# Patient Record
Sex: FEMALE | Race: WHITE | NOT HISPANIC OR LATINO | Employment: OTHER | URBAN - NONMETROPOLITAN AREA
[De-identification: names, ages, dates, MRNs, and addresses within clinical notes are randomized per-mention and may not be internally consistent; named-entity substitution may affect disease eponyms.]

---

## 2017-02-20 ENCOUNTER — HOSPITAL ENCOUNTER (OUTPATIENT)
Dept: MAMMOGRAPHY | Facility: HOSPITAL | Age: 82
Discharge: HOME | End: 2017-02-20
Admitting: NURSE PRACTITIONER

## 2017-02-20 DIAGNOSIS — Z12.31 VISIT FOR SCREENING MAMMOGRAM: ICD-10-CM

## 2017-02-20 PROCEDURE — G0202 SCR MAMMO BI INCL CAD: HCPCS

## 2017-02-20 PROCEDURE — 77063 BREAST TOMOSYNTHESIS BI: CPT | Performed by: RADIOLOGY

## 2017-02-20 PROCEDURE — 77063 BREAST TOMOSYNTHESIS BI: CPT

## 2017-02-20 PROCEDURE — G0202 SCR MAMMO BI INCL CAD: HCPCS | Performed by: RADIOLOGY

## 2018-02-23 ENCOUNTER — CONSULT (OUTPATIENT)
Dept: ONCOLOGY | Facility: CLINIC | Age: 83
End: 2018-02-23

## 2018-02-23 ENCOUNTER — SPECIALTY PHARMACY (OUTPATIENT)
Dept: PHARMACY | Facility: HOSPITAL | Age: 83
End: 2018-02-23

## 2018-02-23 VITALS
HEART RATE: 67 BPM | SYSTOLIC BLOOD PRESSURE: 121 MMHG | BODY MASS INDEX: 32.32 KG/M2 | OXYGEN SATURATION: 94 % | HEIGHT: 65 IN | DIASTOLIC BLOOD PRESSURE: 76 MMHG | WEIGHT: 194 LBS | TEMPERATURE: 98.3 F | RESPIRATION RATE: 20 BRPM

## 2018-02-23 DIAGNOSIS — C15.9 ESOPHAGEAL ADENOCARCINOMA (HCC): Primary | ICD-10-CM

## 2018-02-23 PROCEDURE — 99205 OFFICE O/P NEW HI 60 MIN: CPT | Performed by: INTERNAL MEDICINE

## 2018-02-23 RX ORDER — OMEPRAZOLE 40 MG/1
40 CAPSULE, DELAYED RELEASE ORAL DAILY
COMMUNITY
End: 2019-01-09

## 2018-02-23 RX ORDER — BACLOFEN 10 MG/1
10 TABLET ORAL DAILY
COMMUNITY
End: 2018-03-23

## 2018-02-23 RX ORDER — TRIAMTERENE AND HYDROCHLOROTHIAZIDE 37.5; 25 MG/1; MG/1
1 TABLET ORAL DAILY
Status: ON HOLD | COMMUNITY
End: 2021-01-01

## 2018-02-23 RX ORDER — CAPECITABINE 500 MG/1
1500 TABLET, FILM COATED ORAL 2 TIMES DAILY
Qty: 150 TABLET | Refills: 0 | Status: SHIPPED | OUTPATIENT
Start: 2018-02-23 | End: 2019-01-09

## 2018-02-23 RX ORDER — FOLIC ACID 1 MG/1
1 TABLET ORAL DAILY
COMMUNITY
End: 2018-03-23

## 2018-02-23 RX ORDER — ASPIRIN 81 MG/1
81 TABLET ORAL DAILY
COMMUNITY
End: 2019-11-15

## 2018-02-23 NOTE — PROGRESS NOTES
Name:  Geeta Renee  :  1935  Date:  2018     REFERRING PHYSICIAN  Vishal Mckenzie MD, PhD    PRIMARY CARE PROVIDER  PRISCILLA Hinojosa    REASON FOR CONSULTATION  1. Esophageal adenocarcinoma      CHIEF COMPLAINT  None.    Dear Dr. Mckenzie,    HISTORY OF PRESENT ILLNESS:   Thank you for referring Ms. Renee to our medical oncology clinic. As you are aware, she is a pleasant, 82 y.o., white female with a history of hypertension, arthritis and Addison's esophagus who has been undergoing periodic repeat endoscopic evaluations for the latter since she was first diagnosed in ~. She was again found to have high grade dysplasia in late summer 2017 and was referred back to the [facility] (where she had undergone prior radiofrequency ablations). A repeat local evaluation at  in 2017, unfortunately, was concerning for a component of carcinoma invasive into the submucosa. Imaging at the time (including a PET scan) was consistent with early stage disease, with no definite evidence of either mediastinal node involvement or distant mets. Ultimately, following an evaluation by CT surgery (neither they nor the patient were interested in pursuing an esophagectomy), she was referred to you, closer to her home in [place], [place], to consider treatment with localized radiotherapy. The patient is agreeable to this and is now referred to our clinic to consider including concomitant chemotherapy with the XRT.    INTERIM HISTORY:  Ms. Renee presents to clinic today for initial consultation accompanied by her son. She confirms the above history. She states that, while she has lost a little weight (~5-10 lbs at the most) over the course of the past few months, she does not have any trouble swallowing, her appetite remains the same and she does not have any pain. She overall feels to be in her usual state of health and has no specific complaints.    Past Medical History:   Diagnosis  Date   • Arthritis    • Esophageal cancer    • Hypertension        Past Surgical History:   Procedure Laterality Date   • CHOLECYSTECTOMY         Social History     Social History   • Marital status:      Spouse name: N/A   • Number of children: N/A   • Years of education: N/A     Occupational History   • Not on file.     Social History Main Topics   • Smoking status: Never Smoker   • Smokeless tobacco: Never Used   • Alcohol use No   • Drug use: No   • Sexual activity: Defer     Other Topics Concern   • Not on file     Social History Narrative       Family History   Problem Relation Age of Onset   • Breast cancer Neg Hx        Allergies   Allergen Reactions   • Sulfa Antibiotics Hives       Current Outpatient Prescriptions   Medication Sig Dispense Refill   • AMLODIPINE BESYLATE PO Take 5 mg by mouth Daily.     • aspirin 81 MG EC tablet Take 81 mg by mouth Daily.     • baclofen (LIORESAL) 10 MG tablet Take 10 mg by mouth Daily.     • folic acid (FOLVITE) 1 MG tablet Take 1 mg by mouth Daily.     • methotrexate 2.5 MG tablet Take  by mouth 3 (Three) Doses Each Week. Take Doses 12 (Twelve) Hours Apart.     • omeprazole (priLOSEC) 40 MG capsule Take 40 mg by mouth Daily.     • triamterene-hydrochlorothiazide (MAXZIDE-25) 37.5-25 MG per tablet Take 1 tablet by mouth Daily.     • capecitabine (XELODA) 500 MG chemo tablet Take 3 tablets by mouth 2 (Two) Times a Day. M-F for 5 weeks, during  tablet 0     No current facility-administered medications for this visit.        REVIEW OF SYSTEMS  CONSTITUTIONAL:  No fever, chills or night sweats.  EYES:  No blurry vision, diplopia or other vision changes.  ENT:  No hearing loss, nosebleeds or sore throat.  CARDIOVASCULAR:  No palpitations, arrhythmia, syncopal episodes or edema.  PULMONARY:  No hemoptysis, wheezing, chronic cough or shortness of breath.  GASTROINTESTINAL:  No nausea or vomiting. No constipation or diarrhea. No abdominal pain. Mild (~5-10 lb) weight  "loss over the course of the past several months. No problems with swallowing or PO intake.  GENITOURINARY:  No hematuria, kidney stones or frequent urination.  MUSCULOSKELETAL:  Chronic, mild joint and back pains, stable.  INTEGUMENTARY: No rashes or pruritus.  ENDOCRINE:  No excessive thirst or hot flashes.  HEMATOLOGIC:  No history of free bleeding, spontaneous bleeding or clotting.  IMMUNOLOGIC:  No allergies or frequent infections.  NEUROLOGIC: No numbness, tingling, seizures or weakness.  PSYCHIATRIC:  No anxiety or depression.    PHYSICAL EXAMINATION    /76  Pulse 67  Temp 98.3 °F (36.8 °C) (Oral)   Resp 20  Ht 163.8 cm (64.5\")  Wt 88 kg (194 lb)  SpO2 94%  BMI 32.79 kg/m2    GENERAL:  A well-developed, well-nourished, elderly (but well-appearing), white female in no acute distress.  HEENT:  Pupils equally round and reactive to light.  Extraocular muscles intact.  CARDIOVASCULAR:  Regular rate and rhythm.  No murmurs, gallops or rubs.  LUNGS:  Clear to auscultation bilaterally.  ABDOMEN:  Soft, nontender, nondistended with positive bowel sounds.  EXTREMITIES:  No clubbing, cyanosis or edema bilaterally.  SKIN:  No rashes or petechiae.  NEURO:  Cranial nerves grossly intact.  No focal deficits.  PSYCH:  Alert and oriented x3.    LABORATORY    Lab Results   Component Value Date    WBC 9.0 04/06/2016    HGB 14.0 04/06/2016    HCT 42.5 04/06/2016    MCV 88.0 04/06/2016     04/06/2016    NEUTROABS 6.2 04/06/2016       Lab Results   Component Value Date     07/15/2015    K 3.6 07/15/2015     07/15/2015    CO2 33.8 (H) 07/15/2015    BUN 18 07/15/2015    CREATININE 1.05 04/06/2016    GLUCOSE 109 (H) 07/15/2015    CALCIUM 9.7 07/15/2015    AST 31 (H) 04/06/2016    ALT 28 04/06/2016    ALKPHOS 121 (H) 04/06/2016    BILITOT 0.5 04/06/2016    PROTEINTOT 8.0 04/06/2016    ALBUMIN 4.5 04/06/2016       IMAGING  CT chest, abdomen and pelvis with contrast (11/09/2017):  Impression:  1) Mild, " nonspecific thickening of the distal esophagus which may be related to diagnosis of esophageal cancer. Multiple subcentimeter right paraesophageal lymph nodes are noted.  2) 1.3 mm ground glass nodule in the left upper lobe. No suspicious solid nodules that would suggest metastatic disease.  3) No evidence of metastatic disease to the abdomen or pelvis.  4) Bilobed fusiform AAA measuring up to 4.6 cm beneath the renal arteries.  5) Cardiomegaly with artery atherosclerosis.    NM PET with fusion CT, skull base to mid-thigh (12/07/2017, at ):  Impression:  1) No suspicious focal FDG uptake within the esophagus to suggest a site of invasive malignancy.  2) No suspicious metabolically active mediastinal adenopathy.  3) No suspicious hypermetabolic distal metastasis.  4) Few non-FDG avid groundglass nodules, the largest in the left upper lobe of the lung, unchanged since the most recent comparison CT of the chest of one month prior.    PATHOLOGY  Esophagus, nodules (10/19/2017):  Adenocarcinoma, invasive at least into the muscularis mucosa.    IMPRESSION AND PLAN  Ms. Renee is a 82 y.o., white female with:  Esophageal adenocarcinoma: Diagnosed with clinical stage [id number] disease in late 2017 (although she has not undergone an endoscopic ultrasound). Regardless, even though she is in overall very good health, she is not eligible for an esophagectomy due to her advanced age and comorbidities (and she reiterates today that she would not want one anyway). Therefore, I agree with CT surgery and radiation oncology's recommendations for localized radiotherapy. I also agree that she would be a good candidate for adding concomitant chemotherapy to this treatment plan, and PO capecitabine (Xeloda) is a convenient and fairly well tolerable choice. A Rx for 1500 mg BID M-F throughout the planned ~five-week course of XRT was provided today. She will follow up with radiation oncology, as previously planned, hopefully beginning  Xeloda/XRT within the next two to three weeks. We will see her back in our clinic in ~one month (about one week into her treatment) with a CBC and CMP for a symptom/toxicity check. The patient and her son were in agreement with these plans.    ACO Quality Measures:  a) The patient does not use tobacco products.  b) The patient's BMI is within normal parameters.  c) The current outpatient and discharge medications have been reconciled for the patient.    It is a pleasure to participate in Ms. Renee's care. Please do not hesitate to call with any questions or concerns that you may have.    A total of 60 minutes were spent coordinating this patient’s care in clinic today; more than 50% of this time was spent face-to-face with the patient and her son, reviewing her medical history, discussing the diagnosis and its prognosis and counseling on the current treatment and followup plan. All questions were answered to their satisfaction.    FOLLOW UP  With radiation oncology, as previously planned. Begin ~5-week course of concomitant XRT and Xeloda (patient dose: 1500 mg BID M-F) within the next 2-3 weeks (Rx for the latter provided today). Return to our clinic in ~1 month (about 1 week after starting Xeloda/XRT) with a CBC and CMP.        This document was electronically signed by BERTHA Berrios MD February 23, 2018 3:16 PM      CC: Vishal Mckenzie MD, PhD   PRISCILLA Hinojosa

## 2018-02-27 RX ORDER — ONDANSETRON 4 MG/1
4 TABLET, FILM COATED ORAL EVERY 8 HOURS PRN
Qty: 30 TABLET | Refills: 1 | Status: SHIPPED | OUTPATIENT
Start: 2018-02-27 | End: 2019-01-09

## 2018-03-08 NOTE — PROGRESS NOTES
Specialty Pharmacy Note      Name:  Geeta Renee  :  1935  Date:  2018         Past Medical History:   Diagnosis Date   • Arthritis    • Esophageal cancer    • Hypertension        Past Surgical History:   Procedure Laterality Date   • CHOLECYSTECTOMY         Social History     Social History   • Marital status:      Spouse name: N/A   • Number of children: N/A   • Years of education: N/A     Occupational History   • Not on file.     Social History Main Topics   • Smoking status: Never Smoker   • Smokeless tobacco: Never Used   • Alcohol use No   • Drug use: No   • Sexual activity: Defer     Other Topics Concern   • Not on file     Social History Narrative       Family History   Problem Relation Age of Onset   • Breast cancer Neg Hx        Allergies   Allergen Reactions   • Sulfa Antibiotics Hives       Current Outpatient Prescriptions   Medication Sig Dispense Refill   • AMLODIPINE BESYLATE PO Take 5 mg by mouth Daily.     • aspirin 81 MG EC tablet Take 81 mg by mouth Daily.     • baclofen (LIORESAL) 10 MG tablet Take 10 mg by mouth Daily.     • capecitabine (XELODA) 500 MG chemo tablet Take 3 tablets by mouth 2 (Two) Times a Day. M-F for 5 weeks, during  tablet 0   • folic acid (FOLVITE) 1 MG tablet Take 1 mg by mouth Daily.     • methotrexate 2.5 MG tablet Take  by mouth 3 (Three) Doses Each Week. Take Doses 12 (Twelve) Hours Apart.     • omeprazole (priLOSEC) 40 MG capsule Take 40 mg by mouth Daily.     • ondansetron (ZOFRAN) 4 MG tablet Take 1 tablet by mouth Every 8 (Eight) Hours As Needed for Nausea or Vomiting. 30 tablet 1   • triamterene-hydrochlorothiazide (MAXZIDE-25) 37.5-25 MG per tablet Take 1 tablet by mouth Daily.       No current facility-administered medications for this visit.          LABORATORY:  Last Urine Toxicity     LAST URINE TOXICITY RESULTS Latest Ref Rng & Units 2015 6/10/2014    CREATININE UR mg/dL 114.8 171.5          Evaluation and Follow  Up:  Specialty pharmacy is evaluating patient due to new treatment with Xeloda (capecitabine). Patient is to receive 1500 mg twice daily Monday-Friday for 5 weeks with radiation. Prescription was processed with no issues. Patient was notified ready to be dispensed. She was counseled regarding administration, common/serious side effects, etc. She understood, knows not to start until radiation starts, and had all questions answered. She requested delivery, which we sent overnight to patient on 3/1/2018. No other issues identified at this time. Scheduled patient for next specialty pharmacy visit on 3/22/2018.

## 2018-03-23 ENCOUNTER — OFFICE VISIT (OUTPATIENT)
Dept: ONCOLOGY | Facility: CLINIC | Age: 83
End: 2018-03-23

## 2018-03-23 VITALS
SYSTOLIC BLOOD PRESSURE: 113 MMHG | RESPIRATION RATE: 20 BRPM | WEIGHT: 191.8 LBS | HEART RATE: 87 BPM | BODY MASS INDEX: 32.41 KG/M2 | DIASTOLIC BLOOD PRESSURE: 74 MMHG | TEMPERATURE: 98.2 F | OXYGEN SATURATION: 96 %

## 2018-03-23 DIAGNOSIS — C15.9 ESOPHAGEAL ADENOCARCINOMA (HCC): Primary | ICD-10-CM

## 2018-03-23 LAB
ALBUMIN SERPL-MCNC: 4.1 G/DL (ref 3.4–4.8)
ALBUMIN/GLOB SERPL: 1.3 G/DL (ref 1.5–2.5)
ALP SERPL-CCNC: 89 U/L (ref 35–104)
ALT SERPL W P-5'-P-CCNC: 18 U/L (ref 10–36)
ANION GAP SERPL CALCULATED.3IONS-SCNC: 6.8 MMOL/L (ref 3.6–11.2)
AST SERPL-CCNC: 21 U/L (ref 10–30)
BASOPHILS # BLD AUTO: 0.01 10*3/MM3 (ref 0–0.3)
BASOPHILS NFR BLD AUTO: 0.1 % (ref 0–2)
BILIRUB SERPL-MCNC: 0.7 MG/DL (ref 0.2–1.8)
BUN BLD-MCNC: 28 MG/DL (ref 7–21)
BUN/CREAT SERPL: 24.8 (ref 7–25)
CALCIUM SPEC-SCNC: 9.8 MG/DL (ref 7.7–10)
CHLORIDE SERPL-SCNC: 102 MMOL/L (ref 99–112)
CO2 SERPL-SCNC: 28.2 MMOL/L (ref 24.3–31.9)
CREAT BLD-MCNC: 1.13 MG/DL (ref 0.43–1.29)
DEPRECATED RDW RBC AUTO: 45.9 FL (ref 37–54)
EOSINOPHIL # BLD AUTO: 0.08 10*3/MM3 (ref 0–0.7)
EOSINOPHIL NFR BLD AUTO: 1.1 % (ref 0–7)
ERYTHROCYTE [DISTWIDTH] IN BLOOD BY AUTOMATED COUNT: 15.4 % (ref 11.5–14.5)
GFR SERPL CREATININE-BSD FRML MDRD: 46 ML/MIN/1.73
GLOBULIN UR ELPH-MCNC: 3.1 GM/DL
GLUCOSE BLD-MCNC: 115 MG/DL (ref 70–110)
HCT VFR BLD AUTO: 42.9 % (ref 37–47)
HGB BLD-MCNC: 14.2 G/DL (ref 12–16)
IMM GRANULOCYTES # BLD: 0.02 10*3/MM3 (ref 0–0.03)
IMM GRANULOCYTES NFR BLD: 0.3 % (ref 0–0.5)
LYMPHOCYTES # BLD AUTO: 0.73 10*3/MM3 (ref 1–3)
LYMPHOCYTES NFR BLD AUTO: 9.9 % (ref 16–46)
MCH RBC QN AUTO: 29.2 PG (ref 27–33)
MCHC RBC AUTO-ENTMCNC: 33.1 G/DL (ref 33–37)
MCV RBC AUTO: 88.1 FL (ref 80–94)
MONOCYTES # BLD AUTO: 0.64 10*3/MM3 (ref 0.1–0.9)
MONOCYTES NFR BLD AUTO: 8.7 % (ref 0–12)
NEUTROPHILS # BLD AUTO: 5.86 10*3/MM3 (ref 1.4–6.5)
NEUTROPHILS NFR BLD AUTO: 79.9 % (ref 40–75)
OSMOLALITY SERPL CALC.SUM OF ELEC: 280.2 MOSM/KG (ref 273–305)
PLATELET # BLD AUTO: 222 10*3/MM3 (ref 130–400)
PMV BLD AUTO: 9.9 FL (ref 6–10)
POTASSIUM BLD-SCNC: 3.6 MMOL/L (ref 3.5–5.3)
PROT SERPL-MCNC: 7.2 G/DL (ref 6–8)
RBC # BLD AUTO: 4.87 10*6/MM3 (ref 4.2–5.4)
SODIUM BLD-SCNC: 137 MMOL/L (ref 135–153)
WBC NRBC COR # BLD: 7.34 10*3/MM3 (ref 4.5–12.5)

## 2018-03-23 PROCEDURE — 99214 OFFICE O/P EST MOD 30 MIN: CPT | Performed by: INTERNAL MEDICINE

## 2018-03-23 PROCEDURE — 80053 COMPREHEN METABOLIC PANEL: CPT | Performed by: INTERNAL MEDICINE

## 2018-03-23 PROCEDURE — 85025 COMPLETE CBC W/AUTO DIFF WBC: CPT | Performed by: INTERNAL MEDICINE

## 2018-03-23 PROCEDURE — 36415 COLL VENOUS BLD VENIPUNCTURE: CPT | Performed by: INTERNAL MEDICINE

## 2018-03-23 NOTE — PROGRESS NOTES
Name:  Geeta Renee  :  1935  Date:  3/23/2018     REFERRING PHYSICIAN  Vishal Mckenzie MD, PhD    PRIMARY CARE PROVIDER  PRISCILLA Hinojosa    REASON FOR CONSULTATION  1. Esophageal adenocarcinoma      CHIEF COMPLAINT  Mildly increased difficulty swallowing since starting Xeloda/XRT in early 2018.    Dear Dr. Mckenzie,    HISTORY OF PRESENT ILLNESS:   I saw Ms. Renee in follow up today in our medical oncology clinic. As you are aware, she is a pleasant, 82 y.o., white female with a history of hypertension, arthritis and Addison's esophagus who has been undergoing periodic repeat endoscopic evaluations for the latter since she was first diagnosed in ~. She was again found to have high grade dysplasia in late summer 2017 and was referred back to the [facility] (where she had undergone prior radiofrequency ablations). A repeat local evaluation at  in 2017, unfortunately, was concerning for a component of carcinoma invasive into the submucosa. Imaging at the time (including a PET scan) was consistent with early stage disease, with no definite evidence of either mediastinal node involvement or distant mets. Ultimately, following an evaluation by CT surgery (neither they nor the patient were interested in pursuing an esophagectomy), she was referred to you, closer to her home in [place], [place], to consider treatment with localized radiotherapy. The patient was agreeable to this and was subsequently referred to our clinic to consider providing concomitant chemotherapy with the XRT.    INTERIM HISTORY:  Ms. Renee presents to clinic today for follow up again accompanied by her son. She began concomitant Xeloda/XRT in early 2018, has received ~twelve (12) doses of the latter to date (with a total of twenty-eight planned) and is overall tolerating this regimen well so far. Since starting it, she has gotten a little more fatigued. She has also had some  increased difficulty swallowing, particularly when it comes to the Xeloda tablets. Both of these issues currently remain mild and tolerable. She otherwise continues to do overall well and has no other specific complaints.    Past Medical History:   Diagnosis Date   • Arthritis    • Esophageal cancer    • Hypertension        Past Surgical History:   Procedure Laterality Date   • CHOLECYSTECTOMY         Social History     Social History   • Marital status:      Spouse name: N/A   • Number of children: N/A   • Years of education: N/A     Occupational History   • Not on file.     Social History Main Topics   • Smoking status: Never Smoker   • Smokeless tobacco: Never Used   • Alcohol use No   • Drug use: No   • Sexual activity: Defer     Other Topics Concern   • Not on file     Social History Narrative   • No narrative on file       Family History   Problem Relation Age of Onset   • Breast cancer Neg Hx        Allergies   Allergen Reactions   • Sulfa Antibiotics Hives       Current Outpatient Prescriptions   Medication Sig Dispense Refill   • AMLODIPINE BESYLATE PO Take 5 mg by mouth Daily.     • aspirin 81 MG EC tablet Take 81 mg by mouth Daily.     • capecitabine (XELODA) 500 MG chemo tablet Take 3 tablets by mouth 2 (Two) Times a Day. M-F for 5 weeks, during  tablet 0   • magic mouthwash oral suspension Swish and spit Every 4 (Four) Hours As Needed.     • omeprazole (priLOSEC) 40 MG capsule Take 40 mg by mouth Daily.     • ondansetron (ZOFRAN) 4 MG tablet Take 1 tablet by mouth Every 8 (Eight) Hours As Needed for Nausea or Vomiting. 30 tablet 1   • triamterene-hydrochlorothiazide (MAXZIDE-25) 37.5-25 MG per tablet Take 1 tablet by mouth Daily.       No current facility-administered medications for this visit.        REVIEW OF SYSTEMS  CONSTITUTIONAL:  No fever, chills or night sweats. Slightly increased fatigue since starting Xeloda/XRT.  EYES:  No blurry vision, diplopia or other vision changes.  ENT:   No hearing loss, nosebleeds or sore throat.  CARDIOVASCULAR:  No palpitations, arrhythmia, syncopal episodes or edema.  PULMONARY:  No hemoptysis, wheezing, chronic cough or shortness of breath.  GASTROINTESTINAL:  No nausea or vomiting. No constipation or diarrhea. No abdominal pain. Mild (~5-10 lb) weight loss over the course of the past several months. Slightly increased problems swallowing since starting Xeloda/XRT, as per the HPI above.  GENITOURINARY:  No hematuria, kidney stones or frequent urination.  MUSCULOSKELETAL:  Chronic, mild joint and back pains, stable.  INTEGUMENTARY: No rashes or pruritus.  ENDOCRINE:  No excessive thirst or hot flashes.  HEMATOLOGIC:  No history of free bleeding, spontaneous bleeding or clotting.  IMMUNOLOGIC:  No allergies or frequent infections.  NEUROLOGIC: No numbness, tingling, seizures or weakness.  PSYCHIATRIC:  No anxiety or depression.    PHYSICAL EXAMINATION    /74   Pulse 87   Temp 98.2 °F (36.8 °C) (Oral)   Resp 20   Wt 87 kg (191 lb 12.8 oz)   SpO2 96%   BMI 32.41 kg/m²     GENERAL:  A well-developed, well-nourished, elderly (but well-appearing), white female in no acute distress.  HEENT:  Pupils equally round and reactive to light.  Extraocular muscles intact.  CARDIOVASCULAR:  Regular rate and rhythm.  No murmurs, gallops or rubs.  LUNGS:  Clear to auscultation bilaterally.  ABDOMEN:  Soft, nontender, nondistended with positive bowel sounds.  EXTREMITIES:  No clubbing, cyanosis or edema bilaterally.  SKIN:  No rashes or petechiae.  NEURO:  Cranial nerves grossly intact.  No focal deficits.  PSYCH:  Alert and oriented x3.    LABORATORY    Lab Results   Component Value Date    WBC 7.34 03/23/2018    HGB 14.2 03/23/2018    HCT 42.9 03/23/2018    MCV 88.1 03/23/2018     03/23/2018    NEUTROABS 5.86 03/23/2018       Lab Results   Component Value Date     03/23/2018    K 3.6 03/23/2018     03/23/2018    CO2 28.2 03/23/2018    BUN 28 (H)  03/23/2018    CREATININE 1.13 03/23/2018    GLUCOSE 115 (H) 03/23/2018    CALCIUM 9.8 03/23/2018    AST 21 03/23/2018    ALT 18 03/23/2018    ALKPHOS 89 03/23/2018    BILITOT 0.7 03/23/2018    PROTEINTOT 7.2 03/23/2018    ALBUMIN 4.10 03/23/2018       IMAGING  CT chest, abdomen and pelvis with contrast (11/09/2017):  Impression:  1) Mild, nonspecific thickening of the distal esophagus which may be related to diagnosis of esophageal cancer. Multiple subcentimeter right paraesophageal lymph nodes are noted.  2) 1.3 mm ground glass nodule in the left upper lobe. No suspicious solid nodules that would suggest metastatic disease.  3) No evidence of metastatic disease to the abdomen or pelvis.  4) Bilobed fusiform AAA measuring up to 4.6 cm beneath the renal arteries.  5) Cardiomegaly with artery atherosclerosis.    NM PET with fusion CT, skull base to mid-thigh (12/07/2017, at ):  Impression:  1) No suspicious focal FDG uptake within the esophagus to suggest a site of invasive malignancy.  2) No suspicious metabolically active mediastinal adenopathy.  3) No suspicious hypermetabolic distal metastasis.  4) Few non-FDG avid groundglass nodules, the largest in the left upper lobe of the lung, unchanged since the most recent comparison CT of the chest of one month prior.    PATHOLOGY  Esophagus, nodules (10/19/2017):  Adenocarcinoma, invasive at least into the muscularis mucosa.    IMPRESSION AND PLAN  Ms. Renee is a 82 y.o., white female with:  1. Esophageal adenocarcinoma: Diagnosed with clinical stage [id number] disease in late 2017 (although she has not undergone an endoscopic ultrasound). Regardless, even though she is in overall very good health, she is not eligible for an esophagectomy due to her advanced age and comorbidities (and she has stated that she would not want one anyway). Therefore, I agreed with CT surgery and radiation oncology's initial recommendations for localized radiotherapy. I also agreed (with  rad/onc) that she would be a good candidate for adding concomitant chemotherapy to this treatment plan, and PO capecitabine (Xeloda) is a convenient and fairly well tolerable choice. A Rx for 1500 mg BID M-F throughout the planned ~twenty-eight session course of XRT was provided. She began this treatment regimen in early March 2018, has completed twelve (12) doses of XRT to date and is overall tolerating both it and the Xeloda fairly well so far (she has developed some mild issues with swallowing since starting her treatment, which will hopefully remain manageable; see below). We will proceed with this current treatment plan and see her back in clinic in two weeks with a CBC and CMP for another symptom/toxicity check.  2. Dysphagia: The patient had no such issues prior to starting Xeloda/XRT; however, today, a little over two weeks into her therapy, she reports some mild, increasing difficulty with swallowing, particularly with her pills. She was instructed to (carefully) start cutting her Xeloda in half. She is aware that, if this issue continues to worsen and her nutrition is compromised, (temporary) placement of a PEG tube may need to be considered. We will see her back in clinic in two weeks for another symptom/toxicity check. Continue to monitor.  The patient and her son were in agreement with these plans.    ACO Quality Measures:  a) The patient does not use tobacco products.  b) The patient's BMI is within normal parameters.  c) The current outpatient and discharge medications have been reconciled for the patient.    It is a pleasure to participate in Ms. Renee's care. Please do not hesitate to call with any questions or concerns that you may have.    A total of 30 minutes were spent coordinating this patient’s care in clinic today; more than 50% of this time was spent face-to-face with the patient and her son, reviewing her interim medical history and counseling on the current treatment and followup  plan. All questions were answered to their satisfaction.    FOLLOW UP  With radiation oncology, for ongoing, localized XRT, as previously planned. Proceed with concomitant PO Xeloda as planned (patient dose: 1500 mg BID M-F). Return to our clinic in ~2 weeks with a CBC and CMP.        This document was electronically signed by BERTHA Berrios MD March 23, 2018 2:24 PM      CC: Vishal Mckenzie MD, PhD   PRISCILLA Hinojosa

## 2018-04-06 ENCOUNTER — OFFICE VISIT (OUTPATIENT)
Dept: ONCOLOGY | Facility: CLINIC | Age: 83
End: 2018-04-06

## 2018-04-06 VITALS
RESPIRATION RATE: 18 BRPM | TEMPERATURE: 97.2 F | SYSTOLIC BLOOD PRESSURE: 119 MMHG | OXYGEN SATURATION: 95 % | DIASTOLIC BLOOD PRESSURE: 75 MMHG | BODY MASS INDEX: 31.18 KG/M2 | HEART RATE: 71 BPM | WEIGHT: 184.5 LBS

## 2018-04-06 DIAGNOSIS — C15.9 ESOPHAGEAL ADENOCARCINOMA (HCC): Primary | ICD-10-CM

## 2018-04-06 LAB
ALBUMIN SERPL-MCNC: 4 G/DL (ref 3.4–4.8)
ALBUMIN/GLOB SERPL: 1.3 G/DL (ref 1.5–2.5)
ALP SERPL-CCNC: 99 U/L (ref 35–104)
ALT SERPL W P-5'-P-CCNC: 21 U/L (ref 10–36)
ANION GAP SERPL CALCULATED.3IONS-SCNC: 8.2 MMOL/L (ref 3.6–11.2)
AST SERPL-CCNC: 22 U/L (ref 10–30)
BASOPHILS # BLD AUTO: 0.01 10*3/MM3 (ref 0–0.3)
BASOPHILS NFR BLD AUTO: 0.2 % (ref 0–2)
BILIRUB SERPL-MCNC: 0.8 MG/DL (ref 0.2–1.8)
BUN BLD-MCNC: 19 MG/DL (ref 7–21)
BUN/CREAT SERPL: 17.3 (ref 7–25)
CALCIUM SPEC-SCNC: 9.8 MG/DL (ref 7.7–10)
CHLORIDE SERPL-SCNC: 102 MMOL/L (ref 99–112)
CO2 SERPL-SCNC: 26.8 MMOL/L (ref 24.3–31.9)
CREAT BLD-MCNC: 1.1 MG/DL (ref 0.43–1.29)
DEPRECATED RDW RBC AUTO: 56.9 FL (ref 37–54)
EOSINOPHIL # BLD AUTO: 0.05 10*3/MM3 (ref 0–0.7)
EOSINOPHIL NFR BLD AUTO: 0.9 % (ref 0–7)
ERYTHROCYTE [DISTWIDTH] IN BLOOD BY AUTOMATED COUNT: 18.3 % (ref 11.5–14.5)
GFR SERPL CREATININE-BSD FRML MDRD: 48 ML/MIN/1.73
GLOBULIN UR ELPH-MCNC: 3 GM/DL
GLUCOSE BLD-MCNC: 119 MG/DL (ref 70–110)
HCT VFR BLD AUTO: 41.6 % (ref 37–47)
HGB BLD-MCNC: 14.3 G/DL (ref 12–16)
IMM GRANULOCYTES # BLD: 0.02 10*3/MM3 (ref 0–0.03)
IMM GRANULOCYTES NFR BLD: 0.3 % (ref 0–0.5)
LYMPHOCYTES # BLD AUTO: 0.52 10*3/MM3 (ref 1–3)
LYMPHOCYTES NFR BLD AUTO: 9.1 % (ref 16–46)
MCH RBC QN AUTO: 30.8 PG (ref 27–33)
MCHC RBC AUTO-ENTMCNC: 34.4 G/DL (ref 33–37)
MCV RBC AUTO: 89.5 FL (ref 80–94)
MONOCYTES # BLD AUTO: 0.61 10*3/MM3 (ref 0.1–0.9)
MONOCYTES NFR BLD AUTO: 10.6 % (ref 0–12)
NEUTROPHILS # BLD AUTO: 4.53 10*3/MM3 (ref 1.4–6.5)
NEUTROPHILS NFR BLD AUTO: 78.9 % (ref 40–75)
OSMOLALITY SERPL CALC.SUM OF ELEC: 277.2 MOSM/KG (ref 273–305)
PLATELET # BLD AUTO: 150 10*3/MM3 (ref 130–400)
PMV BLD AUTO: 9.9 FL (ref 6–10)
POTASSIUM BLD-SCNC: 3.5 MMOL/L (ref 3.5–5.3)
PROT SERPL-MCNC: 7 G/DL (ref 6–8)
RBC # BLD AUTO: 4.65 10*6/MM3 (ref 4.2–5.4)
SODIUM BLD-SCNC: 137 MMOL/L (ref 135–153)
WBC NRBC COR # BLD: 5.74 10*3/MM3 (ref 4.5–12.5)

## 2018-04-06 PROCEDURE — 99214 OFFICE O/P EST MOD 30 MIN: CPT | Performed by: INTERNAL MEDICINE

## 2018-04-06 PROCEDURE — 85025 COMPLETE CBC W/AUTO DIFF WBC: CPT | Performed by: INTERNAL MEDICINE

## 2018-04-06 PROCEDURE — 80053 COMPREHEN METABOLIC PANEL: CPT | Performed by: INTERNAL MEDICINE

## 2018-04-06 NOTE — PROGRESS NOTES
Name:  Geeta Renee  :  1935  Date:  2018     REFERRING PHYSICIAN  Vishal Mckenzie MD, PhD    PRIMARY CARE PROVIDER  PRISCILLA Hinojosa    REASON FOR CONSULTATION  1. Esophageal adenocarcinoma      CHIEF COMPLAINT  Mildly increased difficulty swallowing since starting Xeloda/XRT in early 2018.    Dear Dr. Mckenzie,    HISTORY OF PRESENT ILLNESS:   I saw Ms. Renee in follow up today in our medical oncology clinic. As you are aware, she is a pleasant, 82 y.o., white female with a history of hypertension, arthritis and Addison's esophagus who has been undergoing periodic repeat endoscopic evaluations for the latter since she was first diagnosed in ~. She was again found to have high grade dysplasia in late summer 2017 and was referred back to the [facility] (where she had undergone prior radiofrequency ablations). A repeat local evaluation at  in 2017, unfortunately, was concerning for a component of carcinoma invasive into the submucosa. Imaging at the time (including a PET scan) was consistent with early stage disease, with no definite evidence of either mediastinal node involvement or distant mets. Ultimately, following an evaluation by CT surgery (neither they nor the patient were interested in pursuing an esophagectomy), she was referred to you, closer to her home in [place], [place], to consider treatment with localized radiotherapy. The patient was agreeable to this and was subsequently referred to our clinic to consider providing concomitant chemotherapy with the XRT.    INTERIM HISTORY:  Ms. Renee presents to clinic today for follow up again accompanied by her son. She began concomitant Xeloda/XRT in early 2018, has received ~twenty-seven (27) doses of the latter to date (she has six planned sessions remaining) and is overall still tolerating this regimen well so far. Since starting it, she has gotten a little more fatigued. She has also had  some increased difficulty swallowing, particularly when it comes to the Xeloda tablets. Both of these issues currently remain mild and tolerable (she has been crushing the Xeloda and mixing it in with soft foods, such as mashed potatoes). She has lost about ten pounds since starting her treatment (which is weight she wanted to lose anyway). She otherwise continues to do overall well and has no other specific complaints.    Past Medical History:   Diagnosis Date   • Arthritis    • Esophageal cancer    • Hypertension        Past Surgical History:   Procedure Laterality Date   • CHOLECYSTECTOMY         Social History     Social History   • Marital status:      Spouse name: N/A   • Number of children: N/A   • Years of education: N/A     Occupational History   • Not on file.     Social History Main Topics   • Smoking status: Never Smoker   • Smokeless tobacco: Never Used   • Alcohol use No   • Drug use: No   • Sexual activity: Defer     Other Topics Concern   • Not on file     Social History Narrative   • No narrative on file       Family History   Problem Relation Age of Onset   • Breast cancer Neg Hx        Allergies   Allergen Reactions   • Sulfa Antibiotics Hives       Current Outpatient Prescriptions   Medication Sig Dispense Refill   • AMLODIPINE BESYLATE PO Take 5 mg by mouth Daily.     • aspirin 81 MG EC tablet Take 81 mg by mouth Daily.     • capecitabine (XELODA) 500 MG chemo tablet Take 3 tablets by mouth 2 (Two) Times a Day. M-F for 5 weeks, during  tablet 0   • magic mouthwash oral suspension Swish and spit Every 4 (Four) Hours As Needed.     • omeprazole (priLOSEC) 40 MG capsule Take 40 mg by mouth Daily.     • ondansetron (ZOFRAN) 4 MG tablet Take 1 tablet by mouth Every 8 (Eight) Hours As Needed for Nausea or Vomiting. 30 tablet 1   • triamterene-hydrochlorothiazide (MAXZIDE-25) 37.5-25 MG per tablet Take 1 tablet by mouth Daily.       No current facility-administered medications for this  visit.        REVIEW OF SYSTEMS  CONSTITUTIONAL:  No fever, chills or night sweats. Slightly increased fatigue since starting Xeloda/XRT.  EYES:  No blurry vision, diplopia or other vision changes.  ENT:  No hearing loss, nosebleeds or sore throat.  CARDIOVASCULAR:  No palpitations, arrhythmia, syncopal episodes or edema.  PULMONARY:  No hemoptysis, wheezing, chronic cough or shortness of breath.  GASTROINTESTINAL:  No nausea or vomiting. No constipation or diarrhea. No abdominal pain. Mild (~5-10 lb) weight loss over the course of the past several months. Increased, but still manageable, problems with swallowing since starting Xeloda/XRT, as per the HPI above.  GENITOURINARY:  No hematuria, kidney stones or frequent urination.  MUSCULOSKELETAL: Chronic, mild joint and back pains, stable.  INTEGUMENTARY: No rashes or pruritus.  ENDOCRINE:  No excessive thirst or hot flashes.  HEMATOLOGIC:  No history of free bleeding, spontaneous bleeding or clotting.  IMMUNOLOGIC:  No allergies or frequent infections.  NEUROLOGIC: No numbness, tingling, seizures or weakness.  PSYCHIATRIC:  No anxiety or depression.    PHYSICAL EXAMINATION    /75   Pulse 71   Temp 97.2 °F (36.2 °C) (Oral)   Resp 18   Wt 83.7 kg (184 lb 8 oz)   SpO2 95%   BMI 31.18 kg/m²     GENERAL:  A well-developed, well-nourished, elderly (but well-appearing), white female in no acute distress.  HEENT:  Pupils equally round and reactive to light.  Extraocular muscles intact.  CARDIOVASCULAR:  Regular rate and rhythm.  No murmurs, gallops or rubs.  LUNGS:  Clear to auscultation bilaterally.  ABDOMEN:  Soft, nontender, nondistended with positive bowel sounds.  EXTREMITIES:  No clubbing, cyanosis or edema bilaterally.  SKIN:  No rashes or petechiae.  NEURO:  Cranial nerves grossly intact.  No focal deficits.  PSYCH:  Alert and oriented x3.    LABORATORY    Lab Results   Component Value Date    WBC 7.34 03/23/2018    HGB 14.2 03/23/2018    HCT 42.9  03/23/2018    MCV 88.1 03/23/2018     03/23/2018    NEUTROABS 5.86 03/23/2018       Lab Results   Component Value Date     03/23/2018    K 3.6 03/23/2018     03/23/2018    CO2 28.2 03/23/2018    BUN 28 (H) 03/23/2018    CREATININE 1.13 03/23/2018    GLUCOSE 115 (H) 03/23/2018    CALCIUM 9.8 03/23/2018    AST 21 03/23/2018    ALT 18 03/23/2018    ALKPHOS 89 03/23/2018    BILITOT 0.7 03/23/2018    PROTEINTOT 7.2 03/23/2018    ALBUMIN 4.10 03/23/2018       IMAGING  CT chest, abdomen and pelvis with contrast (11/09/2017):  Impression:  1) Mild, nonspecific thickening of the distal esophagus which may be related to diagnosis of esophageal cancer. Multiple subcentimeter right paraesophageal lymph nodes are noted.  2) 1.3 mm ground glass nodule in the left upper lobe. No suspicious solid nodules that would suggest metastatic disease.  3) No evidence of metastatic disease to the abdomen or pelvis.  4) Bilobed fusiform AAA measuring up to 4.6 cm beneath the renal arteries.  5) Cardiomegaly with artery atherosclerosis.    NM PET with fusion CT, skull base to mid-thigh (12/07/2017, at ):  Impression:  1) No suspicious focal FDG uptake within the esophagus to suggest a site of invasive malignancy.  2) No suspicious metabolically active mediastinal adenopathy.  3) No suspicious hypermetabolic distal metastasis.  4) Few non-FDG avid groundglass nodules, the largest in the left upper lobe of the lung, unchanged since the most recent comparison CT of the chest of one month prior.    PATHOLOGY  Esophagus, nodules (10/19/2017):  Adenocarcinoma, invasive at least into the muscularis mucosa.    IMPRESSION AND PLAN  Ms. Renee is a 82 y.o., white female with:  1. Esophageal adenocarcinoma: Diagnosed with clinical stage [id number] disease in late 2017 (although she has not undergone an endoscopic ultrasound). Regardless, even though she is in overall very good health, she is not eligible for an esophagectomy due to  her advanced age and comorbidities (and she has stated that she would not want one anyway). Therefore, I agreed with CT surgery and radiation oncology's initial recommendations for localized radiotherapy. I also agreed (with rad/onc) that she would be a good candidate for adding concomitant chemotherapy to this treatment plan, and PO capecitabine (Xeloda) is a convenient and fairly well tolerable choice. A Rx for 1500 mg BID M-F throughout the planned course of localized XRT was provided. She began this treatment regimen in early March 2018, has completed ~twenty-seven (27) doses of XRT to date and is overall still tolerating both it and the Xeloda fairly well so far (she has developed some mild issues with swallowing since starting her treatment, which will hopefully continue to remain manageable; see below). We will proceed with this current treatment plan (she only has ~six radiation sessions left) and see her back in clinic in one month, a few weeks after the completion of her Xeloda/XRT, with a CBC and CMP.  2. Dysphagia: The patient had no such issues prior to starting Xeloda/XRT; however, since starting this regimen, she has been experiencing some mild, increasing difficulty with swallowing, particularly with her pills. She has been crushing her Xeloda into soft foods. This issue will hopefully remain manageable throughout the remainder of her treatment plan (she has six sessions of XRT left). We will see her back in clinic in one month with a CBC and CMP.  The patient and her son were in agreement with these plans.    ACO Quality Measures:  a) The patient does not use tobacco products.  b) The patient's BMI is within normal parameters.  c) The current outpatient and discharge medications have been reconciled for the patient.    It is a pleasure to participate in Ms. Renee's care. Please do not hesitate to call with any questions or concerns that you may have.    A total of 30 minutes were spent  coordinating this patient’s care in clinic today; more than 50% of this time was spent face-to-face with the patient and her son, reviewing her interim medical history and counseling on the current treatment and followup plan. All questions were answered to their satisfaction.    FOLLOW UP  With radiation oncology, for ongoing, localized XRT, as previously planned. Proceed with concomitant PO Xeloda as planned (patient dose: 1500 mg BID M-F). Return to our clinic in 1 month with a CBC and CMP.        This document was electronically signed by BERTHA Berrios MD April 6, 2018 11:15 AM      CC: Vishal Mckenzie MD, PhD   Tracy Harris, APRN

## 2018-05-07 ENCOUNTER — LAB (OUTPATIENT)
Dept: ONCOLOGY | Facility: CLINIC | Age: 83
End: 2018-05-07

## 2018-05-07 ENCOUNTER — OFFICE VISIT (OUTPATIENT)
Dept: ONCOLOGY | Facility: CLINIC | Age: 83
End: 2018-05-07

## 2018-05-07 VITALS
DIASTOLIC BLOOD PRESSURE: 78 MMHG | BODY MASS INDEX: 31.5 KG/M2 | WEIGHT: 186.4 LBS | OXYGEN SATURATION: 99 % | TEMPERATURE: 98 F | SYSTOLIC BLOOD PRESSURE: 123 MMHG | HEART RATE: 122 BPM | RESPIRATION RATE: 18 BRPM

## 2018-05-07 DIAGNOSIS — C15.9 ESOPHAGEAL ADENOCARCINOMA (HCC): ICD-10-CM

## 2018-05-07 DIAGNOSIS — R13.10 DYSPHAGIA, UNSPECIFIED TYPE: ICD-10-CM

## 2018-05-07 LAB
ALBUMIN SERPL-MCNC: 3.8 G/DL (ref 3.4–4.8)
ALBUMIN/GLOB SERPL: 1.3 G/DL (ref 1.5–2.5)
ALP SERPL-CCNC: 105 U/L (ref 35–104)
ALT SERPL W P-5'-P-CCNC: 19 U/L (ref 10–36)
ANION GAP SERPL CALCULATED.3IONS-SCNC: 8.8 MMOL/L (ref 3.6–11.2)
AST SERPL-CCNC: 26 U/L (ref 10–30)
BASOPHILS # BLD AUTO: 0.04 10*3/MM3 (ref 0–0.3)
BASOPHILS NFR BLD AUTO: 0.5 % (ref 0–2)
BILIRUB SERPL-MCNC: 0.5 MG/DL (ref 0.2–1.8)
BUN BLD-MCNC: 11 MG/DL (ref 7–21)
BUN/CREAT SERPL: 12.9 (ref 7–25)
CALCIUM SPEC-SCNC: 9.3 MG/DL (ref 7.7–10)
CHLORIDE SERPL-SCNC: 108 MMOL/L (ref 99–112)
CO2 SERPL-SCNC: 23.2 MMOL/L (ref 24.3–31.9)
CREAT BLD-MCNC: 0.85 MG/DL (ref 0.43–1.29)
DEPRECATED RDW RBC AUTO: 57.6 FL (ref 37–54)
EOSINOPHIL # BLD AUTO: 0.05 10*3/MM3 (ref 0–0.7)
EOSINOPHIL NFR BLD AUTO: 0.6 % (ref 0–7)
ERYTHROCYTE [DISTWIDTH] IN BLOOD BY AUTOMATED COUNT: 17.4 % (ref 11.5–14.5)
GFR SERPL CREATININE-BSD FRML MDRD: 64 ML/MIN/1.73
GLOBULIN UR ELPH-MCNC: 3 GM/DL
GLUCOSE BLD-MCNC: 102 MG/DL (ref 70–110)
HCT VFR BLD AUTO: 39.2 % (ref 37–47)
HGB BLD-MCNC: 13 G/DL (ref 12–16)
IMM GRANULOCYTES # BLD: 0.04 10*3/MM3 (ref 0–0.03)
IMM GRANULOCYTES NFR BLD: 0.5 % (ref 0–0.5)
LYMPHOCYTES # BLD AUTO: 3.7 10*3/MM3 (ref 1–3)
LYMPHOCYTES NFR BLD AUTO: 48 % (ref 16–46)
MCH RBC QN AUTO: 30.1 PG (ref 27–33)
MCHC RBC AUTO-ENTMCNC: 33.2 G/DL (ref 33–37)
MCV RBC AUTO: 90.7 FL (ref 80–94)
MONOCYTES # BLD AUTO: 0.7 10*3/MM3 (ref 0.1–0.9)
MONOCYTES NFR BLD AUTO: 9.1 % (ref 0–12)
NEUTROPHILS # BLD AUTO: 3.18 10*3/MM3 (ref 1.4–6.5)
NEUTROPHILS NFR BLD AUTO: 41.3 % (ref 40–75)
OSMOLALITY SERPL CALC.SUM OF ELEC: 279 MOSM/KG (ref 273–305)
PLATELET # BLD AUTO: 192 10*3/MM3 (ref 130–400)
PMV BLD AUTO: 9.7 FL (ref 6–10)
POTASSIUM BLD-SCNC: 3.6 MMOL/L (ref 3.5–5.3)
PROT SERPL-MCNC: 6.8 G/DL (ref 6–8)
RBC # BLD AUTO: 4.32 10*6/MM3 (ref 4.2–5.4)
SODIUM BLD-SCNC: 140 MMOL/L (ref 135–153)
WBC NRBC COR # BLD: 7.71 10*3/MM3 (ref 4.5–12.5)

## 2018-05-07 PROCEDURE — 99214 OFFICE O/P EST MOD 30 MIN: CPT | Performed by: NURSE PRACTITIONER

## 2018-05-07 PROCEDURE — 80053 COMPREHEN METABOLIC PANEL: CPT | Performed by: INTERNAL MEDICINE

## 2018-05-07 PROCEDURE — 85025 COMPLETE CBC W/AUTO DIFF WBC: CPT | Performed by: INTERNAL MEDICINE

## 2018-05-07 NOTE — PROGRESS NOTES
Name:  Geeta Renee  :  1935  Date:  2018     REFERRING PHYSICIAN  Vishal Mckenzie MD, PhD    PRIMARY CARE PROVIDER  PRISCILLA Hinojosa    REASON FOR FOLLOW UP  1. Esophageal adenocarcinoma      CHIEF COMPLAINT  Follow up of esophageal cancer    Dear Dr. Mckenzie,    HISTORY OF PRESENT ILLNESS:   I saw Ms. Renee in follow up today in our medical oncology clinic. As you are aware, she is a pleasant, 82 y.o., white female with a history of hypertension, arthritis and Addison's esophagus who has been undergoing periodic repeat endoscopic evaluations for the latter since she was first diagnosed in ~. She was again found to have high grade dysplasia in late summer 2017 and was referred back to the [facility] (where she had undergone prior radiofrequency ablations). A repeat local evaluation at  in 2017, unfortunately, was concerning for a component of carcinoma invasive into the submucosa. Imaging at the time (including a PET scan) was consistent with early stage disease, with no definite evidence of either mediastinal node involvement or distant mets. Ultimately, following an evaluation by CT surgery (neither they nor the patient were interested in pursuing an esophagectomy), she was referred to you, closer to her home in [place], [place], to consider treatment with localized radiotherapy. The patient was agreeable to this and was subsequently referred to our clinic to consider providing concomitant chemotherapy with the XRT.    INTERIM HISTORY:  Ms. Renee presents to clinic today for follow up. Since her last visit, she reports she completed her recommended treatment with concomitant Xeloda/XRT on 18. Overall, she reports tolerating this regimen well with fatigue and difficulty swallowing (particularly when it comes to the Xeloda tablets) as her main side effects. She says she is feeling one hundred percent better since we saw her last with improvement in  fatigue and dysphagia. She continues to eating soft foods such as mashed potatoes and soup but says she is eating well and gaining weight.   She otherwise continues to do overall well and has no other specific complaints.    Past Medical History:   Diagnosis Date   • Arthritis    • Esophageal cancer    • Hypertension        Past Surgical History:   Procedure Laterality Date   • CHOLECYSTECTOMY         Social History     Social History   • Marital status:      Spouse name: N/A   • Number of children: N/A   • Years of education: N/A     Occupational History   • Not on file.     Social History Main Topics   • Smoking status: Never Smoker   • Smokeless tobacco: Never Used   • Alcohol use No   • Drug use: No   • Sexual activity: Defer     Other Topics Concern   • Not on file     Social History Narrative   • No narrative on file       Family History   Problem Relation Age of Onset   • Breast cancer Neg Hx        Allergies   Allergen Reactions   • Sulfa Antibiotics Hives       Current Outpatient Prescriptions   Medication Sig Dispense Refill   • AMLODIPINE BESYLATE PO Take 5 mg by mouth Daily.     • aspirin 81 MG EC tablet Take 81 mg by mouth Daily.     • capecitabine (XELODA) 500 MG chemo tablet Take 3 tablets by mouth 2 (Two) Times a Day. M-F for 5 weeks, during  tablet 0   • magic mouthwash oral suspension Swish and spit Every 4 (Four) Hours As Needed.     • omeprazole (priLOSEC) 40 MG capsule Take 40 mg by mouth Daily.     • ondansetron (ZOFRAN) 4 MG tablet Take 1 tablet by mouth Every 8 (Eight) Hours As Needed for Nausea or Vomiting. 30 tablet 1   • triamterene-hydrochlorothiazide (MAXZIDE-25) 37.5-25 MG per tablet Take 1 tablet by mouth Daily.       No current facility-administered medications for this visit.        REVIEW OF SYSTEMS  CONSTITUTIONAL:  No fever, chills or night sweats. Improvement in fatigue since completing Xeloda/XRT treatment.  EYES:  No blurry vision, diplopia or other vision  changes.  ENT:  No hearing loss, nosebleeds or sore throat.  CARDIOVASCULAR:  No palpitations, arrhythmia, syncopal episodes or edema.  PULMONARY:  No hemoptysis, wheezing, chronic cough or shortness of breath.  GASTROINTESTINAL:  No nausea or vomiting. No constipation or diarrhea. Dysphagia improved since completing Xeloda/XRT, as per the HPI above.  GENITOURINARY:  No hematuria, kidney stones or frequent urination.  MUSCULOSKELETAL: Chronic, mild joint and back pains, stable.  INTEGUMENTARY: No rashes or pruritus.  ENDOCRINE:  No excessive thirst or hot flashes.  HEMATOLOGIC:  No history of free bleeding, spontaneous bleeding or clotting.  IMMUNOLOGIC:  No allergies or frequent infections.  NEUROLOGIC: No numbness, tingling, seizures or weakness.  PSYCHIATRIC:  No anxiety or depression.    PHYSICAL EXAMINATION    /78   Pulse (!) 122   Temp 98 °F (36.7 °C) (Oral)   Resp 18   Wt 84.6 kg (186 lb 6.4 oz)   SpO2 99%   BMI 31.50 kg/m²   GENERAL:  A well-developed, well-nourished, elderly (but well-appearing), white female in no acute distress.  HEENT:  Pupils equally round and reactive to light.  Extraocular muscles intact.  CARDIOVASCULAR:  Regular rate and rhythm.  No murmurs, gallops or rubs.  LUNGS:  Clear to auscultation bilaterally.  ABDOMEN:  Soft, nontender, nondistended with positive bowel sounds.  EXTREMITIES:  No clubbing, cyanosis or edema bilaterally.  SKIN:  No rashes or petechiae.  NEURO:  Cranial nerves grossly intact.  No focal deficits.  PSYCH:  Alert and oriented x3.    LABORATORY    Lab Results   Component Value Date    WBC 7.71 05/07/2018    HGB 13.0 05/07/2018    HCT 39.2 05/07/2018    MCV 90.7 05/07/2018     05/07/2018    NEUTROABS 3.18 05/07/2018       Lab Results   Component Value Date     05/07/2018    K 3.6 05/07/2018     05/07/2018    CO2 23.2 (L) 05/07/2018    BUN 11 05/07/2018    CREATININE 0.85 05/07/2018    GLUCOSE 102 05/07/2018    CALCIUM 9.3 05/07/2018     AST 26 05/07/2018    ALT 19 05/07/2018    ALKPHOS 105 (H) 05/07/2018    BILITOT 0.5 05/07/2018    PROTEINTOT 6.8 05/07/2018    ALBUMIN 3.80 05/07/2018       IMAGING  CT chest, abdomen and pelvis with contrast (11/09/2017):  Impression:  1) Mild, nonspecific thickening of the distal esophagus which may be related to diagnosis of esophageal cancer. Multiple subcentimeter right paraesophageal lymph nodes are noted.  2) 1.3 mm ground glass nodule in the left upper lobe. No suspicious solid nodules that would suggest metastatic disease.  3) No evidence of metastatic disease to the abdomen or pelvis.  4) Bilobed fusiform AAA measuring up to 4.6 cm beneath the renal arteries.  5) Cardiomegaly with artery atherosclerosis.    NM PET with fusion CT, skull base to mid-thigh (12/07/2017, at ):  Impression:  1) No suspicious focal FDG uptake within the esophagus to suggest a site of invasive malignancy.  2) No suspicious metabolically active mediastinal adenopathy.  3) No suspicious hypermetabolic distal metastasis.  4) Few non-FDG avid groundglass nodules, the largest in the left upper lobe of the lung, unchanged since the most recent comparison CT of the chest of one month prior.    PATHOLOGY  Esophagus, nodules (10/19/2017):  Adenocarcinoma, invasive at least into the muscularis mucosa.    IMPRESSION AND PLAN  Ms. Renee is a 82 y.o., white female with:  1. Esophageal adenocarcinoma: Diagnosed with clinical stage [id number] disease in late 2017 (although she has not undergone an endoscopic ultrasound). Regardless, even though she is in overall very good health, she is not eligible for an esophagectomy due to her advanced age and comorbidities (and she has stated that she would not want one anyway). Therefore, agreed with CT surgery and radiation oncology's initial recommendations for localized radiotherapy. Also agreed (with rad/onc) that she would be a good candidate for adding concomitant chemotherapy to this treatment  plan, and PO capecitabine (Xeloda) is a convenient and fairly well tolerable choice. A Rx for 1500 mg BID M-F throughout the planned course of localized XRT was provided. She began this treatment regimen in early March 2018 and recently completed on 4/26/18. Overall, she tolerated the treatment well with fatigue and issues with swallowing (now improved) as her only noticeable side effects. Repeat labs in acceptable range today and clinically doing well. She has an upcoming follow up with Dr. Lemus in June and will need repeat endoscopy. Will have her follow up with Dr. Berrios in one month to discuss further management/monitoring.   2. Dysphagia: The patient had no such issues prior to starting Xeloda/XRT; however, since starting this regimen, she has been experiencing some mild, increasing difficulty with swallowing, particularly with her pills. She has been crushing her Xeloda into soft foods. This issue remained manageable throughout the remainder of her treatment plan which she completed on 4/26/18. She says her symptoms have significantly improved. Will continue to monitor.   The patient was in agreement with these plans.    ACO Quality Measures:  a) The patient does not use tobacco products.  b) The patient's BMI is within normal parameters.  c) The current outpatient and discharge medications have been reconciled for the patient.    It is a pleasure to participate in Ms. Renee's care. Please do not hesitate to call with any questions or concerns that you may have.    A total of 25 minutes were spent coordinating this patient’s care in clinic today; more than 50% of this time was spent face-to-face with the patient and her son, reviewing her interim medical history and counseling on the current treatment and followup plan. All questions were answered to their satisfaction.    FOLLOW UP  With GI, Dr. Lemus on June 11th. Return to our clinic in 1 month with a CBC and CMP.      This document was  electronically signed by PRISCILLA Pak May 7, 2018 11:26 AM      CC: Vishal Mckenzie MD, PhD   Tracy Harris, APRN

## 2018-06-12 ENCOUNTER — OFFICE VISIT (OUTPATIENT)
Dept: ONCOLOGY | Facility: CLINIC | Age: 83
End: 2018-06-12

## 2018-06-12 ENCOUNTER — LAB (OUTPATIENT)
Dept: ONCOLOGY | Facility: CLINIC | Age: 83
End: 2018-06-12

## 2018-06-12 VITALS
RESPIRATION RATE: 18 BRPM | HEART RATE: 72 BPM | OXYGEN SATURATION: 100 % | DIASTOLIC BLOOD PRESSURE: 77 MMHG | SYSTOLIC BLOOD PRESSURE: 122 MMHG | WEIGHT: 178.5 LBS | TEMPERATURE: 97.9 F | BODY MASS INDEX: 30.17 KG/M2

## 2018-06-12 DIAGNOSIS — C15.9 ESOPHAGEAL ADENOCARCINOMA (HCC): ICD-10-CM

## 2018-06-12 DIAGNOSIS — R13.10 DYSPHAGIA, UNSPECIFIED TYPE: ICD-10-CM

## 2018-06-12 DIAGNOSIS — C15.9 ESOPHAGEAL ADENOCARCINOMA (HCC): Primary | ICD-10-CM

## 2018-06-12 LAB
ALBUMIN SERPL-MCNC: 4 G/DL (ref 3.4–4.8)
ALBUMIN/GLOB SERPL: 1.1 G/DL (ref 1.5–2.5)
ALP SERPL-CCNC: 93 U/L (ref 35–104)
ALT SERPL W P-5'-P-CCNC: 18 U/L (ref 10–36)
ANION GAP SERPL CALCULATED.3IONS-SCNC: 3.9 MMOL/L (ref 3.6–11.2)
AST SERPL-CCNC: 20 U/L (ref 10–30)
BASOPHILS # BLD AUTO: 0.02 10*3/MM3 (ref 0–0.3)
BASOPHILS NFR BLD AUTO: 0.4 % (ref 0–2)
BILIRUB SERPL-MCNC: 0.5 MG/DL (ref 0.2–1.8)
BUN BLD-MCNC: 20 MG/DL (ref 7–21)
BUN/CREAT SERPL: 17.7 (ref 7–25)
CALCIUM SPEC-SCNC: 9.4 MG/DL (ref 7.7–10)
CHLORIDE SERPL-SCNC: 105 MMOL/L (ref 99–112)
CO2 SERPL-SCNC: 27.1 MMOL/L (ref 24.3–31.9)
CREAT BLD-MCNC: 1.13 MG/DL (ref 0.43–1.29)
DEPRECATED RDW RBC AUTO: 46.9 FL (ref 37–54)
EOSINOPHIL # BLD AUTO: 0.09 10*3/MM3 (ref 0–0.7)
EOSINOPHIL NFR BLD AUTO: 1.9 % (ref 0–7)
ERYTHROCYTE [DISTWIDTH] IN BLOOD BY AUTOMATED COUNT: 14.5 % (ref 11.5–14.5)
GFR SERPL CREATININE-BSD FRML MDRD: 46 ML/MIN/1.73
GLOBULIN UR ELPH-MCNC: 3.5 GM/DL
GLUCOSE BLD-MCNC: 89 MG/DL (ref 70–110)
HCT VFR BLD AUTO: 39.4 % (ref 37–47)
HGB BLD-MCNC: 13.4 G/DL (ref 12–16)
IMM GRANULOCYTES # BLD: 0.01 10*3/MM3 (ref 0–0.03)
IMM GRANULOCYTES NFR BLD: 0.2 % (ref 0–0.5)
LYMPHOCYTES # BLD AUTO: 1.55 10*3/MM3 (ref 1–3)
LYMPHOCYTES NFR BLD AUTO: 33.3 % (ref 16–46)
MCH RBC QN AUTO: 30.5 PG (ref 27–33)
MCHC RBC AUTO-ENTMCNC: 34 G/DL (ref 33–37)
MCV RBC AUTO: 89.5 FL (ref 80–94)
MONOCYTES # BLD AUTO: 0.38 10*3/MM3 (ref 0.1–0.9)
MONOCYTES NFR BLD AUTO: 8.2 % (ref 0–12)
NEUTROPHILS # BLD AUTO: 2.6 10*3/MM3 (ref 1.4–6.5)
NEUTROPHILS NFR BLD AUTO: 56 % (ref 40–75)
OSMOLALITY SERPL CALC.SUM OF ELEC: 274 MOSM/KG (ref 273–305)
PLATELET # BLD AUTO: 160 10*3/MM3 (ref 130–400)
PMV BLD AUTO: 9.8 FL (ref 6–10)
POTASSIUM BLD-SCNC: 3.2 MMOL/L (ref 3.5–5.3)
PROT SERPL-MCNC: 7.5 G/DL (ref 6–8)
RBC # BLD AUTO: 4.4 10*6/MM3 (ref 4.2–5.4)
SODIUM BLD-SCNC: 136 MMOL/L (ref 135–153)
WBC NRBC COR # BLD: 4.65 10*3/MM3 (ref 4.5–12.5)

## 2018-06-12 PROCEDURE — 80053 COMPREHEN METABOLIC PANEL: CPT | Performed by: NURSE PRACTITIONER

## 2018-06-12 PROCEDURE — 99214 OFFICE O/P EST MOD 30 MIN: CPT | Performed by: NURSE PRACTITIONER

## 2018-06-12 PROCEDURE — 85025 COMPLETE CBC W/AUTO DIFF WBC: CPT | Performed by: NURSE PRACTITIONER

## 2018-06-12 NOTE — PROGRESS NOTES
Name:  Geeta Renee  :  1935  Date:  2018     REFERRING PHYSICIAN  Vishal Mckenzie MD, PhD    PRIMARY CARE PROVIDER  PRISCILLA Hinojosa    REASON FOR FOLLOW UP  1. Esophageal adenocarcinoma      CHIEF COMPLAINT  Follow up of esophageal cancer    Dear Dr. Mckenzie,    HISTORY OF PRESENT ILLNESS:   I saw Ms. Renee in follow up today in our medical oncology clinic. As you are aware, she is a pleasant, 82 y.o., white female with a history of hypertension, arthritis and Addison's esophagus who has been undergoing periodic repeat endoscopic evaluations for the latter since she was first diagnosed in ~. She was again found to have high grade dysplasia in late summer 2017 and was referred back to the [facility] (where she had undergone prior radiofrequency ablations). A repeat local evaluation at  in 2017, unfortunately, was concerning for a component of carcinoma invasive into the submucosa. Imaging at the time (including a PET scan) was consistent with early stage disease, with no definite evidence of either mediastinal node involvement or distant mets. Ultimately, following an evaluation by CT surgery (neither they nor the patient were interested in pursuing an esophagectomy), she was referred to you, closer to her home in [place], [place], to consider treatment with localized radiotherapy. The patient was agreeable to this and was subsequently referred to our clinic to consider providing concomitant chemotherapy with the XRT.    INTERIM HISTORY:  Ms. Renee presents to clinic today for follow up. Since her last visit, she reports she has been doing well. She completed her recommended treatment with concomitant Xeloda/XRT on 18. She continues to have dysphagia but overall this has improved. She continues to eat soft foods and says she is gaining weight. She recently followed up with Dr. Lemus and planning to have repeat endoscopy on 18 with possible  dilatation for ongoing dysphagia. She denies any new complaints today.     Past Medical History:   Diagnosis Date   • Arthritis    • Esophageal cancer    • Hypertension        Past Surgical History:   Procedure Laterality Date   • CHOLECYSTECTOMY         Social History     Social History   • Marital status:      Spouse name: N/A   • Number of children: N/A   • Years of education: N/A     Occupational History   • Not on file.     Social History Main Topics   • Smoking status: Never Smoker   • Smokeless tobacco: Never Used   • Alcohol use No   • Drug use: No   • Sexual activity: Defer     Other Topics Concern   • Not on file     Social History Narrative   • No narrative on file       Family History   Problem Relation Age of Onset   • Breast cancer Neg Hx        Allergies   Allergen Reactions   • Sulfa Antibiotics Allergic reaction to contrast dye       Current Outpatient Prescriptions   Medication Sig Dispense Refill   • AMLODIPINE BESYLATE PO Take 5 mg by mouth Daily.     • aspirin 81 MG EC tablet Take 81 mg by mouth Daily.     • capecitabine (XELODA) 500 MG chemo tablet Take 3 tablets by mouth 2 (Two) Times a Day. M-F for 5 weeks, during  tablet 0   • magic mouthwash oral suspension Swish and spit Every 4 (Four) Hours As Needed.     • omeprazole (priLOSEC) 40 MG capsule Take 40 mg by mouth Daily.     • ondansetron (ZOFRAN) 4 MG tablet Take 1 tablet by mouth Every 8 (Eight) Hours As Needed for Nausea or Vomiting. 30 tablet 1   • triamterene-hydrochlorothiazide (MAXZIDE-25) 37.5-25 MG per tablet Take 1 tablet by mouth Daily.       No current facility-administered medications for this visit.        REVIEW OF SYSTEMS  CONSTITUTIONAL:  No fever, chills or night sweats. Improvement in fatigue since completing Xeloda/XRT treatment.  EYES:  No blurry vision, diplopia or other vision changes.  ENT:  No hearing loss, nosebleeds or sore throat.  CARDIOVASCULAR:  No palpitations, arrhythmia, syncopal episodes or  edema.  PULMONARY:  No hemoptysis, wheezing, chronic cough or shortness of breath.  GASTROINTESTINAL:  No nausea or vomiting. No constipation or diarrhea. Ongoing dysphagia but improved since completing Xeloda/XRT  GENITOURINARY:  No hematuria, kidney stones or frequent urination.  MUSCULOSKELETAL: Chronic, mild joint and back pains, stable.  INTEGUMENTARY: No rashes or pruritus.  ENDOCRINE:  No excessive thirst or hot flashes.  HEMATOLOGIC:  No history of free bleeding, spontaneous bleeding or clotting.  IMMUNOLOGIC:  No allergies or frequent infections.  NEUROLOGIC: No numbness, tingling, seizures or weakness.  PSYCHIATRIC:  No anxiety or depression.    PHYSICAL EXAMINATION    /77   Pulse 72   Temp 97.9 °F (36.6 °C) (Oral)   Resp 18   Wt 81 kg (178 lb 8 oz)   SpO2 100%   BMI 30.17 kg/m²   GENERAL:  A well-developed, well-nourished, elderly (but well-appearing), white female in no acute distress.  HEENT:  Pupils equally round and reactive to light.  Extraocular muscles intact.  CARDIOVASCULAR:  Regular rate and rhythm.  No murmurs, gallops or rubs.  LUNGS:  Clear to auscultation bilaterally.  ABDOMEN:  Soft, nontender, nondistended with positive bowel sounds.  EXTREMITIES:  No clubbing, cyanosis or edema bilaterally.  SKIN:  No rashes or petechiae.  NEURO:  Cranial nerves grossly intact.  No focal deficits.  PSYCH:  Alert and oriented x3.    LABORATORY    Lab Results   Component Value Date    WBC 4.65 06/12/2018    HGB 13.4 06/12/2018    HCT 39.4 06/12/2018    MCV 89.5 06/12/2018     06/12/2018    NEUTROABS 2.60 06/12/2018       Lab Results   Component Value Date     06/12/2018    K 3.2 (L) 06/12/2018     06/12/2018    CO2 27.1 06/12/2018    BUN 20 06/12/2018    CREATININE 1.13 06/12/2018    GLUCOSE 89 06/12/2018    CALCIUM 9.4 06/12/2018    AST 20 06/12/2018    ALT 18 06/12/2018    ALKPHOS 93 06/12/2018    BILITOT 0.5 06/12/2018    PROTEINTOT 7.5 06/12/2018    ALBUMIN 4.00 06/12/2018        IMAGING  CT chest, abdomen and pelvis with contrast (11/09/2017):  Impression:  1) Mild, nonspecific thickening of the distal esophagus which may be related to diagnosis of esophageal cancer. Multiple subcentimeter right paraesophageal lymph nodes are noted.  2) 1.3 mm ground glass nodule in the left upper lobe. No suspicious solid nodules that would suggest metastatic disease.  3) No evidence of metastatic disease to the abdomen or pelvis.  4) Bilobed fusiform AAA measuring up to 4.6 cm beneath the renal arteries.  5) Cardiomegaly with artery atherosclerosis.    NM PET with fusion CT, skull base to mid-thigh (12/07/2017, at ):  Impression:  1) No suspicious focal FDG uptake within the esophagus to suggest a site of invasive malignancy.  2) No suspicious metabolically active mediastinal adenopathy.  3) No suspicious hypermetabolic distal metastasis.  4) Few non-FDG avid groundglass nodules, the largest in the left upper lobe of the lung, unchanged since the most recent comparison CT of the chest of one month prior.    PATHOLOGY  Esophagus, nodules (10/19/2017):  Adenocarcinoma, invasive at least into the muscularis mucosa.    IMPRESSION AND PLAN  Ms. Renee is a 82 y.o., white female with:  1. Esophageal adenocarcinoma: Diagnosed with clinical stage [id number] disease in late 2017 (although she has not undergone an endoscopic ultrasound). Regardless, even though she is in overall very good health, she is not eligible for an esophagectomy due to her advanced age and comorbidities (and she has stated that she would not want one anyway). Therefore, agreed with CT surgery and radiation oncology's initial recommendations for localized radiotherapy. Also agreed (with rad/onc) that she would be a good candidate for adding concomitant chemotherapy to this treatment plan, and PO capecitabine (Xeloda) is a convenient and fairly well tolerable choice. A Rx for 1500 mg BID M-F throughout the planned course of  localized XRT was provided. She began this treatment regimen in early March 2018 and completed on 4/26/18. Overall, she tolerated the treatment well with fatigue and issues with swallowing (now improved) as her only noticeable side effects. Repeat labs in acceptable range today and clinically doing well. She is scheduled for a repeat endoscopy with Dr. Lemus on 6/26/18. Will follow up in 1 month after repeat endoscopy with repeat labs and CT CAP prior.   2. Dysphagia: The patient had no such issues prior to starting Xeloda/XRT; however, since starting this regimen, she has been experiencing some mild, increasing difficulty with swallowing, particularly with her pills.This issue remained manageable throughout the remainder of her treatment plan which she completed on 4/26/18. She continues to have dysphagia but this has significantly improved. Planning for repeat endoscopy on 6/26/18 as above with possible dilatation. Will continue to monitor.   The patient was in agreement with these plans.    ACO Quality Measures:  a) The patient does not use tobacco products.  b) The patient's BMI is above the normal parameters. Plan includes referral to primary care.   c) The current outpatient and discharge medications have been reconciled for the patient.    It is a pleasure to participate in Ms. Renee's care. Please do not hesitate to call with any questions or concerns that you may have.    A total of 25 minutes were spent coordinating this patient’s care in clinic today; more than 50% of this time was spent face-to-face with the patient reviewing her interim medical history and counseling on the current treatment and followup plan. All questions were answered to their satisfaction.    FOLLOW UP  With GI, Dr. Lemus as scheduled for repeat endoscopy on 6/26/18. Return to our clinic in 1 month after repeat endoscopy with repeat labs and CT CAP prior.    This document was electronically signed by Candice Maldonado,  APRN June 12, 2018 12:39 PM      CC: Vishal Mckenzie MD, PhD   Tracy Harris, APRN

## 2018-07-02 ENCOUNTER — HOSPITAL ENCOUNTER (OUTPATIENT)
Dept: CT IMAGING | Facility: HOSPITAL | Age: 83
Discharge: HOME | End: 2018-07-02

## 2018-07-02 ENCOUNTER — HOSPITAL ENCOUNTER (OUTPATIENT)
Dept: CT IMAGING | Facility: HOSPITAL | Age: 83
Discharge: HOME | End: 2018-07-02
Admitting: NURSE PRACTITIONER

## 2018-07-02 DIAGNOSIS — C15.9 ESOPHAGEAL ADENOCARCINOMA (HCC): ICD-10-CM

## 2018-07-02 PROCEDURE — 71260 CT THORAX DX C+: CPT | Performed by: RADIOLOGY

## 2018-07-02 PROCEDURE — 74177 CT ABD & PELVIS W/CONTRAST: CPT | Performed by: RADIOLOGY

## 2018-07-02 PROCEDURE — 74177 CT ABD & PELVIS W/CONTRAST: CPT

## 2018-07-02 PROCEDURE — 25010000002 IOPAMIDOL 61 % SOLUTION: Performed by: NURSE PRACTITIONER

## 2018-07-02 PROCEDURE — 71260 CT THORAX DX C+: CPT

## 2018-07-02 RX ADMIN — IOPAMIDOL 100 ML: 612 INJECTION, SOLUTION INTRAVENOUS at 14:45

## 2018-07-11 ENCOUNTER — OFFICE VISIT (OUTPATIENT)
Dept: ONCOLOGY | Facility: CLINIC | Age: 83
End: 2018-07-11

## 2018-07-11 ENCOUNTER — LAB (OUTPATIENT)
Dept: ONCOLOGY | Facility: CLINIC | Age: 83
End: 2018-07-11

## 2018-07-11 VITALS
OXYGEN SATURATION: 98 % | RESPIRATION RATE: 18 BRPM | TEMPERATURE: 98 F | BODY MASS INDEX: 30.25 KG/M2 | HEART RATE: 65 BPM | DIASTOLIC BLOOD PRESSURE: 91 MMHG | SYSTOLIC BLOOD PRESSURE: 142 MMHG | WEIGHT: 179 LBS

## 2018-07-11 DIAGNOSIS — C15.9 ESOPHAGEAL ADENOCARCINOMA (HCC): ICD-10-CM

## 2018-07-11 DIAGNOSIS — C15.9 ESOPHAGEAL ADENOCARCINOMA (HCC): Primary | ICD-10-CM

## 2018-07-11 LAB
ALBUMIN SERPL-MCNC: 4 G/DL (ref 3.4–4.8)
ALBUMIN/GLOB SERPL: 1.2 G/DL (ref 1.5–2.5)
ALP SERPL-CCNC: 100 U/L (ref 35–104)
ALT SERPL W P-5'-P-CCNC: 10 U/L (ref 10–36)
ANION GAP SERPL CALCULATED.3IONS-SCNC: 7.5 MMOL/L (ref 3.6–11.2)
AST SERPL-CCNC: 15 U/L (ref 10–30)
BASOPHILS # BLD AUTO: 0.02 10*3/MM3 (ref 0–0.3)
BASOPHILS NFR BLD AUTO: 0.4 % (ref 0–2)
BILIRUB SERPL-MCNC: 0.6 MG/DL (ref 0.2–1.8)
BUN BLD-MCNC: 18 MG/DL (ref 7–21)
BUN/CREAT SERPL: 17.1 (ref 7–25)
CALCIUM SPEC-SCNC: 9.6 MG/DL (ref 7.7–10)
CHLORIDE SERPL-SCNC: 102 MMOL/L (ref 99–112)
CO2 SERPL-SCNC: 29.5 MMOL/L (ref 24.3–31.9)
CREAT BLD-MCNC: 1.05 MG/DL (ref 0.43–1.29)
DEPRECATED RDW RBC AUTO: 47.6 FL (ref 37–54)
EOSINOPHIL # BLD AUTO: 0.13 10*3/MM3 (ref 0–0.7)
EOSINOPHIL NFR BLD AUTO: 2.5 % (ref 0–7)
ERYTHROCYTE [DISTWIDTH] IN BLOOD BY AUTOMATED COUNT: 14.8 % (ref 11.5–14.5)
GFR SERPL CREATININE-BSD FRML MDRD: 50 ML/MIN/1.73
GLOBULIN UR ELPH-MCNC: 3.4 GM/DL
GLUCOSE BLD-MCNC: 100 MG/DL (ref 70–110)
HCT VFR BLD AUTO: 39.9 % (ref 37–47)
HGB BLD-MCNC: 13.4 G/DL (ref 12–16)
IMM GRANULOCYTES # BLD: 0.01 10*3/MM3 (ref 0–0.03)
IMM GRANULOCYTES NFR BLD: 0.2 % (ref 0–0.5)
LYMPHOCYTES # BLD AUTO: 1.16 10*3/MM3 (ref 1–3)
LYMPHOCYTES NFR BLD AUTO: 22.6 % (ref 16–46)
MCH RBC QN AUTO: 30.7 PG (ref 27–33)
MCHC RBC AUTO-ENTMCNC: 33.6 G/DL (ref 33–37)
MCV RBC AUTO: 91.5 FL (ref 80–94)
MONOCYTES # BLD AUTO: 0.53 10*3/MM3 (ref 0.1–0.9)
MONOCYTES NFR BLD AUTO: 10.3 % (ref 0–12)
NEUTROPHILS # BLD AUTO: 3.29 10*3/MM3 (ref 1.4–6.5)
NEUTROPHILS NFR BLD AUTO: 64 % (ref 40–75)
OSMOLALITY SERPL CALC.SUM OF ELEC: 279.5 MOSM/KG (ref 273–305)
PLATELET # BLD AUTO: 177 10*3/MM3 (ref 130–400)
PMV BLD AUTO: 10 FL (ref 6–10)
POTASSIUM BLD-SCNC: 3.7 MMOL/L (ref 3.5–5.3)
PROT SERPL-MCNC: 7.4 G/DL (ref 6–8)
RBC # BLD AUTO: 4.36 10*6/MM3 (ref 4.2–5.4)
SODIUM BLD-SCNC: 139 MMOL/L (ref 135–153)
WBC NRBC COR # BLD: 5.14 10*3/MM3 (ref 4.5–12.5)

## 2018-07-11 PROCEDURE — 85025 COMPLETE CBC W/AUTO DIFF WBC: CPT | Performed by: NURSE PRACTITIONER

## 2018-07-11 PROCEDURE — 99214 OFFICE O/P EST MOD 30 MIN: CPT | Performed by: INTERNAL MEDICINE

## 2018-07-11 PROCEDURE — 80053 COMPREHEN METABOLIC PANEL: CPT | Performed by: NURSE PRACTITIONER

## 2018-07-11 NOTE — PROGRESS NOTES
Name:  Geeta Renee  :  1935  Date:  2018     REFERRING PHYSICIAN  Vishal Mckenzie MD, PhD    PRIMARY CARE PROVIDER  PRISCILLA Hinojosa    REASON FOR FOLLOW UP  1. Esophageal adenocarcinoma (CMS/HCC)      CHIEF COMPLAINT  Improving oral intake.    Dear Dr. Mckenzie,    HISTORY OF PRESENT ILLNESS:   I saw Ms. Renee in follow up today in our medical oncology clinic. As you are aware, she is a pleasant, 82 y.o., white female with a history of hypertension, arthritis and Addison's esophagus who has been undergoing periodic repeat endoscopic evaluations for the latter since she was first diagnosed in ~. She was again found to have high grade dysplasia in late summer 2017 and was referred back to the [facility] (where she had undergone prior radiofrequency ablations). A repeat local evaluation at  in 2017, unfortunately, was concerning for a component of carcinoma invasive into the submucosa. Imaging at the time (including a PET scan) was consistent with early stage disease, with no definite evidence of either mediastinal node involvement or distant mets. Ultimately, following an evaluation by CT surgery (neither they nor the patient were interested in pursuing an esophagectomy), she was referred to you, closer to her home in [place], [place], to consider treatment with localized radiotherapy. The patient was agreeable to this and was subsequently referred to our clinic to consider providing concomitant chemotherapy with the XRT.    INTERIM HISTORY:  Ms. Renee presents to clinic today for follow up by herself. She completed her planned treatment with concomitant Xeloda/XRT on 2018. In the ~two and one half months since then, she has been steadily feeling overall better. Her dysphagia and appetite have both been slowly, but steadily, improving (especially since she underwent an esophageal dilatation a couple of weeks ago). She has no other new or specific  complaints and otherwise feels pretty well.    Past Medical History:   Diagnosis Date   • Arthritis    • Esophageal cancer (CMS/HCC)    • Hypertension        Past Surgical History:   Procedure Laterality Date   • CHOLECYSTECTOMY         Social History     Social History   • Marital status:      Spouse name: N/A   • Number of children: N/A   • Years of education: N/A     Occupational History   • Not on file.     Social History Main Topics   • Smoking status: Never Smoker   • Smokeless tobacco: Never Used   • Alcohol use No   • Drug use: No   • Sexual activity: Defer     Other Topics Concern   • Not on file     Social History Narrative   • No narrative on file       Family History   Problem Relation Age of Onset   • Breast cancer Neg Hx        Allergies   Allergen Reactions   • Sulfa Antibiotics Hives       Current Outpatient Prescriptions   Medication Sig Dispense Refill   • AMLODIPINE BESYLATE PO Take 5 mg by mouth Daily.     • aspirin 81 MG EC tablet Take 81 mg by mouth Daily.     • capecitabine (XELODA) 500 MG chemo tablet Take 3 tablets by mouth 2 (Two) Times a Day. M-F for 5 weeks, during  tablet 0   • magic mouthwash oral suspension Swish and spit Every 4 (Four) Hours As Needed.     • omeprazole (priLOSEC) 40 MG capsule Take 40 mg by mouth Daily.     • ondansetron (ZOFRAN) 4 MG tablet Take 1 tablet by mouth Every 8 (Eight) Hours As Needed for Nausea or Vomiting. 30 tablet 1   • triamterene-hydrochlorothiazide (MAXZIDE-25) 37.5-25 MG per tablet Take 1 tablet by mouth Daily.       No current facility-administered medications for this visit.        REVIEW OF SYSTEMS  CONSTITUTIONAL:  No fever, chills or night sweats. Improving fatigue.  EYES:  No blurry vision, diplopia or other vision changes.  ENT:  No hearing loss, nosebleeds or sore throat.  CARDIOVASCULAR:  No palpitations, arrhythmia, syncopal episodes or edema.  PULMONARY:  No hemoptysis, wheezing, chronic cough or shortness of  breath.  GASTROINTESTINAL:  No nausea or vomiting. No constipation or diarrhea. Improving dysphagia and PO intake, as per the HPI above.  GENITOURINARY:  No hematuria, kidney stones or frequent urination.  MUSCULOSKELETAL: Chronic, mild joint and back pains, stable.  INTEGUMENTARY: No rashes or pruritus.  ENDOCRINE:  No excessive thirst or hot flashes.  HEMATOLOGIC:  No history of free bleeding, spontaneous bleeding or clotting.  IMMUNOLOGIC:  No allergies or frequent infections.  NEUROLOGIC: No numbness, tingling, seizures or weakness.  PSYCHIATRIC:  No anxiety or depression.    PHYSICAL EXAMINATION  /91   Pulse 65   Temp 98 °F (36.7 °C) (Oral)   Resp 18   Wt 81.2 kg (179 lb)   SpO2 98%   BMI 30.25 kg/m²     GENERAL:  A well-developed, well-nourished, elderly, white female in no acute distress, appearing younger than her chronological age.  HEENT:  Pupils equally round and reactive to light. Extraocular muscles intact.  CARDIOVASCULAR:  Regular rate and rhythm.  No murmurs, gallops or rubs.  LUNGS:  Clear to auscultation bilaterally.  ABDOMEN:  Soft, nontender, nondistended with positive bowel sounds.  EXTREMITIES:  No clubbing, cyanosis or edema bilaterally.  SKIN:  No rashes or petechiae.  NEURO:  Cranial nerves grossly intact.  No focal deficits.  PSYCH:  Alert and oriented x3.    LABORATORY    Lab Results   Component Value Date    WBC 5.14 07/11/2018    HGB 13.4 07/11/2018    HCT 39.9 07/11/2018    MCV 91.5 07/11/2018     07/11/2018    NEUTROABS 3.29 07/11/2018       Lab Results   Component Value Date     07/11/2018    K 3.7 07/11/2018     07/11/2018    CO2 29.5 07/11/2018    BUN 18 07/11/2018    CREATININE 1.05 07/11/2018    GLUCOSE 100 07/11/2018    CALCIUM 9.6 07/11/2018    AST 15 07/11/2018    ALT 10 07/11/2018    ALKPHOS 100 07/11/2018    BILITOT 0.6 07/11/2018    PROTEINTOT 7.4 07/11/2018    ALBUMIN 4.00 07/11/2018       IMAGING  CT chest, abdomen and pelvis with contrast  (11/09/2017):  Impression:  1) Mild, nonspecific thickening of the distal esophagus which may be related to diagnosis of esophageal cancer. Multiple subcentimeter right paraesophageal lymph nodes are noted.  2) 1.3 mm ground glass nodule in the left upper lobe. No suspicious solid nodules that would suggest metastatic disease.  3) No evidence of metastatic disease to the abdomen or pelvis.  4) Bilobed fusiform AAA measuring up to 4.6 cm beneath the renal arteries.  5) Cardiomegaly with artery atherosclerosis.    NM PET with fusion CT, skull base to mid-thigh (12/07/2017, at ):  Impression:  1) No suspicious focal FDG uptake within the esophagus to suggest a site of invasive malignancy.  2) No suspicious metabolically active mediastinal adenopathy.  3) No suspicious hypermetabolic distal metastasis.  4) Few non-FDG avid groundglass nodules, the largest in the left upper lobe of the lung, unchanged since the most recent comparison CT of the chest of one month prior.    CT chest with contrast (07/02/2018):  Impression: Thickening of the mucosa in the distal portion of the esophagus consistent with clinical history of adenocarcinoma of the esophagus. There is nothing seen on the CT to suggest metastatic disease in the lung parenchyma or in the mediastinal lymph nodes.    CT abdomen and pelvis with contrast (07/02/2018):  Impression: No CT findings of metastatic disease in the abdomen or pelvis. There were benign cysts in the kidneys. There is aneurysmal dilatation of the infrarenal abdominal aorta with a maximum diameter of 4.7 cm.    PROCEDURES  Esophagogastroduodenoscopy with esophageal biopsies and dilatation (06/26/2018):  Impression:  1) Radiation esophagitis with ulceration, dense exudate and tight stenosis balloon dilated to 12 mm and biopsies were performed.  2) Appearance of residual Addison's esophagus with papillary mucosal appearance in the distal most esophagus.  3) Large hiatal hernia.  4) Gastric food  retention consistent with gastroparesis.    PATHOLOGY  Esophagus, nodules (10/19/2017):  Adenocarcinoma, invasive at least into the muscularis mucosa.    IMPRESSION AND PLAN  Ms. Renee is a 82 y.o., white female with:  1. Esophageal adenocarcinoma: Diagnosed with clinical stage [id number] disease in late 2017 (although she did not undergo an endoscopic ultrasound). Regardless, even though she was (and is) in overall good health, she was not eligible for an esophagectomy due to her advanced age and comorbidities (and she has stated that she would not have wanted one anyway). Therefore, we agreed with CT surgery and radiation oncology's initial recommendations for localized radiotherapy. It was also agreed that she would be a good candidate for adding concomitant chemotherapy to this treatment plan, and PO capecitabine (Xeloda) was a convenient and fairly well tolerable choice. A Rx for 1500 mg BID M-F throughout the planned course of localized XRT was provided. She began this treatment regimen in early March 2018 and completed it on 04/26/2018. Overall, she tolerated this treatment regimen very well, with fatigue and mild dysphagia (both of which are now improving) her only noticeable side effects. Today, about two and one half months since completing her therapy, she is overall feeling better. Repeat CT scans performed on 07/02/2018 (and summarized above) are consistent with treatment related changes only, with no definite evidence of residual/recurrent disease. The results of a repeat EGD performed on 06/26/2018 (and also summarized above) also showed no definite evidence of residual/recurrent disease (pathology from biopsies is pending). She will continue to follow up with gastroenterology as previously planned (another repeat EGD is planned within the month). We will see her back in clinic in three months with a repeat CBC, CMP and CT scans.  2. Dysphagia: The patient had no such issues prior to starting  Xeloda/XRT; however, toward the end of this regimen, she began to experience some increasing difficulty with swallowing, particularly with her pills. This issue remained manageable throughout the remainder of her treatment and has been steadily getting better since the completion of it. She will continue to follow up with gastroenterology as planned (another repeat EGD is planned within the month, to likely perform another esophageal dilatation). Continue to monitor.  The patient was in agreement with these plans.    ACO Quality Measures:  a) The patient does not use tobacco products.  b) The patient's BMI is above the normal parameters. Plan includes referral to primary care.   c) The current outpatient and discharge medications have been reconciled for the patient.    It is a pleasure to participate in Ms. Renee's care. Please do not hesitate to call with any questions or concerns that you may have.    A total of 30 minutes were spent coordinating this patient’s care in clinic today; more than 50% of this time was spent face-to-face with the patient, reviewing her interim medical history, discussing the results of the recent repeat EGD and counseling on the current treatment and followup plan. All questions were answered to her satisfaction.    FOLLOW UP  With gastroenterology as previously planned (repeat EGD planned within the month). Return to our clinic in 3 months with a CBC, CMP and CTs of the chest, abdomen and pelvis with contrast.      This document was electronically signed by BERTHA Berrios MD July 11, 2018 2:40 PM      CC: Vishal Mckenzie MD, PhD   MD Tracy Thompson APRN

## 2018-10-08 ENCOUNTER — HOSPITAL ENCOUNTER (OUTPATIENT)
Dept: CT IMAGING | Facility: HOSPITAL | Age: 83
Discharge: HOME | End: 2018-10-08
Attending: INTERNAL MEDICINE

## 2018-10-08 ENCOUNTER — HOSPITAL ENCOUNTER (OUTPATIENT)
Dept: CT IMAGING | Facility: HOSPITAL | Age: 83
Discharge: HOME | End: 2018-10-08
Attending: INTERNAL MEDICINE | Admitting: INTERNAL MEDICINE

## 2018-10-08 DIAGNOSIS — C15.9 ESOPHAGEAL ADENOCARCINOMA (HCC): ICD-10-CM

## 2018-10-08 LAB — CREAT BLDA-MCNC: 1 MG/DL (ref 0.6–1.3)

## 2018-10-08 PROCEDURE — 71260 CT THORAX DX C+: CPT

## 2018-10-08 PROCEDURE — 82565 ASSAY OF CREATININE: CPT

## 2018-10-08 PROCEDURE — 74177 CT ABD & PELVIS W/CONTRAST: CPT

## 2018-10-08 PROCEDURE — 25010000002 IOPAMIDOL 61 % SOLUTION: Performed by: INTERNAL MEDICINE

## 2018-10-08 PROCEDURE — 74177 CT ABD & PELVIS W/CONTRAST: CPT | Performed by: RADIOLOGY

## 2018-10-08 PROCEDURE — 71260 CT THORAX DX C+: CPT | Performed by: RADIOLOGY

## 2018-10-08 RX ADMIN — IOPAMIDOL 60 ML: 612 INJECTION, SOLUTION INTRAVENOUS at 09:22

## 2018-10-10 ENCOUNTER — OFFICE VISIT (OUTPATIENT)
Dept: ONCOLOGY | Facility: CLINIC | Age: 83
End: 2018-10-10

## 2018-10-10 ENCOUNTER — LAB (OUTPATIENT)
Dept: ONCOLOGY | Facility: CLINIC | Age: 83
End: 2018-10-10

## 2018-10-10 VITALS
RESPIRATION RATE: 18 BRPM | DIASTOLIC BLOOD PRESSURE: 72 MMHG | OXYGEN SATURATION: 98 % | HEART RATE: 63 BPM | TEMPERATURE: 97.9 F | SYSTOLIC BLOOD PRESSURE: 109 MMHG

## 2018-10-10 DIAGNOSIS — C15.9 ESOPHAGEAL ADENOCARCINOMA (HCC): ICD-10-CM

## 2018-10-10 DIAGNOSIS — C15.9 ESOPHAGEAL ADENOCARCINOMA (HCC): Primary | ICD-10-CM

## 2018-10-10 LAB
ALBUMIN SERPL-MCNC: 4 G/DL (ref 3.4–4.8)
ALBUMIN/GLOB SERPL: 1.3 G/DL (ref 1.5–2.5)
ALP SERPL-CCNC: 93 U/L (ref 35–104)
ALT SERPL W P-5'-P-CCNC: 13 U/L (ref 10–36)
ANION GAP SERPL CALCULATED.3IONS-SCNC: 6 MMOL/L (ref 3.6–11.2)
AST SERPL-CCNC: 18 U/L (ref 10–30)
BASOPHILS # BLD AUTO: 0.03 10*3/MM3 (ref 0–0.3)
BASOPHILS NFR BLD AUTO: 0.5 % (ref 0–2)
BILIRUB SERPL-MCNC: 0.6 MG/DL (ref 0.2–1.8)
BUN BLD-MCNC: 19 MG/DL (ref 7–21)
BUN/CREAT SERPL: 17.4 (ref 7–25)
CALCIUM SPEC-SCNC: 9.1 MG/DL (ref 7.7–10)
CHLORIDE SERPL-SCNC: 105 MMOL/L (ref 99–112)
CO2 SERPL-SCNC: 26 MMOL/L (ref 24.3–31.9)
CREAT BLD-MCNC: 1.09 MG/DL (ref 0.43–1.29)
DEPRECATED RDW RBC AUTO: 50.8 FL (ref 37–54)
EOSINOPHIL # BLD AUTO: 0.1 10*3/MM3 (ref 0–0.7)
EOSINOPHIL NFR BLD AUTO: 1.5 % (ref 0–7)
ERYTHROCYTE [DISTWIDTH] IN BLOOD BY AUTOMATED COUNT: 15.4 % (ref 11.5–14.5)
GFR SERPL CREATININE-BSD FRML MDRD: 48 ML/MIN/1.73
GLOBULIN UR ELPH-MCNC: 3 GM/DL
GLUCOSE BLD-MCNC: 113 MG/DL (ref 70–110)
HCT VFR BLD AUTO: 39.8 % (ref 37–47)
HGB BLD-MCNC: 13.1 G/DL (ref 12–16)
IMM GRANULOCYTES # BLD: 0.02 10*3/MM3 (ref 0–0.03)
IMM GRANULOCYTES NFR BLD: 0.3 % (ref 0–0.5)
LYMPHOCYTES # BLD AUTO: 1.16 10*3/MM3 (ref 1–3)
LYMPHOCYTES NFR BLD AUTO: 17.7 % (ref 16–46)
MCH RBC QN AUTO: 30.7 PG (ref 27–33)
MCHC RBC AUTO-ENTMCNC: 32.9 G/DL (ref 33–37)
MCV RBC AUTO: 93.2 FL (ref 80–94)
MONOCYTES # BLD AUTO: 0.63 10*3/MM3 (ref 0.1–0.9)
MONOCYTES NFR BLD AUTO: 9.6 % (ref 0–12)
NEUTROPHILS # BLD AUTO: 4.63 10*3/MM3 (ref 1.4–6.5)
NEUTROPHILS NFR BLD AUTO: 70.4 % (ref 40–75)
OSMOLALITY SERPL CALC.SUM OF ELEC: 276.9 MOSM/KG (ref 273–305)
PLATELET # BLD AUTO: 222 10*3/MM3 (ref 130–400)
PMV BLD AUTO: 10.1 FL (ref 6–10)
POTASSIUM BLD-SCNC: 4.2 MMOL/L (ref 3.5–5.3)
PROT SERPL-MCNC: 7 G/DL (ref 6–8)
RBC # BLD AUTO: 4.27 10*6/MM3 (ref 4.2–5.4)
SODIUM BLD-SCNC: 137 MMOL/L (ref 135–153)
WBC NRBC COR # BLD: 6.57 10*3/MM3 (ref 4.5–12.5)

## 2018-10-10 PROCEDURE — 80053 COMPREHEN METABOLIC PANEL: CPT | Performed by: INTERNAL MEDICINE

## 2018-10-10 PROCEDURE — 85025 COMPLETE CBC W/AUTO DIFF WBC: CPT | Performed by: INTERNAL MEDICINE

## 2018-10-10 PROCEDURE — 99213 OFFICE O/P EST LOW 20 MIN: CPT | Performed by: INTERNAL MEDICINE

## 2018-10-10 NOTE — PROGRESS NOTES
Name:  Geeta Renee  :  1935  Date:  10/10/2018     REFERRING PHYSICIAN  Vishal Mckenzie MD, PhD    PRIMARY CARE PROVIDER  Tracy Harris APRN    REASON FOR FOLLOW UP  1. Esophageal adenocarcinoma (CMS/HCC)      CHIEF COMPLAINT  None.    Dear Dr. Mckenzie,    HISTORY OF PRESENT ILLNESS:   I saw Ms. Renee in follow up today in our medical oncology clinic. As you are aware, she is a pleasant, 82 y.o., white female with a history of hypertension, arthritis and Addison's esophagus who has been undergoing periodic repeat endoscopic evaluations for the latter since she was first diagnosed in ~. She was again found to have high grade dysplasia in late summer 2017 and was referred back to the [facility] (where she had undergone prior radiofrequency ablations). A repeat local evaluation at  in 2017, unfortunately, was concerning for a component of carcinoma invasive into the submucosa. Imaging at the time (including a PET scan) was consistent with early stage disease, with no definite evidence of either mediastinal node involvement or distant mets. Ultimately, following an evaluation by CT surgery (neither they nor the patient were interested in pursuing an esophagectomy), she was referred to you, closer to her home in [place], [place], to consider treatment with localized radiotherapy. The patient was agreeable to this and was subsequently referred to our clinic to consider providing concomitant chemotherapy with the XRT. She completed planned treatment with concomitant Xeloda/XRT on 2018; and she has been doing overall very well ever since then, with no symptoms/signs of residual/recurernt disease to date.    INTERIM HISTORY:  Ms. Renee presents to clinic today for follow up by herself. In the ~five and one half months since she completed Xeloda/XRT, she has been steadily feeling overall better. Her dysphagia and appetite continue to improve (especially since she  underwent an esophageal dilatation in late June 2018). She again has no new or specific complaints and feels overall very well.    Past Medical History:   Diagnosis Date   • Arthritis    • Esophageal cancer (CMS/HCC)    • Hypertension        Past Surgical History:   Procedure Laterality Date   • CHOLECYSTECTOMY         Social History     Social History   • Marital status:      Spouse name: N/A   • Number of children: N/A   • Years of education: N/A     Occupational History   • Not on file.     Social History Main Topics   • Smoking status: Never Smoker   • Smokeless tobacco: Never Used   • Alcohol use No   • Drug use: No   • Sexual activity: Defer     Other Topics Concern   • Not on file     Social History Narrative   • No narrative on file       Family History   Problem Relation Age of Onset   • Breast cancer Neg Hx        Allergies   Allergen Reactions   • Sulfa Antibiotics Hives       Current Outpatient Prescriptions   Medication Sig Dispense Refill   • AMLODIPINE BESYLATE PO Take 5 mg by mouth Daily.     • aspirin 81 MG EC tablet Take 81 mg by mouth Daily.     • capecitabine (XELODA) 500 MG chemo tablet Take 3 tablets by mouth 2 (Two) Times a Day. M-F for 5 weeks, during  tablet 0   • magic mouthwash oral suspension Swish and spit Every 4 (Four) Hours As Needed.     • omeprazole (priLOSEC) 40 MG capsule Take 40 mg by mouth Daily.     • ondansetron (ZOFRAN) 4 MG tablet Take 1 tablet by mouth Every 8 (Eight) Hours As Needed for Nausea or Vomiting. 30 tablet 1   • triamterene-hydrochlorothiazide (MAXZIDE-25) 37.5-25 MG per tablet Take 1 tablet by mouth Daily.       No current facility-administered medications for this visit.      REVIEW OF SYSTEMS  CONSTITUTIONAL:  No fever, chills or night sweats. Improved fatigue.  EYES:  No blurry vision, diplopia or other vision changes.  ENT:  No hearing loss, nosebleeds or sore throat.  CARDIOVASCULAR:  No palpitations, arrhythmia, syncopal episodes or  edema.  PULMONARY:  No hemoptysis, wheezing, chronic cough or shortness of breath.  GASTROINTESTINAL:  No nausea or vomiting. No constipation or diarrhea. Improving dysphagia and PO intake, as per the HPI above.  GENITOURINARY:  No hematuria, kidney stones or frequent urination.  MUSCULOSKELETAL: Chronic, mild joint and back pains, stable.  INTEGUMENTARY: No rashes or pruritus.  ENDOCRINE:  No excessive thirst or hot flashes.  HEMATOLOGIC:  No history of free bleeding, spontaneous bleeding or clotting.  IMMUNOLOGIC:  No allergies or frequent infections.  NEUROLOGIC: No numbness, tingling, seizures or weakness.  PSYCHIATRIC:  No anxiety or depression.    PHYSICAL EXAMINATION  /72   Pulse 63   Temp 97.9 °F (36.6 °C) (Oral)   Resp 18   SpO2 98%     GENERAL:  A well-developed, well-nourished, elderly, white female in no acute distress, appearing younger than her chronological age.  HEENT:  Pupils equally round and reactive to light. Extraocular muscles intact.  CARDIOVASCULAR:  Regular rate and rhythm.  No murmurs, gallops or rubs.  LUNGS:  Clear to auscultation bilaterally.  ABDOMEN:  Soft, nontender, nondistended with positive bowel sounds.  EXTREMITIES:  No clubbing, cyanosis or edema bilaterally.  SKIN:  No rashes or petechiae.  NEURO:  Cranial nerves grossly intact.  No focal deficits.  PSYCH:  Alert and oriented x3.    LABORATORY    Lab Results   Component Value Date    WBC 6.57 10/10/2018    HGB 13.1 10/10/2018    HCT 39.8 10/10/2018    MCV 93.2 10/10/2018     10/10/2018    NEUTROABS 4.63 10/10/2018       Lab Results   Component Value Date     07/11/2018    K 3.7 07/11/2018     07/11/2018    CO2 29.5 07/11/2018    BUN 18 07/11/2018    CREATININE 1.00 10/08/2018    GLUCOSE 100 07/11/2018    CALCIUM 9.6 07/11/2018    AST 15 07/11/2018    ALT 10 07/11/2018    ALKPHOS 100 07/11/2018    BILITOT 0.6 07/11/2018    PROTEINTOT 7.4 07/11/2018    ALBUMIN 4.00 07/11/2018     IMAGING  CT chest,  abdomen and pelvis with contrast (11/09/2017):  Impression:  1) Mild, nonspecific thickening of the distal esophagus which may be related to diagnosis of esophageal cancer. Multiple subcentimeter right paraesophageal lymph nodes are noted.  2) 1.3 mm ground glass nodule in the left upper lobe. No suspicious solid nodules that would suggest metastatic disease.  3) No evidence of metastatic disease to the abdomen or pelvis.  4) Bilobed fusiform AAA measuring up to 4.6 cm beneath the renal arteries.  5) Cardiomegaly with artery atherosclerosis.    NM PET with fusion CT, skull base to mid-thigh (12/07/2017, at ):  Impression:  1) No suspicious focal FDG uptake within the esophagus to suggest a site of invasive malignancy.  2) No suspicious metabolically active mediastinal adenopathy.  3) No suspicious hypermetabolic distal metastasis.  4) Few non-FDG avid groundglass nodules, the largest in the left upper lobe of the lung, unchanged since the most recent comparison CT of the chest of one month prior.    CT chest with contrast (07/02/2018):  Impression: Thickening of the mucosa in the distal portion of the esophagus consistent with clinical history of adenocarcinoma of the esophagus. There is nothing seen on the CT to suggest metastatic disease in the lung parenchyma or in the mediastinal lymph nodes.    CT abdomen and pelvis with contrast (07/02/2018):  Impression: No CT findings of metastatic disease in the abdomen or pelvis. There were benign cysts in the kidneys. There is aneurysmal dilatation of the infrarenal abdominal aorta with a maximum diameter of 4.7 cm.    CT chest with contrast (10/08/2018):  Impression: Thickening of the esophageal wall, similar to the previous exam. No new pulmonary pathology.    CT abdomen and pelvis with contrast (10/08/2018):  Impression:  1) Thickening of the distal esophageal wall, similar to the previous exam.  2) Very slight interval increase in size of AAA (from 4.7 to 4.8  cm).    PROCEDURES  Esophagogastroduodenoscopy with esophageal biopsies and dilatation (06/26/2018):  Impression:  1) Radiation esophagitis with ulceration, dense exudate and tight stenosis balloon dilated to 12 mm and biopsies were performed.  2) Appearance of residual Addison's esophagus with papillary mucosal appearance in the distal most esophagus.  3) Large hiatal hernia.  4) Gastric food retention consistent with gastroparesis.    PATHOLOGY  Esophagus, nodules (10/19/2017):  Adenocarcinoma, invasive at least into the muscularis mucosa.    Esophageal biopsy, stricture (06/26/2018):  Gastric type mucosa with ulceration, acute and chronic inflammation, and focal intestinal metaplasia, negative for dysplasia.    IMPRESSION AND PLAN  Ms. Renee is a 82 y.o., white female with:  1. Esophageal adenocarcinoma: Diagnosed with clinical stage [id number] disease in late 2017 (although she did not undergo an endoscopic ultrasound). Regardless, even though she was (and is) in overall good health, she was not eligible for an esophagectomy due to her advanced age and comorbidities (and she has repeatedly stated that she would not have wanted one anyway). Therefore, we agreed with CT surgery and radiation oncology's initial recommendations for localized radiotherapy. It was also agreed that she would be a good candidate for adding concomitant chemotherapy to this treatment plan, and PO capecitabine (Xeloda) was a convenient and fairly well tolerable choice. A Rx for 1500 mg BID M-F throughout the planned course of localized XRT was provided. She began this treatment regimen in early March 2018 and completed it on 04/26/2018. Overall, she tolerated this treatment regimen very well, with fatigue and mild dysphagia (both of which are now improving) her only noticeable side effects. Today, about five and one half months since completing her therapy, she continues to feel overall better. Repeat CT scans performed on 07/02/2018  (and summarized above) were consistent with treatment related changes only, with no definite evidence of residual/recurrent disease; and this continues to be the case on the most recent follow up imaging (CT scans performed on 10/08/2018 and also summarized above). The results of a repeat EGD performed on 06/26/2018 (and also summarized above) also showed no definite evidence of residual/recurrent disease (biopsies were negative for malignancy). She will continue to follow up with gastroenterology as previously planned. We will see her back in our clinic in three months with a repeat CBC, CMP and CT scans.  2. Dysphagia: The patient had no such issues prior to starting Xeloda/XRT; however, toward the end of this regimen, she began to experience some increasing difficulty with swallowing, particularly with her pills. This issue remained manageable throughout the remainder of her treatment and has been getting better since the completion of it, particularly after she underwent an esophageal dilatation in late June 2018. She will continue to follow up with gastroenterology as planned. Continue to monitor.  The patient was in agreement with these plans.    ACO Quality Measures:  a) The patient does not use tobacco products.  b) The patient's BMI is above the normal parameters. Plan includes referral to primary care.   c) The current outpatient and discharge medications have been reconciled for the patient.    It is a pleasure to participate in Ms. Renee's care. Please do not hesitate to call with any questions or concerns that you may have.    A total of 20 minutes were spent coordinating this patient’s care in clinic today; more than 50% of this time was spent face-to-face with the patient, reviewing her interim medical history, discussing the results of the recent repeat CT scans and counseling on the current followup plan. All questions were answered to her satisfaction.    FOLLOW UP  With gastroenterology as  previously planned. Return to our clinic in 3 months with a CBC, CMP and CTs of the chest, abdomen and pelvis with contrast.      This document was electronically signed by BERTHA Berrios MD October 10, 2018 11:14 AM      CC: Vishal Mckenzie MD, PhD   MD Tracy Thompson, APRN

## 2019-01-07 ENCOUNTER — HOSPITAL ENCOUNTER (OUTPATIENT)
Dept: CT IMAGING | Facility: HOSPITAL | Age: 84
Discharge: HOME | End: 2019-01-07
Attending: INTERNAL MEDICINE

## 2019-01-07 ENCOUNTER — HOSPITAL ENCOUNTER (OUTPATIENT)
Dept: CT IMAGING | Facility: HOSPITAL | Age: 84
Discharge: HOME | End: 2019-01-07
Attending: INTERNAL MEDICINE | Admitting: INTERNAL MEDICINE

## 2019-01-07 DIAGNOSIS — C15.9 ESOPHAGEAL ADENOCARCINOMA (HCC): ICD-10-CM

## 2019-01-07 LAB — CREAT BLDA-MCNC: 1.1 MG/DL (ref 0.6–1.3)

## 2019-01-07 PROCEDURE — 71260 CT THORAX DX C+: CPT

## 2019-01-07 PROCEDURE — 74177 CT ABD & PELVIS W/CONTRAST: CPT | Performed by: RADIOLOGY

## 2019-01-07 PROCEDURE — 25010000002 IOPAMIDOL 61 % SOLUTION: Performed by: INTERNAL MEDICINE

## 2019-01-07 PROCEDURE — 82565 ASSAY OF CREATININE: CPT

## 2019-01-07 PROCEDURE — 74177 CT ABD & PELVIS W/CONTRAST: CPT

## 2019-01-07 PROCEDURE — 71260 CT THORAX DX C+: CPT | Performed by: RADIOLOGY

## 2019-01-07 RX ADMIN — IOPAMIDOL 80 ML: 612 INJECTION, SOLUTION INTRAVENOUS at 09:06

## 2019-01-09 ENCOUNTER — OFFICE VISIT (OUTPATIENT)
Dept: ONCOLOGY | Facility: CLINIC | Age: 84
End: 2019-01-09

## 2019-01-09 ENCOUNTER — LAB (OUTPATIENT)
Dept: ONCOLOGY | Facility: CLINIC | Age: 84
End: 2019-01-09

## 2019-01-09 VITALS
DIASTOLIC BLOOD PRESSURE: 113 MMHG | BODY MASS INDEX: 31.87 KG/M2 | WEIGHT: 188.6 LBS | RESPIRATION RATE: 20 BRPM | OXYGEN SATURATION: 96 % | TEMPERATURE: 97.7 F | HEART RATE: 73 BPM | SYSTOLIC BLOOD PRESSURE: 185 MMHG

## 2019-01-09 DIAGNOSIS — C15.9 ESOPHAGEAL ADENOCARCINOMA (HCC): Primary | ICD-10-CM

## 2019-01-09 DIAGNOSIS — C15.9 ESOPHAGEAL ADENOCARCINOMA (HCC): ICD-10-CM

## 2019-01-09 LAB
ALBUMIN SERPL-MCNC: 4 G/DL (ref 3.4–4.8)
ALBUMIN/GLOB SERPL: 1.3 G/DL (ref 1.5–2.5)
ALP SERPL-CCNC: 103 U/L (ref 35–104)
ALT SERPL W P-5'-P-CCNC: 20 U/L (ref 10–36)
ANION GAP SERPL CALCULATED.3IONS-SCNC: 3.7 MMOL/L (ref 3.6–11.2)
AST SERPL-CCNC: 22 U/L (ref 10–30)
BASOPHILS # BLD AUTO: 0.02 10*3/MM3 (ref 0–0.3)
BASOPHILS NFR BLD AUTO: 0.5 % (ref 0–2)
BILIRUB SERPL-MCNC: 0.6 MG/DL (ref 0.2–1.8)
BUN BLD-MCNC: 17 MG/DL (ref 7–21)
BUN/CREAT SERPL: 16.5 (ref 7–25)
CALCIUM SPEC-SCNC: 9.4 MG/DL (ref 7.7–10)
CHLORIDE SERPL-SCNC: 104 MMOL/L (ref 99–112)
CO2 SERPL-SCNC: 29.3 MMOL/L (ref 24.3–31.9)
CREAT BLD-MCNC: 1.03 MG/DL (ref 0.43–1.29)
DEPRECATED RDW RBC AUTO: 48.5 FL (ref 37–54)
EOSINOPHIL # BLD AUTO: 0.06 10*3/MM3 (ref 0–0.7)
EOSINOPHIL NFR BLD AUTO: 1.4 % (ref 0–7)
ERYTHROCYTE [DISTWIDTH] IN BLOOD BY AUTOMATED COUNT: 15 % (ref 11.5–14.5)
GFR SERPL CREATININE-BSD FRML MDRD: 51 ML/MIN/1.73
GLOBULIN UR ELPH-MCNC: 3.1 GM/DL
GLUCOSE BLD-MCNC: 98 MG/DL (ref 70–110)
HCT VFR BLD AUTO: 42.3 % (ref 37–47)
HGB BLD-MCNC: 13.9 G/DL (ref 12–16)
IMM GRANULOCYTES # BLD AUTO: 0 10*3/MM3 (ref 0–0.03)
IMM GRANULOCYTES NFR BLD AUTO: 0 % (ref 0–0.5)
LYMPHOCYTES # BLD AUTO: 0.9 10*3/MM3 (ref 1–3)
LYMPHOCYTES NFR BLD AUTO: 20.7 % (ref 16–46)
MCH RBC QN AUTO: 29.5 PG (ref 27–33)
MCHC RBC AUTO-ENTMCNC: 32.9 G/DL (ref 33–37)
MCV RBC AUTO: 89.8 FL (ref 80–94)
MONOCYTES # BLD AUTO: 0.16 10*3/MM3 (ref 0.1–0.9)
MONOCYTES NFR BLD AUTO: 3.7 % (ref 0–12)
NEUTROPHILS # BLD AUTO: 3.2 10*3/MM3 (ref 1.4–6.5)
NEUTROPHILS NFR BLD AUTO: 73.7 % (ref 40–75)
OSMOLALITY SERPL CALC.SUM OF ELEC: 275.3 MOSM/KG (ref 273–305)
PLATELET # BLD AUTO: 236 10*3/MM3 (ref 130–400)
PMV BLD AUTO: 10 FL (ref 6–10)
POTASSIUM BLD-SCNC: 4.2 MMOL/L (ref 3.5–5.3)
PROT SERPL-MCNC: 7.1 G/DL (ref 6–8)
RBC # BLD AUTO: 4.71 10*6/MM3 (ref 4.2–5.4)
SODIUM BLD-SCNC: 137 MMOL/L (ref 135–153)
WBC NRBC COR # BLD: 4.34 10*3/MM3 (ref 4.5–12.5)

## 2019-01-09 PROCEDURE — 85025 COMPLETE CBC W/AUTO DIFF WBC: CPT | Performed by: INTERNAL MEDICINE

## 2019-01-09 PROCEDURE — 80053 COMPREHEN METABOLIC PANEL: CPT | Performed by: INTERNAL MEDICINE

## 2019-01-09 PROCEDURE — 99214 OFFICE O/P EST MOD 30 MIN: CPT | Performed by: INTERNAL MEDICINE

## 2019-01-09 RX ORDER — ATENOLOL 100 MG/1
100 TABLET ORAL DAILY
COMMUNITY
End: 2021-01-01 | Stop reason: HOSPADM

## 2019-01-09 RX ORDER — DEXLANSOPRAZOLE 60 MG/1
60 CAPSULE, DELAYED RELEASE ORAL DAILY
Status: ON HOLD | COMMUNITY
End: 2021-01-01

## 2019-01-09 NOTE — PROGRESS NOTES
Name:  Geeta Renee  :  1935  Date:  2019     REFERRING PHYSICIAN  Vishal Mckenzie MD, PhD    PRIMARY CARE PROVIDER  Tracy Harris APRN    REASON FOR FOLLOW UP  1. Esophageal adenocarcinoma (CMS/HCC)      CHIEF COMPLAINT  Follow up esophageal cancer status post chemo/XRT. No specific complaints.    Dear Dr. Mckenzie,    HISTORY OF PRESENT ILLNESS:   I saw Ms. Renee in follow up today in our medical oncology clinic. As you are aware, she is a pleasant, 83 y.o., white female with a history of hypertension, arthritis and Addison's esophagus who has been undergoing periodic repeat endoscopic evaluations for the latter since she was first diagnosed in ~. She was again found to have high grade dysplasia in late summer 2017 and was referred back to the [facility] (where she had undergone prior radiofrequency ablations). A repeat local evaluation at  in 2017, unfortunately, was concerning for a component of carcinoma invasive into the submucosa. Imaging at the time (including a PET scan) was consistent with early stage disease, with no definite evidence of either mediastinal node involvement or distant mets. Ultimately, following an evaluation by CT surgery (neither they nor the patient were interested in pursuing an esophagectomy), she was referred to you, closer to her home in [place], [place], to consider treatment with localized radiotherapy. The patient was agreeable to this and was subsequently referred to our clinic to consider providing concomitant chemotherapy with the XRT. She completed planned treatment with concomitant Xeloda/XRT on 2018; and she has been doing overall very well ever since then, with no symptoms/signs of residual/recurernt disease to date.    INTERIM HISTORY:  Ms. Renee presents to clinic today for follow up by herself. In the ~eight and one half months since she completed Xeloda/XRT, she has been feeling overall better. Her  dysphagia and appetite have gotten much better (especially since she underwent an esophageal dilatation in late June 2018). She again has no new or specific complaints and feels overall very well. She insists that her blood pressure is not normally anywhere near as high as it was in our office today.    Past Medical History:   Diagnosis Date   • Arthritis    • Esophageal cancer (CMS/HCC)    • Hypertension        Past Surgical History:   Procedure Laterality Date   • CHOLECYSTECTOMY         Social History     Socioeconomic History   • Marital status:      Spouse name: Not on file   • Number of children: Not on file   • Years of education: Not on file   • Highest education level: Not on file   Social Needs   • Financial resource strain: Not on file   • Food insecurity - worry: Not on file   • Food insecurity - inability: Not on file   • Transportation needs - medical: Not on file   • Transportation needs - non-medical: Not on file   Occupational History   • Not on file   Tobacco Use   • Smoking status: Never Smoker   • Smokeless tobacco: Never Used   Substance and Sexual Activity   • Alcohol use: No   • Drug use: No   • Sexual activity: Defer   Other Topics Concern   • Not on file   Social History Narrative   • Not on file       Family History   Problem Relation Age of Onset   • Breast cancer Neg Hx        Allergies   Allergen Reactions   • Sulfa Antibiotics Hives       Current Outpatient Medications   Medication Sig Dispense Refill   • AMLODIPINE BESYLATE PO Take 5 mg by mouth Daily.     • aspirin 81 MG EC tablet Take 81 mg by mouth Daily.     • atenolol (TENORMIN) 100 MG tablet Take 100 mg by mouth Daily.     • dexlansoprazole (DEXILANT) 60 MG capsule Take 60 mg by mouth Daily.     • methotrexate 2.5 MG tablet Take  by mouth 3 (Three) Doses Each Week. Take Doses 12 (Twelve) Hours Apart.     • triamterene-hydrochlorothiazide (MAXZIDE-25) 37.5-25 MG per tablet Take 1 tablet by mouth Daily.       No current  facility-administered medications for this visit.      REVIEW OF SYSTEMS  CONSTITUTIONAL:  No fever, chills or night sweats. Improved fatigue.  EYES:  No blurry vision, diplopia or other vision changes.  ENT:  No hearing loss, nosebleeds or sore throat.  CARDIOVASCULAR:  No palpitations, arrhythmia, syncopal episodes or edema.  PULMONARY:  No hemoptysis, wheezing, chronic cough or shortness of breath.  GASTROINTESTINAL:  No nausea or vomiting. No constipation or diarrhea. Improved dysphagia and PO intake, as per the HPI above.  GENITOURINARY:  No hematuria, kidney stones or frequent urination.  MUSCULOSKELETAL: Chronic, mild joint and back pains, stable.  INTEGUMENTARY: No rashes or pruritus.  ENDOCRINE:  No excessive thirst or hot flashes.  HEMATOLOGIC:  No history of free bleeding, spontaneous bleeding or clotting.  IMMUNOLOGIC:  No allergies or frequent infections.  NEUROLOGIC: No numbness, tingling, seizures or weakness.  PSYCHIATRIC:  No anxiety or depression.    PHYSICAL EXAMINATION  BP (!) 185/113   Pulse 73   Temp 97.7 °F (36.5 °C) (Oral)   Resp 20   Wt 85.5 kg (188 lb 9.6 oz)   SpO2 96%   BMI 31.87 kg/m²     GENERAL:  A well-developed, well-nourished, elderly, white female in no acute distress, appearing younger than her chronological age.  HEENT:  Pupils equally round and reactive to light. Extraocular muscles intact.  CARDIOVASCULAR:  Regular rate and rhythm.  No murmurs, gallops or rubs.  LUNGS:  Clear to auscultation bilaterally.  ABDOMEN:  Soft, nontender, nondistended with positive bowel sounds.  EXTREMITIES:  No clubbing, cyanosis or edema bilaterally.  SKIN:  No rashes or petechiae.  NEURO:  Cranial nerves grossly intact.  No focal deficits.  PSYCH:  Alert and oriented x3.    LABORATORY    Lab Results   Component Value Date    WBC 4.34 (L) 01/09/2019    HGB 13.9 01/09/2019    HCT 42.3 01/09/2019    MCV 89.8 01/09/2019     01/09/2019    NEUTROABS 3.20 01/09/2019       Lab Results    Component Value Date     10/10/2018    K 4.2 10/10/2018     10/10/2018    CO2 26.0 10/10/2018    BUN 19 10/10/2018    CREATININE 1.10 01/07/2019    GLUCOSE 113 (H) 10/10/2018    CALCIUM 9.1 10/10/2018    AST 18 10/10/2018    ALT 13 10/10/2018    ALKPHOS 93 10/10/2018    BILITOT 0.6 10/10/2018    PROTEINTOT 7.0 10/10/2018    ALBUMIN 4.00 10/10/2018     IMAGING  CT chest, abdomen and pelvis with contrast (11/09/2017):  Impression:  1) Mild, nonspecific thickening of the distal esophagus which may be related to diagnosis of esophageal cancer. Multiple subcentimeter right paraesophageal lymph nodes are noted.  2) 1.3 mm ground glass nodule in the left upper lobe. No suspicious solid nodules that would suggest metastatic disease.  3) No evidence of metastatic disease to the abdomen or pelvis.  4) Bilobed fusiform AAA measuring up to 4.6 cm beneath the renal arteries.  5) Cardiomegaly with artery atherosclerosis.    NM PET with fusion CT, skull base to mid-thigh (12/07/2017, at ):  Impression:  1) No suspicious focal FDG uptake within the esophagus to suggest a site of invasive malignancy.  2) No suspicious metabolically active mediastinal adenopathy.  3) No suspicious hypermetabolic distal metastasis.  4) Few non-FDG avid groundglass nodules, the largest in the left upper lobe of the lung, unchanged since the most recent comparison CT of the chest of one month prior.    CT chest with contrast (07/02/2018):  Impression: Thickening of the mucosa in the distal portion of the esophagus consistent with clinical history of adenocarcinoma of the esophagus. There is nothing seen on the CT to suggest metastatic disease in the lung parenchyma or in the mediastinal lymph nodes.    CT abdomen and pelvis with contrast (07/02/2018):  Impression: No CT findings of metastatic disease in the abdomen or pelvis. There were benign cysts in the kidneys. There is aneurysmal dilatation of the infrarenal abdominal aorta with a  maximum diameter of 4.7 cm.    CT chest with contrast (10/08/2018):  Impression: Thickening of the esophageal wall, similar to the previous exam. No new pulmonary pathology.    CT abdomen and pelvis with contrast (10/08/2018):  Impression:  1) Thickening of the distal esophageal wall, similar to the previous exam.  2) Very slight interval increase in size of AAA (from 4.7 to 4.8 cm).    CT chest with contrast (01/07/2019):  Impression:  1) Esophageal wall thickening of the mid-distal esophagus with no increase since the previous exam. No mediastinal masses or adenopathy identified.  2) Remainder of chest is stable from previous.    CT abdomen and pelvis with contrast (01/07/2019):  Impression:  1) Stable exam demonstrating no evidence of metastatic disease to the abdomen or pelvis. Distal esophageal wall thickening and hiatal hernia similar to previous.  2) Based on coronal reformatted sequences, likely no significant change in transverse diameter of infrarenal abdominal aortic aneurysm (4.7 cm and was previously 4.7 cm).    PROCEDURES  Esophagogastroduodenoscopy with esophageal biopsies and dilatation (06/26/2018):  Impression:  1) Radiation esophagitis with ulceration, dense exudate and tight stenosis balloon dilated to 12 mm and biopsies were performed.  2) Appearance of residual Addison's esophagus with papillary mucosal appearance in the distal most esophagus.  3) Large hiatal hernia.  4) Gastric food retention consistent with gastroparesis.    PATHOLOGY  Esophagus, nodules (10/19/2017):  Adenocarcinoma, invasive at least into the muscularis mucosa.    Esophageal biopsy, stricture (06/26/2018):  Gastric type mucosa with ulceration, acute and chronic inflammation, and focal intestinal metaplasia, negative for dysplasia.    IMPRESSION AND PLAN  Ms. Renee is a 83 y.o., white female with:  1. Esophageal adenocarcinoma: Diagnosed with clinical stage [id number] disease in late 2017 (although she did not undergo an  endoscopic ultrasound). Regardless, even though she was (and is) in overall good health, she was not eligible for an esophagectomy due to her advanced age and comorbidities (and she has repeatedly stated that she would not have wanted one anyway). Therefore, we agreed with CT surgery and radiation oncology's initial recommendations for localized radiotherapy. It was also agreed that she would be a good candidate for adding concomitant chemotherapy to this treatment plan, and PO capecitabine (Xeloda) was a convenient and fairly well tolerable choice. A Rx for 1500 mg BID M-F throughout the planned course of localized XRT was provided. She began this treatment regimen in early March 2018 and completed it on 04/26/2018. Overall, she tolerated this treatment regimen very well, with fatigue and mild dysphagia (both of which are now improving) her only noticeable side effects. Today, about eight and one half months since completing her therapy, she continues to feel overall well. Repeat CT scans performed on 07/02/2018 (and summarized above) were consistent with treatment related changes only, with no definite evidence of residual/recurrent disease; and this continues to be the case on the most recent follow up imaging (CT scans performed on 01/07/2019 and also summarized above). The results of a repeat EGD performed on 06/26/2018 (and also summarized above) also showed no definite evidence of residual/recurrent disease (biopsies were negative for malignancy). She will continue to follow up with gastroenterology as previously planned (she is scheduled to undergo another repeat EGD later this month). We will see her back in our clinic again in three months (~early April 2019) with a repeat CBC, CMP and CT scans.  2. Dysphagia: The patient had no such issues prior to starting Xeloda/XRT; however, toward the end of this regimen, she began to experience some increasing difficulty with swallowing, particularly with her pills.  This issue remained manageable throughout the remainder of her treatment and has been getting better since the completion of it, particularly after she underwent an esophageal dilatation in late June 2018. She will continue to follow up with gastroenterology as planned (she is scheduled to undergo another repeat EGD later this month). Continue to monitor.  3. Hypertension: The patient was instructed to go straight to the ED from our office for emergent management. She also plans to follow up with her PCP.  4. AAA: A known issue that currently remains stable (~4.7 cm) on the most recent repeat CT scans (performed on 01/07/2019 and summarized above).  The patient was in agreement with these plans.    ACO Quality Measures:  a) The patient does not use tobacco products.  b) The patient's BMI is above the normal parameters. Plan includes referral to primary care.   c) The current outpatient and discharge medications have been reconciled for the patient.    It is a pleasure to participate in Ms. Renee's care. Please do not hesitate to call with any questions or concerns that you may have.    A total of 30 minutes were spent coordinating this patient’s care in clinic today; more than 50% of this time was spent face-to-face with the patient, reviewing her interim medical history, discussing the results of the recent repeat CT scans and counseling on the current followup plan. All questions were answered to her satisfaction.    FOLLOW UP  With gastroenterology as previously planned. With the ED/primary care for management of her severe hypertension. Return to our clinic in 3 months (~April 2019) with a CBC, CMP and CTs of the chest, abdomen and pelvis with contrast.        This document was electronically signed by BERTHA Berrios MD January 9, 2019 11:06 AM      CC: Vishal Mckenzie MD, PhD   MD Tracy Thompson, APRN

## 2019-04-04 ENCOUNTER — HOSPITAL ENCOUNTER (OUTPATIENT)
Dept: CT IMAGING | Facility: HOSPITAL | Age: 84
Discharge: HOME | End: 2019-04-04

## 2019-04-04 ENCOUNTER — HOSPITAL ENCOUNTER (OUTPATIENT)
Dept: CT IMAGING | Facility: HOSPITAL | Age: 84
Discharge: HOME | End: 2019-04-04
Admitting: INTERNAL MEDICINE

## 2019-04-04 DIAGNOSIS — C15.9 ESOPHAGEAL ADENOCARCINOMA (HCC): ICD-10-CM

## 2019-04-04 LAB — CREAT BLDA-MCNC: 1.2 MG/DL (ref 0.6–1.3)

## 2019-04-04 PROCEDURE — 71260 CT THORAX DX C+: CPT | Performed by: RADIOLOGY

## 2019-04-04 PROCEDURE — 74177 CT ABD & PELVIS W/CONTRAST: CPT

## 2019-04-04 PROCEDURE — 25010000002 IOPAMIDOL 61 % SOLUTION: Performed by: INTERNAL MEDICINE

## 2019-04-04 PROCEDURE — 71260 CT THORAX DX C+: CPT

## 2019-04-04 PROCEDURE — 74177 CT ABD & PELVIS W/CONTRAST: CPT | Performed by: RADIOLOGY

## 2019-04-04 PROCEDURE — 82565 ASSAY OF CREATININE: CPT

## 2019-04-04 RX ADMIN — IOPAMIDOL 60 ML: 612 INJECTION, SOLUTION INTRAVENOUS at 09:02

## 2019-04-10 ENCOUNTER — LAB (OUTPATIENT)
Dept: ONCOLOGY | Facility: CLINIC | Age: 84
End: 2019-04-10

## 2019-04-10 ENCOUNTER — OFFICE VISIT (OUTPATIENT)
Dept: ONCOLOGY | Facility: CLINIC | Age: 84
End: 2019-04-10

## 2019-04-10 VITALS
TEMPERATURE: 97.8 F | HEART RATE: 60 BPM | RESPIRATION RATE: 18 BRPM | BODY MASS INDEX: 32.55 KG/M2 | DIASTOLIC BLOOD PRESSURE: 78 MMHG | OXYGEN SATURATION: 97 % | WEIGHT: 192.6 LBS | SYSTOLIC BLOOD PRESSURE: 114 MMHG

## 2019-04-10 DIAGNOSIS — R13.10 DYSPHAGIA, UNSPECIFIED TYPE: ICD-10-CM

## 2019-04-10 DIAGNOSIS — N28.9 RENAL INSUFFICIENCY: ICD-10-CM

## 2019-04-10 DIAGNOSIS — C15.9 ESOPHAGEAL ADENOCARCINOMA (HCC): ICD-10-CM

## 2019-04-10 DIAGNOSIS — C15.9 ESOPHAGEAL ADENOCARCINOMA (HCC): Primary | ICD-10-CM

## 2019-04-10 DIAGNOSIS — I71.40 ABDOMINAL AORTIC ANEURYSM (AAA) WITHOUT RUPTURE (HCC): ICD-10-CM

## 2019-04-10 LAB
ALBUMIN SERPL-MCNC: 3.62 G/DL (ref 3.5–5.2)
ALBUMIN/GLOB SERPL: 1.1 G/DL
ALP SERPL-CCNC: 116 U/L (ref 39–117)
ALT SERPL W P-5'-P-CCNC: 14 U/L (ref 1–33)
ANION GAP SERPL CALCULATED.3IONS-SCNC: 14 MMOL/L
AST SERPL-CCNC: 19 U/L (ref 1–32)
BASOPHILS # BLD AUTO: 0.01 10*3/MM3 (ref 0–0.2)
BASOPHILS NFR BLD AUTO: 0.2 % (ref 0–1.5)
BILIRUB SERPL-MCNC: 0.4 MG/DL (ref 0.2–1.2)
BUN BLD-MCNC: 25 MG/DL (ref 8–23)
BUN/CREAT SERPL: 20.5 (ref 7–25)
CALCIUM SPEC-SCNC: 9 MG/DL (ref 8.6–10.5)
CHLORIDE SERPL-SCNC: 100 MMOL/L (ref 98–107)
CO2 SERPL-SCNC: 28 MMOL/L (ref 22–29)
CREAT BLD-MCNC: 1.22 MG/DL (ref 0.57–1)
DEPRECATED RDW RBC AUTO: 49.6 FL (ref 37–54)
EOSINOPHIL # BLD AUTO: 0.06 10*3/MM3 (ref 0–0.4)
EOSINOPHIL NFR BLD AUTO: 1.1 % (ref 0.3–6.2)
ERYTHROCYTE [DISTWIDTH] IN BLOOD BY AUTOMATED COUNT: 15.2 % (ref 12.3–15.4)
GFR SERPL CREATININE-BSD FRML MDRD: 42 ML/MIN/1.73
GLOBULIN UR ELPH-MCNC: 3.2 GM/DL
GLUCOSE BLD-MCNC: 117 MG/DL (ref 65–99)
HCT VFR BLD AUTO: 40 % (ref 34–46.6)
HGB BLD-MCNC: 13.2 G/DL (ref 12–15.9)
IMM GRANULOCYTES # BLD AUTO: 0.01 10*3/MM3 (ref 0–0.05)
IMM GRANULOCYTES NFR BLD AUTO: 0.2 % (ref 0–0.5)
LYMPHOCYTES # BLD AUTO: 1.11 10*3/MM3 (ref 0.7–3.1)
LYMPHOCYTES NFR BLD AUTO: 20.5 % (ref 19.6–45.3)
MCH RBC QN AUTO: 30.1 PG (ref 26.6–33)
MCHC RBC AUTO-ENTMCNC: 33 G/DL (ref 31.5–35.7)
MCV RBC AUTO: 91.3 FL (ref 79–97)
MONOCYTES # BLD AUTO: 0.38 10*3/MM3 (ref 0.1–0.9)
MONOCYTES NFR BLD AUTO: 7 % (ref 5–12)
NEUTROPHILS # BLD AUTO: 3.84 10*3/MM3 (ref 1.4–7)
NEUTROPHILS NFR BLD AUTO: 71 % (ref 42.7–76)
PLATELET # BLD AUTO: 213 10*3/MM3 (ref 140–450)
PMV BLD AUTO: 10.6 FL (ref 6–12)
POTASSIUM BLD-SCNC: 3.8 MMOL/L (ref 3.5–5.2)
PROT SERPL-MCNC: 6.8 G/DL (ref 6–8.5)
RBC # BLD AUTO: 4.38 10*6/MM3 (ref 3.77–5.28)
SODIUM BLD-SCNC: 142 MMOL/L (ref 136–145)
WBC NRBC COR # BLD: 5.41 10*3/MM3 (ref 3.4–10.8)

## 2019-04-10 PROCEDURE — 99214 OFFICE O/P EST MOD 30 MIN: CPT | Performed by: NURSE PRACTITIONER

## 2019-04-10 PROCEDURE — 85025 COMPLETE CBC W/AUTO DIFF WBC: CPT | Performed by: INTERNAL MEDICINE

## 2019-04-10 PROCEDURE — 80053 COMPREHEN METABOLIC PANEL: CPT | Performed by: INTERNAL MEDICINE

## 2019-04-10 NOTE — PROGRESS NOTES
Name:  Geeta Renee  :  1935  Date:  4/10/2019     REFERRING PHYSICIAN  Vishal Mckenzie MD, PhD    PRIMARY CARE PROVIDER  Tracy Harris APRN    REASON FOR FOLLOW UP  1. Esophageal adenocarcinoma (CMS/HCC)      CHIEF COMPLAINT  Follow up esophageal cancer status post chemo/XRT and recent imaging.     Dear Dr. Mckenzie,    HISTORY OF PRESENT ILLNESS:   I saw Ms. Renee in follow up today in our medical oncology clinic. As you are aware, she is a pleasant, 83 y.o., white female with a history of hypertension, arthritis and Addison's esophagus who has been undergoing periodic repeat endoscopic evaluations for the latter since she was first diagnosed in ~. She was again found to have high grade dysplasia in late summer 2017 and was referred back to the [facility] (where she had undergone prior radiofrequency ablations). A repeat local evaluation at  in 2017, unfortunately, was concerning for a component of carcinoma invasive into the submucosa. Imaging at the time (including a PET scan) was consistent with early stage disease, with no definite evidence of either mediastinal node involvement or distant mets. Ultimately, following an evaluation by CT surgery (neither they nor the patient were interested in pursuing an esophagectomy), she was referred to you, closer to her home in [place], [place], to consider treatment with localized radiotherapy. The patient was agreeable to this and was subsequently referred to our clinic to consider providing concomitant chemotherapy with the XRT. She completed planned treatment with concomitant Xeloda/XRT on 2018; and she has been doing overall very well ever since then, with no symptoms/signs of residual/recurernt disease to date.    INTERIM HISTORY:  Ms. Renee presents to clinic today for follow up by herself. Since her last visit, she reports she has been doing well. She underwent repeat EGD with Dr. Lemus at the end of  January (report currently unavailable) and patient says she received a good report and biopsy was negative for malignancy. Will request report today. She will follow up with Dr. Lemus again next month. She denies having dysphagia at present. She reports appetite is good. She again has no new or specific complaints and feels overall very well. She recently underwent repeat imaging and is anxious to hear of results.     Past Medical History:   Diagnosis Date   • Arthritis    • Esophageal cancer (CMS/HCC)    • Hypertension        Past Surgical History:   Procedure Laterality Date   • CHOLECYSTECTOMY         Social History     Socioeconomic History   • Marital status:      Spouse name: Not on file   • Number of children: Not on file   • Years of education: Not on file   • Highest education level: Not on file   Tobacco Use   • Smoking status: Never Smoker   • Smokeless tobacco: Never Used   Substance and Sexual Activity   • Alcohol use: No   • Drug use: No   • Sexual activity: Defer       Family History   Problem Relation Age of Onset   • Breast cancer Neg Hx        Allergies   Allergen Reactions   • Sulfa Antibiotics Hives       Current Outpatient Medications   Medication Sig Dispense Refill   • AMLODIPINE BESYLATE PO Take 5 mg by mouth Daily.     • aspirin 81 MG EC tablet Take 81 mg by mouth Daily.     • atenolol (TENORMIN) 100 MG tablet Take 100 mg by mouth Daily.     • dexlansoprazole (DEXILANT) 60 MG capsule Take 60 mg by mouth Daily.     • methotrexate 2.5 MG tablet Take  by mouth 3 (Three) Doses Each Week. Take Doses 12 (Twelve) Hours Apart.     • triamterene-hydrochlorothiazide (MAXZIDE-25) 37.5-25 MG per tablet Take 1 tablet by mouth Daily.       No current facility-administered medications for this visit.      REVIEW OF SYSTEMS  CONSTITUTIONAL:  No fever, chills or night sweats.   EYES:  No blurry vision, diplopia or other vision changes.  ENT:  No hearing loss, nosebleeds or sore  throat.  CARDIOVASCULAR:  No palpitations, arrhythmia, syncopal episodes or edema.  PULMONARY:  No hemoptysis, wheezing, chronic cough or shortness of breath.  GASTROINTESTINAL:  No nausea or vomiting. No constipation or diarrhea. No dysphagia.   GENITOURINARY:  No hematuria, kidney stones or frequent urination.  MUSCULOSKELETAL: Chronic, mild joint and back pains, stable.  INTEGUMENTARY: No rashes or pruritus.  ENDOCRINE:  No excessive thirst or hot flashes.  HEMATOLOGIC:  No history of free bleeding, spontaneous bleeding or clotting.  IMMUNOLOGIC:  No allergies or frequent infections.  NEUROLOGIC: No numbness, tingling, seizures or weakness.  PSYCHIATRIC:  No anxiety or depression.    PHYSICAL EXAMINATION  /78   Pulse 60   Temp 97.8 °F (36.6 °C) (Oral)   Resp 18   Wt 87.4 kg (192 lb 9.6 oz)   SpO2 97%   BMI 32.55 kg/m²   GENERAL:  A well-developed, well-nourished, elderly, white female in no acute distress  HEENT:  Pupils equally round and reactive to light. Extraocular muscles intact.  CARDIOVASCULAR:  Regular rate and rhythm.  No murmurs, gallops or rubs.  LUNGS:  Clear to auscultation bilaterally.  ABDOMEN:  Soft, nontender, nondistended with positive bowel sounds.  EXTREMITIES:  No clubbing, cyanosis or edema bilaterally.  SKIN:  No rashes or petechiae.  NEURO:  Cranial nerves grossly intact.  No focal deficits.  PSYCH:  Alert and oriented x3.    LABORATORY    Lab Results   Component Value Date    WBC 5.41 04/10/2019    HGB 13.2 04/10/2019    HCT 40.0 04/10/2019    MCV 91.3 04/10/2019     04/10/2019    NEUTROABS 3.84 04/10/2019       Lab Results   Component Value Date     04/10/2019    K 3.8 04/10/2019     04/10/2019    CO2 28.0 04/10/2019    BUN 25 (H) 04/10/2019    CREATININE 1.22 (H) 04/10/2019    GLUCOSE 117 (H) 04/10/2019    CALCIUM 9.0 04/10/2019    AST 19 04/10/2019    ALT 14 04/10/2019    ALKPHOS 116 04/10/2019    BILITOT 0.4 04/10/2019    PROTEINTOT 6.8 04/10/2019     ALBUMIN 3.62 04/10/2019     IMAGING  CT chest, abdomen and pelvis with contrast (11/09/2017):  Impression:  1) Mild, nonspecific thickening of the distal esophagus which may be related to diagnosis of esophageal cancer. Multiple subcentimeter right paraesophageal lymph nodes are noted.  2) 1.3 mm ground glass nodule in the left upper lobe. No suspicious solid nodules that would suggest metastatic disease.  3) No evidence of metastatic disease to the abdomen or pelvis.  4) Bilobed fusiform AAA measuring up to 4.6 cm beneath the renal arteries.  5) Cardiomegaly with artery atherosclerosis.    NM PET with fusion CT, skull base to mid-thigh (12/07/2017, at ):  Impression:  1) No suspicious focal FDG uptake within the esophagus to suggest a site of invasive malignancy.  2) No suspicious metabolically active mediastinal adenopathy.  3) No suspicious hypermetabolic distal metastasis.  4) Few non-FDG avid groundglass nodules, the largest in the left upper lobe of the lung, unchanged since the most recent comparison CT of the chest of one month prior.    CT chest with contrast (07/02/2018):  Impression: Thickening of the mucosa in the distal portion of the esophagus consistent with clinical history of adenocarcinoma of the esophagus. There is nothing seen on the CT to suggest metastatic disease in the lung parenchyma or in the mediastinal lymph nodes.    CT abdomen and pelvis with contrast (07/02/2018):  Impression: No CT findings of metastatic disease in the abdomen or pelvis. There were benign cysts in the kidneys. There is aneurysmal dilatation of the infrarenal abdominal aorta with a maximum diameter of 4.7 cm.    CT chest with contrast (10/08/2018):  Impression: Thickening of the esophageal wall, similar to the previous exam. No new pulmonary pathology.    CT abdomen and pelvis with contrast (10/08/2018):  Impression:  1) Thickening of the distal esophageal wall, similar to the previous exam.  2) Very slight interval  increase in size of AAA (from 4.7 to 4.8 cm).    CT chest with contrast (01/07/2019):  Impression:  1) Esophageal wall thickening of the mid-distal esophagus with no increase since the previous exam. No mediastinal masses or adenopathy identified.  2) Remainder of chest is stable from previous.    CT abdomen and pelvis with contrast (01/07/2019):  Impression:  1) Stable exam demonstrating no evidence of metastatic disease to the abdomen or pelvis. Distal esophageal wall thickening and hiatal hernia similar to previous.  2) Based on coronal reformatted sequences, likely no significant change in transverse diameter of infrarenal abdominal aortic aneurysm (4.7 cm and was previously 4.7 cm).    CT chest with contrast (04/04/2019)  IMPRESSION:  Stable CT scan of the thorax. There continues to be some  mild mucosal thickening in the distal portion of the esophagus, stable  in comparing with the earlier exam.     CT abdomen pelvis with contrast (04/04/2019)  IMPRESSION:  Nothing seen to suggest metastatic disease in the abdomen  and pelvis. There is aneurysmal dilatation of the infrarenal portion of  the abdominal aorta. The diameter is increased slightly. Currently the  aortic diameter measures 4.9 cm and previously measures 4.7.    PROCEDURES  Esophagogastroduodenoscopy with esophageal biopsies and dilatation (06/26/2018):  Impression:  1) Radiation esophagitis with ulceration, dense exudate and tight stenosis balloon dilated to 12 mm and biopsies were performed.  2) Appearance of residual Addison's esophagus with papillary mucosal appearance in the distal most esophagus.  3) Large hiatal hernia.  4) Gastric food retention consistent with gastroparesis.    PATHOLOGY  Esophagus, nodules (10/19/2017):  Adenocarcinoma, invasive at least into the muscularis mucosa.    Esophageal biopsy, stricture (06/26/2018):  Gastric type mucosa with ulceration, acute and chronic inflammation, and focal intestinal metaplasia, negative for  dysplasia.    IMPRESSION AND PLAN  Ms. Renee is a 83 y.o., white female with:  1. Esophageal adenocarcinoma: Diagnosed with clinical stage [id number] disease in late 2017 (although she did not undergo an endoscopic ultrasound). Regardless, even though she was (and is) in overall good health, she was not eligible for an esophagectomy due to her advanced age and comorbidities (and she has repeatedly stated that she would not have wanted one anyway). Therefore, we agreed with CT surgery and radiation oncology's initial recommendations for localized radiotherapy. It was also agreed that she would be a good candidate for adding concomitant chemotherapy to this treatment plan, and PO capecitabine (Xeloda) was a convenient and fairly well tolerable choice. A Rx for 1500 mg BID M-F throughout the planned course of localized XRT was provided. She began this treatment regimen in early March 2018 and completed it on 04/26/2018. Overall, she tolerated this treatment regimen very well, with fatigue and mild dysphagia (both of which are now improved)as her only noticeable side effects. Clinically she continues to feel overall well. Repeat CT scans performed on 07/02/2018 (and summarized above) were consistent with treatment related changes only, with no definite evidence of residual/recurrent disease; and this continues to be the case on the most recent follow up imaging (CT scans performed on 04/04/2019 and also summarized above). The results of a repeat EGD performed on 06/26/2018 (and also summarized above) also showed no definite evidence of residual/recurrent disease (biopsies were negative for malignancy). She says she followed up with gastroenterology and had repeat EGD at end of January which again was unremarkable and biopsy negative for malignancy. Report currently unavailable, will request today. She will follow up with gastroenterology as previously planned next month. We will see her back in our clinic again in  three months with a repeat CBC and CMP. Will plan to repeat imaging in 6 months.   2. Dysphagia: The patient had no such issues prior to starting Xeloda/XRT; however, toward the end of this regimen, she began to experience some increasing difficulty with swallowing, particularly with her pills. This issue remained manageable throughout the remainder of her treatment and has improved since the completion of it, particularly after she underwent an esophageal dilatation in late June 2018. She will continue to follow up with gastroenterology as planned. Continue to monitor.  3. AAA: A known issue that has remained stable. Most recent repeat CT scans performed on 04/04/2019 and summarized above showed slight increase from 4.7 cm to 4.9 cm. She says this is being monitored by her PCP and has an upcoming appointment.   4. Renal insufficiency: Cr 1.22 today and previously been normal. Advised patient to increase water intake and follow up with PCP for ongoing monitoring.   The patient was in agreement with these plans.    ACO Quality Measures:  a) The patient does not use tobacco products.  b) The patient's BMI is above the normal parameters. Plan includes referral to primary care.   c) The current outpatient and discharge medications have been reconciled for the patient.    It is a pleasure to participate in Ms. Renee's care. Please do not hesitate to call with any questions or concerns that you may have.    A total of 30 minutes were spent coordinating this patient’s care in clinic today; more than 50% of this time was spent face-to-face with the patient, reviewing her interim medical history, discussing the results of the recent repeat CT scans and counseling on the current followup plan. All questions were answered to her satisfaction.    FOLLOW UP  Will request recent EGD report and pathology. With gastroenterology as previously planned. With primary care as previously planned. Return to our clinic in 3 months  with a CBC and CMP. Will plan to repeat CTs of the chest, abdomen and pelvis with contrast in 6 months.         This document was electronically signed by PRISCILLA Pak April 10, 2019 12:23 PM      CC: Vishal Mckenzie MD, PhD   MD Tracy Thompson, PRISCILLA

## 2019-04-24 ENCOUNTER — HOSPITAL ENCOUNTER (OUTPATIENT)
Dept: MAMMOGRAPHY | Facility: HOSPITAL | Age: 84
Discharge: HOME | End: 2019-04-24
Admitting: NURSE PRACTITIONER

## 2019-04-24 DIAGNOSIS — Z12.39 SCREENING BREAST EXAMINATION: ICD-10-CM

## 2019-04-24 PROCEDURE — 77063 BREAST TOMOSYNTHESIS BI: CPT | Performed by: RADIOLOGY

## 2019-04-24 PROCEDURE — 77067 SCR MAMMO BI INCL CAD: CPT

## 2019-04-24 PROCEDURE — 77067 SCR MAMMO BI INCL CAD: CPT | Performed by: RADIOLOGY

## 2019-04-24 PROCEDURE — 77063 BREAST TOMOSYNTHESIS BI: CPT

## 2019-05-01 ENCOUNTER — APPOINTMENT (OUTPATIENT)
Dept: ULTRASOUND IMAGING | Facility: HOSPITAL | Age: 84
End: 2019-05-01

## 2019-05-09 ENCOUNTER — HOSPITAL ENCOUNTER (OUTPATIENT)
Dept: ULTRASOUND IMAGING | Facility: HOSPITAL | Age: 84
Discharge: HOME | End: 2019-05-09
Admitting: RADIOLOGY

## 2019-05-09 DIAGNOSIS — R92.8 ABNORMAL MAMMOGRAM: ICD-10-CM

## 2019-05-09 PROCEDURE — 76642 ULTRASOUND BREAST LIMITED: CPT

## 2019-05-09 PROCEDURE — 76642 ULTRASOUND BREAST LIMITED: CPT | Performed by: RADIOLOGY

## 2019-05-16 ENCOUNTER — APPOINTMENT (OUTPATIENT)
Dept: ULTRASOUND IMAGING | Facility: HOSPITAL | Age: 84
End: 2019-05-16

## 2019-07-10 ENCOUNTER — LAB (OUTPATIENT)
Dept: ONCOLOGY | Facility: CLINIC | Age: 84
End: 2019-07-10

## 2019-07-10 ENCOUNTER — OFFICE VISIT (OUTPATIENT)
Dept: ONCOLOGY | Facility: CLINIC | Age: 84
End: 2019-07-10

## 2019-07-10 VITALS
SYSTOLIC BLOOD PRESSURE: 126 MMHG | WEIGHT: 185.8 LBS | OXYGEN SATURATION: 98 % | BODY MASS INDEX: 31.4 KG/M2 | RESPIRATION RATE: 18 BRPM | HEART RATE: 64 BPM | DIASTOLIC BLOOD PRESSURE: 76 MMHG | TEMPERATURE: 98.1 F

## 2019-07-10 DIAGNOSIS — C15.9 ESOPHAGEAL ADENOCARCINOMA (HCC): Primary | ICD-10-CM

## 2019-07-10 DIAGNOSIS — C15.9 ESOPHAGEAL ADENOCARCINOMA (HCC): ICD-10-CM

## 2019-07-10 LAB
ALBUMIN SERPL-MCNC: 3.95 G/DL (ref 3.5–5.2)
ALBUMIN/GLOB SERPL: 1 G/DL
ALP SERPL-CCNC: 109 U/L (ref 39–117)
ALT SERPL W P-5'-P-CCNC: 7 U/L (ref 1–33)
ANION GAP SERPL CALCULATED.3IONS-SCNC: 15.1 MMOL/L (ref 5–15)
AST SERPL-CCNC: 13 U/L (ref 1–32)
BASOPHILS # BLD AUTO: 0.02 10*3/MM3 (ref 0–0.2)
BASOPHILS NFR BLD AUTO: 0.3 % (ref 0–1.5)
BILIRUB SERPL-MCNC: 0.5 MG/DL (ref 0.2–1.2)
BUN BLD-MCNC: 21 MG/DL (ref 8–23)
BUN/CREAT SERPL: 16.9 (ref 7–25)
CALCIUM SPEC-SCNC: 9.6 MG/DL (ref 8.6–10.5)
CHLORIDE SERPL-SCNC: 101 MMOL/L (ref 98–107)
CO2 SERPL-SCNC: 24.9 MMOL/L (ref 22–29)
CREAT BLD-MCNC: 1.24 MG/DL (ref 0.57–1)
DEPRECATED RDW RBC AUTO: 43.2 FL (ref 37–54)
EOSINOPHIL # BLD AUTO: 0.08 10*3/MM3 (ref 0–0.4)
EOSINOPHIL NFR BLD AUTO: 1.1 % (ref 0.3–6.2)
ERYTHROCYTE [DISTWIDTH] IN BLOOD BY AUTOMATED COUNT: 13.5 % (ref 12.3–15.4)
GFR SERPL CREATININE-BSD FRML MDRD: 41 ML/MIN/1.73
GLOBULIN UR ELPH-MCNC: 3.9 GM/DL
GLUCOSE BLD-MCNC: 129 MG/DL (ref 65–99)
HCT VFR BLD AUTO: 42.4 % (ref 34–46.6)
HGB BLD-MCNC: 14.1 G/DL (ref 12–15.9)
IMM GRANULOCYTES # BLD AUTO: 0.04 10*3/MM3 (ref 0–0.05)
IMM GRANULOCYTES NFR BLD AUTO: 0.6 % (ref 0–0.5)
LYMPHOCYTES # BLD AUTO: 1.56 10*3/MM3 (ref 0.7–3.1)
LYMPHOCYTES NFR BLD AUTO: 21.8 % (ref 19.6–45.3)
MCH RBC QN AUTO: 29.4 PG (ref 26.6–33)
MCHC RBC AUTO-ENTMCNC: 33.3 G/DL (ref 31.5–35.7)
MCV RBC AUTO: 88.5 FL (ref 79–97)
MONOCYTES # BLD AUTO: 0.55 10*3/MM3 (ref 0.1–0.9)
MONOCYTES NFR BLD AUTO: 7.7 % (ref 5–12)
NEUTROPHILS # BLD AUTO: 4.91 10*3/MM3 (ref 1.7–7)
NEUTROPHILS NFR BLD AUTO: 68.5 % (ref 42.7–76)
PLATELET # BLD AUTO: 244 10*3/MM3 (ref 140–450)
PMV BLD AUTO: 10.2 FL (ref 6–12)
POTASSIUM BLD-SCNC: 3.6 MMOL/L (ref 3.5–5.2)
PROT SERPL-MCNC: 7.8 G/DL (ref 6–8.5)
RBC # BLD AUTO: 4.79 10*6/MM3 (ref 3.77–5.28)
SODIUM BLD-SCNC: 141 MMOL/L (ref 136–145)
WBC NRBC COR # BLD: 7.16 10*3/MM3 (ref 3.4–10.8)

## 2019-07-10 PROCEDURE — 80053 COMPREHEN METABOLIC PANEL: CPT | Performed by: NURSE PRACTITIONER

## 2019-07-10 PROCEDURE — 85025 COMPLETE CBC W/AUTO DIFF WBC: CPT | Performed by: NURSE PRACTITIONER

## 2019-07-10 PROCEDURE — 99214 OFFICE O/P EST MOD 30 MIN: CPT | Performed by: INTERNAL MEDICINE

## 2019-07-10 NOTE — PROGRESS NOTES
Name:  Geeta Renee  :  1935  Date:  7/10/2019     REFERRING PHYSICIAN  Vishal Mckenzie MD, PhD    PRIMARY CARE PROVIDER  Tracy Harris APRN    REASON FOR FOLLOW UP  1. Esophageal adenocarcinoma (CMS/HCC)      CHIEF COMPLAINT  None.    Dear Dr. Mckenzie,    HISTORY OF PRESENT ILLNESS:   I saw Ms. Renee in follow up today in our medical oncology clinic. As you are aware, she is a pleasant, 83 y.o., white female with a history of hypertension, arthritis and Addison's esophagus who has been undergoing periodic repeat endoscopic evaluations for the latter since she was first diagnosed in ~. She was again found to have high grade dysplasia in late summer 2017 and was referred back to the [facility] (where she had undergone prior radiofrequency ablations). A repeat local evaluation at  in 2017, unfortunately, was concerning for a component of carcinoma invasive into the submucosa. Imaging at the time (including a PET scan) was consistent with early stage disease, with no definite evidence of either mediastinal node involvement or distant mets. Ultimately, following an evaluation by CT surgery (neither they nor the patient were interested in pursuing an esophagectomy), she was referred to you, closer to her home in [place], [place], to consider treatment with localized radiotherapy. The patient was agreeable to this and was subsequently referred to our clinic to consider providing concomitant chemotherapy with the XRT. She completed planned treatment with concomitant Xeloda/XRT on 2018; and she has been doing overall very well ever since then, with no symptoms/signs of residual/recurernt disease to date.    INTERIM HISTORY:  Ms. Renee presents to clinic today for follow up by herself. Since her last visit, she has continued to do very well. She is scheduled to undergo another routine, surveillance EGD in August. She again denies having dysphagia at present. Her  appetite remains good. She again has no new or specific complaints and feels overall very well.    Past Medical History:   Diagnosis Date   • Arthritis    • Esophageal cancer (CMS/HCC)    • Hypertension        Past Surgical History:   Procedure Laterality Date   • CHOLECYSTECTOMY         Social History     Socioeconomic History   • Marital status:      Spouse name: Not on file   • Number of children: Not on file   • Years of education: Not on file   • Highest education level: Not on file   Tobacco Use   • Smoking status: Never Smoker   • Smokeless tobacco: Never Used   Substance and Sexual Activity   • Alcohol use: No   • Drug use: No   • Sexual activity: Defer       Family History   Problem Relation Age of Onset   • Breast cancer Neg Hx        Allergies   Allergen Reactions   • Sulfa Antibiotics Hives       Current Outpatient Medications   Medication Sig Dispense Refill   • AMLODIPINE BESYLATE PO Take 5 mg by mouth Daily.     • aspirin 81 MG EC tablet Take 81 mg by mouth Daily.     • atenolol (TENORMIN) 100 MG tablet Take 100 mg by mouth Daily.     • dexlansoprazole (DEXILANT) 60 MG capsule Take 60 mg by mouth Daily.     • methotrexate 2.5 MG tablet Take  by mouth 3 (Three) Doses Each Week. Take Doses 12 (Twelve) Hours Apart.     • triamterene-hydrochlorothiazide (MAXZIDE-25) 37.5-25 MG per tablet Take 1 tablet by mouth Daily.       No current facility-administered medications for this visit.      REVIEW OF SYSTEMS  CONSTITUTIONAL:  No fever, chills or night sweats.   EYES:  No blurry vision, diplopia or other vision changes.  ENT:  No hearing loss, nosebleeds or sore throat.  CARDIOVASCULAR:  No palpitations, arrhythmia, syncopal episodes or edema.  PULMONARY:  No hemoptysis, wheezing, chronic cough or shortness of breath.  GASTROINTESTINAL:  No nausea or vomiting. No constipation or diarrhea. No dysphagia.   GENITOURINARY:  No hematuria, kidney stones or frequent urination.  MUSCULOSKELETAL: Chronic, mild  joint and back pains, stable.  INTEGUMENTARY: No rashes or pruritus.  ENDOCRINE:  No excessive thirst or hot flashes.  HEMATOLOGIC:  No history of free bleeding, spontaneous bleeding or clotting.  IMMUNOLOGIC:  No allergies or frequent infections.  NEUROLOGIC: No numbness, tingling, seizures or weakness.  PSYCHIATRIC:  No anxiety or depression.    PHYSICAL EXAMINATION  /76   Pulse 64   Temp 98.1 °F (36.7 °C) (Oral)   Resp 18   Wt 84.3 kg (185 lb 12.8 oz)   SpO2 98%   BMI 31.40 kg/m²     GENERAL:  A well-developed, well-nourished, elderly, white female in no acute distress  HEENT:  Pupils equally round and reactive to light. Extraocular muscles intact.  CARDIOVASCULAR:  Regular rate and rhythm.  No murmurs, gallops or rubs.  LUNGS:  Clear to auscultation bilaterally.  ABDOMEN:  Soft, nontender, nondistended with positive bowel sounds.  EXTREMITIES:  No clubbing, cyanosis or edema bilaterally.  SKIN:  No rashes or petechiae.  NEURO:  Cranial nerves grossly intact.  No focal deficits.  PSYCH:  Alert and oriented x3.    LABORATORY    Lab Results   Component Value Date    WBC 7.16 07/10/2019    HGB 14.1 07/10/2019    HCT 42.4 07/10/2019    MCV 88.5 07/10/2019     07/10/2019    NEUTROABS 4.91 07/10/2019       Lab Results   Component Value Date     07/10/2019    K 3.6 07/10/2019     07/10/2019    CO2 24.9 07/10/2019    BUN 21 07/10/2019    CREATININE 1.24 (H) 07/10/2019    GLUCOSE 129 (H) 07/10/2019    CALCIUM 9.6 07/10/2019    AST 13 07/10/2019    ALT 7 07/10/2019    ALKPHOS 109 07/10/2019    BILITOT 0.5 07/10/2019    PROTEINTOT 7.8 07/10/2019    ALBUMIN 3.95 07/10/2019     IMAGING  CT chest, abdomen and pelvis with contrast (11/09/2017):  Impression:  1) Mild, nonspecific thickening of the distal esophagus which may be related to diagnosis of esophageal cancer. Multiple subcentimeter right paraesophageal lymph nodes are noted.  2) 1.3 mm ground glass nodule in the left upper lobe. No  suspicious solid nodules that would suggest metastatic disease.  3) No evidence of metastatic disease to the abdomen or pelvis.  4) Bilobed fusiform AAA measuring up to 4.6 cm beneath the renal arteries.  5) Cardiomegaly with artery atherosclerosis.    NM PET with fusion CT, skull base to mid-thigh (12/07/2017, at ):  Impression:  1) No suspicious focal FDG uptake within the esophagus to suggest a site of invasive malignancy.  2) No suspicious metabolically active mediastinal adenopathy.  3) No suspicious hypermetabolic distal metastasis.  4) Few non-FDG avid groundglass nodules, the largest in the left upper lobe of the lung, unchanged since the most recent comparison CT of the chest of one month prior.    CT chest with contrast (07/02/2018):  Impression: Thickening of the mucosa in the distal portion of the esophagus consistent with clinical history of adenocarcinoma of the esophagus. There is nothing seen on the CT to suggest metastatic disease in the lung parenchyma or in the mediastinal lymph nodes.    CT abdomen and pelvis with contrast (07/02/2018):  Impression: No CT findings of metastatic disease in the abdomen or pelvis. There were benign cysts in the kidneys. There is aneurysmal dilatation of the infrarenal abdominal aorta with a maximum diameter of 4.7 cm.    CT chest with contrast (10/08/2018):  Impression: Thickening of the esophageal wall, similar to the previous exam. No new pulmonary pathology.    CT abdomen and pelvis with contrast (10/08/2018):  Impression:  1) Thickening of the distal esophageal wall, similar to the previous exam.  2) Very slight interval increase in size of AAA (from 4.7 to 4.8 cm).    CT chest with contrast (01/07/2019):  Impression:  1) Esophageal wall thickening of the mid-distal esophagus with no increase since the previous exam. No mediastinal masses or adenopathy identified.  2) Remainder of chest is stable from previous.    CT abdomen and pelvis with contrast  (01/07/2019):  Impression:  1) Stable exam demonstrating no evidence of metastatic disease to the abdomen or pelvis. Distal esophageal wall thickening and hiatal hernia similar to previous.  2) Based on coronal reformatted sequences, likely no significant change in transverse diameter of infrarenal abdominal aortic aneurysm (4.7 cm and was previously 4.7 cm).    CT chest with contrast (04/04/2019)  Impression: Stable CT scan of the thorax. There continues to be some  mild mucosal thickening in the distal portion of the esophagus, stable  in comparing with the earlier exam.     CT abdomen pelvis with contrast (04/04/2019)  Impression: Nothing seen to suggest metastatic disease in the abdomen  and pelvis. There is aneurysmal dilatation of the infrarenal portion of  the abdominal aorta. The diameter is increased slightly. Currently the  aortic diameter measures 4.9 cm and previously measures 4.7.    PROCEDURES  Esophagogastroduodenoscopy with esophageal biopsies and dilatation (06/26/2018):  Impression:  1) Radiation esophagitis with ulceration, dense exudate and tight stenosis balloon dilated to 12 mm and biopsies were performed.  2) Appearance of residual Addison's esophagus with papillary mucosal appearance in the distal most esophagus.  3) Large hiatal hernia.  4) Gastric food retention consistent with gastroparesis.    PATHOLOGY  Esophagus, nodules (10/19/2017):  Adenocarcinoma, invasive at least into the muscularis mucosa.    Esophageal biopsy, stricture (06/26/2018):  Gastric type mucosa with ulceration, acute and chronic inflammation, and focal intestinal metaplasia, negative for dysplasia.    Esophagus, biopsy, distal (01/18/2019):  Addison's esophagitis without dysplasia. Ulceration with acute inflammation and reactive epithelial changes. Negative for malignancy.    IMPRESSION AND PLAN  Ms. Renee is a 83 y.o., white female with:  1. Esophageal adenocarcinoma: Diagnosed with clinical stage [id number]  disease in late 2017 (although she did not undergo an endoscopic ultrasound). Regardless, even though she was (and is) in overall good health, she was not eligible for an esophagectomy due to her advanced age and comorbidities (and she has repeatedly stated that she would not have wanted one anyway). Therefore, we agreed with CT surgery and radiation oncology's initial recommendations for localized radiotherapy. It was also agreed that she would be a good candidate for adding concomitant chemotherapy to this treatment plan, and PO capecitabine (Xeloda) was a convenient and fairly well tolerable choice. A Rx for 1500 mg BID M-F throughout the planned course of localized XRT was provided. She began this treatment regimen in early March 2018 and completed it on 04/26/2018. Overall, she tolerated this treatment regimen very well, with fatigue and mild dysphagia (both of which are now improved) as her only noticeable side effects. Clinically she continues to feel overall well. Repeat CT scans performed on 07/02/2018 (and summarized above) were consistent with treatment related changes only, with no definite evidence of residual/recurrent disease; and this continues to be the case on the most recent follow up imaging (CT scans performed on 04/04/2019 and also summarized above). The biopsy results of the most recent repeat, surveillance EGD (performed on 01/18/2019 and summarized above) again showed no evidence of residual/recurrent disease. She will continue to follow routinely with gastroenterology as previously planned (another repeat endoscopy is planned in August). We will see her back in our clinic in three months with a repeat CBC, CMP and CTs of the chest, abdomen and pelvis with contrast.  2. Dysphagia: The patient had no such issues prior to starting Xeloda/XRT; however, toward the end of this regimen, she began to experience some increasing difficulty with swallowing, particularly with her pills. This issue  remained manageable throughout the remainder of her treatment and has resolved since the completion of it, particularly after she underwent an esophageal dilatation in late June 2018. She will continue to follow up with gastroenterology as planned. Continue to monitor.  3. AAA: A known issue that has remained stable. The most recent repeat CT scan of the abdomen/pelvis (performed on 04/04/2019 and summarized above) showed a slight increase in diameter (from 4.7 cm to 4.9 cm). She reiterates that this is being monitored by her PCP.  4. Renal insufficiency: Mild and recently stable (creatinie of 1.24 today). She was again advised to stay well hydrated and to follow up with her PCP for ongoing monitoring.   The patient was in agreement with these plans.    ACO Quality Measures:  a) The patient does not use tobacco products.  b) The patient's BMI is above the normal parameters. Plan includes referral to primary care.   c) The current outpatient and discharge medications have been reconciled for the patient.    It is a pleasure to participate in Ms. Renee's care. Please do not hesitate to call with any questions or concerns that you may have.    A total of 30 minutes were spent coordinating this patient’s care in clinic today; more than 50% of this time was spent face-to-face with the patient, reviewing her interim medical history and counseling on the current followup plan. All questions were answered to her satisfaction.    FOLLOW UP  With gastroenterology in August 2019 for a repeat EGD, as previously planned. With primary care as previously planned. Return to our clinic in 3 months (~October 2019) with a CBC, CMP and CTs of the chest, abdomen and pelvis with contrast.          This document was electronically signed by BERTHA Berrios MD July 10, 2019 10:26 AM      CC: Vishal Mckenzie MD, PhD   MD Tracy Thompson, APRN

## 2019-10-03 ENCOUNTER — HOSPITAL ENCOUNTER (OUTPATIENT)
Dept: CT IMAGING | Facility: HOSPITAL | Age: 84
Discharge: HOME | End: 2019-10-03

## 2019-10-03 ENCOUNTER — HOSPITAL ENCOUNTER (OUTPATIENT)
Dept: CT IMAGING | Facility: HOSPITAL | Age: 84
Discharge: HOME | End: 2019-10-03
Admitting: INTERNAL MEDICINE

## 2019-10-03 DIAGNOSIS — C15.9 ESOPHAGEAL ADENOCARCINOMA (HCC): ICD-10-CM

## 2019-10-03 LAB — CREAT BLDA-MCNC: 1 MG/DL (ref 0.6–1.3)

## 2019-10-03 PROCEDURE — 74177 CT ABD & PELVIS W/CONTRAST: CPT

## 2019-10-03 PROCEDURE — 74177 CT ABD & PELVIS W/CONTRAST: CPT | Performed by: RADIOLOGY

## 2019-10-03 PROCEDURE — 0 IOVERSOL 68 % SOLUTION: Performed by: INTERNAL MEDICINE

## 2019-10-03 PROCEDURE — 71260 CT THORAX DX C+: CPT

## 2019-10-03 PROCEDURE — 82565 ASSAY OF CREATININE: CPT

## 2019-10-03 PROCEDURE — 71260 CT THORAX DX C+: CPT | Performed by: RADIOLOGY

## 2019-10-03 RX ADMIN — IOVERSOL 90 ML: 678 INJECTION INTRA-ARTERIAL; INTRAVENOUS at 09:32

## 2019-10-08 ENCOUNTER — OFFICE VISIT (OUTPATIENT)
Dept: ONCOLOGY | Facility: CLINIC | Age: 84
End: 2019-10-08

## 2019-10-08 VITALS
RESPIRATION RATE: 18 BRPM | BODY MASS INDEX: 32.72 KG/M2 | WEIGHT: 193.6 LBS | TEMPERATURE: 97.3 F | OXYGEN SATURATION: 97 % | SYSTOLIC BLOOD PRESSURE: 120 MMHG | HEART RATE: 73 BPM | DIASTOLIC BLOOD PRESSURE: 79 MMHG

## 2019-10-08 DIAGNOSIS — N63.0 BREAST NODULE: ICD-10-CM

## 2019-10-08 DIAGNOSIS — C15.9 ESOPHAGEAL ADENOCARCINOMA (HCC): Primary | ICD-10-CM

## 2019-10-08 DIAGNOSIS — R13.10 DYSPHAGIA, UNSPECIFIED TYPE: ICD-10-CM

## 2019-10-08 DIAGNOSIS — N28.9 RENAL INSUFFICIENCY: ICD-10-CM

## 2019-10-08 DIAGNOSIS — I71.40 ABDOMINAL AORTIC ANEURYSM (AAA) WITHOUT RUPTURE (HCC): ICD-10-CM

## 2019-10-08 LAB
ALBUMIN SERPL-MCNC: 3.99 G/DL (ref 3.5–5.2)
ALBUMIN/GLOB SERPL: 1.1 G/DL
ALP SERPL-CCNC: 119 U/L (ref 39–117)
ALT SERPL W P-5'-P-CCNC: 11 U/L (ref 1–33)
ANION GAP SERPL CALCULATED.3IONS-SCNC: 13 MMOL/L (ref 5–15)
AST SERPL-CCNC: 14 U/L (ref 1–32)
BASOPHILS # BLD AUTO: 0.02 10*3/MM3 (ref 0–0.2)
BASOPHILS NFR BLD AUTO: 0.4 % (ref 0–1.5)
BILIRUB SERPL-MCNC: 0.4 MG/DL (ref 0.2–1.2)
BUN BLD-MCNC: 27 MG/DL (ref 8–23)
BUN/CREAT SERPL: 22.7 (ref 7–25)
CALCIUM SPEC-SCNC: 9.5 MG/DL (ref 8.6–10.5)
CHLORIDE SERPL-SCNC: 99 MMOL/L (ref 98–107)
CO2 SERPL-SCNC: 28 MMOL/L (ref 22–29)
CREAT BLD-MCNC: 1.19 MG/DL (ref 0.57–1)
DEPRECATED RDW RBC AUTO: 44.9 FL (ref 37–54)
EOSINOPHIL # BLD AUTO: 0.07 10*3/MM3 (ref 0–0.4)
EOSINOPHIL NFR BLD AUTO: 1.3 % (ref 0.3–6.2)
ERYTHROCYTE [DISTWIDTH] IN BLOOD BY AUTOMATED COUNT: 14.1 % (ref 12.3–15.4)
GFR SERPL CREATININE-BSD FRML MDRD: 43 ML/MIN/1.73
GLOBULIN UR ELPH-MCNC: 3.6 GM/DL
GLUCOSE BLD-MCNC: 124 MG/DL (ref 65–99)
HCT VFR BLD AUTO: 42.1 % (ref 34–46.6)
HGB BLD-MCNC: 13.8 G/DL (ref 12–15.9)
IMM GRANULOCYTES # BLD AUTO: 0.02 10*3/MM3 (ref 0–0.05)
IMM GRANULOCYTES NFR BLD AUTO: 0.4 % (ref 0–0.5)
LYMPHOCYTES # BLD AUTO: 1.34 10*3/MM3 (ref 0.7–3.1)
LYMPHOCYTES NFR BLD AUTO: 25.3 % (ref 19.6–45.3)
MCH RBC QN AUTO: 29.2 PG (ref 26.6–33)
MCHC RBC AUTO-ENTMCNC: 32.8 G/DL (ref 31.5–35.7)
MCV RBC AUTO: 89 FL (ref 79–97)
MONOCYTES # BLD AUTO: 0.44 10*3/MM3 (ref 0.1–0.9)
MONOCYTES NFR BLD AUTO: 8.3 % (ref 5–12)
NEUTROPHILS # BLD AUTO: 3.4 10*3/MM3 (ref 1.7–7)
NEUTROPHILS NFR BLD AUTO: 64.3 % (ref 42.7–76)
PLATELET # BLD AUTO: 215 10*3/MM3 (ref 140–450)
PMV BLD AUTO: 10.5 FL (ref 6–12)
POTASSIUM BLD-SCNC: 3.8 MMOL/L (ref 3.5–5.2)
PROT SERPL-MCNC: 7.6 G/DL (ref 6–8.5)
RBC # BLD AUTO: 4.73 10*6/MM3 (ref 3.77–5.28)
SODIUM BLD-SCNC: 140 MMOL/L (ref 136–145)
WBC NRBC COR # BLD: 5.29 10*3/MM3 (ref 3.4–10.8)

## 2019-10-08 PROCEDURE — 80053 COMPREHEN METABOLIC PANEL: CPT | Performed by: NURSE PRACTITIONER

## 2019-10-08 PROCEDURE — 85025 COMPLETE CBC W/AUTO DIFF WBC: CPT | Performed by: NURSE PRACTITIONER

## 2019-10-08 PROCEDURE — 99214 OFFICE O/P EST MOD 30 MIN: CPT | Performed by: NURSE PRACTITIONER

## 2019-10-08 NOTE — PROGRESS NOTES
Name:  Geeta Renee  :  1935  Date:  10/8/2019     REFERRING PHYSICIAN  Vishal Mckenzie MD, PhD    PRIMARY CARE PROVIDER  Tracy Harris APRN    REASON FOR FOLLOW UP  1. Esophageal adenocarcinoma (CMS/HCC)      CHIEF COMPLAINT  None.    Dear Dr. Mckenzie,    HISTORY OF PRESENT ILLNESS:   I saw Ms. Renee in follow up today in our medical oncology clinic. As you are aware, she is a pleasant, 83 y.o., white female with a history of hypertension, arthritis and Addison's esophagus who has been undergoing periodic repeat endoscopic evaluations for the latter since she was first diagnosed in ~. She was again found to have high grade dysplasia in late summer 2017 and was referred back to the [facility] (where she had undergone prior radiofrequency ablations). A repeat local evaluation at  in 2017, unfortunately, was concerning for a component of carcinoma invasive into the submucosa. Imaging at the time (including a PET scan) was consistent with early stage disease, with no definite evidence of either mediastinal node involvement or distant mets. Ultimately, following an evaluation by CT surgery (neither they nor the patient were interested in pursuing an esophagectomy), she was referred to you, closer to her home in [place], [place], to consider treatment with localized radiotherapy. The patient was agreeable to this and was subsequently referred to our clinic to consider providing concomitant chemotherapy with the XRT. She completed planned treatment with concomitant Xeloda/XRT on 2018; and she has been doing overall very well ever since then, with no symptoms/signs of residual/recurernt disease to date.    INTERIM HISTORY:  Ms. Renee presents to clinic today for follow up by herself. Since her last visit, she has continued to do very well. She reports having another routine, surveillance EGD in August which was negative for recurrent disease. Report currently  unavailable. She will follow up with Dr. Lemus again in January. She again denies having dysphagia at present. Reports good appetite and stable weight. She had an abnormal mammogram in April and an ultrasound guided core biopsy was recommended of left breast nodule. However, patient says she has not scheduled this yet. She says she has not noticed any breast changes or problems. She again has no new or specific complaints today.     Past Medical History:   Diagnosis Date   • Arthritis    • Esophageal cancer (CMS/HCC)    • Hypertension        Past Surgical History:   Procedure Laterality Date   • CATARACT EXTRACTION, BILATERAL  09/10/2019   • CHOLECYSTECTOMY         Social History     Socioeconomic History   • Marital status:      Spouse name: Not on file   • Number of children: Not on file   • Years of education: Not on file   • Highest education level: Not on file   Tobacco Use   • Smoking status: Never Smoker   • Smokeless tobacco: Never Used   Substance and Sexual Activity   • Alcohol use: No   • Drug use: No   • Sexual activity: Defer       Family History   Problem Relation Age of Onset   • Breast cancer Neg Hx        Allergies   Allergen Reactions   • Sulfa Antibiotics Hives       Current Outpatient Medications   Medication Sig Dispense Refill   • AMLODIPINE BESYLATE PO Take 5 mg by mouth Daily.     • aspirin 81 MG EC tablet Take 81 mg by mouth Daily.     • atenolol (TENORMIN) 100 MG tablet Take 100 mg by mouth Daily.     • dexlansoprazole (DEXILANT) 60 MG capsule Take 60 mg by mouth Daily.     • methotrexate 2.5 MG tablet Take  by mouth 3 (Three) Doses Each Week. Take Doses 12 (Twelve) Hours Apart.     • triamterene-hydrochlorothiazide (MAXZIDE-25) 37.5-25 MG per tablet Take 1 tablet by mouth Daily.       No current facility-administered medications for this visit.      REVIEW OF SYSTEMS   A comprehensive 14 point review of systems was performed.  Significant findings as mentioned above.  All other  systems reviewed and are negative.      PHYSICAL EXAMINATION  /79   Pulse 73   Temp 97.3 °F (36.3 °C) (Oral)   Resp 18   Wt 87.8 kg (193 lb 9.6 oz)   SpO2 97%   BMI 32.72 kg/m²   GENERAL:  A well-developed, well-nourished, elderly, white female in no acute distress  HEENT:  Pupils equally round and reactive to light. Extraocular muscles intact.  CARDIOVASCULAR:  Regular rate and rhythm.  No murmurs, gallops or rubs.  LUNGS:  Clear to auscultation bilaterally.  ABDOMEN:  Soft, nontender, nondistended with positive bowel sounds.  EXTREMITIES:  No clubbing, cyanosis or edema bilaterally.  SKIN:  No rashes or petechiae.  NEURO:  Cranial nerves grossly intact.  No focal deficits.  PSYCH:  Alert and oriented x3.    LABORATORY    Lab Results   Component Value Date    WBC 5.29 10/08/2019    HGB 13.8 10/08/2019    HCT 42.1 10/08/2019    MCV 89.0 10/08/2019     10/08/2019    NEUTROABS 3.40 10/08/2019       Lab Results   Component Value Date     10/08/2019    K 3.8 10/08/2019    CL 99 10/08/2019    CO2 28.0 10/08/2019    BUN 27 (H) 10/08/2019    CREATININE 1.19 (H) 10/08/2019    GLUCOSE 124 (H) 10/08/2019    CALCIUM 9.5 10/08/2019    AST 14 10/08/2019    ALT 11 10/08/2019    ALKPHOS 119 (H) 10/08/2019    BILITOT 0.4 10/08/2019    PROTEINTOT 7.6 10/08/2019    ALBUMIN 3.99 10/08/2019     IMAGING  CT chest, abdomen and pelvis with contrast (11/09/2017):  Impression:  1) Mild, nonspecific thickening of the distal esophagus which may be related to diagnosis of esophageal cancer. Multiple subcentimeter right paraesophageal lymph nodes are noted.  2) 1.3 mm ground glass nodule in the left upper lobe. No suspicious solid nodules that would suggest metastatic disease.  3) No evidence of metastatic disease to the abdomen or pelvis.  4) Bilobed fusiform AAA measuring up to 4.6 cm beneath the renal arteries.  5) Cardiomegaly with artery atherosclerosis.    NM PET with fusion CT, skull base to mid-thigh  (12/07/2017, at ):  Impression:  1) No suspicious focal FDG uptake within the esophagus to suggest a site of invasive malignancy.  2) No suspicious metabolically active mediastinal adenopathy.  3) No suspicious hypermetabolic distal metastasis.  4) Few non-FDG avid groundglass nodules, the largest in the left upper lobe of the lung, unchanged since the most recent comparison CT of the chest of one month prior.    CT chest with contrast (07/02/2018):  Impression: Thickening of the mucosa in the distal portion of the esophagus consistent with clinical history of adenocarcinoma of the esophagus. There is nothing seen on the CT to suggest metastatic disease in the lung parenchyma or in the mediastinal lymph nodes.    CT abdomen and pelvis with contrast (07/02/2018):  Impression: No CT findings of metastatic disease in the abdomen or pelvis. There were benign cysts in the kidneys. There is aneurysmal dilatation of the infrarenal abdominal aorta with a maximum diameter of 4.7 cm.    CT chest with contrast (10/08/2018):  Impression: Thickening of the esophageal wall, similar to the previous exam. No new pulmonary pathology.    CT abdomen and pelvis with contrast (10/08/2018):  Impression:  1) Thickening of the distal esophageal wall, similar to the previous exam.  2) Very slight interval increase in size of AAA (from 4.7 to 4.8 cm).    CT chest with contrast (01/07/2019):  Impression:  1) Esophageal wall thickening of the mid-distal esophagus with no increase since the previous exam. No mediastinal masses or adenopathy identified.  2) Remainder of chest is stable from previous.    CT abdomen and pelvis with contrast (01/07/2019):  Impression:  1) Stable exam demonstrating no evidence of metastatic disease to the abdomen or pelvis. Distal esophageal wall thickening and hiatal hernia similar to previous.  2) Based on coronal reformatted sequences, likely no significant change in transverse diameter of infrarenal abdominal  aortic aneurysm (4.7 cm and was previously 4.7 cm).    CT chest with contrast (04/04/2019)  Impression: Stable CT scan of the thorax. There continues to be some  mild mucosal thickening in the distal portion of the esophagus, stable  in comparing with the earlier exam.     CT abdomen pelvis with contrast (04/04/2019)  Impression: Nothing seen to suggest metastatic disease in the abdomen  and pelvis. There is aneurysmal dilatation of the infrarenal portion of  the abdominal aorta. The diameter is increased slightly. Currently the  aortic diameter measures 4.9 cm and previously measures 4.7.    Mammogram bilateral (4/24/19)  IMPRESSION:  1. Stable mammographic appearance of the right breast no findings  suspicious for malignancy.      2. 1.2 cm irregular mass in the left 2:00 region suspicious for  malignancy. Recommend additional imaging.     BI-RADS CATEGORY:  0, INCOMPLETE: Need additional imaging evaluation.     RECOMMENDATION:  Focused ultrasound evaluation of the left breast and  left axilla.    US Breast Left Limited (5/9/19)  IMPRESSION:  0.9 cm irregular mass in the left 2:00 region suspicious  for malignancy.     FINAL BI-RADS PATIENT ASSESSMENT CATEGORY: 4, SUSPICIOUS     RECOMMENDATION:  Recommend ultrasound guided core biopsy of the mass in  the left 2:00 region.    CT chest with contrast 10/3/19  IMPRESSION:  1. Left breast nodule suspicious.  2. Area of increased density in the left upper lobe may represent  scarring, pneumonia, or possibly mass but it is stable.   3. No new adenopathy is seen.  4. Thickening of the distal esophageal wall also stable.  5. Compression abnormality in the thoracic spine does not appear to be  pathologic compression and is stable.     CT abdomen and pelvis with contrast (10/3/19)  IMPRESSION:  :   1. Increased soft tissue in the ascending colon. This all could  represent stool, but I would suggest colonoscopy.  2. Abdominal aortic aneurysm increased to 5.18 cm.  3.  Thickening of the distal esophageal wall.  4. Dilatation of intra and extrahepatic ductal system but no pancreatic  head mass is seen.    PROCEDURES  Esophagogastroduodenoscopy with esophageal biopsies and dilatation (06/26/2018):  Impression:  1) Radiation esophagitis with ulceration, dense exudate and tight stenosis balloon dilated to 12 mm and biopsies were performed.  2) Appearance of residual Addison's esophagus with papillary mucosal appearance in the distal most esophagus.  3) Large hiatal hernia.  4) Gastric food retention consistent with gastroparesis.    PATHOLOGY  Esophagus, nodules (10/19/2017):  Adenocarcinoma, invasive at least into the muscularis mucosa.    Esophageal biopsy, stricture (06/26/2018):  Gastric type mucosa with ulceration, acute and chronic inflammation, and focal intestinal metaplasia, negative for dysplasia.    Esophagus, biopsy, distal (01/18/2019):  Addison's esophagitis without dysplasia. Ulceration with acute inflammation and reactive epithelial changes. Negative for malignancy.    IMPRESSION AND PLAN  Ms. Renee is a 83 y.o., white female with:  1. Esophageal adenocarcinoma: Diagnosed with clinical stage [id number] disease in late 2017 (although she did not undergo an endoscopic ultrasound). Regardless, even though she was (and is) in overall good health, she was not eligible for an esophagectomy due to her advanced age and comorbidities (and she has repeatedly stated that she would not have wanted one anyway). Therefore, we agreed with CT surgery and radiation oncology's initial recommendations for localized radiotherapy. It was also agreed that she would be a good candidate for adding concomitant chemotherapy to this treatment plan, and PO capecitabine (Xeloda) was a convenient and fairly well tolerable choice. A Rx for 1500 mg BID M-F throughout the planned course of localized XRT was provided. She began this treatment regimen in early March 2018 and completed it on  04/26/2018. Overall, she tolerated this treatment regimen very well, with fatigue and mild dysphagia (both of which are now improved) as her only noticeable side effects. Clinically she continues to feel overall well. Repeat CT scans performed on 07/02/2018 (and summarized above) were consistent with treatment related changes only, with no definite evidence of residual/recurrent disease; and this continues to be the case on the most recent follow up imaging (CT scans performed on 10/3/19 and also summarized above). The biopsy results of the repeat, surveillance EGD (performed on 01/18/2019 and summarized above) again showed no evidence of residual/recurrent disease. She reports having repeat EGD in August which was again negative for recurrent disease. Will request report today. We will see her back in our clinic in three months with a repeat CBC, CMP and CTs of the chest, abdomen and pelvis with contrast.  2. Dysphagia: The patient had no such issues prior to starting Xeloda/XRT; however, toward the end of this regimen, she began to experience some increasing difficulty with swallowing, particularly with her pills. This issue remained manageable throughout the remainder of her treatment and has resolved since the completion of it, particularly after she underwent an esophageal dilatation in late June 2018. She will continue to follow up with gastroenterology as planned in January. Continue to monitor.  3. AAA: A known issue that has remained stable. The most recent repeat CT scan of the abdomen/pelvis (performed on 10/3/19 and summarized above) showed an increase in diameter (from 4.9 cm to 5.18 cm). She reiterates that this is being monitored by her PCP. Ongoing management per PCP.   4. Renal insufficiency: Mild and currently stable/improved (creatinie of 1.19 today). She was again advised to stay well hydrated and to follow up with her PCP for ongoing monitoring.   5. Left breast nodule: Initially noted on  mammogram in April. She then underwent left breast US which showed a 0.9 cm irregular mass in the left 2:00 region suspicious for malignancy. US guided core biopsy was recommended. However, patient says she has failed to schedule this. This nodule was again noted on recent CT imaging from 10/3. Recommended patient call and schedule this biopsy today and she says she will.     The patient was in agreement with these plans.    ACO Quality Measures:  a) The patient does not use tobacco products.  b) The patient's BMI is above the normal parameters. Plan includes referral to primary care.   c) The current outpatient and discharge medications have been reconciled for the patient.    It is a pleasure to participate in Ms. Renee's care. Please do not hesitate to call with any questions or concerns that you may have.    A total of 30 minutes were spent coordinating this patient’s care in clinic today; more than 50% of this time was spent face-to-face with the patient, reviewing her interim medical history, discussing recent imaging and counseling on the current followup plan. All questions were answered to her satisfaction.    FOLLOW UP  With gastroenterology in January 2020 for a repeat EGD, as previously planned. With primary care as previously planned. Return to our clinic in 3 months with a CBC, CMP and CTs of the chest, abdomen and pelvis with contrast.    This document was electronically signed by PRISCILLA Pak October 8, 2019 1:23 PM      CC: Vishal Mckenzie MD, PhD   MD Tracy Thompson APRN

## 2019-10-09 ENCOUNTER — HOSPITAL ENCOUNTER (OUTPATIENT)
Dept: MAMMOGRAPHY | Facility: HOSPITAL | Age: 84
Discharge: HOME | End: 2019-10-09

## 2019-10-09 ENCOUNTER — HOSPITAL ENCOUNTER (OUTPATIENT)
Dept: ULTRASOUND IMAGING | Facility: HOSPITAL | Age: 84
Discharge: HOME | End: 2019-10-09
Admitting: RADIOLOGY

## 2019-10-09 DIAGNOSIS — R92.8 ABNORMAL MAMMOGRAM OF LEFT BREAST: ICD-10-CM

## 2019-10-09 PROCEDURE — G0279 TOMOSYNTHESIS, MAMMO: HCPCS

## 2019-10-09 PROCEDURE — G0279 TOMOSYNTHESIS, MAMMO: HCPCS | Performed by: RADIOLOGY

## 2019-10-09 PROCEDURE — 88342 IMHCHEM/IMCYTCHM 1ST ANTB: CPT | Performed by: RADIOLOGY

## 2019-10-09 PROCEDURE — 77065 DX MAMMO INCL CAD UNI: CPT

## 2019-10-09 PROCEDURE — 88360 TUMOR IMMUNOHISTOCHEM/MANUAL: CPT | Performed by: RADIOLOGY

## 2019-10-09 PROCEDURE — 19083 BX BREAST 1ST LESION US IMAG: CPT | Performed by: RADIOLOGY

## 2019-10-09 PROCEDURE — 77065 DX MAMMO INCL CAD UNI: CPT | Performed by: RADIOLOGY

## 2019-10-09 PROCEDURE — 88305 TISSUE EXAM BY PATHOLOGIST: CPT | Performed by: RADIOLOGY

## 2019-10-09 PROCEDURE — A4648 IMPLANTABLE TISSUE MARKER: HCPCS

## 2019-10-11 ENCOUNTER — TELEPHONE (OUTPATIENT)
Dept: MAMMOGRAPHY | Facility: HOSPITAL | Age: 84
End: 2019-10-11

## 2019-10-15 ENCOUNTER — TELEPHONE (OUTPATIENT)
Dept: MAMMOGRAPHY | Facility: HOSPITAL | Age: 84
End: 2019-10-15

## 2019-10-15 LAB
LAB AP CASE REPORT: NORMAL
LAB AP CLINICAL INFORMATION: NORMAL
PATH REPORT.FINAL DX SPEC: NORMAL

## 2019-10-18 ENCOUNTER — TELEPHONE (OUTPATIENT)
Dept: MAMMOGRAPHY | Facility: HOSPITAL | Age: 84
End: 2019-10-18

## 2019-11-01 ENCOUNTER — OFFICE VISIT (OUTPATIENT)
Dept: SURGERY | Facility: CLINIC | Age: 84
End: 2019-11-01

## 2019-11-01 VITALS
SYSTOLIC BLOOD PRESSURE: 170 MMHG | WEIGHT: 198.2 LBS | HEART RATE: 64 BPM | HEIGHT: 64 IN | DIASTOLIC BLOOD PRESSURE: 95 MMHG | BODY MASS INDEX: 33.84 KG/M2

## 2019-11-01 DIAGNOSIS — C50.412 MALIGNANT NEOPLASM OF UPPER-OUTER QUADRANT OF LEFT BREAST IN FEMALE, ESTROGEN RECEPTOR POSITIVE (HCC): Primary | ICD-10-CM

## 2019-11-01 DIAGNOSIS — Z17.0 MALIGNANT NEOPLASM OF UPPER-OUTER QUADRANT OF LEFT BREAST IN FEMALE, ESTROGEN RECEPTOR POSITIVE (HCC): Primary | ICD-10-CM

## 2019-11-01 PROCEDURE — 99203 OFFICE O/P NEW LOW 30 MIN: CPT | Performed by: SURGERY

## 2019-11-01 RX ORDER — TRIAMTERENE AND HYDROCHLOROTHIAZIDE 37.5; 25 MG/1; MG/1
1 CAPSULE ORAL
COMMUNITY
Start: 2013-01-29 | End: 2019-11-01 | Stop reason: SDUPTHER

## 2019-11-01 RX ORDER — AMLODIPINE BESYLATE 5 MG/1
10 TABLET ORAL
Status: ON HOLD | COMMUNITY
End: 2021-01-01

## 2019-11-01 RX ORDER — CEFAZOLIN SODIUM 2 G/50ML
2 SOLUTION INTRAVENOUS ONCE
Status: CANCELLED | OUTPATIENT
Start: 2019-11-01 | End: 2019-11-01

## 2019-11-01 NOTE — PROGRESS NOTES
Subjective   Geeta Renee is a 83 y.o. female is being seen for consultation today at the request of Dr. Newberry for abnormal mammogram and pathology report.    History of Present Illness  Ms. Renee was seen in the office today to discuss her new diagnosis of left breast cancer she was actually seen in May 2019 and biopsy was recommended at that time but was not done.  The patient was reimaged on 10/9/2019 and underwent a left breast ultrasound-guided biopsy which demonstrated a grade 1 ductal carcinoma, ER positive, IA positive, IA negative.  The patient denies a palpable mass or nipple discharge.  There is no prior history of breast biopsy or cyst aspiration.  As far as risk factors go, Geeta was 20 at the time that she had a first child, with an onset of menses at age 14.  There is no family history of breast or ovarian cancer.  Patient was postmenopausal at age 55 and has not been on hormone replacement therapy.  Allergies   Allergen Reactions   • Sulfa Antibiotics Hives     Current Outpatient Medications   Medication Sig Dispense Refill   • amLODIPine (NORVASC) 5 MG tablet Take 1 tablet by mouth.     • AMLODIPINE BESYLATE PO Take 5 mg by mouth Daily.     • aspirin 81 MG EC tablet Take 81 mg by mouth Daily.     • atenolol (TENORMIN) 100 MG tablet Take 100 mg by mouth Daily.     • dexlansoprazole (DEXILANT) 60 MG capsule Take 60 mg by mouth Daily.     • methotrexate 2.5 MG tablet Take  by mouth 3 (Three) Doses Each Week. Take Doses 12 (Twelve) Hours Apart.     • triamterene-hydrochlorothiazide (MAXZIDE-25) 37.5-25 MG per tablet Take 1 tablet by mouth Daily.       No current facility-administered medications for this visit.      Past Medical History:   Diagnosis Date   • Arthritis    • Esophageal cancer (CMS/HCC)    • Hypertension      Past Surgical History:   Procedure Laterality Date   • CATARACT EXTRACTION, BILATERAL  09/10/2019   • CHOLECYSTECTOMY       Review of Systems  General:  "negative  Integumentary: negative  Eyes: eyesight problems, glasses  ENT: negative  Respiratory: negative  Gastrointestinal: negative  Cardiovascular: negative  Neurological: negative  Psychiatric: negative  Hematologic/Lymphatic: negative  Genitourinary: negative  Musculoskeletal: joint stiffness  Endocrine: negative  Breasts: negative        Objective   /95 (BP Location: Left arm)   Pulse 64   Ht 162.6 cm (64\")   Wt 89.9 kg (198 lb 3.2 oz)   BMI 34.02 kg/m²   Physical Exam  General:  This is a WD WN female in no acute distress  Vital signs stable, afebrile  HEENT exam:  WNL. Sclera are anicteric.  EOMI  Neck:  supple, FROM.  No JVD.  Trachea midline.  No palpable thyroid nodules. No supraclavicular adenopathy  Lungs:  Respiratory effort normal. Auscultation: Clear, without wheezes, rhonchi, rales  Heart:  Regular rate and rhythm, without murmur, gallop, rub.  No pedal edema  Breasts: On visual inspection the breasts are symmetrical.  Examination of the right breast demonstrates no discrete mass, skin change, or axillary adenopathy.  Examination of the left breast demonstrates a mild asymmetric fullness in the 2 o'clock position in the area of clinical concern.  No other palpable abnormalities.  There is no axillary adenopathy.  Abdomen: Nontender, without hepatosplenomegaly  Musculoskeletal:  muscle strength/tone is normal.    Psyc:  alert, oriented x 3.  Mood and affect are appropriate  skin:  Warm with good turgor.  Without rash or lesion  extremities:  Examination of the extremities revealed no cyanosis, clubbing or edema.    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  Patient and son are in agreement with this.  They understand the possible need for additional surgery to obtain clear margins based upon the pathology report.    Patient's Body mass index is 34.02 kg/m². BMI is above normal parameters. Recommendations include: educational material.       Future Appointments   Date Time Provider Department Center   1/8/2020 11:00 AM [facility]   1/8/2020 11:30 AM BERTHA Berrios MD [facility]         Please note that portions of this note were completed with a voice recognition program.

## 2019-11-01 NOTE — H&P
Subjective   Geeta Renee is a 83 y.o. female is being seen for consultation today at the request of Dr. Newberry for abnormal mammogram and pathology report.    History of Present Illness  Ms. Renee was seen in the office today to discuss her new diagnosis of left breast cancer she was actually seen in May 2019 and biopsy was recommended at that time but was not done.  The patient was reimaged on 10/9/2019 and underwent a left breast ultrasound-guided biopsy which demonstrated a grade 1 ductal carcinoma, ER positive, IL positive, IL negative.  The patient denies a palpable mass or nipple discharge.  There is no prior history of breast biopsy or cyst aspiration.  As far as risk factors go, Geeta was 20 at the time that she had a first child, with an onset of menses at age 14.  There is no family history of breast or ovarian cancer.  Patient was postmenopausal at age 55 and has not been on hormone replacement therapy.  Allergies   Allergen Reactions   • Sulfa Antibiotics Hives     Current Outpatient Medications   Medication Sig Dispense Refill   • amLODIPine (NORVASC) 5 MG tablet Take 1 tablet by mouth.     • AMLODIPINE BESYLATE PO Take 5 mg by mouth Daily.     • aspirin 81 MG EC tablet Take 81 mg by mouth Daily.     • atenolol (TENORMIN) 100 MG tablet Take 100 mg by mouth Daily.     • dexlansoprazole (DEXILANT) 60 MG capsule Take 60 mg by mouth Daily.     • methotrexate 2.5 MG tablet Take  by mouth 3 (Three) Doses Each Week. Take Doses 12 (Twelve) Hours Apart.     • triamterene-hydrochlorothiazide (MAXZIDE-25) 37.5-25 MG per tablet Take 1 tablet by mouth Daily.       No current facility-administered medications for this visit.      Past Medical History:   Diagnosis Date   • Arthritis    • Esophageal cancer (CMS/HCC)    • Hypertension      Past Surgical History:   Procedure Laterality Date   • CATARACT EXTRACTION, BILATERAL  09/10/2019   • CHOLECYSTECTOMY       Review of Systems  General:  "negative  Integumentary: negative  Eyes: eyesight problems, glasses  ENT: negative  Respiratory: negative  Gastrointestinal: negative  Cardiovascular: negative  Neurological: negative  Psychiatric: negative  Hematologic/Lymphatic: negative  Genitourinary: negative  Musculoskeletal: joint stiffness  Endocrine: negative  Breasts: negative        Objective   /95 (BP Location: Left arm)   Pulse 64   Ht 162.6 cm (64\")   Wt 89.9 kg (198 lb 3.2 oz)   BMI 34.02 kg/m²    Physical Exam  General:  This is a WD WN female in no acute distress  Vital signs stable, afebrile  HEENT exam:  WNL. Sclera are anicteric.  EOMI  Neck:  supple, FROM.  No JVD.  Trachea midline.  No palpable thyroid nodules. No supraclavicular adenopathy  Lungs:  Respiratory effort normal. Auscultation: Clear, without wheezes, rhonchi, rales  Heart:  Regular rate and rhythm, without murmur, gallop, rub.  No pedal edema  Breasts: On visual inspection the breasts are symmetrical.  Examination of the right breast demonstrates no discrete mass, skin change, or axillary adenopathy.  Examination of the left breast demonstrates a mild asymmetric fullness in the 2 o'clock position in the area of clinical concern.  No other palpable abnormalities.  There is no axillary adenopathy.  Abdomen: Nontender, without hepatosplenomegaly  Musculoskeletal:  muscle strength/tone is normal.    Psyc:  alert, oriented x 3.  Mood and affect are appropriate  skin:  Warm with good turgor.  Without rash or lesion  extremities:  Examination of the extremities revealed no cyanosis, clubbing or edema.    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  Patient and son are in agreement with this.  They understand the possible need for additional surgery to obtain clear margins based upon the pathology report.    Patient's Body mass index is 34.02 kg/m². BMI is above normal parameters. Recommendations include: educational material.       Future Appointments   Date Time Provider Department Center   1/8/2020 11:00 AM [facility]   1/8/2020 11:30 AM BERTHA Berrios MD [facility]         Please note that portions of this note were completed with a voice recognition program.  This document has been electronically signed by Nurys WATERS MD on November 1, 2019 1:02 PM

## 2019-11-01 NOTE — H&P (VIEW-ONLY)
Subjective   Geeta Renee is a 83 y.o. female is being seen for consultation today at the request of Dr. Newberry for abnormal mammogram and pathology report.    History of Present Illness  Ms. Renee was seen in the office today to discuss her new diagnosis of left breast cancer she was actually seen in May 2019 and biopsy was recommended at that time but was not done.  The patient was reimaged on 10/9/2019 and underwent a left breast ultrasound-guided biopsy which demonstrated a grade 1 ductal carcinoma, ER positive, MO positive, MO negative.  The patient denies a palpable mass or nipple discharge.  There is no prior history of breast biopsy or cyst aspiration.  As far as risk factors go, Geeta was 20 at the time that she had a first child, with an onset of menses at age 14.  There is no family history of breast or ovarian cancer.  Patient was postmenopausal at age 55 and has not been on hormone replacement therapy.  Allergies   Allergen Reactions   • Sulfa Antibiotics Hives     Current Outpatient Medications   Medication Sig Dispense Refill   • amLODIPine (NORVASC) 5 MG tablet Take 1 tablet by mouth.     • AMLODIPINE BESYLATE PO Take 5 mg by mouth Daily.     • aspirin 81 MG EC tablet Take 81 mg by mouth Daily.     • atenolol (TENORMIN) 100 MG tablet Take 100 mg by mouth Daily.     • dexlansoprazole (DEXILANT) 60 MG capsule Take 60 mg by mouth Daily.     • methotrexate 2.5 MG tablet Take  by mouth 3 (Three) Doses Each Week. Take Doses 12 (Twelve) Hours Apart.     • triamterene-hydrochlorothiazide (MAXZIDE-25) 37.5-25 MG per tablet Take 1 tablet by mouth Daily.       No current facility-administered medications for this visit.      Past Medical History:   Diagnosis Date   • Arthritis    • Esophageal cancer (CMS/HCC)    • Hypertension      Past Surgical History:   Procedure Laterality Date   • CATARACT EXTRACTION, BILATERAL  09/10/2019   • CHOLECYSTECTOMY       Review of Systems  General:  "negative  Integumentary: negative  Eyes: eyesight problems, glasses  ENT: negative  Respiratory: negative  Gastrointestinal: negative  Cardiovascular: negative  Neurological: negative  Psychiatric: negative  Hematologic/Lymphatic: negative  Genitourinary: negative  Musculoskeletal: joint stiffness  Endocrine: negative  Breasts: negative        Objective   /95 (BP Location: Left arm)   Pulse 64   Ht 162.6 cm (64\")   Wt 89.9 kg (198 lb 3.2 oz)   BMI 34.02 kg/m²    Physical Exam  General:  This is a WD WN female in no acute distress  Vital signs stable, afebrile  HEENT exam:  WNL. Sclera are anicteric.  EOMI  Neck:  supple, FROM.  No JVD.  Trachea midline.  No palpable thyroid nodules. No supraclavicular adenopathy  Lungs:  Respiratory effort normal. Auscultation: Clear, without wheezes, rhonchi, rales  Heart:  Regular rate and rhythm, without murmur, gallop, rub.  No pedal edema  Breasts: On visual inspection the breasts are symmetrical.  Examination of the right breast demonstrates no discrete mass, skin change, or axillary adenopathy.  Examination of the left breast demonstrates a mild asymmetric fullness in the 2 o'clock position in the area of clinical concern.  No other palpable abnormalities.  There is no axillary adenopathy.  Abdomen: Nontender, without hepatosplenomegaly  Musculoskeletal:  muscle strength/tone is normal.    Psyc:  alert, oriented x 3.  Mood and affect are appropriate  skin:  Warm with good turgor.  Without rash or lesion  extremities:  Examination of the extremities revealed no cyanosis, clubbing or edema.    Results/Data  Current and previous mammogram and ultrasound reports and images as well as pathology report were reviewed and I agree with the assessment  Procedures       Assessment/Plan   Clinical T1N0 grade 1 invasive ductal carcinoma, ER positive, VT positive, HER-2 negative     plan: Recommend lumpectomy       Discussion/Summary: Given the patient's age, comorbidities and " tumor characteristics lumpectomy alone followed by hormonal therapy is a reasonable option for disease control.  Patient and son are in agreement with this.  They understand the possible need for additional surgery to obtain clear margins based upon the pathology report.    Patient's Body mass index is 34.02 kg/m². BMI is above normal parameters. Recommendations include: educational material.       Future Appointments   Date Time Provider Department Center   1/8/2020 11:00 AM [facility]   1/8/2020 11:30 AM [facility]A Berrios MD [facility]         Please note that portions of this note were completed with a voice recognition program.  This document has been electronically signed by Nurys WATERS MD on November 1, 2019 1:02 PM

## 2019-11-12 ENCOUNTER — TELEPHONE (OUTPATIENT)
Dept: SURGERY | Facility: CLINIC | Age: 84
End: 2019-11-12

## 2019-11-15 ENCOUNTER — APPOINTMENT (OUTPATIENT)
Dept: PREADMISSION TESTING | Facility: HOSPITAL | Age: 84
End: 2019-11-15

## 2019-11-15 DIAGNOSIS — C50.412 MALIGNANT NEOPLASM OF UPPER-OUTER QUADRANT OF LEFT BREAST IN FEMALE, ESTROGEN RECEPTOR POSITIVE (HCC): ICD-10-CM

## 2019-11-15 DIAGNOSIS — Z17.0 MALIGNANT NEOPLASM OF UPPER-OUTER QUADRANT OF LEFT BREAST IN FEMALE, ESTROGEN RECEPTOR POSITIVE (HCC): ICD-10-CM

## 2019-11-15 LAB
ANION GAP SERPL CALCULATED.3IONS-SCNC: 9.5 MMOL/L (ref 5–15)
BASOPHILS # BLD AUTO: 0.04 10*3/MM3 (ref 0–0.2)
BASOPHILS NFR BLD AUTO: 0.6 % (ref 0–1.5)
BUN BLD-MCNC: 25 MG/DL (ref 8–23)
BUN/CREAT SERPL: 20.2 (ref 7–25)
CALCIUM SPEC-SCNC: 9.5 MG/DL (ref 8.6–10.5)
CHLORIDE SERPL-SCNC: 98 MMOL/L (ref 98–107)
CO2 SERPL-SCNC: 29.5 MMOL/L (ref 22–29)
CREAT BLD-MCNC: 1.24 MG/DL (ref 0.57–1)
DEPRECATED RDW RBC AUTO: 44.3 FL (ref 37–54)
EOSINOPHIL # BLD AUTO: 0.1 10*3/MM3 (ref 0–0.4)
EOSINOPHIL NFR BLD AUTO: 1.5 % (ref 0.3–6.2)
ERYTHROCYTE [DISTWIDTH] IN BLOOD BY AUTOMATED COUNT: 13.5 % (ref 12.3–15.4)
GFR SERPL CREATININE-BSD FRML MDRD: 41 ML/MIN/1.73
GLUCOSE BLD-MCNC: 114 MG/DL (ref 65–99)
HCT VFR BLD AUTO: 42.5 % (ref 34–46.6)
HGB BLD-MCNC: 13.8 G/DL (ref 12–15.9)
IMM GRANULOCYTES # BLD AUTO: 0.04 10*3/MM3 (ref 0–0.05)
IMM GRANULOCYTES NFR BLD AUTO: 0.6 % (ref 0–0.5)
LYMPHOCYTES # BLD AUTO: 1.54 10*3/MM3 (ref 0.7–3.1)
LYMPHOCYTES NFR BLD AUTO: 22.6 % (ref 19.6–45.3)
MCH RBC QN AUTO: 28.8 PG (ref 26.6–33)
MCHC RBC AUTO-ENTMCNC: 32.5 G/DL (ref 31.5–35.7)
MCV RBC AUTO: 88.5 FL (ref 79–97)
MONOCYTES # BLD AUTO: 0.68 10*3/MM3 (ref 0.1–0.9)
MONOCYTES NFR BLD AUTO: 10 % (ref 5–12)
NEUTROPHILS # BLD AUTO: 4.4 10*3/MM3 (ref 1.7–7)
NEUTROPHILS NFR BLD AUTO: 64.7 % (ref 42.7–76)
NRBC BLD AUTO-RTO: 0 /100 WBC (ref 0–0.2)
PLATELET # BLD AUTO: 222 10*3/MM3 (ref 140–450)
PMV BLD AUTO: 10.7 FL (ref 6–12)
POTASSIUM BLD-SCNC: 3.9 MMOL/L (ref 3.5–5.2)
RBC # BLD AUTO: 4.8 10*6/MM3 (ref 3.77–5.28)
SODIUM BLD-SCNC: 137 MMOL/L (ref 136–145)
WBC NRBC COR # BLD: 6.8 10*3/MM3 (ref 3.4–10.8)

## 2019-11-15 PROCEDURE — 85025 COMPLETE CBC W/AUTO DIFF WBC: CPT | Performed by: SURGERY

## 2019-11-15 PROCEDURE — 93005 ELECTROCARDIOGRAM TRACING: CPT

## 2019-11-15 PROCEDURE — 80048 BASIC METABOLIC PNL TOTAL CA: CPT | Performed by: SURGERY

## 2019-11-15 PROCEDURE — 36415 COLL VENOUS BLD VENIPUNCTURE: CPT

## 2019-11-15 NOTE — DISCHARGE INSTRUCTIONS

## 2019-11-19 ENCOUNTER — HOSPITAL ENCOUNTER (OUTPATIENT)
Facility: HOSPITAL | Age: 84
Setting detail: HOSPITAL OUTPATIENT SURGERY
Discharge: HOME | End: 2019-11-19
Attending: SURGERY | Admitting: SURGERY

## 2019-11-19 ENCOUNTER — ANESTHESIA EVENT (OUTPATIENT)
Dept: PERIOP | Facility: HOSPITAL | Age: 84
End: 2019-11-19

## 2019-11-19 ENCOUNTER — ANESTHESIA (OUTPATIENT)
Dept: PERIOP | Facility: HOSPITAL | Age: 84
End: 2019-11-19

## 2019-11-19 VITALS
DIASTOLIC BLOOD PRESSURE: 84 MMHG | SYSTOLIC BLOOD PRESSURE: 132 MMHG | HEIGHT: 64 IN | BODY MASS INDEX: 33.8 KG/M2 | RESPIRATION RATE: 15 BRPM | TEMPERATURE: 97 F | HEART RATE: 70 BPM | OXYGEN SATURATION: 97 % | WEIGHT: 198 LBS

## 2019-11-19 DIAGNOSIS — C50.412 MALIGNANT NEOPLASM OF UPPER-OUTER QUADRANT OF LEFT BREAST IN FEMALE, ESTROGEN RECEPTOR POSITIVE (HCC): ICD-10-CM

## 2019-11-19 DIAGNOSIS — Z17.0 MALIGNANT NEOPLASM OF UPPER-OUTER QUADRANT OF LEFT BREAST IN FEMALE, ESTROGEN RECEPTOR POSITIVE (HCC): ICD-10-CM

## 2019-11-19 PROCEDURE — 25010000003 CEFAZOLIN SODIUM-DEXTROSE 2-3 GM-%(50ML) RECONSTITUTED SOLUTION: Performed by: SURGERY

## 2019-11-19 PROCEDURE — 25010000002 PROPOFOL 10 MG/ML EMULSION: Performed by: NURSE ANESTHETIST, CERTIFIED REGISTERED

## 2019-11-19 PROCEDURE — 88342 IMHCHEM/IMCYTCHM 1ST ANTB: CPT | Performed by: SURGERY

## 2019-11-19 PROCEDURE — 25010000002 DEXAMETHASONE PER 1 MG: Performed by: NURSE ANESTHETIST, CERTIFIED REGISTERED

## 2019-11-19 PROCEDURE — 25010000002 FENTANYL CITRATE (PF) 100 MCG/2ML SOLUTION: Performed by: NURSE ANESTHETIST, CERTIFIED REGISTERED

## 2019-11-19 PROCEDURE — 19301 PARTIAL MASTECTOMY: CPT | Performed by: SURGERY

## 2019-11-19 PROCEDURE — 88307 TISSUE EXAM BY PATHOLOGIST: CPT | Performed by: SURGERY

## 2019-11-19 PROCEDURE — 25010000002 ONDANSETRON PER 1 MG: Performed by: NURSE ANESTHETIST, CERTIFIED REGISTERED

## 2019-11-19 RX ORDER — IPRATROPIUM BROMIDE AND ALBUTEROL SULFATE 2.5; .5 MG/3ML; MG/3ML
3 SOLUTION RESPIRATORY (INHALATION) ONCE AS NEEDED
Status: CANCELLED | OUTPATIENT
Start: 2019-11-19

## 2019-11-19 RX ORDER — SODIUM CHLORIDE, SODIUM LACTATE, POTASSIUM CHLORIDE, CALCIUM CHLORIDE 600; 310; 30; 20 MG/100ML; MG/100ML; MG/100ML; MG/100ML
125 INJECTION, SOLUTION INTRAVENOUS CONTINUOUS
Status: DISCONTINUED | OUTPATIENT
Start: 2019-11-19 | End: 2019-11-19 | Stop reason: HOSPADM

## 2019-11-19 RX ORDER — MAGNESIUM HYDROXIDE 1200 MG/15ML
LIQUID ORAL AS NEEDED
Status: DISCONTINUED | OUTPATIENT
Start: 2019-11-19 | End: 2019-11-19 | Stop reason: HOSPADM

## 2019-11-19 RX ORDER — ONDANSETRON 2 MG/ML
4 INJECTION INTRAMUSCULAR; INTRAVENOUS AS NEEDED
Status: CANCELLED | OUTPATIENT
Start: 2019-11-19

## 2019-11-19 RX ORDER — CEFAZOLIN SODIUM 2 G/50ML
2 SOLUTION INTRAVENOUS ONCE
Status: COMPLETED | OUTPATIENT
Start: 2019-11-19 | End: 2019-11-19

## 2019-11-19 RX ORDER — DEXAMETHASONE SODIUM PHOSPHATE 10 MG/ML
INJECTION INTRAMUSCULAR; INTRAVENOUS AS NEEDED
Status: DISCONTINUED | OUTPATIENT
Start: 2019-11-19 | End: 2019-11-19 | Stop reason: SURG

## 2019-11-19 RX ORDER — BUPIVACAINE HYDROCHLORIDE AND EPINEPHRINE 5; 5 MG/ML; UG/ML
INJECTION, SOLUTION EPIDURAL; INTRACAUDAL; PERINEURAL AS NEEDED
Status: DISCONTINUED | OUTPATIENT
Start: 2019-11-19 | End: 2019-11-19 | Stop reason: HOSPADM

## 2019-11-19 RX ORDER — PROPOFOL 10 MG/ML
VIAL (ML) INTRAVENOUS AS NEEDED
Status: DISCONTINUED | OUTPATIENT
Start: 2019-11-19 | End: 2019-11-19 | Stop reason: SURG

## 2019-11-19 RX ORDER — MIDAZOLAM HYDROCHLORIDE 1 MG/ML
2 INJECTION INTRAMUSCULAR; INTRAVENOUS
Status: DISCONTINUED | OUTPATIENT
Start: 2019-11-19 | End: 2019-11-19 | Stop reason: HOSPADM

## 2019-11-19 RX ORDER — IPRATROPIUM BROMIDE AND ALBUTEROL SULFATE 2.5; .5 MG/3ML; MG/3ML
3 SOLUTION RESPIRATORY (INHALATION) ONCE AS NEEDED
Status: DISCONTINUED | OUTPATIENT
Start: 2019-11-19 | End: 2019-11-19 | Stop reason: HOSPADM

## 2019-11-19 RX ORDER — LIDOCAINE HYDROCHLORIDE 20 MG/ML
INJECTION, SOLUTION INFILTRATION; PERINEURAL AS NEEDED
Status: DISCONTINUED | OUTPATIENT
Start: 2019-11-19 | End: 2019-11-19 | Stop reason: SURG

## 2019-11-19 RX ORDER — ONDANSETRON 2 MG/ML
4 INJECTION INTRAMUSCULAR; INTRAVENOUS AS NEEDED
Status: DISCONTINUED | OUTPATIENT
Start: 2019-11-19 | End: 2019-11-19 | Stop reason: HOSPADM

## 2019-11-19 RX ORDER — FENTANYL CITRATE 50 UG/ML
50 INJECTION, SOLUTION INTRAMUSCULAR; INTRAVENOUS
Status: CANCELLED | OUTPATIENT
Start: 2019-11-19

## 2019-11-19 RX ORDER — SODIUM CHLORIDE 0.9 % (FLUSH) 0.9 %
3-10 SYRINGE (ML) INJECTION AS NEEDED
Status: DISCONTINUED | OUTPATIENT
Start: 2019-11-19 | End: 2019-11-19 | Stop reason: HOSPADM

## 2019-11-19 RX ORDER — OXYCODONE HYDROCHLORIDE AND ACETAMINOPHEN 5; 325 MG/1; MG/1
1 TABLET ORAL ONCE AS NEEDED
Status: DISCONTINUED | OUTPATIENT
Start: 2019-11-19 | End: 2019-11-19 | Stop reason: HOSPADM

## 2019-11-19 RX ORDER — FENTANYL CITRATE 50 UG/ML
INJECTION, SOLUTION INTRAMUSCULAR; INTRAVENOUS AS NEEDED
Status: DISCONTINUED | OUTPATIENT
Start: 2019-11-19 | End: 2019-11-19 | Stop reason: SURG

## 2019-11-19 RX ORDER — FENTANYL CITRATE 50 UG/ML
50 INJECTION, SOLUTION INTRAMUSCULAR; INTRAVENOUS
Status: DISCONTINUED | OUTPATIENT
Start: 2019-11-19 | End: 2019-11-19 | Stop reason: HOSPADM

## 2019-11-19 RX ORDER — MIDAZOLAM HYDROCHLORIDE 1 MG/ML
1 INJECTION INTRAMUSCULAR; INTRAVENOUS
Status: DISCONTINUED | OUTPATIENT
Start: 2019-11-19 | End: 2019-11-19 | Stop reason: HOSPADM

## 2019-11-19 RX ORDER — HYDROCODONE BITARTRATE AND ACETAMINOPHEN 5; 325 MG/1; MG/1
1 TABLET ORAL EVERY 6 HOURS PRN
Qty: 10 TABLET | Refills: 0 | Status: ON HOLD | OUTPATIENT
Start: 2019-11-19 | End: 2021-01-01

## 2019-11-19 RX ORDER — SODIUM CHLORIDE 0.9 % (FLUSH) 0.9 %
3 SYRINGE (ML) INJECTION EVERY 12 HOURS SCHEDULED
Status: DISCONTINUED | OUTPATIENT
Start: 2019-11-19 | End: 2019-11-19 | Stop reason: HOSPADM

## 2019-11-19 RX ORDER — FAMOTIDINE 10 MG/ML
INJECTION, SOLUTION INTRAVENOUS AS NEEDED
Status: DISCONTINUED | OUTPATIENT
Start: 2019-11-19 | End: 2019-11-19 | Stop reason: SURG

## 2019-11-19 RX ORDER — OXYCODONE HYDROCHLORIDE AND ACETAMINOPHEN 5; 325 MG/1; MG/1
1 TABLET ORAL ONCE AS NEEDED
Status: CANCELLED | OUTPATIENT
Start: 2019-11-19

## 2019-11-19 RX ORDER — MEPERIDINE HYDROCHLORIDE 25 MG/ML
12.5 INJECTION INTRAMUSCULAR; INTRAVENOUS; SUBCUTANEOUS
Status: CANCELLED | OUTPATIENT
Start: 2019-11-19 | End: 2019-11-20

## 2019-11-19 RX ORDER — ONDANSETRON 2 MG/ML
INJECTION INTRAMUSCULAR; INTRAVENOUS AS NEEDED
Status: DISCONTINUED | OUTPATIENT
Start: 2019-11-19 | End: 2019-11-19 | Stop reason: SURG

## 2019-11-19 RX ADMIN — ONDANSETRON 4 MG: 2 INJECTION INTRAMUSCULAR; INTRAVENOUS at 08:58

## 2019-11-19 RX ADMIN — SODIUM CHLORIDE, POTASSIUM CHLORIDE, SODIUM LACTATE AND CALCIUM CHLORIDE 125 ML/HR: 600; 310; 30; 20 INJECTION, SOLUTION INTRAVENOUS at 08:18

## 2019-11-19 RX ADMIN — LIDOCAINE HYDROCHLORIDE 60 MG: 20 INJECTION, SOLUTION INFILTRATION; PERINEURAL at 08:58

## 2019-11-19 RX ADMIN — FENTANYL CITRATE 100 MCG: 50 INJECTION INTRAMUSCULAR; INTRAVENOUS at 08:53

## 2019-11-19 RX ADMIN — EPHEDRINE SULFATE 10 MG: 50 INJECTION, SOLUTION INTRAVENOUS at 09:00

## 2019-11-19 RX ADMIN — DEXAMETHASONE SODIUM PHOSPHATE 4 MG: 10 INJECTION INTRAMUSCULAR; INTRAVENOUS at 08:58

## 2019-11-19 RX ADMIN — EPHEDRINE SULFATE 10 MG: 50 INJECTION, SOLUTION INTRAVENOUS at 09:05

## 2019-11-19 RX ADMIN — FAMOTIDINE 20 MG: 10 INJECTION INTRAVENOUS at 08:58

## 2019-11-19 RX ADMIN — CEFAZOLIN SODIUM 2 G: 2 SOLUTION INTRAVENOUS at 08:53

## 2019-11-19 RX ADMIN — EPHEDRINE SULFATE 10 MG: 50 INJECTION, SOLUTION INTRAVENOUS at 09:10

## 2019-11-19 RX ADMIN — SODIUM CHLORIDE, POTASSIUM CHLORIDE, SODIUM LACTATE AND CALCIUM CHLORIDE: 600; 310; 30; 20 INJECTION, SOLUTION INTRAVENOUS at 09:35

## 2019-11-19 RX ADMIN — PROPOFOL 100 MG: 10 INJECTION, EMULSION INTRAVENOUS at 08:58

## 2019-11-19 NOTE — ANESTHESIA POSTPROCEDURE EVALUATION
Patient: Geeta Renee    Procedure Summary     Date:  11/19/19 Room / Location:  [facility] OR  /  [facility] OR    Anesthesia Start:  0853 Anesthesia Stop:  0947    Procedure:  BREAST LUMPECTOMY WITH ULTRASOUND (Left Breast) Diagnosis:       Malignant neoplasm of upper-outer quadrant of left breast in female, estrogen receptor positive (CMS/HCC)      (Malignant neoplasm of upper-outer quadrant of left breast in female, estrogen receptor positive (CMS/HCC) [C50.412, Z17.0])    Surgeon:  Nurys Selby MD Provider:  Ravinder Meza MD    Anesthesia Type:  general ASA Status:  3          Anesthesia Type: general  Last vitals  BP   129/79 (11/19/19 1018)   Temp   97.1 °F (36.2 °C) (11/19/19 0948)   Pulse   72 (11/19/19 1018)   Resp   14 (11/19/19 1018)     SpO2   96 % (11/19/19 1018)     Post Anesthesia Care and Evaluation    Patient location during evaluation: PHASE II  Patient participation: complete - patient participated  Level of consciousness: awake and alert  Pain score: 1  Pain management: adequate  Airway patency: patent  Anesthetic complications: No anesthetic complications  PONV Status: controlled  Cardiovascular status: acceptable  Respiratory status: acceptable  Hydration status: acceptable

## 2019-11-19 NOTE — OP NOTE
Left breast lumpectomy    Surgeon:  Nurys Selby M.D., PAIGE    Assistant; Edgar Livingston    Pre-op: Left breast carcinoma    Post-op:  Same    Anesthesia:  general    Indications: Left breast carcinoma       Procedure Details   After obtaining informed consent and with venous compression boots in place and receiving preoperative antibiotics the patient was taken to the operating room and placed under anesthesia. The left breast was prepped and draped in a sterile fashion.  Ultrasound was utilized to identify the tumor in the 2 o'clock position.  An incision was made directly over the tumor and carried down into the breast tissue.  Dissection was then carried out around the tumor mass.  Marking sutures were placed at 12 and 3:00.  The wound was irrigated and hemostasis was obtained.  There was some gritty material at the base of the lumpectomy which was thought to represent possible additional disease.  This was excised with the cautery and sent as a separate specimen as posterior margin..    The wound was irrigated and hemostasis was obtained.  The incision was closed in a several layer closure of deep 0 Vicryl sutures, followed by 3-0 Vicryl subdermal sutures and 4-0 Vicryl subcuticular suture.    Findings:    Estimated Blood Loss:  Minimal    Blood administered:            Drains: none           Specimens:   ID Type Source Tests Collected by Time   A : deep margain Tissue Breast, Left TISSUE PATHOLOGY EXAM Nurys Selby MD 11/19/2019 0924   B : stitch marked at 12 oclock and 3 oclock Tissue Breast, Left TISSUE PATHOLOGY EXAM Nurys Selby MD 11/19/2019 0912        Grafts and Implants: None        Complications:  none           Disposition: PACU - hemodynamically stable.           Condition: stable

## 2019-11-19 NOTE — ANESTHESIA PREPROCEDURE EVALUATION
Anesthesia Evaluation     no history of anesthetic complications:  NPO Solid Status: > 8 hours  NPO Liquid Status: > 8 hours           Airway   Mallampati: II  TM distance: >3 FB  Neck ROM: full  No difficulty expected  Dental - normal exam     Pulmonary - normal exam   (+) a smoker Former,   Cardiovascular - normal exam    (+) hypertension,       Neuro/Psych  GI/Hepatic/Renal/Endo    (+)  GERD,      Musculoskeletal     Abdominal  - normal exam   Substance History      OB/GYN          Other      history of cancer active        Phys Exam Other: Multiple Caps                Anesthesia Plan    ASA 3     general     intravenous induction     Anesthetic plan, all risks, benefits, and alternatives have been provided, discussed and informed consent has been obtained with: patient.

## 2019-11-19 NOTE — ANESTHESIA PROCEDURE NOTES
Airway  Urgency: elective    Date/Time: 11/19/2019 8:59 AM  Airway not difficult    General Information and Staff    Patient location during procedure: OR  Anesthesiologist: Ravinder Meza MD  CRNA: Anam Sher CRNA    Indications and Patient Condition  Indications for airway management: airway protection    Preoxygenated: yes  MILS maintained throughout  Mask difficulty assessment: 0 - not attempted    Final Airway Details  Final airway type: supraglottic airway      Successful airway: unique  Size 4    Number of attempts at approach: 1  Assessment: lips, teeth, and gum same as pre-op and atraumatic intubation    Additional Comments  Dentition & lips unchanged from preop

## 2019-11-22 LAB
LAB AP CASE REPORT: NORMAL
PATH REPORT.FINAL DX SPEC: NORMAL

## 2019-11-25 ENCOUNTER — OFFICE VISIT (OUTPATIENT)
Dept: SURGERY | Facility: CLINIC | Age: 84
End: 2019-11-25

## 2019-11-25 VITALS
SYSTOLIC BLOOD PRESSURE: 125 MMHG | WEIGHT: 198.6 LBS | BODY MASS INDEX: 33.9 KG/M2 | DIASTOLIC BLOOD PRESSURE: 91 MMHG | HEIGHT: 64 IN | HEART RATE: 81 BPM

## 2019-11-25 DIAGNOSIS — Z09 POSTOP CHECK: ICD-10-CM

## 2019-11-25 DIAGNOSIS — Z17.0 MALIGNANT NEOPLASM OF UPPER-OUTER QUADRANT OF LEFT BREAST IN FEMALE, ESTROGEN RECEPTOR POSITIVE (HCC): Primary | ICD-10-CM

## 2019-11-25 DIAGNOSIS — C50.412 MALIGNANT NEOPLASM OF UPPER-OUTER QUADRANT OF LEFT BREAST IN FEMALE, ESTROGEN RECEPTOR POSITIVE (HCC): Primary | ICD-10-CM

## 2019-11-25 PROCEDURE — 99024 POSTOP FOLLOW-UP VISIT: CPT | Performed by: SURGERY

## 2019-11-25 RX ORDER — ALENDRONATE SODIUM 70 MG/1
70 TABLET ORAL
Qty: 4 TABLET | Refills: 11 | Status: SHIPPED | OUTPATIENT
Start: 2019-11-25 | End: 2020-01-08 | Stop reason: ALTCHOICE

## 2019-11-25 RX ORDER — ANASTROZOLE 1 MG/1
1 TABLET ORAL DAILY
Qty: 30 TABLET | Refills: 0 | Status: SHIPPED | OUTPATIENT
Start: 2019-11-25 | End: 2019-12-25

## 2019-11-25 NOTE — PROGRESS NOTES
"Subjective   Geeta Renee is a 84 y.o. female here today for post op and pathology report.    History of Present Illness  Ms. Michael gonzalez was seen in the office today for her first postoperative visit following a left lumpectomy.  Due to the patient's age and comorbidities the decision was made to do lumpectomy alone without sentinel node evaluation.  She presents now for follow-up.  She voices no complaints related to her procedure.  Allergies   Allergen Reactions   • Codeine Nausea Only   • Sulfa Antibiotics Hives         Current Outpatient Medications   Medication Sig Dispense Refill   • amLODIPine (NORVASC) 5 MG tablet Take 10 mg by mouth.     • atenolol (TENORMIN) 100 MG tablet Take 100 mg by mouth Daily.     • dexlansoprazole (DEXILANT) 60 MG capsule Take 60 mg by mouth Daily.     • HYDROcodone-acetaminophen (NORCO) 5-325 MG per tablet Take 1 tablet by mouth Every 6 (Six) Hours As Needed for Moderate Pain . 10 tablet 0   • triamterene-hydrochlorothiazide (MAXZIDE-25) 37.5-25 MG per tablet Take 1 tablet by mouth Daily.     • alendronate (FOSAMAX) 70 MG tablet Take 1 tablet by mouth Every 7 (Seven) Days. 4 tablet 11   • anastrozole (ARIMIDEX) 1 MG tablet Take 1 tablet by mouth Daily for 30 days. 30 tablet 0     No current facility-administered medications for this visit.        Objective   /91 (BP Location: Left arm)   Pulse 81   Ht 162.6 cm (64\")   Wt 90.1 kg (198 lb 9.6 oz)   BMI 34.09 kg/m²    Physical Exam  On examination this is a well-developed well-nourished female in no acute distress  HEENT examination: Within normal limits.  Conjunctiva pink.  Nose and ears appear normal.  Neck: Supple, full range of motion.  No JVD.  Musculoskeletal: Full range of motion all extremities without focal weakness. Normal gait. No digital cyanosis.  Psych: Patient is alert, oriented x3. Mood and affect are appropriate.  Left breast: Healing surgical scar in the 2:30 position without hematoma or " seroma  Results/Data  Pathology report was reviewed with the patient and son.  The patient's primary tumor was excised to clear margins but there was an area of greeting is deep and separate from the tumor which was excised and also represented cancer which by report and with discussion with the pathologist appeared to represent a separate tumor.    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  The patient's primary tumor had clear margins and the significance of the small area of grittiness that did turn out to be tumor is unclear.  Patient and son are comfortable with close observation  Start anastrozole  Obtain copy of bone density result  Follow-up in 1 month       Discussion/Summary  Patient's Body mass index is 34.09 kg/m². BMI is above normal parameters. Recommendations include: educational material.  Future Appointments   Date Time Provider Department Center   12/16/2019  2:45 PM [facility] DEXA 1 BH [facility] DX [facility]   12/30/2019  1:00 PM Nurys Selby MD [facility] None   1/8/2020 11:00 AM [facility]   1/8/2020 11:30 AM [facility] Berrios MD [facility]         Please note that portions of this note were completed with a voice recognition program.

## 2019-11-26 DIAGNOSIS — M81.8 OTHER OSTEOPOROSIS WITHOUT CURRENT PATHOLOGICAL FRACTURE: Primary | ICD-10-CM

## 2019-12-16 ENCOUNTER — HOSPITAL ENCOUNTER (OUTPATIENT)
Dept: BONE DENSITY | Facility: HOSPITAL | Age: 84
Discharge: HOME | End: 2019-12-16
Admitting: SURGERY

## 2019-12-16 DIAGNOSIS — M81.8 OTHER OSTEOPOROSIS WITHOUT CURRENT PATHOLOGICAL FRACTURE: ICD-10-CM

## 2019-12-16 PROCEDURE — 77080 DXA BONE DENSITY AXIAL: CPT

## 2019-12-16 PROCEDURE — 77080 DXA BONE DENSITY AXIAL: CPT | Performed by: RADIOLOGY

## 2020-01-02 ENCOUNTER — HOSPITAL ENCOUNTER (OUTPATIENT)
Dept: CT IMAGING | Facility: HOSPITAL | Age: 85
Discharge: HOME | End: 2020-01-02

## 2020-01-02 ENCOUNTER — HOSPITAL ENCOUNTER (OUTPATIENT)
Dept: CT IMAGING | Facility: HOSPITAL | Age: 85
Discharge: HOME | End: 2020-01-02
Admitting: NURSE PRACTITIONER

## 2020-01-02 DIAGNOSIS — C15.9 ESOPHAGEAL ADENOCARCINOMA (HCC): ICD-10-CM

## 2020-01-02 LAB — CREAT BLDA-MCNC: 1.1 MG/DL (ref 0.6–1.3)

## 2020-01-02 PROCEDURE — 74177 CT ABD & PELVIS W/CONTRAST: CPT

## 2020-01-02 PROCEDURE — 74177 CT ABD & PELVIS W/CONTRAST: CPT | Performed by: RADIOLOGY

## 2020-01-02 PROCEDURE — 71260 CT THORAX DX C+: CPT | Performed by: RADIOLOGY

## 2020-01-02 PROCEDURE — 82565 ASSAY OF CREATININE: CPT

## 2020-01-02 PROCEDURE — 0 IOVERSOL 68 % SOLUTION: Performed by: NURSE PRACTITIONER

## 2020-01-02 PROCEDURE — 71260 CT THORAX DX C+: CPT

## 2020-01-02 RX ADMIN — IOVERSOL 60 ML: 678 INJECTION INTRA-ARTERIAL; INTRAVENOUS at 09:11

## 2020-01-08 ENCOUNTER — OFFICE VISIT (OUTPATIENT)
Dept: ONCOLOGY | Facility: CLINIC | Age: 85
End: 2020-01-08

## 2020-01-08 ENCOUNTER — LAB (OUTPATIENT)
Dept: ONCOLOGY | Facility: CLINIC | Age: 85
End: 2020-01-08

## 2020-01-08 VITALS
SYSTOLIC BLOOD PRESSURE: 92 MMHG | HEART RATE: 71 BPM | OXYGEN SATURATION: 97 % | TEMPERATURE: 98.2 F | RESPIRATION RATE: 18 BRPM | DIASTOLIC BLOOD PRESSURE: 65 MMHG

## 2020-01-08 DIAGNOSIS — C15.9 ESOPHAGEAL ADENOCARCINOMA (HCC): ICD-10-CM

## 2020-01-08 DIAGNOSIS — C50.412 MALIGNANT NEOPLASM OF UPPER-OUTER QUADRANT OF LEFT BREAST IN FEMALE, ESTROGEN RECEPTOR POSITIVE (HCC): ICD-10-CM

## 2020-01-08 DIAGNOSIS — C15.9 ESOPHAGEAL ADENOCARCINOMA (HCC): Primary | ICD-10-CM

## 2020-01-08 DIAGNOSIS — M81.0 AGE-RELATED OSTEOPOROSIS WITHOUT CURRENT PATHOLOGICAL FRACTURE: ICD-10-CM

## 2020-01-08 DIAGNOSIS — Z17.0 MALIGNANT NEOPLASM OF UPPER-OUTER QUADRANT OF LEFT BREAST IN FEMALE, ESTROGEN RECEPTOR POSITIVE (HCC): ICD-10-CM

## 2020-01-08 LAB
ALBUMIN SERPL-MCNC: 3.69 G/DL (ref 3.5–5.2)
ALBUMIN/GLOB SERPL: 1.1 G/DL
ALP SERPL-CCNC: 122 U/L (ref 39–117)
ALT SERPL W P-5'-P-CCNC: 13 U/L (ref 1–33)
ANION GAP SERPL CALCULATED.3IONS-SCNC: 12.4 MMOL/L (ref 5–15)
AST SERPL-CCNC: 15 U/L (ref 1–32)
BASOPHILS # BLD AUTO: 0.03 10*3/MM3 (ref 0–0.2)
BASOPHILS NFR BLD AUTO: 0.5 % (ref 0–1.5)
BILIRUB SERPL-MCNC: 0.3 MG/DL (ref 0.2–1.2)
BUN BLD-MCNC: 25 MG/DL (ref 8–23)
BUN/CREAT SERPL: 21.9 (ref 7–25)
CALCIUM SPEC-SCNC: 9.2 MG/DL (ref 8.6–10.5)
CHLORIDE SERPL-SCNC: 101 MMOL/L (ref 98–107)
CO2 SERPL-SCNC: 25.6 MMOL/L (ref 22–29)
CREAT BLD-MCNC: 1.14 MG/DL (ref 0.57–1)
DEPRECATED RDW RBC AUTO: 42.5 FL (ref 37–54)
EOSINOPHIL # BLD AUTO: 0.1 10*3/MM3 (ref 0–0.4)
EOSINOPHIL NFR BLD AUTO: 1.8 % (ref 0.3–6.2)
ERYTHROCYTE [DISTWIDTH] IN BLOOD BY AUTOMATED COUNT: 13.4 % (ref 12.3–15.4)
GFR SERPL CREATININE-BSD FRML MDRD: 46 ML/MIN/1.73
GLOBULIN UR ELPH-MCNC: 3.4 GM/DL
GLUCOSE BLD-MCNC: 119 MG/DL (ref 65–99)
HCT VFR BLD AUTO: 40.6 % (ref 34–46.6)
HGB BLD-MCNC: 13.5 G/DL (ref 12–15.9)
IMM GRANULOCYTES # BLD AUTO: 0.02 10*3/MM3 (ref 0–0.05)
IMM GRANULOCYTES NFR BLD AUTO: 0.4 % (ref 0–0.5)
LYMPHOCYTES # BLD AUTO: 1.38 10*3/MM3 (ref 0.7–3.1)
LYMPHOCYTES NFR BLD AUTO: 25.2 % (ref 19.6–45.3)
MCH RBC QN AUTO: 28.9 PG (ref 26.6–33)
MCHC RBC AUTO-ENTMCNC: 33.3 G/DL (ref 31.5–35.7)
MCV RBC AUTO: 86.9 FL (ref 79–97)
MONOCYTES # BLD AUTO: 0.48 10*3/MM3 (ref 0.1–0.9)
MONOCYTES NFR BLD AUTO: 8.8 % (ref 5–12)
NEUTROPHILS # BLD AUTO: 3.46 10*3/MM3 (ref 1.7–7)
NEUTROPHILS NFR BLD AUTO: 63.3 % (ref 42.7–76)
NRBC BLD AUTO-RTO: 0 /100 WBC (ref 0–0.2)
PLATELET # BLD AUTO: 216 10*3/MM3 (ref 140–450)
PMV BLD AUTO: 10.1 FL (ref 6–12)
POTASSIUM BLD-SCNC: 3.8 MMOL/L (ref 3.5–5.2)
PROT SERPL-MCNC: 7.1 G/DL (ref 6–8.5)
RBC # BLD AUTO: 4.67 10*6/MM3 (ref 3.77–5.28)
SODIUM BLD-SCNC: 139 MMOL/L (ref 136–145)
WBC NRBC COR # BLD: 5.47 10*3/MM3 (ref 3.4–10.8)

## 2020-01-08 PROCEDURE — 80053 COMPREHEN METABOLIC PANEL: CPT | Performed by: NURSE PRACTITIONER

## 2020-01-08 PROCEDURE — 85025 COMPLETE CBC W/AUTO DIFF WBC: CPT | Performed by: NURSE PRACTITIONER

## 2020-01-08 PROCEDURE — 99215 OFFICE O/P EST HI 40 MIN: CPT | Performed by: INTERNAL MEDICINE

## 2020-01-08 NOTE — PROGRESS NOTES
Name:  Geeta Renee  :  1935  Date:  2020     REFERRING PHYSICIAN  Vishal Mckenzie MD, PhD    PRIMARY CARE PROVIDER  Tracy Harris APRN    REASON FOR FOLLOW UP  1. Esophageal adenocarcinoma (CMS/HCC)    2. Malignant neoplasm of upper-outer quadrant of left breast in female, estrogen receptor positive (CMS/HCC)    3. Age-related osteoporosis without current pathological fracture      CHIEF COMPLAINT  None.    Dear Dr. Mckeznie,    HISTORY OF PRESENT ILLNESS:   I saw Ms. Renee in follow up today in our medical oncology clinic. As you are aware, she is a pleasant, 84 y.o., white female with a history of hypertension, arthritis and Addison's esophagus who has been undergoing periodic repeat endoscopic evaluations for the latter since she was first diagnosed in ~. She was again found to have high grade dysplasia in late summer 2017 and was referred back to the [facility] (where she had undergone prior radiofrequency ablations). A repeat local evaluation at  in 2017, unfortunately, was concerning for a component of carcinoma invasive into the submucosa. Imaging at the time (including a PET scan) was consistent with early stage disease, with no definite evidence of either mediastinal node involvement or distant mets. Ultimately, following an evaluation by CT surgery (neither they nor the patient were interested in pursuing an esophagectomy), she was referred to you, closer to her home in [place], [place], to consider treatment with localized radiotherapy. The patient was agreeable to this and was subsequently referred to our clinic to consider providing concomitant chemotherapy with the XRT. She completed planned treatment with concomitant Xeloda/XRT on 2018; and she has been doing overall very well ever since then, with no symptoms/signs of residual/recurernt disease to date.    INTERIM HISTORY:  Ms. Renee presents to clinic today for follow up by herself.  Since her last visit, she has continued to do overall well. She underwent another routine, surveillance EGD in 2019 and this was, again, reportedly unremarkable (this procedure will likely be repeated this Spring). She again denies having dysphagia at present. Her appetite remains good. Since we last saw her in clinic, she was diagnosed with an early stage, left-sided, ER/AK strongly positive, well-differentiated breast cancer. With her age and comorbidities, her surgeon ultimately recommended treatment with lumpectomy followed by adjuvant Arimidex therapy alone (which she has been on for a total of about one month now and is tolerating well). She again has no new or specific complaints.    Past Medical History:   Diagnosis Date   • Arthritis    • Breast lump     left   • Esophageal cancer (CMS/HCC)    • GERD (gastroesophageal reflux disease)    • Hypertension    • Snoring        Past Surgical History:   Procedure Laterality Date   • BREAST LUMPECTOMY Left 2019    Procedure: BREAST LUMPECTOMY WITH ULTRASOUND;  Surgeon: Nurys Selby MD;  Location: [facility];  Service: General   • CATARACT EXTRACTION, BILATERAL  09/10/2019   • CHOLECYSTECTOMY     • COLONOSCOPY     • ELBOW PROCEDURE Right    • ENDOSCOPY     • FRACTURE SURGERY      right elbow       Social History     Socioeconomic History   • Marital status:      Spouse name: Not on file   • Number of children: Not on file   • Years of education: Not on file   • Highest education level: Not on file   Tobacco Use   • Smoking status: Former Smoker     Packs/day: 0.50     Years: 30.00     Pack years: 15.00     Types: Cigarettes     Last attempt to quit:      Years since quittin.0   • Smokeless tobacco: Never Used   Substance and Sexual Activity   • Alcohol use: No   • Drug use: No   • Sexual activity: Defer       Family History   Problem Relation Age of Onset   • Hypertension Mother    • Heart disease Brother    • Breast cancer Neg Hx         Allergies   Allergen Reactions   • Codeine Nausea Only   • Sulfa Antibiotics Hives       Current Outpatient Medications   Medication Sig Dispense Refill   • amLODIPine (NORVASC) 5 MG tablet Take 10 mg by mouth.     • atenolol (TENORMIN) 100 MG tablet Take 100 mg by mouth Daily.     • dexlansoprazole (DEXILANT) 60 MG capsule Take 60 mg by mouth Daily.     • HYDROcodone-acetaminophen (NORCO) 5-325 MG per tablet Take 1 tablet by mouth Every 6 (Six) Hours As Needed for Moderate Pain . 10 tablet 0   • triamterene-hydrochlorothiazide (MAXZIDE-25) 37.5-25 MG per tablet Take 1 tablet by mouth Daily.       No current facility-administered medications for this visit.      REVIEW OF SYSTEMS  CONSTITUTIONAL:  No fever, chills or night sweats.   EYES:  No blurry vision, diplopia or other vision changes.  ENT:  No hearing loss, nosebleeds or sore throat.  CARDIOVASCULAR:  No palpitations, arrhythmia, syncopal episodes or edema.  PULMONARY:  No hemoptysis, wheezing, chronic cough or shortness of breath.  GASTROINTESTINAL:  No nausea or vomiting. No constipation or diarrhea. No dysphagia.  GENITOURINARY:  No hematuria, kidney stones or frequent urination.  MUSCULOSKELETAL: Chronic, mild joint and back pains, stable.  INTEGUMENTARY: No rashes or pruritus.  ENDOCRINE:  No excessive thirst or hot flashes.  HEMATOLOGIC:  No history of free bleeding, spontaneous bleeding or clotting.  IMMUNOLOGIC:  No allergies or frequent infections.  NEUROLOGIC: No numbness, tingling, seizures or weakness.  PSYCHIATRIC:  No anxiety or depression.    PHYSICAL EXAMINATION  BP 92/65   Pulse 71   Temp 98.2 °F (36.8 °C) (Oral)   Resp 18   SpO2 97%     GENERAL:  A well-developed, well-nourished, elderly, white female in no acute distress  HEENT:  Pupils equally round and reactive to light. Extraocular muscles intact.  CARDIOVASCULAR:  Regular rate and rhythm.  No murmurs, gallops or rubs.  LUNGS:  Clear to auscultation bilaterally.  ABDOMEN:   Soft, nontender, nondistended with positive bowel sounds.  EXTREMITIES:  No clubbing, cyanosis or edema bilaterally.  SKIN:  No rashes or petechiae.  NEURO:  Cranial nerves grossly intact.  No focal deficits.  PSYCH:  Alert and oriented x3.    LABORATORY    Lab Results   Component Value Date    WBC 5.47 01/08/2020    HGB 13.5 01/08/2020    HCT 40.6 01/08/2020    MCV 86.9 01/08/2020     01/08/2020    NEUTROABS 3.46 01/08/2020       Lab Results   Component Value Date     01/08/2020    K 3.8 01/08/2020     01/08/2020    CO2 25.6 01/08/2020    BUN 25 (H) 01/08/2020    CREATININE 1.14 (H) 01/08/2020    GLUCOSE 119 (H) 01/08/2020    CALCIUM 9.2 01/08/2020    AST 15 01/08/2020    ALT 13 01/08/2020    ALKPHOS 122 (H) 01/08/2020    BILITOT 0.3 01/08/2020    PROTEINTOT 7.1 01/08/2020    ALBUMIN 3.69 01/08/2020     IMAGING  CT chest, abdomen and pelvis with contrast (11/09/2017):  Impression:  1) Mild, nonspecific thickening of the distal esophagus which may be related to diagnosis of esophageal cancer. Multiple subcentimeter right paraesophageal lymph nodes are noted.  2) 1.3 mm ground glass nodule in the left upper lobe. No suspicious solid nodules that would suggest metastatic disease.  3) No evidence of metastatic disease to the abdomen or pelvis.  4) Bilobed fusiform AAA measuring up to 4.6 cm beneath the renal arteries.  5) Cardiomegaly with artery atherosclerosis.    NM PET with fusion CT, skull base to mid-thigh (12/07/2017, at ):  Impression:  1) No suspicious focal FDG uptake within the esophagus to suggest a site of invasive malignancy.  2) No suspicious metabolically active mediastinal adenopathy.  3) No suspicious hypermetabolic distal metastasis.  4) Few non-FDG avid groundglass nodules, the largest in the left upper lobe of the lung, unchanged since the most recent comparison CT of the chest of one month prior.    CT chest with contrast (07/02/2018):  Impression: Thickening of the mucosa in  the distal portion of the esophagus consistent with clinical history of adenocarcinoma of the esophagus. There is nothing seen on the CT to suggest metastatic disease in the lung parenchyma or in the mediastinal lymph nodes.    CT abdomen and pelvis with contrast (07/02/2018):  Impression: No CT findings of metastatic disease in the abdomen or pelvis. There were benign cysts in the kidneys. There is aneurysmal dilatation of the infrarenal abdominal aorta with a maximum diameter of 4.7 cm.    CT chest with contrast (10/08/2018):  Impression: Thickening of the esophageal wall, similar to the previous exam. No new pulmonary pathology.    CT abdomen and pelvis with contrast (10/08/2018):  Impression:  1) Thickening of the distal esophageal wall, similar to the previous exam.  2) Very slight interval increase in size of AAA (from 4.7 to 4.8 cm).    CT chest with contrast (01/07/2019):  Impression:  1) Esophageal wall thickening of the mid-distal esophagus with no increase since the previous exam. No mediastinal masses or adenopathy identified.  2) Remainder of chest is stable from previous.    CT abdomen and pelvis with contrast (01/07/2019):  Impression:  1) Stable exam demonstrating no evidence of metastatic disease to the abdomen or pelvis. Distal esophageal wall thickening and hiatal hernia similar to previous.  2) Based on coronal reformatted sequences, likely no significant change in transverse diameter of infrarenal abdominal aortic aneurysm (4.7 cm and was previously 4.7 cm).    CT chest with contrast (04/04/2019)  Impression: Stable CT scan of the thorax. There continues to be some  mild mucosal thickening in the distal portion of the esophagus, stable  in comparing with the earlier exam.     CT abdomen pelvis with contrast (04/04/2019)  Impression: Nothing seen to suggest metastatic disease in the abdomen  and pelvis. There is aneurysmal dilatation of the infrarenal portion of  the abdominal aorta. The diameter  is increased slightly. Currently the  aortic diameter measures 4.9 cm and previously measures 4.7.    Bone densitometry (DEXA) scan (12/16/2019):  Impression: Osteoporosis with a T-score of -3.1 in the left hip.    CT chest with contrast (01/02/2020):  Impression: Stable CT of the thorax. Mild thickening of the mucosa remains in the esophagus. No enlarged lymph nodes have developed. There is a stable area of ground-glass parenchymal infiltrate in the left upper lobe.    CT abdomen and pelvis with contrast (01/02/2020):  Impression:  1) The biliary tree is not as dilated as it was on the previous CT done 10/03/2019.  2) The pancreas shows atrophy with fatty infiltrate. There are benign cysts in the kidneys. There is an infrarenal abdominal aortic aneurysm which now has a diameter of 5.3 cm. There is a hiatal hernia in the lower mediastinum.    PROCEDURES  Esophagogastroduodenoscopy with esophageal biopsies and dilatation (06/26/2018):  Impression:  1) Radiation esophagitis with ulceration, dense exudate and tight stenosis balloon dilated to 12 mm and biopsies were performed.  2) Appearance of residual Addison's esophagus with papillary mucosal appearance in the distal most esophagus.  3) Large hiatal hernia.  4) Gastric food retention consistent with gastroparesis.    PATHOLOGY  Esophagus, nodules (10/19/2017):  Adenocarcinoma, invasive at least into the muscularis mucosa.    Esophageal biopsy, stricture (06/26/2018):  Gastric type mucosa with ulceration, acute and chronic inflammation, and focal intestinal metaplasia, negative for dysplasia.    Esophagus, biopsy, distal (01/18/2019):  Addison's esophagitis without dysplasia. Ulceration with acute inflammation and reactive epithelial changes. Negative for malignancy.    Left breast, mass, needle core biopsies (10/09/2019):  Invasive, well-differentiated ductal adenocarcinoma. Palm-Velasquez grade 1 (3+1+1). ER strongly positive (100%), MD strongly positive (100%),  HER2/lisa negative (1+ by IHC).    Breast margin, left, deep margin (11/19/2019):  Invasive, well-differentiated ductal carcinoma with lobular features, involving previously inked deep resection margin.    Breast, lumpectomy, left (11/19/2019):  Invasive, well-differentiated ductal carcinoma (1.9 cm) with associated low-to-intermediate grade DCIS, solid and cribiform types. Margins are free of tumor. Pathologic tumor stage [id number].    IMPRESSION AND PLAN  Ms. Renee is a 84 y.o., white female with:  1. Esophageal adenocarcinoma: Diagnosed with clinical stage [id number] disease in late 2017 (although she did not undergo an endoscopic ultrasound). Regardless, even though she was (and is) in overall good health, she was not eligible for an esophagectomy due to her advanced age and comorbidities (and she has repeatedly stated that she would not have wanted one anyway). Therefore, we agreed with CT surgery and radiation oncology's initial recommendations for localized radiotherapy. It was also agreed that she would be a good candidate for adding concomitant chemotherapy to this treatment plan, and PO capecitabine (Xeloda) was a convenient and fairly well tolerable choice. A Rx for 1500 mg BID M-F throughout the planned course of localized XRT was provided. She began this treatment regimen in early March 2018 and completed it on 04/26/2018. Overall, she tolerated this treatment regimen very well, with fatigue and mild dysphagia (both of which are now improved) as her only noticeable side effects. Clinically she continues to feel overall well. Repeat CT scans performed on 07/02/2018 (and summarized above) were consistent with treatment related changes only, with no definite evidence of residual/recurrent disease; and this continues to be the case on the most recent follow up imaging (CT scans performed on 01/02/2020 and also summarized above). The most recent repeat, surveillance EGD (performed in August 2019) again  reportedly showed no evidence of residual/recurrent disease. She will continue to follow routinely with gastroenterology as previously planned (another repeat endoscopy will likely be performed this Spring). We will repeat CTs of the chest, abdomen and pelvis with contrast in six months (~early July 2020).  2. Dysphagia: The patient had no such issues prior to starting Xeloda/XRT; however, toward the end of this regimen, she began to experience some increasing difficulty with swallowing, particularly with her pills. This issue remained manageable throughout the remainder of her treatment and has resolved since the completion of it, particularly after she underwent an esophageal dilatation in late June 2018. She will continue to follow up with gastroenterology as planned. Continue to monitor.  3. AAA: A known issue that had been fairly stable. The most recent repeat CT scan of the abdomen/pelvis (performed on 01/02/2020 and summarized above) now shows a substantial increase in diameter (from 4.9 cm to 5.3 cm). Although the risks would obviously be high, she is nearing the typical threshold (5.5 cm or greater) where intervention is usually considered. Ongoing management with primary care and, if needed, vascular surgery.  4. Breast carcinoma: Since we last saw her in clinic, she was diagnosed with an early stage, ER/MN strongly positive, well-differentiated ductal adenocarcinoma of the left breast. With her age and comorbidities, her surgeon ultimately recommended treatment with lumpectomy (performed on 11/19/2019) followed by adjuvant endocrine (with an AI) therapy alone (and the patient agreed). She has been on Arimidex for a total of about one month now and is tolerating it well, with no significant side effects. The current standard of care is to remain on this medication for at least a total of five years (through ~late 2024). Ongoing management/monitoring per her breast surgeon.  5. Osteoporosis: A known,  longstanding issue for which she had been on Fosamax. She stopped this medication when she was diagnosed with issue #1 and has apparently been off of it ever since, as it seemed to exacerbate the reflux and discomfort that the cancer was causing. Her most recent bone densitometry (DEXA) scan performed on 12/16/2019 and summarized above shows that her BMD remains solidly within the osteoporotic range. Particularly now that she has started adjuvant Arimidex for issue #4 (see above), restarting treatment for this issue (the osteoporosis) is strongly recommended. She is agreeable to receiving [id number] denosumab (Prolia), which our clinic will start next month (and continue giving her on the days of her [id number] wellness visits).  The patient was in agreement with these plans.    It is a pleasure to participate in Ms. Renee's care. Please do not hesitate to call with any questions or concerns that you may have.    A total of 45 minutes were spent coordinating this patient’s care in clinic today; more than 50% of this time was spent face-to-face with the patient, reviewing her interim medical history and counseling on the current followup plan. All questions were answered to her satisfaction.    FOLLOW UP  Continue adjuvant Arimidex. With gastroenterology in August 2019 for a repeat EGD, as previously planned. With primary care, as previously planned, and vascular surgery, as indicated, for evaluation of enlarging AAA. With breast surgery, as previously planned. Return to our clinic in 6 weeks. First dose of [id number] denosumab (Prolia) that day. Repeat CTs of the chest, abdomen and pelvis with contrast in ~6 months.            This document was electronically signed by BERTHA Berrios MD January 8, 2020 1:10 PM      CC: Vishal Mckenzie MD, PhD   MD Guerrero Arce MD Janey P. Phipps, APRN

## 2020-01-13 ENCOUNTER — OFFICE VISIT (OUTPATIENT)
Dept: SURGERY | Facility: CLINIC | Age: 85
End: 2020-01-13

## 2020-01-13 VITALS
WEIGHT: 203.4 LBS | HEIGHT: 64 IN | SYSTOLIC BLOOD PRESSURE: 107 MMHG | BODY MASS INDEX: 34.72 KG/M2 | HEART RATE: 70 BPM | DIASTOLIC BLOOD PRESSURE: 96 MMHG

## 2020-01-13 DIAGNOSIS — C50.412 MALIGNANT NEOPLASM OF UPPER-OUTER QUADRANT OF LEFT BREAST IN FEMALE, ESTROGEN RECEPTOR POSITIVE (HCC): Primary | ICD-10-CM

## 2020-01-13 DIAGNOSIS — Z79.811 AROMATASE INHIBITOR USE: ICD-10-CM

## 2020-01-13 DIAGNOSIS — M81.0 AGE-RELATED OSTEOPOROSIS WITHOUT CURRENT PATHOLOGICAL FRACTURE: ICD-10-CM

## 2020-01-13 DIAGNOSIS — Z17.0 MALIGNANT NEOPLASM OF UPPER-OUTER QUADRANT OF LEFT BREAST IN FEMALE, ESTROGEN RECEPTOR POSITIVE (HCC): Primary | ICD-10-CM

## 2020-01-13 PROCEDURE — 99024 POSTOP FOLLOW-UP VISIT: CPT | Performed by: SURGERY

## 2020-01-13 RX ORDER — ANASTROZOLE 1 MG/1
1 TABLET ORAL DAILY
Qty: 30 TABLET | Refills: 6 | Status: SHIPPED | OUTPATIENT
Start: 2020-01-13 | End: 2020-02-12

## 2020-01-13 NOTE — PROGRESS NOTES
"Subjective   Geeta Renee is a 84 y.o. female here today for follow up post op and DEXA review.    History of Present Illness  Ms. Renee was seen in the office today for breast cancer follow-up.  She is status post a left lumpectomy on 11/19/2019 for a T2 carcinoma.  The patient's primary tumor was excised with clear margins but just deep and separate from the primary tumor there was an area of gritty tissue that did demonstrate cancer and appeared to be a separate tumor.  This was at the deep margin.  Given the patient's age and comorbidities sentinel node evaluation was not recommended and after discussion of the pathology close follow-up and hormonal therapy was recommended.  Bone density was obtained in December which demonstrated osteoporosis with a T score of -3.1 and the patient was started on Fosamax in December.  She was started on anastrozole at the time of her last visit which she states she is tolerating well.  She denies any palpable masses in the breast or the axilla.  She denies any arm edema.    Allergies   Allergen Reactions   • Codeine Nausea Only   • Sulfa Antibiotics Hives         Current Outpatient Medications   Medication Sig Dispense Refill   • amLODIPine (NORVASC) 5 MG tablet Take 10 mg by mouth.     • atenolol (TENORMIN) 100 MG tablet Take 100 mg by mouth Daily.     • dexlansoprazole (DEXILANT) 60 MG capsule Take 60 mg by mouth Daily.     • HYDROcodone-acetaminophen (NORCO) 5-325 MG per tablet Take 1 tablet by mouth Every 6 (Six) Hours As Needed for Moderate Pain . 10 tablet 0   • triamterene-hydrochlorothiazide (MAXZIDE-25) 37.5-25 MG per tablet Take 1 tablet by mouth Daily.       No current facility-administered medications for this visit.          Objective   Ht 162.6 cm (64\")   Wt 92.3 kg (203 lb 6.4 oz)   BMI 34.91 kg/m²    Physical Exam  On examination this is a well-developed well-nourished female in no acute distress  HEENT examination: Within normal limits.  Conjunctiva " pink.  Nose and ears appear normal.  Neck: Supple, full range of motion.  No JVD.  Musculoskeletal: Full range of motion all extremities without focal weakness. Normal gait. No digital cyanosis.  Psych: Patient is alert, oriented x3. Mood and affect are appropriate.  Left breast: Healing surgical scar in the lateral left breast without palpable mass or adenopathy.  Results/Data      Procedures     Assessment/Plan   T1c NX grade 1 invasive ductal carcinoma, ER positive, ME positive, HER-2 negative  Separate small focus of ductal carcinoma with lobular features distinct from primary tumor, at deep margin    Continue anastrozole and Fosamax  Left mammogram in April, 2020 with return to the office following         Discussion/Summary  Patient's Body mass index is 34.91 kg/m². BMI is above normal parameters. Recommendations include: educational material.    Future Appointments   Date Time Provider Department Center   2/18/2020 11:00 AM [facility]   2/18/2020 11:30 AM BERTHA Berrios MD [facility]         Please note that portions of this note were completed with a voice recognition program.

## 2020-01-15 PROBLEM — Z79.811 AROMATASE INHIBITOR USE: Status: ACTIVE | Noted: 2020-01-15

## 2020-01-20 ENCOUNTER — TELEPHONE (OUTPATIENT)
Dept: SURGERY | Facility: CLINIC | Age: 85
End: 2020-01-20

## 2020-02-18 ENCOUNTER — OFFICE VISIT (OUTPATIENT)
Dept: ONCOLOGY | Facility: CLINIC | Age: 85
End: 2020-02-18

## 2020-02-18 ENCOUNTER — INFUSION (OUTPATIENT)
Dept: ONCOLOGY | Facility: HOSPITAL | Age: 85
End: 2020-02-18

## 2020-02-18 ENCOUNTER — LAB (OUTPATIENT)
Dept: ONCOLOGY | Facility: CLINIC | Age: 85
End: 2020-02-18

## 2020-02-18 VITALS
HEART RATE: 62 BPM | BODY MASS INDEX: 34.14 KG/M2 | RESPIRATION RATE: 20 BRPM | SYSTOLIC BLOOD PRESSURE: 121 MMHG | DIASTOLIC BLOOD PRESSURE: 80 MMHG | OXYGEN SATURATION: 96 % | WEIGHT: 198.9 LBS | TEMPERATURE: 98.2 F

## 2020-02-18 VITALS
OXYGEN SATURATION: 96 % | SYSTOLIC BLOOD PRESSURE: 121 MMHG | WEIGHT: 198.9 LBS | HEART RATE: 62 BPM | BODY MASS INDEX: 34.14 KG/M2 | DIASTOLIC BLOOD PRESSURE: 80 MMHG | RESPIRATION RATE: 20 BRPM | TEMPERATURE: 98.2 F

## 2020-02-18 VITALS
DIASTOLIC BLOOD PRESSURE: 80 MMHG | RESPIRATION RATE: 20 BRPM | SYSTOLIC BLOOD PRESSURE: 121 MMHG | OXYGEN SATURATION: 96 % | WEIGHT: 198.9 LBS | HEART RATE: 62 BPM | BODY MASS INDEX: 34.14 KG/M2 | TEMPERATURE: 98.2 F

## 2020-02-18 DIAGNOSIS — M81.0 AGE-RELATED OSTEOPOROSIS WITHOUT CURRENT PATHOLOGICAL FRACTURE: ICD-10-CM

## 2020-02-18 DIAGNOSIS — C50.412 MALIGNANT NEOPLASM OF UPPER-OUTER QUADRANT OF LEFT BREAST IN FEMALE, ESTROGEN RECEPTOR POSITIVE (HCC): ICD-10-CM

## 2020-02-18 DIAGNOSIS — Z17.0 MALIGNANT NEOPLASM OF UPPER-OUTER QUADRANT OF LEFT BREAST IN FEMALE, ESTROGEN RECEPTOR POSITIVE (HCC): ICD-10-CM

## 2020-02-18 DIAGNOSIS — M81.0 AGE-RELATED OSTEOPOROSIS WITHOUT CURRENT PATHOLOGICAL FRACTURE: Primary | ICD-10-CM

## 2020-02-18 DIAGNOSIS — C15.9 ESOPHAGEAL ADENOCARCINOMA (HCC): Primary | ICD-10-CM

## 2020-02-18 DIAGNOSIS — Z17.0 MALIGNANT NEOPLASM OF UPPER-OUTER QUADRANT OF LEFT BREAST IN FEMALE, ESTROGEN RECEPTOR POSITIVE (HCC): Primary | ICD-10-CM

## 2020-02-18 DIAGNOSIS — C50.412 MALIGNANT NEOPLASM OF UPPER-OUTER QUADRANT OF LEFT BREAST IN FEMALE, ESTROGEN RECEPTOR POSITIVE (HCC): Primary | ICD-10-CM

## 2020-02-18 LAB
ALBUMIN SERPL-MCNC: 3.76 G/DL (ref 3.5–5.2)
ALBUMIN/GLOB SERPL: 1.1 G/DL
ALP SERPL-CCNC: 108 U/L (ref 39–117)
ALT SERPL W P-5'-P-CCNC: 10 U/L (ref 1–33)
ANION GAP SERPL CALCULATED.3IONS-SCNC: 12.5 MMOL/L (ref 5–15)
AST SERPL-CCNC: 19 U/L (ref 1–32)
BASOPHILS # BLD AUTO: 0.04 10*3/MM3 (ref 0–0.2)
BASOPHILS NFR BLD AUTO: 0.7 % (ref 0–1.5)
BILIRUB SERPL-MCNC: 0.4 MG/DL (ref 0.2–1.2)
BUN BLD-MCNC: 18 MG/DL (ref 8–23)
BUN/CREAT SERPL: 16.1 (ref 7–25)
CALCIUM SPEC-SCNC: 10 MG/DL (ref 8.6–10.5)
CHLORIDE SERPL-SCNC: 97 MMOL/L (ref 98–107)
CO2 SERPL-SCNC: 29.5 MMOL/L (ref 22–29)
CREAT BLD-MCNC: 1.12 MG/DL (ref 0.57–1)
DEPRECATED RDW RBC AUTO: 43.9 FL (ref 37–54)
EOSINOPHIL # BLD AUTO: 0.09 10*3/MM3 (ref 0–0.4)
EOSINOPHIL NFR BLD AUTO: 1.6 % (ref 0.3–6.2)
ERYTHROCYTE [DISTWIDTH] IN BLOOD BY AUTOMATED COUNT: 13.7 % (ref 12.3–15.4)
GFR SERPL CREATININE-BSD FRML MDRD: 46 ML/MIN/1.73
GLOBULIN UR ELPH-MCNC: 3.5 GM/DL
GLUCOSE BLD-MCNC: 107 MG/DL (ref 65–99)
HCT VFR BLD AUTO: 42.2 % (ref 34–46.6)
HGB BLD-MCNC: 13.7 G/DL (ref 12–15.9)
IMM GRANULOCYTES # BLD AUTO: 0.01 10*3/MM3 (ref 0–0.05)
IMM GRANULOCYTES NFR BLD AUTO: 0.2 % (ref 0–0.5)
LYMPHOCYTES # BLD AUTO: 1.41 10*3/MM3 (ref 0.7–3.1)
LYMPHOCYTES NFR BLD AUTO: 24.7 % (ref 19.6–45.3)
MAGNESIUM SERPL-MCNC: 1.7 MG/DL (ref 1.6–2.4)
MCH RBC QN AUTO: 28.5 PG (ref 26.6–33)
MCHC RBC AUTO-ENTMCNC: 32.5 G/DL (ref 31.5–35.7)
MCV RBC AUTO: 87.7 FL (ref 79–97)
MONOCYTES # BLD AUTO: 0.48 10*3/MM3 (ref 0.1–0.9)
MONOCYTES NFR BLD AUTO: 8.4 % (ref 5–12)
NEUTROPHILS # BLD AUTO: 3.69 10*3/MM3 (ref 1.7–7)
NEUTROPHILS NFR BLD AUTO: 64.4 % (ref 42.7–76)
NRBC BLD AUTO-RTO: 0 /100 WBC (ref 0–0.2)
PHOSPHATE SERPL-MCNC: 2.9 MG/DL (ref 2.5–4.5)
PLATELET # BLD AUTO: 197 10*3/MM3 (ref 140–450)
PMV BLD AUTO: 10.2 FL (ref 6–12)
POTASSIUM BLD-SCNC: 3.8 MMOL/L (ref 3.5–5.2)
PROT SERPL-MCNC: 7.3 G/DL (ref 6–8.5)
RBC # BLD AUTO: 4.81 10*6/MM3 (ref 3.77–5.28)
SODIUM BLD-SCNC: 139 MMOL/L (ref 136–145)
WBC NRBC COR # BLD: 5.72 10*3/MM3 (ref 3.4–10.8)

## 2020-02-18 PROCEDURE — 80053 COMPREHEN METABOLIC PANEL: CPT | Performed by: INTERNAL MEDICINE

## 2020-02-18 PROCEDURE — 25010000002 DENOSUMAB 60 MG/ML SOLUTION PREFILLED SYRINGE: Performed by: INTERNAL MEDICINE

## 2020-02-18 PROCEDURE — 96372 THER/PROPH/DIAG INJ SC/IM: CPT

## 2020-02-18 PROCEDURE — 83735 ASSAY OF MAGNESIUM: CPT | Performed by: INTERNAL MEDICINE

## 2020-02-18 PROCEDURE — 36415 COLL VENOUS BLD VENIPUNCTURE: CPT

## 2020-02-18 PROCEDURE — 84100 ASSAY OF PHOSPHORUS: CPT | Performed by: INTERNAL MEDICINE

## 2020-02-18 PROCEDURE — 99214 OFFICE O/P EST MOD 30 MIN: CPT | Performed by: INTERNAL MEDICINE

## 2020-02-18 PROCEDURE — 85025 COMPLETE CBC W/AUTO DIFF WBC: CPT | Performed by: INTERNAL MEDICINE

## 2020-02-18 RX ORDER — ANASTROZOLE 1 MG/1
TABLET ORAL DAILY
COMMUNITY

## 2020-02-18 RX ADMIN — DENOSUMAB 60 MG: 60 INJECTION SUBCUTANEOUS at 11:44

## 2020-02-18 NOTE — PROGRESS NOTES
Name:  Geeta Renee  :  1935  Date:  2020     REFERRING PHYSICIAN  Vishal Mckenzie MD, PhD    PRIMARY CARE PROVIDER  Tracy Harris APRN    REASON FOR FOLLOW UP  1. Esophageal adenocarcinoma (CMS/HCC)    2. Malignant neoplasm of upper-outer quadrant of left breast in female, estrogen receptor positive (CMS/HCC)      CHIEF COMPLAINT  None.    Dear Dr. Mckenzie,    HISTORY OF PRESENT ILLNESS:   I saw Ms. Renee in follow up today in our medical oncology clinic. As you are aware, she is a pleasant, 84 y.o., white female with a history of hypertension, arthritis and Addison's esophagus who has been undergoing periodic repeat endoscopic evaluations for the latter since she was first diagnosed in ~. She was again found to have high grade dysplasia in late summer 2017 and was referred back to the [facility] (where she had undergone prior radiofrequency ablations). A repeat local evaluation at  in 2017, unfortunately, was concerning for a component of carcinoma invasive into the submucosa. Imaging at the time (including a PET scan) was consistent with early stage disease, with no definite evidence of either mediastinal node involvement or distant mets. Ultimately, following an evaluation by CT surgery (neither they nor the patient were interested in pursuing an esophagectomy), she was referred to you, closer to her home in [place], [place], to consider treatment with localized radiotherapy. The patient was agreeable to this and was subsequently referred to our clinic to consider providing concomitant chemotherapy with the XRT. She completed planned treatment with concomitant Xeloda/XRT on 2018; and she has been doing overall very well ever since then, with no symptoms/signs of residual/recurernt disease to date.    INTERIM HISTORY:  Ms. Renee presents to clinic today for follow up accompanied by her son. Since her last visit, she has continued to do overall  well. She most recently underwent another routine, surveillance EGD in 2019 and this was, again, reportedly unremarkable (this procedure will likely be repeated this Spring). She again denies having dysphagia at present. Her appetite remains good. She was diagnosed with an early stage, left-sided, ER/LA strongly positive, well-differentiated breast cancer in 2019. With her age and comorbidities, her surgeon ultimately recommended treatment with lumpectomy followed by adjuvant Arimidex therapy alone (which she has been on for a total of about three months now and continues to tolerate well). She remains agreeable to starting [id number] Prolia today, as planned. She again has no new or specific complaints.    Past Medical History:   Diagnosis Date   • Arthritis    • Breast lump     left   • Esophageal cancer (CMS/HCC)    • GERD (gastroesophageal reflux disease)    • Hypertension    • Snoring        Past Surgical History:   Procedure Laterality Date   • BREAST LUMPECTOMY Left 2019    Procedure: BREAST LUMPECTOMY WITH ULTRASOUND;  Surgeon: Nurys Selby MD;  Location: [facility];  Service: General   • CATARACT EXTRACTION, BILATERAL  09/10/2019   • CHOLECYSTECTOMY     • COLONOSCOPY     • ELBOW PROCEDURE Right    • ENDOSCOPY     • FRACTURE SURGERY      right elbow       Social History     Socioeconomic History   • Marital status:      Spouse name: Not on file   • Number of children: Not on file   • Years of education: Not on file   • Highest education level: Not on file   Tobacco Use   • Smoking status: Former Smoker     Packs/day: 0.50     Years: 30.00     Pack years: 15.00     Types: Cigarettes     Last attempt to quit:      Years since quittin.1   • Smokeless tobacco: Never Used   Substance and Sexual Activity   • Alcohol use: No   • Drug use: No   • Sexual activity: Defer       Family History   Problem Relation Age of Onset   • Hypertension Mother    • Heart disease Brother     • Breast cancer Neg Hx        Allergies   Allergen Reactions   • Codeine Nausea Only   • Sulfa Antibiotics Hives       Current Outpatient Medications   Medication Sig Dispense Refill   • amLODIPine (NORVASC) 5 MG tablet Take 10 mg by mouth.     • anastrozole (ARIMIDEX) 1 MG tablet Take  by mouth Daily.     • atenolol (TENORMIN) 100 MG tablet Take 100 mg by mouth Daily.     • dexlansoprazole (DEXILANT) 60 MG capsule Take 60 mg by mouth Daily.     • HYDROcodone-acetaminophen (NORCO) 5-325 MG per tablet Take 1 tablet by mouth Every 6 (Six) Hours As Needed for Moderate Pain . 10 tablet 0   • triamterene-hydrochlorothiazide (MAXZIDE-25) 37.5-25 MG per tablet Take 1 tablet by mouth Daily.       No current facility-administered medications for this visit.      REVIEW OF SYSTEMS  CONSTITUTIONAL:  No fever, chills or night sweats.   EYES:  No blurry vision, diplopia or other vision changes.  ENT:  No hearing loss, nosebleeds or sore throat.  CARDIOVASCULAR:  No palpitations, arrhythmia, syncopal episodes or edema.  PULMONARY:  No hemoptysis, wheezing, chronic cough or shortness of breath.  GASTROINTESTINAL:  No nausea or vomiting. No constipation or diarrhea. No dysphagia.  GENITOURINARY:  No hematuria, kidney stones or frequent urination.  MUSCULOSKELETAL: Chronic, mild joint and back pains, stable.  INTEGUMENTARY: No rashes or pruritus.  ENDOCRINE:  No excessive thirst or hot flashes.  HEMATOLOGIC:  No history of free bleeding, spontaneous bleeding or clotting.  IMMUNOLOGIC:  No allergies or frequent infections.  NEUROLOGIC: No numbness, tingling, seizures or weakness.  PSYCHIATRIC:  No anxiety or depression.    PHYSICAL EXAMINATION  /80   Pulse 62   Temp 98.2 °F (36.8 °C) (Oral)   Resp 20   Wt 90.2 kg (198 lb 14.4 oz)   SpO2 96%   BMI 34.14 kg/m²     GENERAL:  A well-developed, well-nourished, elderly, white female in no acute distress  HEENT:  Pupils equally round and reactive to light. Extraocular muscles  intact.  CARDIOVASCULAR:  Regular rate and rhythm.  No murmurs, gallops or rubs.  LUNGS:  Clear to auscultation bilaterally.  ABDOMEN:  Soft, nontender, nondistended with positive bowel sounds.  EXTREMITIES:  No clubbing, cyanosis or edema bilaterally.  SKIN:  No rashes or petechiae.  NEURO:  Cranial nerves grossly intact.  No focal deficits.  PSYCH:  Alert and oriented x3.    LABORATORY    Lab Results   Component Value Date    WBC 5.72 02/18/2020    HGB 13.7 02/18/2020    HCT 42.2 02/18/2020    MCV 87.7 02/18/2020     02/18/2020    NEUTROABS 3.69 02/18/2020       Lab Results   Component Value Date     01/08/2020    K 3.8 01/08/2020     01/08/2020    CO2 25.6 01/08/2020    BUN 25 (H) 01/08/2020    CREATININE 1.14 (H) 01/08/2020    GLUCOSE 119 (H) 01/08/2020    CALCIUM 9.2 01/08/2020    AST 15 01/08/2020    ALT 13 01/08/2020    ALKPHOS 122 (H) 01/08/2020    BILITOT 0.3 01/08/2020    PROTEINTOT 7.1 01/08/2020    ALBUMIN 3.69 01/08/2020     IMAGING  CT chest, abdomen and pelvis with contrast (11/09/2017):  Impression:  1) Mild, nonspecific thickening of the distal esophagus which may be related to diagnosis of esophageal cancer. Multiple subcentimeter right paraesophageal lymph nodes are noted.  2) 1.3 mm ground glass nodule in the left upper lobe. No suspicious solid nodules that would suggest metastatic disease.  3) No evidence of metastatic disease to the abdomen or pelvis.  4) Bilobed fusiform AAA measuring up to 4.6 cm beneath the renal arteries.  5) Cardiomegaly with artery atherosclerosis.    NM PET with fusion CT, skull base to mid-thigh (12/07/2017, at ):  Impression:  1) No suspicious focal FDG uptake within the esophagus to suggest a site of invasive malignancy.  2) No suspicious metabolically active mediastinal adenopathy.  3) No suspicious hypermetabolic distal metastasis.  4) Few non-FDG avid groundglass nodules, the largest in the left upper lobe of the lung, unchanged since the most  recent comparison CT of the chest of one month prior.    CT chest with contrast (07/02/2018):  Impression: Thickening of the mucosa in the distal portion of the esophagus consistent with clinical history of adenocarcinoma of the esophagus. There is nothing seen on the CT to suggest metastatic disease in the lung parenchyma or in the mediastinal lymph nodes.    CT abdomen and pelvis with contrast (07/02/2018):  Impression: No CT findings of metastatic disease in the abdomen or pelvis. There were benign cysts in the kidneys. There is aneurysmal dilatation of the infrarenal abdominal aorta with a maximum diameter of 4.7 cm.    CT chest with contrast (10/08/2018):  Impression: Thickening of the esophageal wall, similar to the previous exam. No new pulmonary pathology.    CT abdomen and pelvis with contrast (10/08/2018):  Impression:  1) Thickening of the distal esophageal wall, similar to the previous exam.  2) Very slight interval increase in size of AAA (from 4.7 to 4.8 cm).    CT chest with contrast (01/07/2019):  Impression:  1) Esophageal wall thickening of the mid-distal esophagus with no increase since the previous exam. No mediastinal masses or adenopathy identified.  2) Remainder of chest is stable from previous.    CT abdomen and pelvis with contrast (01/07/2019):  Impression:  1) Stable exam demonstrating no evidence of metastatic disease to the abdomen or pelvis. Distal esophageal wall thickening and hiatal hernia similar to previous.  2) Based on coronal reformatted sequences, likely no significant change in transverse diameter of infrarenal abdominal aortic aneurysm (4.7 cm and was previously 4.7 cm).    CT chest with contrast (04/04/2019)  Impression: Stable CT scan of the thorax. There continues to be some  mild mucosal thickening in the distal portion of the esophagus, stable  in comparing with the earlier exam.     CT abdomen pelvis with contrast (04/04/2019)  Impression: Nothing seen to suggest  metastatic disease in the abdomen  and pelvis. There is aneurysmal dilatation of the infrarenal portion of  the abdominal aorta. The diameter is increased slightly. Currently the  aortic diameter measures 4.9 cm and previously measures 4.7.    Bone densitometry (DEXA) scan (12/16/2019):  Impression: Osteoporosis with a T-score of -3.1 in the left hip.    CT chest with contrast (01/02/2020):  Impression: Stable CT of the thorax. Mild thickening of the mucosa remains in the esophagus. No enlarged lymph nodes have developed. There is a stable area of ground-glass parenchymal infiltrate in the left upper lobe.    CT abdomen and pelvis with contrast (01/02/2020):  Impression:  1) The biliary tree is not as dilated as it was on the previous CT done 10/03/2019.  2) The pancreas shows atrophy with fatty infiltrate. There are benign cysts in the kidneys. There is an infrarenal abdominal aortic aneurysm which now has a diameter of 5.3 cm. There is a hiatal hernia in the lower mediastinum.    PROCEDURES  Esophagogastroduodenoscopy with esophageal biopsies and dilatation (06/26/2018):  Impression:  1) Radiation esophagitis with ulceration, dense exudate and tight stenosis balloon dilated to 12 mm and biopsies were performed.  2) Appearance of residual Addison's esophagus with papillary mucosal appearance in the distal most esophagus.  3) Large hiatal hernia.  4) Gastric food retention consistent with gastroparesis.    PATHOLOGY  Esophagus, nodules (10/19/2017):  Adenocarcinoma, invasive at least into the muscularis mucosa.    Esophageal biopsy, stricture (06/26/2018):  Gastric type mucosa with ulceration, acute and chronic inflammation, and focal intestinal metaplasia, negative for dysplasia.    Esophagus, biopsy, distal (01/18/2019):  Addison's esophagitis without dysplasia. Ulceration with acute inflammation and reactive epithelial changes. Negative for malignancy.    Left breast, mass, needle core biopsies  (10/09/2019):  Invasive, well-differentiated ductal adenocarcinoma. Palm-Velasquez grade 1 (3+1+1). ER strongly positive (100%), LA strongly positive (100%), HER2/lisa negative (1+ by IHC).    Breast margin, left, deep margin (11/19/2019):  Invasive, well-differentiated ductal carcinoma with lobular features, involving previously inked deep resection margin.    Breast, lumpectomy, left (11/19/2019):  Invasive, well-differentiated ductal carcinoma (1.9 cm) with associated low-to-intermediate grade DCIS, solid and cribiform types. Margins are free of tumor. Pathologic tumor stage [id number].    IMPRESSION AND PLAN  Ms. Renee is a 84 y.o., white female with:  1. Esophageal adenocarcinoma: Diagnosed with clinical stage [id number] disease in late 2017 (although she did not undergo an endoscopic ultrasound). Regardless, even though she was (and is) in overall good health, she was not eligible for an esophagectomy due to her advanced age and comorbidities (and she has repeatedly stated that she would not have wanted one anyway). Therefore, we agreed with CT surgery and radiation oncology's initial recommendations for localized radiotherapy. It was also agreed that she would be a good candidate for adding concomitant chemotherapy to this treatment plan, and PO capecitabine (Xeloda) was a convenient and fairly well tolerable choice. A Rx for 1500 mg BID M-F throughout the planned course of localized XRT was provided. She began this treatment regimen in early March 2018 and completed it on 04/26/2018. Overall, she tolerated this treatment regimen very well, with fatigue and mild dysphagia (both of which are now improved) as her only noticeable side effects. Clinically she continues to feel overall well. Repeat CT scans performed on 07/02/2018 (and summarized above) were consistent with treatment related changes only, with no definite evidence of residual/recurrent disease; and this continues to be the case on the most  recent follow up imaging (CT scans performed on 01/02/2020 and also summarized above). The most recent repeat, surveillance EGD (performed in August 2019) again reportedly showed no evidence of residual/recurrent disease. She will continue to follow routinely with gastroenterology as previously planned (another repeat endoscopy will likely be performed this Spring). We will repeat CTs of the chest, abdomen and pelvis with contrast just prior to her next follow up appointment, in six months (~early to mid-August 2020).  2. Dysphagia: The patient had no such issues prior to starting Xeloda/XRT; however, toward the end of this regimen, she began to experience some increasing difficulty with swallowing, particularly with her pills. This issue remained manageable throughout the remainder of her treatment and has resolved since the completion of it, particularly after she underwent an esophageal dilatation in late June 2018. She will continue to follow up with gastroenterology as planned. Continue to monitor.  3. AAA: A known issue. The most recent repeat CT scan of the abdomen/pelvis (performed on 01/02/2020 and summarized above) now showed a substantial increase in diameter (from 4.9 cm to 5.3 cm). Although the risks would obviously be high, she is nearing the typical threshold (5.5 cm or greater) where intervention is usually considered. She reports today that she is now scheduled to start following routinely with vascular surgery shortly.  4. Breast carcinoma: She was diagnosed with an early stage, ER/IL strongly positive, well-differentiated ductal adenocarcinoma of the left breast in fall 2019. With her age and comorbidities, her surgeon ultimately recommended treatment with lumpectomy (performed on 11/19/2019) followed by adjuvant endocrine (with an AI) therapy alone (and the patient agreed). She has been on Arimidex for a total of about three (3) months now and is still tolerating it overall well, with no  significant side effects. The current standard of care is to remain on this medication for at least a total of five years (through ~late 2024). Ongoing management/monitoring per her breast surgeon.  5. Osteoporosis: A known, longstanding issue for which she had been on Fosamax. She stopped this medication when she was diagnosed with issue #1, as it seemed to exacerbate the reflux and discomfort that the cancer was causing. Her most recent bone densitometry (DEXA) scan performed on 12/16/2019 and summarized above showed that her BMD remains solidly within the osteoporotic range. Particularly now that she is on adjuvant Arimidex for issue #4 (see above), restarting treatment for this issue (the osteoporosis) is strongly recommended; and she remains agreeable to start receiving [id number] denosumab (Prolia) through our clinic (first dose given today).  The patient and her son were in agreement with these plans.    It is a pleasure to participate in Ms. Renee's care. Please do not hesitate to call with any questions or concerns that you may have.    A total of 30 minutes were spent coordinating this patient’s care in clinic today; more than 50% of this time was spent face-to-face with the patient and her son, reviewing her interim medical history and counseling on the current treatment and followup plan. All questions were answered to their satisfaction.    FOLLOW UP  Continue adjuvant Arimidex. With gastroenterology for a repeat EGD in ~Spring 2020, as previously planned. With vascular surgery, as planned, for evaluation of enlarging AAA. With breast surgery, as previously planned. Initial cycle of [id number] Proila (denosumab) today, as planned. Return to our clinic in 6 months (~mid-August 2020), on the day of the 2nd cycle of [id number] Prolia, with CTs of the chest, abdomen and pelvis with contrast just prior.            This document was electronically signed by BERTHA Berrios MD February 18, 2020        CC: Vishal Mckenzie MD, PhD   MD Guerrero Arce MD Janey P. Phipps, APRN

## 2020-04-01 DIAGNOSIS — Z17.0 MALIGNANT NEOPLASM OF UPPER-OUTER QUADRANT OF LEFT BREAST IN FEMALE, ESTROGEN RECEPTOR POSITIVE (HCC): Primary | ICD-10-CM

## 2020-04-01 DIAGNOSIS — C50.412 MALIGNANT NEOPLASM OF UPPER-OUTER QUADRANT OF LEFT BREAST IN FEMALE, ESTROGEN RECEPTOR POSITIVE (HCC): Primary | ICD-10-CM

## 2020-05-12 ENCOUNTER — HOSPITAL ENCOUNTER (OUTPATIENT)
Dept: MAMMOGRAPHY | Facility: HOSPITAL | Age: 85
Discharge: HOME | End: 2020-05-12
Admitting: SURGERY

## 2020-05-12 DIAGNOSIS — C50.412 MALIGNANT NEOPLASM OF UPPER-OUTER QUADRANT OF LEFT BREAST IN FEMALE, ESTROGEN RECEPTOR POSITIVE (HCC): ICD-10-CM

## 2020-05-12 DIAGNOSIS — Z17.0 MALIGNANT NEOPLASM OF UPPER-OUTER QUADRANT OF LEFT BREAST IN FEMALE, ESTROGEN RECEPTOR POSITIVE (HCC): ICD-10-CM

## 2020-05-12 PROCEDURE — 77066 DX MAMMO INCL CAD BI: CPT

## 2020-05-12 PROCEDURE — G0279 TOMOSYNTHESIS, MAMMO: HCPCS

## 2020-05-12 PROCEDURE — 77065 DX MAMMO INCL CAD UNI: CPT | Performed by: RADIOLOGY

## 2020-05-12 PROCEDURE — G0279 TOMOSYNTHESIS, MAMMO: HCPCS | Performed by: RADIOLOGY

## 2020-08-04 ENCOUNTER — TELEPHONE (OUTPATIENT)
Dept: ONCOLOGY | Facility: CLINIC | Age: 85
End: 2020-08-04

## 2020-08-04 NOTE — TELEPHONE ENCOUNTER
PT WANTS TO CANCEL HER 8/6 CT SCAN BECAUSE DR SORIANO - CARDIOLOGIST IS GOING TO SCHEDULE A CT BECAUSE PT HAS AN ANEURYSM.     PLEASE CANCEL BOTH 8/6 CT APPTS.    BEST CALL BACK # [phone]

## 2020-08-06 ENCOUNTER — APPOINTMENT (OUTPATIENT)
Dept: CT IMAGING | Facility: HOSPITAL | Age: 85
End: 2020-08-06

## 2020-08-17 ENCOUNTER — TELEPHONE (OUTPATIENT)
Dept: ONCOLOGY | Facility: HOSPITAL | Age: 85
End: 2020-08-17

## 2020-08-18 ENCOUNTER — LAB (OUTPATIENT)
Dept: ONCOLOGY | Facility: CLINIC | Age: 85
End: 2020-08-18

## 2020-08-18 ENCOUNTER — INFUSION (OUTPATIENT)
Dept: ONCOLOGY | Facility: HOSPITAL | Age: 85
End: 2020-08-18

## 2020-08-18 ENCOUNTER — OFFICE VISIT (OUTPATIENT)
Dept: ONCOLOGY | Facility: CLINIC | Age: 85
End: 2020-08-18

## 2020-08-18 VITALS
BODY MASS INDEX: 34.4 KG/M2 | SYSTOLIC BLOOD PRESSURE: 124 MMHG | DIASTOLIC BLOOD PRESSURE: 73 MMHG | RESPIRATION RATE: 18 BRPM | HEART RATE: 64 BPM | WEIGHT: 200.4 LBS | TEMPERATURE: 97.3 F | WEIGHT: 200.4 LBS | RESPIRATION RATE: 18 BRPM | DIASTOLIC BLOOD PRESSURE: 73 MMHG | HEART RATE: 64 BPM | SYSTOLIC BLOOD PRESSURE: 124 MMHG | BODY MASS INDEX: 34.4 KG/M2 | OXYGEN SATURATION: 98 % | TEMPERATURE: 97.3 F | OXYGEN SATURATION: 98 %

## 2020-08-18 DIAGNOSIS — Z17.0 MALIGNANT NEOPLASM OF UPPER-OUTER QUADRANT OF LEFT BREAST IN FEMALE, ESTROGEN RECEPTOR POSITIVE (HCC): ICD-10-CM

## 2020-08-18 DIAGNOSIS — M81.0 AGE-RELATED OSTEOPOROSIS WITHOUT CURRENT PATHOLOGICAL FRACTURE: Primary | ICD-10-CM

## 2020-08-18 DIAGNOSIS — C15.9 ESOPHAGEAL ADENOCARCINOMA (HCC): Primary | ICD-10-CM

## 2020-08-18 DIAGNOSIS — M81.0 AGE-RELATED OSTEOPOROSIS WITHOUT CURRENT PATHOLOGICAL FRACTURE: ICD-10-CM

## 2020-08-18 DIAGNOSIS — C50.412 MALIGNANT NEOPLASM OF UPPER-OUTER QUADRANT OF LEFT BREAST IN FEMALE, ESTROGEN RECEPTOR POSITIVE (HCC): ICD-10-CM

## 2020-08-18 LAB
ALBUMIN SERPL-MCNC: 3.71 G/DL (ref 3.5–5.2)
ALBUMIN/GLOB SERPL: 1.3 G/DL
ALP SERPL-CCNC: 92 U/L (ref 39–117)
ALT SERPL W P-5'-P-CCNC: 15 U/L (ref 1–33)
ANION GAP SERPL CALCULATED.3IONS-SCNC: 14.2 MMOL/L (ref 5–15)
AST SERPL-CCNC: 18 U/L (ref 1–32)
BASOPHILS # BLD AUTO: 0.03 10*3/MM3 (ref 0–0.2)
BASOPHILS NFR BLD AUTO: 0.5 % (ref 0–1.5)
BILIRUB SERPL-MCNC: 0.4 MG/DL (ref 0–1.2)
BUN SERPL-MCNC: 21 MG/DL (ref 8–23)
BUN/CREAT SERPL: 16.2 (ref 7–25)
CALCIUM SPEC-SCNC: 8.7 MG/DL (ref 8.6–10.5)
CHLORIDE SERPL-SCNC: 102 MMOL/L (ref 98–107)
CO2 SERPL-SCNC: 26.8 MMOL/L (ref 22–29)
CREAT SERPL-MCNC: 1.3 MG/DL (ref 0.57–1)
DEPRECATED RDW RBC AUTO: 45 FL (ref 37–54)
EOSINOPHIL # BLD AUTO: 0.07 10*3/MM3 (ref 0–0.4)
EOSINOPHIL NFR BLD AUTO: 1.1 % (ref 0.3–6.2)
ERYTHROCYTE [DISTWIDTH] IN BLOOD BY AUTOMATED COUNT: 14 % (ref 12.3–15.4)
GFR SERPL CREATININE-BSD FRML MDRD: 39 ML/MIN/1.73
GLOBULIN UR ELPH-MCNC: 2.9 GM/DL
GLUCOSE SERPL-MCNC: 116 MG/DL (ref 65–99)
HCT VFR BLD AUTO: 37.4 % (ref 34–46.6)
HGB BLD-MCNC: 12.3 G/DL (ref 12–15.9)
IMM GRANULOCYTES # BLD AUTO: 0.04 10*3/MM3 (ref 0–0.05)
IMM GRANULOCYTES NFR BLD AUTO: 0.6 % (ref 0–0.5)
LYMPHOCYTES # BLD AUTO: 1.25 10*3/MM3 (ref 0.7–3.1)
LYMPHOCYTES NFR BLD AUTO: 20.1 % (ref 19.6–45.3)
MAGNESIUM SERPL-MCNC: 1.3 MG/DL (ref 1.6–2.4)
MCH RBC QN AUTO: 28.8 PG (ref 26.6–33)
MCHC RBC AUTO-ENTMCNC: 32.9 G/DL (ref 31.5–35.7)
MCV RBC AUTO: 87.6 FL (ref 79–97)
MONOCYTES # BLD AUTO: 0.4 10*3/MM3 (ref 0.1–0.9)
MONOCYTES NFR BLD AUTO: 6.4 % (ref 5–12)
NEUTROPHILS NFR BLD AUTO: 4.44 10*3/MM3 (ref 1.7–7)
NEUTROPHILS NFR BLD AUTO: 71.3 % (ref 42.7–76)
NRBC BLD AUTO-RTO: 0 /100 WBC (ref 0–0.2)
PHOSPHATE SERPL-MCNC: 2.3 MG/DL (ref 2.5–4.5)
PLATELET # BLD AUTO: 202 10*3/MM3 (ref 140–450)
PMV BLD AUTO: 10.1 FL (ref 6–12)
POTASSIUM SERPL-SCNC: 2.9 MMOL/L (ref 3.5–5.2)
PROT SERPL-MCNC: 6.6 G/DL (ref 6–8.5)
RBC # BLD AUTO: 4.27 10*6/MM3 (ref 3.77–5.28)
SODIUM SERPL-SCNC: 143 MMOL/L (ref 136–145)
WBC # BLD AUTO: 6.23 10*3/MM3 (ref 3.4–10.8)

## 2020-08-18 PROCEDURE — 96372 THER/PROPH/DIAG INJ SC/IM: CPT

## 2020-08-18 PROCEDURE — 84100 ASSAY OF PHOSPHORUS: CPT | Performed by: INTERNAL MEDICINE

## 2020-08-18 PROCEDURE — 25010000002 DENOSUMAB 60 MG/ML SOLUTION PREFILLED SYRINGE: Performed by: INTERNAL MEDICINE

## 2020-08-18 PROCEDURE — 80053 COMPREHEN METABOLIC PANEL: CPT | Performed by: INTERNAL MEDICINE

## 2020-08-18 PROCEDURE — 85025 COMPLETE CBC W/AUTO DIFF WBC: CPT | Performed by: INTERNAL MEDICINE

## 2020-08-18 PROCEDURE — 36415 COLL VENOUS BLD VENIPUNCTURE: CPT

## 2020-08-18 PROCEDURE — 99214 OFFICE O/P EST MOD 30 MIN: CPT | Performed by: INTERNAL MEDICINE

## 2020-08-18 PROCEDURE — 83735 ASSAY OF MAGNESIUM: CPT | Performed by: INTERNAL MEDICINE

## 2020-08-18 RX ORDER — POTASSIUM CHLORIDE 20 MEQ/1
20 TABLET, EXTENDED RELEASE ORAL 2 TIMES DAILY
Qty: 20 TABLET | Refills: 0 | Status: ON HOLD | OUTPATIENT
Start: 2020-08-18 | End: 2021-01-01

## 2020-08-18 RX ORDER — MAGNESIUM OXIDE 400 MG/1
400 TABLET ORAL DAILY
Qty: 10 TABLET | Refills: 0 | Status: SHIPPED | OUTPATIENT
Start: 2020-08-18 | End: 2020-08-28

## 2020-08-18 RX ADMIN — DENOSUMAB 60 MG: 60 INJECTION SUBCUTANEOUS at 11:35

## 2020-09-24 ENCOUNTER — TELEPHONE (OUTPATIENT)
Dept: SURGERY | Facility: CLINIC | Age: 85
End: 2020-09-24

## 2020-09-24 DIAGNOSIS — C50.412 MALIGNANT NEOPLASM OF UPPER-OUTER QUADRANT OF LEFT BREAST IN FEMALE, ESTROGEN RECEPTOR POSITIVE (HCC): Primary | ICD-10-CM

## 2020-09-24 DIAGNOSIS — Z17.0 MALIGNANT NEOPLASM OF UPPER-OUTER QUADRANT OF LEFT BREAST IN FEMALE, ESTROGEN RECEPTOR POSITIVE (HCC): Primary | ICD-10-CM

## 2020-09-24 NOTE — TELEPHONE ENCOUNTER
Miss Connor had her mammogram on time but wanted to wait due to COVID for follow up. Will send in 2 refills because she HAS to come in for an office visit.

## 2020-11-12 ENCOUNTER — HOSPITAL ENCOUNTER (OUTPATIENT)
Dept: MAMMOGRAPHY | Facility: HOSPITAL | Age: 85
Discharge: HOME | End: 2020-11-12
Admitting: SURGERY

## 2020-11-12 DIAGNOSIS — C50.412 MALIGNANT NEOPLASM OF UPPER-OUTER QUADRANT OF LEFT BREAST IN FEMALE, ESTROGEN RECEPTOR POSITIVE (HCC): ICD-10-CM

## 2020-11-12 DIAGNOSIS — Z17.0 MALIGNANT NEOPLASM OF UPPER-OUTER QUADRANT OF LEFT BREAST IN FEMALE, ESTROGEN RECEPTOR POSITIVE (HCC): ICD-10-CM

## 2020-11-12 DIAGNOSIS — Z09 FOLLOW-UP EXAM, 3-6 MONTHS SINCE PREVIOUS EXAM: ICD-10-CM

## 2020-11-12 PROCEDURE — G0279 TOMOSYNTHESIS, MAMMO: HCPCS | Performed by: RADIOLOGY

## 2020-11-12 PROCEDURE — G0279 TOMOSYNTHESIS, MAMMO: HCPCS

## 2020-11-12 PROCEDURE — 77065 DX MAMMO INCL CAD UNI: CPT | Performed by: RADIOLOGY

## 2020-11-12 PROCEDURE — 77065 DX MAMMO INCL CAD UNI: CPT

## 2020-11-16 ENCOUNTER — OFFICE VISIT (OUTPATIENT)
Dept: SURGERY | Facility: CLINIC | Age: 85
End: 2020-11-16

## 2020-11-16 VITALS
HEART RATE: 84 BPM | WEIGHT: 197.2 LBS | BODY MASS INDEX: 33.67 KG/M2 | DIASTOLIC BLOOD PRESSURE: 93 MMHG | SYSTOLIC BLOOD PRESSURE: 157 MMHG | HEIGHT: 64 IN

## 2020-11-16 DIAGNOSIS — C50.412 MALIGNANT NEOPLASM OF UPPER-OUTER QUADRANT OF LEFT BREAST IN FEMALE, ESTROGEN RECEPTOR POSITIVE (HCC): Primary | ICD-10-CM

## 2020-11-16 DIAGNOSIS — M81.0 AGE-RELATED OSTEOPOROSIS WITHOUT CURRENT PATHOLOGICAL FRACTURE: ICD-10-CM

## 2020-11-16 DIAGNOSIS — Z79.811 AROMATASE INHIBITOR USE: ICD-10-CM

## 2020-11-16 DIAGNOSIS — Z17.0 MALIGNANT NEOPLASM OF UPPER-OUTER QUADRANT OF LEFT BREAST IN FEMALE, ESTROGEN RECEPTOR POSITIVE (HCC): Primary | ICD-10-CM

## 2020-11-16 PROCEDURE — 99214 OFFICE O/P EST MOD 30 MIN: CPT | Performed by: SURGERY

## 2020-11-16 NOTE — PROGRESS NOTES
Subjective   Geeta Renee is a 85 y.o. female is here today for follow-up breast care.    History of Present Illness  Ms. Renee was seen in the office today for breast cancer follow-up.  She is status post a left lumpectomy on 11/19/2019 for a T2 carcinoma.  The patient's primary tumor was excised with clear margins but just deep and separate from the primary tumor there was an area of gritty tissue that did demonstrate cancer and appeared to be a separate tumor.  This was at the deep margin.  Given the patient's age and comorbidities sentinel node evaluation was not recommended and after discussion of the pathology close follow-up and hormonal therapy was recommended.  Bone density was obtained in December which demonstrated osteoporosis with a T score of -3.1 and the patient was started on Fosamax in December.  She was subsequently switched to Prolia. She was started on anastrozole in January, 2020 which she states she is tolerating well.    Ms. Renee had a left mammogram on 11/12/2020 which demonstrated presumed postop changes.  She had a bilateral mammogram in May 2020 as well.  She denies any palpable masses in the breast or the axilla.  She denies any arm edema.  She denies any symptoms of metastatic disease.  Patient's main complaint is issues with controlling her blood pressure.    Allergies   Allergen Reactions   • Codeine Nausea Only   • Sulfa Antibiotics Hives         Current Outpatient Medications   Medication Sig Dispense Refill   • amLODIPine (NORVASC) 5 MG tablet Take 10 mg by mouth.     • anastrozole (ARIMIDEX) 1 MG tablet Take  by mouth Daily.     • atenolol (TENORMIN) 100 MG tablet Take 100 mg by mouth Daily.     • dexlansoprazole (DEXILANT) 60 MG capsule Take 60 mg by mouth Daily.     • HYDROcodone-acetaminophen (NORCO) 5-325 MG per tablet Take 1 tablet by mouth Every 6 (Six) Hours As Needed for Moderate Pain . 10 tablet 0   • potassium chloride (K-DUR,KLOR-CON) 20 MEQ CR tablet  "Take 1 tablet by mouth 2 (Two) Times a Day. 20 tablet 0   • triamterene-hydrochlorothiazide (MAXZIDE-25) 37.5-25 MG per tablet Take 1 tablet by mouth Daily.       No current facility-administered medications for this visit.      Past Medical History:   Diagnosis Date   • Arthritis    • Breast cancer (CMS/HCC)    • Breast lump     left   • Esophageal cancer (CMS/HCC)    • GERD (gastroesophageal reflux disease)    • Hypertension    • Snoring      Past Surgical History:   Procedure Laterality Date   • BREAST LUMPECTOMY Left 11/19/2019    Procedure: BREAST LUMPECTOMY WITH ULTRASOUND;  Surgeon: Nurys Selby MD;  Location: [facility];  Service: General   • CATARACT EXTRACTION, BILATERAL  09/10/2019   • CHOLECYSTECTOMY     • COLONOSCOPY     • ELBOW PROCEDURE Right    • ENDOSCOPY     • FRACTURE SURGERY      right elbow       The following portions of the patient's history were reviewed and updated as appropriate: allergies, current medications, past family history, past medical history, past social history, past surgical history and problem list.    Review of Systems  General: negative  Integumentary: negative  Eyes: negative  ENT: negative  Respiratory: negative  Gastrointestinal: negative  Cardiovascular: negative  Neurological: negative  Psychiatric: negative  Hematologic/Lymphatic: negative  Genitourinary: negative  Musculoskeletal: painful joints  Endocrine: negative  Breasts: negative    Objective   Ht 162.6 cm (64\")   Wt 89.4 kg (197 lb 3.2 oz)   BMI 33.85 kg/m²    Physical Exam  General:  This is a WD WN female in no acute distress  Vital signs stable, afebrile  HEENT exam:  WNL. Sclera are anicteric.  EOMI  Neck:  supple, FROM.  No JVD.  Trachea midline.  No palpable thyroid nodules. No supraclavicular adenopathy  Lungs:  Respiratory effort normal. Auscultation: Clear, without wheezes, rhonchi, rales  Heart:  Regular rate and rhythm, without murmur, gallop, rub.  No pedal edema  Breasts: On visual inspection " the breasts are symmetrical.  Examination of the right breast demonstrates no discrete mass, skin change, or axillary adenopathy.  Examination of the left breast demonstrates a surgical scar laterally in the 3 o'clock position in the axilla.  There is no palpable mass or adenopathy.  Abdomen: Nontender, without hepatosplenomegaly  Musculoskeletal:  muscle strength/tone is normal.    Psyc:  alert, oriented x 3.  Mood and affect are appropriate  skin:  Warm with good turgor.  Without rash or lesion  extremities:  Examination of the extremities revealed no cyanosis, clubbing or edema.    Results/Data  Mammogram reports and images were reviewed and I agree with the assessment    Procedures     Assessment/Plan   T1c NX grade 1 invasive ductal carcinoma, ER positive, MA positive, HER-2 negative  Separate small focus of ductal carcinoma with lobular features distinct from primary tumor, at deep margin     Continue anastrozole and Fosamax  Bilateral mammogram in May, 2021 with return to the office following         Discussion/Summary    Patient's Body mass index is 33.85 kg/m². BMI is above normal parameters. Recommendations include: educational material.         Future Appointments   Date Time Provider Department Center   2/18/2021  2:00 PM [facility]URSE [facility]   2/18/2021  2:00 PM [facility]  [facility]   2/18/2021  2:30 PM BERTHA Berrios MD [facility]         Please note that portions of this note were completed with a voice recognition program.

## 2021-01-01 ENCOUNTER — INFUSION (OUTPATIENT)
Dept: ONCOLOGY | Facility: HOSPITAL | Age: 86
End: 2021-01-01

## 2021-01-01 ENCOUNTER — APPOINTMENT (OUTPATIENT)
Dept: CARDIOLOGY | Facility: HOSPITAL | Age: 86
End: 2021-01-01

## 2021-01-01 ENCOUNTER — HOSPITAL ENCOUNTER (EMERGENCY)
Facility: HOSPITAL | Age: 86
Discharge: NURSING, REHAB OR LONG-TERM CARE | End: 2021-12-23
Attending: STUDENT IN AN ORGANIZED HEALTH CARE EDUCATION/TRAINING PROGRAM | Admitting: STUDENT IN AN ORGANIZED HEALTH CARE EDUCATION/TRAINING PROGRAM

## 2021-01-01 ENCOUNTER — APPOINTMENT (OUTPATIENT)
Dept: GENERAL RADIOLOGY | Facility: HOSPITAL | Age: 86
End: 2021-01-01

## 2021-01-01 ENCOUNTER — TRANSCRIBE ORDERS (OUTPATIENT)
Dept: ADMINISTRATIVE | Facility: HOSPITAL | Age: 86
End: 2021-01-01

## 2021-01-01 ENCOUNTER — APPOINTMENT (OUTPATIENT)
Dept: BONE DENSITY | Facility: HOSPITAL | Age: 86
End: 2021-01-01

## 2021-01-01 ENCOUNTER — HOSPITAL ENCOUNTER (OUTPATIENT)
Dept: CT IMAGING | Facility: HOSPITAL | Age: 86
Discharge: HOME | End: 2021-09-20

## 2021-01-01 ENCOUNTER — APPOINTMENT (OUTPATIENT)
Dept: ONCOLOGY | Facility: HOSPITAL | Age: 86
End: 2021-01-01

## 2021-01-01 ENCOUNTER — LAB (OUTPATIENT)
Dept: LAB | Facility: HOSPITAL | Age: 86
End: 2021-01-01

## 2021-01-01 ENCOUNTER — HOSPITAL ENCOUNTER (OUTPATIENT)
Dept: MAMMOGRAPHY | Facility: HOSPITAL | Age: 86
Discharge: HOME | End: 2021-05-12
Admitting: RADIOLOGY

## 2021-01-01 ENCOUNTER — HOSPITAL ENCOUNTER (INPATIENT)
Facility: HOSPITAL | Age: 86
LOS: 13 days | Discharge: NURSING, REHAB OR LONG-TERM CARE | End: 2021-12-21
Attending: STUDENT IN AN ORGANIZED HEALTH CARE EDUCATION/TRAINING PROGRAM | Admitting: HOSPITALIST

## 2021-01-01 ENCOUNTER — OFFICE VISIT (OUTPATIENT)
Dept: CARDIAC SURGERY | Facility: CLINIC | Age: 86
End: 2021-01-01

## 2021-01-01 ENCOUNTER — LAB (OUTPATIENT)
Dept: ONCOLOGY | Facility: CLINIC | Age: 86
End: 2021-01-01

## 2021-01-01 ENCOUNTER — APPOINTMENT (OUTPATIENT)
Dept: CT IMAGING | Facility: HOSPITAL | Age: 86
End: 2021-01-01

## 2021-01-01 ENCOUNTER — PREP FOR SURGERY (OUTPATIENT)
Dept: OTHER | Facility: HOSPITAL | Age: 86
End: 2021-01-01

## 2021-01-01 ENCOUNTER — APPOINTMENT (OUTPATIENT)
Dept: MAMMOGRAPHY | Facility: HOSPITAL | Age: 86
End: 2021-01-01

## 2021-01-01 ENCOUNTER — TELEPHONE (OUTPATIENT)
Dept: CARDIAC SURGERY | Facility: CLINIC | Age: 86
End: 2021-01-01

## 2021-01-01 ENCOUNTER — OFFICE VISIT (OUTPATIENT)
Dept: SURGERY | Facility: CLINIC | Age: 86
End: 2021-01-01

## 2021-01-01 ENCOUNTER — OFFICE VISIT (OUTPATIENT)
Dept: ONCOLOGY | Facility: CLINIC | Age: 86
End: 2021-01-01

## 2021-01-01 ENCOUNTER — HOSPITAL ENCOUNTER (OUTPATIENT)
Facility: HOSPITAL | Age: 86
Setting detail: SURGERY ADMIT
End: 2021-01-01
Attending: THORACIC SURGERY (CARDIOTHORACIC VASCULAR SURGERY) | Admitting: THORACIC SURGERY (CARDIOTHORACIC VASCULAR SURGERY)

## 2021-01-01 VITALS
SYSTOLIC BLOOD PRESSURE: 141 MMHG | WEIGHT: 190 LBS | RESPIRATION RATE: 18 BRPM | DIASTOLIC BLOOD PRESSURE: 88 MMHG | OXYGEN SATURATION: 97 % | BODY MASS INDEX: 32.61 KG/M2 | TEMPERATURE: 97.5 F | HEART RATE: 79 BPM

## 2021-01-01 VITALS
OXYGEN SATURATION: 96 % | RESPIRATION RATE: 18 BRPM | HEART RATE: 68 BPM | BODY MASS INDEX: 29.71 KG/M2 | HEIGHT: 64 IN | TEMPERATURE: 98.5 F | SYSTOLIC BLOOD PRESSURE: 154 MMHG | WEIGHT: 174 LBS | DIASTOLIC BLOOD PRESSURE: 98 MMHG

## 2021-01-01 VITALS
HEIGHT: 64 IN | OXYGEN SATURATION: 96 % | DIASTOLIC BLOOD PRESSURE: 79 MMHG | WEIGHT: 187 LBS | RESPIRATION RATE: 16 BRPM | TEMPERATURE: 96.6 F | SYSTOLIC BLOOD PRESSURE: 124 MMHG | BODY MASS INDEX: 31.92 KG/M2 | HEART RATE: 72 BPM

## 2021-01-01 VITALS
WEIGHT: 190 LBS | HEART RATE: 79 BPM | BODY MASS INDEX: 32.61 KG/M2 | TEMPERATURE: 97.5 F | RESPIRATION RATE: 18 BRPM | OXYGEN SATURATION: 97 % | SYSTOLIC BLOOD PRESSURE: 141 MMHG | DIASTOLIC BLOOD PRESSURE: 88 MMHG

## 2021-01-01 VITALS
HEIGHT: 64 IN | BODY MASS INDEX: 32.61 KG/M2 | DIASTOLIC BLOOD PRESSURE: 80 MMHG | TEMPERATURE: 98.1 F | WEIGHT: 191 LBS | OXYGEN SATURATION: 98 % | HEART RATE: 76 BPM | SYSTOLIC BLOOD PRESSURE: 142 MMHG

## 2021-01-01 VITALS
WEIGHT: 192.2 LBS | SYSTOLIC BLOOD PRESSURE: 126 MMHG | HEART RATE: 77 BPM | DIASTOLIC BLOOD PRESSURE: 86 MMHG | BODY MASS INDEX: 32.81 KG/M2 | HEIGHT: 64 IN

## 2021-01-01 VITALS
HEART RATE: 72 BPM | BODY MASS INDEX: 31.92 KG/M2 | RESPIRATION RATE: 16 BRPM | DIASTOLIC BLOOD PRESSURE: 79 MMHG | WEIGHT: 187 LBS | HEIGHT: 64 IN | OXYGEN SATURATION: 96 % | SYSTOLIC BLOOD PRESSURE: 124 MMHG | TEMPERATURE: 96.6 F

## 2021-01-01 VITALS
WEIGHT: 177.6 LBS | HEART RATE: 83 BPM | HEIGHT: 64 IN | TEMPERATURE: 97.8 F | OXYGEN SATURATION: 95 % | BODY MASS INDEX: 30.32 KG/M2 | RESPIRATION RATE: 18 BRPM | DIASTOLIC BLOOD PRESSURE: 52 MMHG | SYSTOLIC BLOOD PRESSURE: 111 MMHG

## 2021-01-01 DIAGNOSIS — N18.30 CHRONIC KIDNEY DISEASE (CKD), ACTIVE MEDICAL MANAGEMENT WITHOUT DIALYSIS, STAGE 3 (MODERATE) (HCC): ICD-10-CM

## 2021-01-01 DIAGNOSIS — I71.40 AAA (ABDOMINAL AORTIC ANEURYSM) WITHOUT RUPTURE (HCC): Primary | ICD-10-CM

## 2021-01-01 DIAGNOSIS — Z17.0 MALIGNANT NEOPLASM OF UPPER-OUTER QUADRANT OF LEFT BREAST IN FEMALE, ESTROGEN RECEPTOR POSITIVE (HCC): ICD-10-CM

## 2021-01-01 DIAGNOSIS — C15.9 ESOPHAGEAL ADENOCARCINOMA (HCC): Primary | ICD-10-CM

## 2021-01-01 DIAGNOSIS — N18.30 STAGE 3 CHRONIC KIDNEY DISEASE, UNSPECIFIED WHETHER STAGE 3A OR 3B CKD (HCC): Primary | ICD-10-CM

## 2021-01-01 DIAGNOSIS — M81.0 AGE-RELATED OSTEOPOROSIS WITHOUT CURRENT PATHOLOGICAL FRACTURE: Primary | ICD-10-CM

## 2021-01-01 DIAGNOSIS — N17.9 ACUTE RENAL FAILURE, UNSPECIFIED ACUTE RENAL FAILURE TYPE (HCC): Primary | ICD-10-CM

## 2021-01-01 DIAGNOSIS — I71.40 ABDOMINAL AORTIC ANEURYSM (AAA) WITHOUT RUPTURE (HCC): Primary | ICD-10-CM

## 2021-01-01 DIAGNOSIS — N18.30 MALIGNANT HYPERTENSIVE HEART AND CHRONIC KIDNEY DISEASE STAGE III (HCC): Primary | ICD-10-CM

## 2021-01-01 DIAGNOSIS — K85.90 ACUTE PANCREATITIS WITHOUT INFECTION OR NECROSIS, UNSPECIFIED PANCREATITIS TYPE: ICD-10-CM

## 2021-01-01 DIAGNOSIS — Z01.818 OTHER SPECIFIED PRE-OPERATIVE EXAMINATION: Primary | ICD-10-CM

## 2021-01-01 DIAGNOSIS — C50.412 MALIGNANT NEOPLASM OF UPPER-OUTER QUADRANT OF LEFT BREAST IN FEMALE, ESTROGEN RECEPTOR POSITIVE (HCC): ICD-10-CM

## 2021-01-01 DIAGNOSIS — E87.6 HYPOKALEMIA: ICD-10-CM

## 2021-01-01 DIAGNOSIS — N18.30 MALIGNANT HYPERTENSIVE HEART AND CHRONIC KIDNEY DISEASE STAGE III (HCC): ICD-10-CM

## 2021-01-01 DIAGNOSIS — I71.40 ABDOMINAL AORTIC ANEURYSM (AAA) WITHOUT RUPTURE (HCC): ICD-10-CM

## 2021-01-01 DIAGNOSIS — R92.8 ABNORMAL MAMMOGRAM: ICD-10-CM

## 2021-01-01 DIAGNOSIS — I13.10 MALIGNANT HYPERTENSIVE HEART AND CHRONIC KIDNEY DISEASE STAGE III (HCC): Primary | ICD-10-CM

## 2021-01-01 DIAGNOSIS — Z17.0 MALIGNANT NEOPLASM OF UPPER-OUTER QUADRANT OF LEFT BREAST IN FEMALE, ESTROGEN RECEPTOR POSITIVE (HCC): Primary | ICD-10-CM

## 2021-01-01 DIAGNOSIS — M81.0 AGE-RELATED OSTEOPOROSIS WITHOUT CURRENT PATHOLOGICAL FRACTURE: ICD-10-CM

## 2021-01-01 DIAGNOSIS — C50.412 MALIGNANT NEOPLASM OF UPPER-OUTER QUADRANT OF LEFT BREAST IN FEMALE, ESTROGEN RECEPTOR POSITIVE (HCC): Primary | ICD-10-CM

## 2021-01-01 DIAGNOSIS — I13.10 MALIGNANT HYPERTENSIVE HEART AND CHRONIC KIDNEY DISEASE STAGE III (HCC): ICD-10-CM

## 2021-01-01 DIAGNOSIS — Z01.818 OTHER SPECIFIED PRE-OPERATIVE EXAMINATION: ICD-10-CM

## 2021-01-01 DIAGNOSIS — C15.9 ESOPHAGEAL ADENOCARCINOMA (HCC): ICD-10-CM

## 2021-01-01 DIAGNOSIS — N18.30 CHRONIC KIDNEY DISEASE (CKD), ACTIVE MEDICAL MANAGEMENT WITHOUT DIALYSIS, STAGE 3 (MODERATE) (HCC): Primary | ICD-10-CM

## 2021-01-01 DIAGNOSIS — N18.30 STAGE 3 CHRONIC KIDNEY DISEASE, UNSPECIFIED WHETHER STAGE 3A OR 3B CKD (HCC): ICD-10-CM

## 2021-01-01 DIAGNOSIS — R89.9 ABNORMAL LABORATORY TEST: Primary | ICD-10-CM

## 2021-01-01 DIAGNOSIS — E83.52 HYPERCALCEMIA: ICD-10-CM

## 2021-01-01 LAB
027 TOXIN: NORMAL
1,25(OH)2D SERPL-MCNC: 35.9 PG/ML (ref 19.9–79.3)
1,25(OH)2D SERPL-MCNC: 8 PG/ML (ref 19.9–79.3)
A-A DO2: 61.2 MMHG (ref 0–300)
ABSOLUTE LUNG FLUID CONTENT: 37 % (ref 20–35)
ALBUMIN SERPL-MCNC: 2.33 G/DL (ref 3.5–5.2)
ALBUMIN SERPL-MCNC: 2.42 G/DL (ref 3.5–5.2)
ALBUMIN SERPL-MCNC: 2.49 G/DL (ref 3.5–5.2)
ALBUMIN SERPL-MCNC: 2.5 G/DL (ref 3.5–5.2)
ALBUMIN SERPL-MCNC: 2.56 G/DL (ref 3.5–5.2)
ALBUMIN SERPL-MCNC: 2.62 G/DL (ref 3.5–5.2)
ALBUMIN SERPL-MCNC: 2.63 G/DL (ref 3.5–5.2)
ALBUMIN SERPL-MCNC: 2.64 G/DL (ref 3.5–5.2)
ALBUMIN SERPL-MCNC: 3.07 G/DL (ref 3.5–5.2)
ALBUMIN SERPL-MCNC: 3.14 G/DL (ref 3.5–5.2)
ALBUMIN SERPL-MCNC: 3.22 G/DL (ref 3.5–5.2)
ALBUMIN SERPL-MCNC: 3.5 G/DL (ref 3.5–5.2)
ALBUMIN SERPL-MCNC: 3.56 G/DL (ref 3.5–5.2)
ALBUMIN SERPL-MCNC: 3.6 G/DL (ref 3.5–5.2)
ALBUMIN SERPL-MCNC: 3.75 G/DL (ref 3.5–5.2)
ALBUMIN SERPL-MCNC: 3.88 G/DL (ref 3.5–5.2)
ALBUMIN UR-MCNC: 10.6 MG/DL
ALBUMIN UR-MCNC: 3.7 MG/DL
ALBUMIN/GLOB SERPL: 0.7 G/DL
ALBUMIN/GLOB SERPL: 0.8 G/DL
ALBUMIN/GLOB SERPL: 0.9 G/DL
ALBUMIN/GLOB SERPL: 1.1 G/DL
ALP SERPL-CCNC: 100 U/L (ref 39–117)
ALP SERPL-CCNC: 100 U/L (ref 39–117)
ALP SERPL-CCNC: 103 U/L (ref 39–117)
ALP SERPL-CCNC: 108 U/L (ref 39–117)
ALP SERPL-CCNC: 114 U/L (ref 39–117)
ALP SERPL-CCNC: 117 U/L (ref 39–117)
ALP SERPL-CCNC: 117 U/L (ref 39–117)
ALP SERPL-CCNC: 119 U/L (ref 39–117)
ALP SERPL-CCNC: 120 U/L (ref 39–117)
ALP SERPL-CCNC: 125 U/L (ref 39–117)
ALP SERPL-CCNC: 86 U/L (ref 39–117)
ALP SERPL-CCNC: 87 U/L (ref 39–117)
ALP SERPL-CCNC: 88 U/L (ref 39–117)
ALP SERPL-CCNC: 90 U/L (ref 39–117)
ALP SERPL-CCNC: 98 U/L (ref 39–117)
ALP SERPL-CCNC: 98 U/L (ref 39–117)
ALT SERPL W P-5'-P-CCNC: 10 U/L (ref 1–33)
ALT SERPL W P-5'-P-CCNC: 10 U/L (ref 1–33)
ALT SERPL W P-5'-P-CCNC: 11 U/L (ref 1–33)
ALT SERPL W P-5'-P-CCNC: 12 U/L (ref 1–33)
ALT SERPL W P-5'-P-CCNC: 16 U/L (ref 1–33)
ALT SERPL W P-5'-P-CCNC: 17 U/L (ref 1–33)
ALT SERPL W P-5'-P-CCNC: 19 U/L (ref 1–33)
ALT SERPL W P-5'-P-CCNC: 23 U/L (ref 1–33)
ALT SERPL W P-5'-P-CCNC: 9 U/L (ref 1–33)
AMPHET+METHAMPHET UR QL: NEGATIVE
AMPHETAMINES UR QL: NEGATIVE
AMYLASE SERPL-CCNC: 446 U/L (ref 28–100)
ANION GAP SERPL CALCULATED.3IONS-SCNC: 10.1 MMOL/L (ref 5–15)
ANION GAP SERPL CALCULATED.3IONS-SCNC: 10.3 MMOL/L (ref 5–15)
ANION GAP SERPL CALCULATED.3IONS-SCNC: 11.1 MMOL/L (ref 5–15)
ANION GAP SERPL CALCULATED.3IONS-SCNC: 11.2 MMOL/L (ref 5–15)
ANION GAP SERPL CALCULATED.3IONS-SCNC: 11.3 MMOL/L (ref 5–15)
ANION GAP SERPL CALCULATED.3IONS-SCNC: 11.5 MMOL/L (ref 5–15)
ANION GAP SERPL CALCULATED.3IONS-SCNC: 12.1 MMOL/L (ref 5–15)
ANION GAP SERPL CALCULATED.3IONS-SCNC: 12.5 MMOL/L (ref 5–15)
ANION GAP SERPL CALCULATED.3IONS-SCNC: 12.6 MMOL/L (ref 5–15)
ANION GAP SERPL CALCULATED.3IONS-SCNC: 12.7 MMOL/L (ref 5–15)
ANION GAP SERPL CALCULATED.3IONS-SCNC: 12.7 MMOL/L (ref 5–15)
ANION GAP SERPL CALCULATED.3IONS-SCNC: 13 MMOL/L (ref 5–15)
ANION GAP SERPL CALCULATED.3IONS-SCNC: 13.4 MMOL/L (ref 5–15)
ANION GAP SERPL CALCULATED.3IONS-SCNC: 13.4 MMOL/L (ref 5–15)
ANION GAP SERPL CALCULATED.3IONS-SCNC: 13.7 MMOL/L (ref 5–15)
ANION GAP SERPL CALCULATED.3IONS-SCNC: 13.7 MMOL/L (ref 5–15)
ANION GAP SERPL CALCULATED.3IONS-SCNC: 14.4 MMOL/L (ref 5–15)
ANION GAP SERPL CALCULATED.3IONS-SCNC: 15.2 MMOL/L (ref 5–15)
ANION GAP SERPL CALCULATED.3IONS-SCNC: 8.8 MMOL/L (ref 5–15)
ARTERIAL PATENCY WRIST A: ABNORMAL
AST SERPL-CCNC: 12 U/L (ref 1–32)
AST SERPL-CCNC: 13 U/L (ref 1–32)
AST SERPL-CCNC: 14 U/L (ref 1–32)
AST SERPL-CCNC: 16 U/L (ref 1–32)
AST SERPL-CCNC: 17 U/L (ref 1–32)
AST SERPL-CCNC: 17 U/L (ref 1–32)
AST SERPL-CCNC: 18 U/L (ref 1–32)
AST SERPL-CCNC: 19 U/L (ref 1–32)
AST SERPL-CCNC: 20 U/L (ref 1–32)
AST SERPL-CCNC: 23 U/L (ref 1–32)
AST SERPL-CCNC: 26 U/L (ref 1–32)
ATMOSPHERIC PRESS: 726 MMHG
BACTERIA SPEC AEROBE CULT: NORMAL
BACTERIA UR QL AUTO: ABNORMAL /HPF
BARBITURATES UR QL SCN: NEGATIVE
BASE EXCESS BLDA CALC-SCNC: 16.8 MMOL/L (ref 0–2)
BASOPHILS # BLD AUTO: 0.02 10*3/MM3 (ref 0–0.2)
BASOPHILS # BLD AUTO: 0.03 10*3/MM3 (ref 0–0.2)
BASOPHILS # BLD AUTO: 0.04 10*3/MM3 (ref 0–0.2)
BASOPHILS # BLD AUTO: 0.05 10*3/MM3 (ref 0–0.2)
BASOPHILS # BLD AUTO: 0.06 10*3/MM3 (ref 0–0.2)
BASOPHILS # BLD AUTO: 0.07 10*3/MM3 (ref 0–0.2)
BASOPHILS NFR BLD AUTO: 0.1 % (ref 0–1.5)
BASOPHILS NFR BLD AUTO: 0.2 % (ref 0–1.5)
BASOPHILS NFR BLD AUTO: 0.3 % (ref 0–1.5)
BASOPHILS NFR BLD AUTO: 0.4 % (ref 0–1.5)
BASOPHILS NFR BLD AUTO: 0.7 % (ref 0–1.5)
BASOPHILS NFR BLD AUTO: 0.8 % (ref 0–1.5)
BDY SITE: ABNORMAL
BENZODIAZ UR QL SCN: NEGATIVE
BH CV ECHO MEAS - BSA(HAYCOCK): 1.9 M^2
BH CV ECHO MEAS - BSA: 1.9 M^2
BH CV ECHO MEAS - BZI_BMI: 30.2 KILOGRAMS/M^2
BH CV ECHO MEAS - BZI_METRIC_HEIGHT: 162.6 CM
BH CV ECHO MEAS - BZI_METRIC_WEIGHT: 79.8 KG
BH CV ECHO MEAS - EDV(CUBED): 92.3 ML
BH CV ECHO MEAS - EDV(MOD-SP4): 33.2 ML
BH CV ECHO MEAS - EDV(TEICH): 93.4 ML
BH CV ECHO MEAS - EF(CUBED): 71.6 %
BH CV ECHO MEAS - EF(MOD-SP4): 73 %
BH CV ECHO MEAS - EF(TEICH): 63.4 %
BH CV ECHO MEAS - ESV(CUBED): 26.2 ML
BH CV ECHO MEAS - ESV(MOD-SP4): 9 ML
BH CV ECHO MEAS - ESV(TEICH): 34.2 ML
BH CV ECHO MEAS - FS: 34.3 %
BH CV ECHO MEAS - IVS/LVPW: 0.67
BH CV ECHO MEAS - IVSD: 0.99 CM
BH CV ECHO MEAS - LV DIASTOLIC VOL/BSA (35-75): 17.9 ML/M^2
BH CV ECHO MEAS - LV MASS(C)D: 209.6 GRAMS
BH CV ECHO MEAS - LV MASS(C)DI: 113.2 GRAMS/M^2
BH CV ECHO MEAS - LV SYSTOLIC VOL/BSA (12-30): 4.8 ML/M^2
BH CV ECHO MEAS - LVIDD: 4.5 CM
BH CV ECHO MEAS - LVIDS: 3 CM
BH CV ECHO MEAS - LVLD AP4: 7.3 CM
BH CV ECHO MEAS - LVLS AP4: 5.9 CM
BH CV ECHO MEAS - LVPWD: 1.5 CM
BH CV ECHO MEAS - SI(CUBED): 35.7 ML/M^2
BH CV ECHO MEAS - SI(MOD-SP4): 13.1 ML/M^2
BH CV ECHO MEAS - SI(TEICH): 32 ML/M^2
BH CV ECHO MEAS - SV(CUBED): 66.1 ML
BH CV ECHO MEAS - SV(MOD-SP4): 24.2 ML
BH CV ECHO MEAS - SV(TEICH): 59.3 ML
BILIRUB CONJ SERPL-MCNC: <0.2 MG/DL (ref 0–0.3)
BILIRUB INDIRECT SERPL-MCNC: ABNORMAL MG/DL
BILIRUB SERPL-MCNC: 0.2 MG/DL (ref 0–1.2)
BILIRUB SERPL-MCNC: 0.2 MG/DL (ref 0–1.2)
BILIRUB SERPL-MCNC: 0.3 MG/DL (ref 0–1.2)
BILIRUB SERPL-MCNC: 0.4 MG/DL (ref 0–1.2)
BILIRUB SERPL-MCNC: 0.5 MG/DL (ref 0–1.2)
BILIRUB SERPL-MCNC: 0.6 MG/DL (ref 0–1.2)
BILIRUB UR QL STRIP: NEGATIVE
BODY TEMPERATURE: 0 C
BUN SERPL-MCNC: 10 MG/DL (ref 8–23)
BUN SERPL-MCNC: 11 MG/DL (ref 8–23)
BUN SERPL-MCNC: 15 MG/DL (ref 8–23)
BUN SERPL-MCNC: 15 MG/DL (ref 8–23)
BUN SERPL-MCNC: 17 MG/DL (ref 8–23)
BUN SERPL-MCNC: 17 MG/DL (ref 8–23)
BUN SERPL-MCNC: 18 MG/DL (ref 8–23)
BUN SERPL-MCNC: 19 MG/DL (ref 8–23)
BUN SERPL-MCNC: 20 MG/DL (ref 8–23)
BUN SERPL-MCNC: 22 MG/DL (ref 8–23)
BUN SERPL-MCNC: 24 MG/DL (ref 8–23)
BUN SERPL-MCNC: 25 MG/DL (ref 8–23)
BUN SERPL-MCNC: 25 MG/DL (ref 8–23)
BUN SERPL-MCNC: 27 MG/DL (ref 8–23)
BUN SERPL-MCNC: 28 MG/DL (ref 8–23)
BUN SERPL-MCNC: 28 MG/DL (ref 8–23)
BUN SERPL-MCNC: 29 MG/DL (ref 8–23)
BUN/CREAT SERPL: 10 (ref 7–25)
BUN/CREAT SERPL: 10.3 (ref 7–25)
BUN/CREAT SERPL: 10.5 (ref 7–25)
BUN/CREAT SERPL: 10.8 (ref 7–25)
BUN/CREAT SERPL: 10.9 (ref 7–25)
BUN/CREAT SERPL: 11.2 (ref 7–25)
BUN/CREAT SERPL: 11.3 (ref 7–25)
BUN/CREAT SERPL: 11.4 (ref 7–25)
BUN/CREAT SERPL: 12.1 (ref 7–25)
BUN/CREAT SERPL: 12.9 (ref 7–25)
BUN/CREAT SERPL: 15.1 (ref 7–25)
BUN/CREAT SERPL: 18.4 (ref 7–25)
BUN/CREAT SERPL: 6.5 (ref 7–25)
BUN/CREAT SERPL: 8.2 (ref 7–25)
BUN/CREAT SERPL: 8.3 (ref 7–25)
BUN/CREAT SERPL: 8.4 (ref 7–25)
BUN/CREAT SERPL: 8.6 (ref 7–25)
BUN/CREAT SERPL: 8.9 (ref 7–25)
BUN/CREAT SERPL: 9.6 (ref 7–25)
BUPRENORPHINE SERPL-MCNC: NEGATIVE NG/ML
C DIFF TOX GENS STL QL NAA+PROBE: NEGATIVE
CA-I SERPL ISE-MCNC: 0.97 MMOL/L (ref 1.12–1.32)
CA-I SERPL ISE-MCNC: 1.84 MMOL/L (ref 1.12–1.32)
CALCIT SERPL-MCNC: <2 PG/ML (ref 0–5)
CALCIUM SPEC-SCNC: 10.4 MG/DL (ref 8.6–10.5)
CALCIUM SPEC-SCNC: 13.3 MG/DL (ref 8.6–10.5)
CALCIUM SPEC-SCNC: 13.8 MG/DL (ref 8.6–10.5)
CALCIUM SPEC-SCNC: 14.1 MG/DL (ref 8.6–10.5)
CALCIUM SPEC-SCNC: 15.5 MG/DL (ref 8.6–10.5)
CALCIUM SPEC-SCNC: 16.9 MG/DL (ref 8.6–10.5)
CALCIUM SPEC-SCNC: 6.1 MG/DL (ref 8.6–10.5)
CALCIUM SPEC-SCNC: 6.3 MG/DL (ref 8.6–10.5)
CALCIUM SPEC-SCNC: 6.8 MG/DL (ref 8.6–10.5)
CALCIUM SPEC-SCNC: 6.8 MG/DL (ref 8.6–10.5)
CALCIUM SPEC-SCNC: 6.9 MG/DL (ref 8.6–10.5)
CALCIUM SPEC-SCNC: 7 MG/DL (ref 8.6–10.5)
CALCIUM SPEC-SCNC: 7.8 MG/DL (ref 8.6–10.5)
CALCIUM SPEC-SCNC: 8 MG/DL (ref 8.6–10.5)
CALCIUM SPEC-SCNC: 8.5 MG/DL (ref 8.6–10.5)
CALCIUM SPEC-SCNC: 8.6 MG/DL (ref 8.6–10.5)
CALCIUM SPEC-SCNC: 9.2 MG/DL (ref 8.6–10.5)
CALCIUM SPEC-SCNC: 9.4 MG/DL (ref 8.6–10.5)
CALCIUM SPEC-SCNC: 9.4 MG/DL (ref 8.6–10.5)
CALCIUM SPEC-SCNC: 9.5 MG/DL (ref 8.6–10.5)
CANNABINOIDS SERPL QL: NEGATIVE
CHLORIDE SERPL-SCNC: 101 MMOL/L (ref 98–107)
CHLORIDE SERPL-SCNC: 103 MMOL/L (ref 98–107)
CHLORIDE SERPL-SCNC: 104 MMOL/L (ref 98–107)
CHLORIDE SERPL-SCNC: 105 MMOL/L (ref 98–107)
CHLORIDE SERPL-SCNC: 106 MMOL/L (ref 98–107)
CHLORIDE SERPL-SCNC: 107 MMOL/L (ref 98–107)
CHLORIDE SERPL-SCNC: 107 MMOL/L (ref 98–107)
CHLORIDE SERPL-SCNC: 109 MMOL/L (ref 98–107)
CHLORIDE SERPL-SCNC: 110 MMOL/L (ref 98–107)
CHLORIDE SERPL-SCNC: 112 MMOL/L (ref 98–107)
CHLORIDE SERPL-SCNC: 86 MMOL/L (ref 98–107)
CHLORIDE SERPL-SCNC: 89 MMOL/L (ref 98–107)
CHLORIDE SERPL-SCNC: 93 MMOL/L (ref 98–107)
CHLORIDE SERPL-SCNC: 94 MMOL/L (ref 98–107)
CHLORIDE SERPL-SCNC: 94 MMOL/L (ref 98–107)
CLARITY UR: ABNORMAL
CLARITY UR: CLEAR
CLARITY UR: CLEAR
CO2 BLDA-SCNC: 43.2 MMOL/L (ref 22–33)
CO2 SERPL-SCNC: 18.3 MMOL/L (ref 22–29)
CO2 SERPL-SCNC: 18.6 MMOL/L (ref 22–29)
CO2 SERPL-SCNC: 18.8 MMOL/L (ref 22–29)
CO2 SERPL-SCNC: 19.4 MMOL/L (ref 22–29)
CO2 SERPL-SCNC: 19.5 MMOL/L (ref 22–29)
CO2 SERPL-SCNC: 20 MMOL/L (ref 22–29)
CO2 SERPL-SCNC: 20.6 MMOL/L (ref 22–29)
CO2 SERPL-SCNC: 22.3 MMOL/L (ref 22–29)
CO2 SERPL-SCNC: 23.8 MMOL/L (ref 22–29)
CO2 SERPL-SCNC: 23.9 MMOL/L (ref 22–29)
CO2 SERPL-SCNC: 25.2 MMOL/L (ref 22–29)
CO2 SERPL-SCNC: 25.7 MMOL/L (ref 22–29)
CO2 SERPL-SCNC: 26.7 MMOL/L (ref 22–29)
CO2 SERPL-SCNC: 26.9 MMOL/L (ref 22–29)
CO2 SERPL-SCNC: 30.3 MMOL/L (ref 22–29)
CO2 SERPL-SCNC: 31.3 MMOL/L (ref 22–29)
CO2 SERPL-SCNC: 33.5 MMOL/L (ref 22–29)
CO2 SERPL-SCNC: 33.6 MMOL/L (ref 22–29)
CO2 SERPL-SCNC: 37.9 MMOL/L (ref 22–29)
COCAINE UR QL: NEGATIVE
COHGB MFR BLD: 1.4 % (ref 0–5)
COLOR UR: YELLOW
CREAT SERPL-MCNC: 0.97 MG/DL (ref 0.57–1)
CREAT SERPL-MCNC: 1.15 MG/DL (ref 0.57–1)
CREAT SERPL-MCNC: 1.26 MG/DL (ref 0.57–1)
CREAT SERPL-MCNC: 1.32 MG/DL (ref 0.57–1)
CREAT SERPL-MCNC: 1.36 MG/DL (ref 0.57–1)
CREAT SERPL-MCNC: 1.38 MG/DL (ref 0.57–1)
CREAT SERPL-MCNC: 1.49 MG/DL (ref 0.57–1)
CREAT SERPL-MCNC: 1.68 MG/DL (ref 0.57–1)
CREAT SERPL-MCNC: 1.72 MG/DL (ref 0.57–1)
CREAT SERPL-MCNC: 1.77 MG/DL (ref 0.57–1)
CREAT SERPL-MCNC: 2.14 MG/DL (ref 0.57–1)
CREAT SERPL-MCNC: 2.31 MG/DL (ref 0.57–1)
CREAT SERPL-MCNC: 2.4 MG/DL (ref 0.57–1)
CREAT SERPL-MCNC: 2.89 MG/DL (ref 0.57–1)
CREAT SERPL-MCNC: 3.24 MG/DL (ref 0.57–1)
CREAT SERPL-MCNC: 3.28 MG/DL (ref 0.57–1)
CREAT SERPL-MCNC: 3.35 MG/DL (ref 0.57–1)
CREAT SERPL-MCNC: 3.37 MG/DL (ref 0.57–1)
CREAT SERPL-MCNC: 3.51 MG/DL (ref 0.57–1)
CREAT UR-MCNC: 108 MG/DL
CREAT UR-MCNC: 108 MG/DL
CREAT UR-MCNC: 119.4 MG/DL
CREAT UR-MCNC: 119.4 MG/DL
CREAT UR-MCNC: 223.4 MG/DL
CREAT UR-MCNC: 223.4 MG/DL
CRP SERPL-MCNC: 10.96 MG/DL (ref 0–0.5)
CRP SERPL-MCNC: 19.54 MG/DL (ref 0–0.5)
CRP SERPL-MCNC: 4.13 MG/DL (ref 0–0.5)
D-LACTATE SERPL-SCNC: 1.7 MMOL/L (ref 0.5–2)
D-LACTATE SERPL-SCNC: 2.7 MMOL/L (ref 0.5–2)
DEPRECATED RDW RBC AUTO: 41.6 FL (ref 37–54)
DEPRECATED RDW RBC AUTO: 41.8 FL (ref 37–54)
DEPRECATED RDW RBC AUTO: 45.5 FL (ref 37–54)
DEPRECATED RDW RBC AUTO: 45.7 FL (ref 37–54)
DEPRECATED RDW RBC AUTO: 47.3 FL (ref 37–54)
DEPRECATED RDW RBC AUTO: 47.8 FL (ref 37–54)
DEPRECATED RDW RBC AUTO: 48 FL (ref 37–54)
DEPRECATED RDW RBC AUTO: 48.4 FL (ref 37–54)
DEPRECATED RDW RBC AUTO: 49 FL (ref 37–54)
DEPRECATED RDW RBC AUTO: 49.3 FL (ref 37–54)
DEPRECATED RDW RBC AUTO: 50 FL (ref 37–54)
DEPRECATED RDW RBC AUTO: 51.1 FL (ref 37–54)
DEPRECATED RDW RBC AUTO: 51.3 FL (ref 37–54)
DEPRECATED RDW RBC AUTO: 51.4 FL (ref 37–54)
EOSINOPHIL # BLD AUTO: 0.01 10*3/MM3 (ref 0–0.4)
EOSINOPHIL # BLD AUTO: 0.05 10*3/MM3 (ref 0–0.4)
EOSINOPHIL # BLD AUTO: 0.08 10*3/MM3 (ref 0–0.4)
EOSINOPHIL # BLD AUTO: 0.16 10*3/MM3 (ref 0–0.4)
EOSINOPHIL # BLD AUTO: 0.17 10*3/MM3 (ref 0–0.4)
EOSINOPHIL # BLD AUTO: 0.18 10*3/MM3 (ref 0–0.4)
EOSINOPHIL # BLD AUTO: 0.18 10*3/MM3 (ref 0–0.4)
EOSINOPHIL # BLD AUTO: 0.19 10*3/MM3 (ref 0–0.4)
EOSINOPHIL # BLD AUTO: 0.2 10*3/MM3 (ref 0–0.4)
EOSINOPHIL # BLD AUTO: 0.22 10*3/MM3 (ref 0–0.4)
EOSINOPHIL # BLD AUTO: 0.22 10*3/MM3 (ref 0–0.4)
EOSINOPHIL # BLD AUTO: 0.24 10*3/MM3 (ref 0–0.4)
EOSINOPHIL NFR BLD AUTO: 0.1 % (ref 0.3–6.2)
EOSINOPHIL NFR BLD AUTO: 0.3 % (ref 0.3–6.2)
EOSINOPHIL NFR BLD AUTO: 0.9 % (ref 0.3–6.2)
EOSINOPHIL NFR BLD AUTO: 1.1 % (ref 0.3–6.2)
EOSINOPHIL NFR BLD AUTO: 1.3 % (ref 0.3–6.2)
EOSINOPHIL NFR BLD AUTO: 1.4 % (ref 0.3–6.2)
EOSINOPHIL NFR BLD AUTO: 1.5 % (ref 0.3–6.2)
EOSINOPHIL NFR BLD AUTO: 1.5 % (ref 0.3–6.2)
EOSINOPHIL NFR BLD AUTO: 1.8 % (ref 0.3–6.2)
EOSINOPHIL NFR BLD AUTO: 2.7 % (ref 0.3–6.2)
ERYTHROCYTE [DISTWIDTH] IN BLOOD BY AUTOMATED COUNT: 13.5 % (ref 12.3–15.4)
ERYTHROCYTE [DISTWIDTH] IN BLOOD BY AUTOMATED COUNT: 13.6 % (ref 12.3–15.4)
ERYTHROCYTE [DISTWIDTH] IN BLOOD BY AUTOMATED COUNT: 14.1 % (ref 12.3–15.4)
ERYTHROCYTE [DISTWIDTH] IN BLOOD BY AUTOMATED COUNT: 14.6 % (ref 12.3–15.4)
ERYTHROCYTE [DISTWIDTH] IN BLOOD BY AUTOMATED COUNT: 14.8 % (ref 12.3–15.4)
ERYTHROCYTE [DISTWIDTH] IN BLOOD BY AUTOMATED COUNT: 14.9 % (ref 12.3–15.4)
ERYTHROCYTE [DISTWIDTH] IN BLOOD BY AUTOMATED COUNT: 15 % (ref 12.3–15.4)
ERYTHROCYTE [DISTWIDTH] IN BLOOD BY AUTOMATED COUNT: 15.1 % (ref 12.3–15.4)
ERYTHROCYTE [DISTWIDTH] IN BLOOD BY AUTOMATED COUNT: 15.2 % (ref 12.3–15.4)
ERYTHROCYTE [DISTWIDTH] IN BLOOD BY AUTOMATED COUNT: 15.4 % (ref 12.3–15.4)
ERYTHROCYTE [DISTWIDTH] IN BLOOD BY AUTOMATED COUNT: 15.6 % (ref 12.3–15.4)
ERYTHROCYTE [DISTWIDTH] IN BLOOD BY AUTOMATED COUNT: 15.7 % (ref 12.3–15.4)
ERYTHROCYTE [SEDIMENTATION RATE] IN BLOOD: 21 MM/HR (ref 0–30)
FLUAV SUBTYP SPEC NAA+PROBE: NOT DETECTED
FLUBV RNA ISLT QL NAA+PROBE: NOT DETECTED
GAS FLOW AIRWAY: 2 LPM
GFR SERPL CREATININE-BSD FRML MDRD: 12 ML/MIN/1.73
GFR SERPL CREATININE-BSD FRML MDRD: 13 ML/MIN/1.73
GFR SERPL CREATININE-BSD FRML MDRD: 14 ML/MIN/1.73
GFR SERPL CREATININE-BSD FRML MDRD: 15 ML/MIN/1.73
GFR SERPL CREATININE-BSD FRML MDRD: 19 ML/MIN/1.73
GFR SERPL CREATININE-BSD FRML MDRD: 20 ML/MIN/1.73
GFR SERPL CREATININE-BSD FRML MDRD: 22 ML/MIN/1.73
GFR SERPL CREATININE-BSD FRML MDRD: 27 ML/MIN/1.73
GFR SERPL CREATININE-BSD FRML MDRD: 28 ML/MIN/1.73
GFR SERPL CREATININE-BSD FRML MDRD: 29 ML/MIN/1.73
GFR SERPL CREATININE-BSD FRML MDRD: 33 ML/MIN/1.73
GFR SERPL CREATININE-BSD FRML MDRD: 36 ML/MIN/1.73
GFR SERPL CREATININE-BSD FRML MDRD: 37 ML/MIN/1.73
GFR SERPL CREATININE-BSD FRML MDRD: 38 ML/MIN/1.73
GFR SERPL CREATININE-BSD FRML MDRD: 40 ML/MIN/1.73
GFR SERPL CREATININE-BSD FRML MDRD: 45 ML/MIN/1.73
GFR SERPL CREATININE-BSD FRML MDRD: 55 ML/MIN/1.73
GFR SERPL CREATININE-BSD FRML MDRD: ABNORMAL ML/MIN/{1.73_M2}
GLOBULIN UR ELPH-MCNC: 2.8 GM/DL
GLOBULIN UR ELPH-MCNC: 2.9 GM/DL
GLOBULIN UR ELPH-MCNC: 3.1 GM/DL
GLOBULIN UR ELPH-MCNC: 3.1 GM/DL
GLOBULIN UR ELPH-MCNC: 3.2 GM/DL
GLOBULIN UR ELPH-MCNC: 3.3 GM/DL
GLOBULIN UR ELPH-MCNC: 3.4 GM/DL
GLOBULIN UR ELPH-MCNC: 3.5 GM/DL
GLOBULIN UR ELPH-MCNC: 3.6 GM/DL
GLOBULIN UR ELPH-MCNC: 3.7 GM/DL
GLUCOSE BLDC GLUCOMTR-MCNC: 118 MG/DL (ref 70–130)
GLUCOSE BLDC GLUCOMTR-MCNC: 122 MG/DL (ref 70–130)
GLUCOSE SERPL-MCNC: 104 MG/DL (ref 65–99)
GLUCOSE SERPL-MCNC: 104 MG/DL (ref 65–99)
GLUCOSE SERPL-MCNC: 110 MG/DL (ref 65–99)
GLUCOSE SERPL-MCNC: 110 MG/DL (ref 65–99)
GLUCOSE SERPL-MCNC: 111 MG/DL (ref 65–99)
GLUCOSE SERPL-MCNC: 111 MG/DL (ref 65–99)
GLUCOSE SERPL-MCNC: 113 MG/DL (ref 65–99)
GLUCOSE SERPL-MCNC: 113 MG/DL (ref 65–99)
GLUCOSE SERPL-MCNC: 114 MG/DL (ref 65–99)
GLUCOSE SERPL-MCNC: 115 MG/DL (ref 65–99)
GLUCOSE SERPL-MCNC: 118 MG/DL (ref 65–99)
GLUCOSE SERPL-MCNC: 121 MG/DL (ref 65–99)
GLUCOSE SERPL-MCNC: 121 MG/DL (ref 65–99)
GLUCOSE SERPL-MCNC: 83 MG/DL (ref 65–99)
GLUCOSE SERPL-MCNC: 85 MG/DL (ref 65–99)
GLUCOSE SERPL-MCNC: 86 MG/DL (ref 65–99)
GLUCOSE SERPL-MCNC: 92 MG/DL (ref 65–99)
GLUCOSE SERPL-MCNC: 94 MG/DL (ref 65–99)
GLUCOSE SERPL-MCNC: 95 MG/DL (ref 65–99)
GLUCOSE UR STRIP-MCNC: NEGATIVE MG/DL
HCO3 BLDA-SCNC: 41.7 MMOL/L (ref 20–26)
HCT VFR BLD AUTO: 25.4 % (ref 34–46.6)
HCT VFR BLD AUTO: 27 % (ref 34–46.6)
HCT VFR BLD AUTO: 27.5 % (ref 34–46.6)
HCT VFR BLD AUTO: 27.9 % (ref 34–46.6)
HCT VFR BLD AUTO: 28.1 % (ref 34–46.6)
HCT VFR BLD AUTO: 28.3 % (ref 34–46.6)
HCT VFR BLD AUTO: 28.4 % (ref 34–46.6)
HCT VFR BLD AUTO: 29.8 % (ref 34–46.6)
HCT VFR BLD AUTO: 29.9 % (ref 34–46.6)
HCT VFR BLD AUTO: 30.4 % (ref 34–46.6)
HCT VFR BLD AUTO: 32.4 % (ref 34–46.6)
HCT VFR BLD AUTO: 33.1 % (ref 34–46.6)
HCT VFR BLD AUTO: 36.4 % (ref 34–46.6)
HCT VFR BLD AUTO: 39 % (ref 34–46.6)
HCT VFR BLD CALC: 35.1 % (ref 38–51)
HGB BLD-MCNC: 10 G/DL (ref 12–15.9)
HGB BLD-MCNC: 10.7 G/DL (ref 12–15.9)
HGB BLD-MCNC: 10.9 G/DL (ref 12–15.9)
HGB BLD-MCNC: 12.4 G/DL (ref 12–15.9)
HGB BLD-MCNC: 12.5 G/DL (ref 12–15.9)
HGB BLD-MCNC: 8.1 G/DL (ref 12–15.9)
HGB BLD-MCNC: 8.5 G/DL (ref 12–15.9)
HGB BLD-MCNC: 8.9 G/DL (ref 12–15.9)
HGB BLD-MCNC: 8.9 G/DL (ref 12–15.9)
HGB BLD-MCNC: 9 G/DL (ref 12–15.9)
HGB BLD-MCNC: 9.2 G/DL (ref 12–15.9)
HGB BLD-MCNC: 9.3 G/DL (ref 12–15.9)
HGB BLD-MCNC: 9.6 G/DL (ref 12–15.9)
HGB BLD-MCNC: 9.9 G/DL (ref 12–15.9)
HGB BLDA-MCNC: 11.4 G/DL (ref 13.5–17.5)
HGB UR QL STRIP.AUTO: ABNORMAL
HGB UR QL STRIP.AUTO: ABNORMAL
HGB UR QL STRIP.AUTO: NEGATIVE
HOLD SPECIMEN: NORMAL
HOLD SPECIMEN: NORMAL
HYALINE CASTS UR QL AUTO: ABNORMAL /LPF
IMM GRANULOCYTES # BLD AUTO: 0.04 10*3/MM3 (ref 0–0.05)
IMM GRANULOCYTES # BLD AUTO: 0.1 10*3/MM3 (ref 0–0.05)
IMM GRANULOCYTES # BLD AUTO: 0.1 10*3/MM3 (ref 0–0.05)
IMM GRANULOCYTES # BLD AUTO: 0.13 10*3/MM3 (ref 0–0.05)
IMM GRANULOCYTES # BLD AUTO: 0.14 10*3/MM3 (ref 0–0.05)
IMM GRANULOCYTES # BLD AUTO: 0.14 10*3/MM3 (ref 0–0.05)
IMM GRANULOCYTES # BLD AUTO: 0.16 10*3/MM3 (ref 0–0.05)
IMM GRANULOCYTES # BLD AUTO: 0.19 10*3/MM3 (ref 0–0.05)
IMM GRANULOCYTES # BLD AUTO: 0.21 10*3/MM3 (ref 0–0.05)
IMM GRANULOCYTES # BLD AUTO: 0.22 10*3/MM3 (ref 0–0.05)
IMM GRANULOCYTES # BLD AUTO: 0.24 10*3/MM3 (ref 0–0.05)
IMM GRANULOCYTES NFR BLD AUTO: 0.5 % (ref 0–0.5)
IMM GRANULOCYTES NFR BLD AUTO: 0.6 % (ref 0–0.5)
IMM GRANULOCYTES NFR BLD AUTO: 0.6 % (ref 0–0.5)
IMM GRANULOCYTES NFR BLD AUTO: 0.8 % (ref 0–0.5)
IMM GRANULOCYTES NFR BLD AUTO: 1 % (ref 0–0.5)
IMM GRANULOCYTES NFR BLD AUTO: 1.3 % (ref 0–0.5)
IMM GRANULOCYTES NFR BLD AUTO: 1.5 % (ref 0–0.5)
IMM GRANULOCYTES NFR BLD AUTO: 1.6 % (ref 0–0.5)
IMM GRANULOCYTES NFR BLD AUTO: 1.8 % (ref 0–0.5)
IMM GRANULOCYTES NFR BLD AUTO: 1.8 % (ref 0–0.5)
IMM GRANULOCYTES NFR BLD AUTO: 2 % (ref 0–0.5)
IMM GRANULOCYTES NFR BLD AUTO: 2.1 % (ref 0–0.5)
INHALED O2 CONCENTRATION: 28 %
IRON 24H UR-MRATE: 18 MCG/DL (ref 37–145)
IRON SATN MFR SERPL: 10 % (ref 20–50)
KETONES UR QL STRIP: NEGATIVE
LEUKOCYTE ESTERASE UR QL STRIP.AUTO: ABNORMAL
LIPASE SERPL-CCNC: 126 U/L (ref 13–60)
LIPASE SERPL-CCNC: 154 U/L (ref 13–60)
LIPASE SERPL-CCNC: 188 U/L (ref 13–60)
LIPASE SERPL-CCNC: 788 U/L (ref 13–60)
LYMPHOCYTES # BLD AUTO: 0.75 10*3/MM3 (ref 0.7–3.1)
LYMPHOCYTES # BLD AUTO: 0.84 10*3/MM3 (ref 0.7–3.1)
LYMPHOCYTES # BLD AUTO: 0.88 10*3/MM3 (ref 0.7–3.1)
LYMPHOCYTES # BLD AUTO: 0.89 10*3/MM3 (ref 0.7–3.1)
LYMPHOCYTES # BLD AUTO: 0.93 10*3/MM3 (ref 0.7–3.1)
LYMPHOCYTES # BLD AUTO: 0.94 10*3/MM3 (ref 0.7–3.1)
LYMPHOCYTES # BLD AUTO: 0.96 10*3/MM3 (ref 0.7–3.1)
LYMPHOCYTES # BLD AUTO: 1.03 10*3/MM3 (ref 0.7–3.1)
LYMPHOCYTES # BLD AUTO: 1.08 10*3/MM3 (ref 0.7–3.1)
LYMPHOCYTES # BLD AUTO: 1.1 10*3/MM3 (ref 0.7–3.1)
LYMPHOCYTES # BLD AUTO: 1.18 10*3/MM3 (ref 0.7–3.1)
LYMPHOCYTES # BLD AUTO: 1.2 10*3/MM3 (ref 0.7–3.1)
LYMPHOCYTES # BLD AUTO: 1.24 10*3/MM3 (ref 0.7–3.1)
LYMPHOCYTES # BLD AUTO: 1.73 10*3/MM3 (ref 0.7–3.1)
LYMPHOCYTES NFR BLD AUTO: 13.5 % (ref 19.6–45.3)
LYMPHOCYTES NFR BLD AUTO: 19.7 % (ref 19.6–45.3)
LYMPHOCYTES NFR BLD AUTO: 4.3 % (ref 19.6–45.3)
LYMPHOCYTES NFR BLD AUTO: 6.6 % (ref 19.6–45.3)
LYMPHOCYTES NFR BLD AUTO: 6.8 % (ref 19.6–45.3)
LYMPHOCYTES NFR BLD AUTO: 7.3 % (ref 19.6–45.3)
LYMPHOCYTES NFR BLD AUTO: 7.3 % (ref 19.6–45.3)
LYMPHOCYTES NFR BLD AUTO: 7.8 % (ref 19.6–45.3)
LYMPHOCYTES NFR BLD AUTO: 7.8 % (ref 19.6–45.3)
LYMPHOCYTES NFR BLD AUTO: 7.9 % (ref 19.6–45.3)
LYMPHOCYTES NFR BLD AUTO: 7.9 % (ref 19.6–45.3)
LYMPHOCYTES NFR BLD AUTO: 8.5 % (ref 19.6–45.3)
LYMPHOCYTES NFR BLD AUTO: 8.8 % (ref 19.6–45.3)
LYMPHOCYTES NFR BLD AUTO: 8.8 % (ref 19.6–45.3)
Lab: ABNORMAL
MAGNESIUM SERPL-MCNC: 1 MG/DL (ref 1.6–2.4)
MAGNESIUM SERPL-MCNC: 1.1 MG/DL (ref 1.6–2.4)
MAGNESIUM SERPL-MCNC: 1.2 MG/DL (ref 1.6–2.4)
MAGNESIUM SERPL-MCNC: 1.3 MG/DL (ref 1.6–2.4)
MAGNESIUM SERPL-MCNC: 1.4 MG/DL (ref 1.6–2.4)
MAGNESIUM SERPL-MCNC: 1.4 MG/DL (ref 1.6–2.4)
MAGNESIUM SERPL-MCNC: 1.5 MG/DL (ref 1.6–2.4)
MAGNESIUM SERPL-MCNC: 1.6 MG/DL (ref 1.6–2.4)
MAGNESIUM SERPL-MCNC: 1.7 MG/DL (ref 1.6–2.4)
MAGNESIUM SERPL-MCNC: 1.8 MG/DL (ref 1.6–2.4)
MAGNESIUM SERPL-MCNC: 1.9 MG/DL (ref 1.6–2.4)
MAGNESIUM SERPL-MCNC: 2.2 MG/DL (ref 1.6–2.4)
MAXIMAL PREDICTED HEART RATE: 135 BPM
MCH RBC QN AUTO: 28 PG (ref 26.6–33)
MCH RBC QN AUTO: 28 PG (ref 26.6–33)
MCH RBC QN AUTO: 28.1 PG (ref 26.6–33)
MCH RBC QN AUTO: 28.2 PG (ref 26.6–33)
MCH RBC QN AUTO: 28.3 PG (ref 26.6–33)
MCH RBC QN AUTO: 28.3 PG (ref 26.6–33)
MCH RBC QN AUTO: 28.4 PG (ref 26.6–33)
MCH RBC QN AUTO: 28.6 PG (ref 26.6–33)
MCH RBC QN AUTO: 28.8 PG (ref 26.6–33)
MCH RBC QN AUTO: 29 PG (ref 26.6–33)
MCHC RBC AUTO-ENTMCNC: 31.5 G/DL (ref 31.5–35.7)
MCHC RBC AUTO-ENTMCNC: 31.7 G/DL (ref 31.5–35.7)
MCHC RBC AUTO-ENTMCNC: 31.8 G/DL (ref 31.5–35.7)
MCHC RBC AUTO-ENTMCNC: 31.8 G/DL (ref 31.5–35.7)
MCHC RBC AUTO-ENTMCNC: 31.9 G/DL (ref 31.5–35.7)
MCHC RBC AUTO-ENTMCNC: 32.2 G/DL (ref 31.5–35.7)
MCHC RBC AUTO-ENTMCNC: 32.3 G/DL (ref 31.5–35.7)
MCHC RBC AUTO-ENTMCNC: 32.4 G/DL (ref 31.5–35.7)
MCHC RBC AUTO-ENTMCNC: 32.6 G/DL (ref 31.5–35.7)
MCHC RBC AUTO-ENTMCNC: 32.7 G/DL (ref 31.5–35.7)
MCHC RBC AUTO-ENTMCNC: 33 G/DL (ref 31.5–35.7)
MCHC RBC AUTO-ENTMCNC: 33.4 G/DL (ref 31.5–35.7)
MCHC RBC AUTO-ENTMCNC: 33.6 G/DL (ref 31.5–35.7)
MCHC RBC AUTO-ENTMCNC: 34.3 G/DL (ref 31.5–35.7)
MCV RBC AUTO: 83.9 FL (ref 79–97)
MCV RBC AUTO: 83.9 FL (ref 79–97)
MCV RBC AUTO: 86.4 FL (ref 79–97)
MCV RBC AUTO: 86.6 FL (ref 79–97)
MCV RBC AUTO: 86.7 FL (ref 79–97)
MCV RBC AUTO: 86.9 FL (ref 79–97)
MCV RBC AUTO: 86.9 FL (ref 79–97)
MCV RBC AUTO: 87.8 FL (ref 79–97)
MCV RBC AUTO: 87.9 FL (ref 79–97)
MCV RBC AUTO: 88.2 FL (ref 79–97)
MCV RBC AUTO: 88.4 FL (ref 79–97)
MCV RBC AUTO: 88.8 FL (ref 79–97)
MCV RBC AUTO: 89.4 FL (ref 79–97)
MCV RBC AUTO: 89.5 FL (ref 79–97)
METHADONE UR QL SCN: NEGATIVE
METHGB BLD QL: 0.2 % (ref 0–3)
MICROALBUMIN/CREAT UR: 31 MG/G
MICROALBUMIN/CREAT UR: 47.4 MG/G
MODALITY: ABNORMAL
MONOCYTES # BLD AUTO: 0.54 10*3/MM3 (ref 0.1–0.9)
MONOCYTES # BLD AUTO: 0.57 10*3/MM3 (ref 0.1–0.9)
MONOCYTES # BLD AUTO: 0.58 10*3/MM3 (ref 0.1–0.9)
MONOCYTES # BLD AUTO: 0.59 10*3/MM3 (ref 0.1–0.9)
MONOCYTES # BLD AUTO: 0.59 10*3/MM3 (ref 0.1–0.9)
MONOCYTES # BLD AUTO: 0.6 10*3/MM3 (ref 0.1–0.9)
MONOCYTES # BLD AUTO: 0.61 10*3/MM3 (ref 0.1–0.9)
MONOCYTES # BLD AUTO: 0.61 10*3/MM3 (ref 0.1–0.9)
MONOCYTES # BLD AUTO: 0.7 10*3/MM3 (ref 0.1–0.9)
MONOCYTES # BLD AUTO: 0.7 10*3/MM3 (ref 0.1–0.9)
MONOCYTES # BLD AUTO: 0.71 10*3/MM3 (ref 0.1–0.9)
MONOCYTES # BLD AUTO: 0.71 10*3/MM3 (ref 0.1–0.9)
MONOCYTES # BLD AUTO: 0.72 10*3/MM3 (ref 0.1–0.9)
MONOCYTES # BLD AUTO: 0.73 10*3/MM3 (ref 0.1–0.9)
MONOCYTES NFR BLD AUTO: 3.3 % (ref 5–12)
MONOCYTES NFR BLD AUTO: 3.6 % (ref 5–12)
MONOCYTES NFR BLD AUTO: 4.3 % (ref 5–12)
MONOCYTES NFR BLD AUTO: 4.6 % (ref 5–12)
MONOCYTES NFR BLD AUTO: 4.8 % (ref 5–12)
MONOCYTES NFR BLD AUTO: 4.8 % (ref 5–12)
MONOCYTES NFR BLD AUTO: 5.2 % (ref 5–12)
MONOCYTES NFR BLD AUTO: 5.4 % (ref 5–12)
MONOCYTES NFR BLD AUTO: 5.7 % (ref 5–12)
MONOCYTES NFR BLD AUTO: 5.9 % (ref 5–12)
MONOCYTES NFR BLD AUTO: 5.9 % (ref 5–12)
MONOCYTES NFR BLD AUTO: 6.2 % (ref 5–12)
MONOCYTES NFR BLD AUTO: 6.2 % (ref 5–12)
MONOCYTES NFR BLD AUTO: 6.8 % (ref 5–12)
MRSA DNA SPEC QL NAA+PROBE: NEGATIVE
NEUTROPHILS NFR BLD AUTO: 10.14 10*3/MM3 (ref 1.7–7)
NEUTROPHILS NFR BLD AUTO: 10.22 10*3/MM3 (ref 1.7–7)
NEUTROPHILS NFR BLD AUTO: 10.23 10*3/MM3 (ref 1.7–7)
NEUTROPHILS NFR BLD AUTO: 10.79 10*3/MM3 (ref 1.7–7)
NEUTROPHILS NFR BLD AUTO: 10.93 10*3/MM3 (ref 1.7–7)
NEUTROPHILS NFR BLD AUTO: 11.01 10*3/MM3 (ref 1.7–7)
NEUTROPHILS NFR BLD AUTO: 11.67 10*3/MM3 (ref 1.7–7)
NEUTROPHILS NFR BLD AUTO: 13.95 10*3/MM3 (ref 1.7–7)
NEUTROPHILS NFR BLD AUTO: 14.21 10*3/MM3 (ref 1.7–7)
NEUTROPHILS NFR BLD AUTO: 16.15 10*3/MM3 (ref 1.7–7)
NEUTROPHILS NFR BLD AUTO: 6.32 10*3/MM3 (ref 1.7–7)
NEUTROPHILS NFR BLD AUTO: 6.6 10*3/MM3 (ref 1.7–7)
NEUTROPHILS NFR BLD AUTO: 7.76 10*3/MM3 (ref 1.7–7)
NEUTROPHILS NFR BLD AUTO: 72 % (ref 42.7–76)
NEUTROPHILS NFR BLD AUTO: 74.1 % (ref 42.7–76)
NEUTROPHILS NFR BLD AUTO: 81.4 % (ref 42.7–76)
NEUTROPHILS NFR BLD AUTO: 82 % (ref 42.7–76)
NEUTROPHILS NFR BLD AUTO: 82.8 % (ref 42.7–76)
NEUTROPHILS NFR BLD AUTO: 83 % (ref 42.7–76)
NEUTROPHILS NFR BLD AUTO: 83.6 % (ref 42.7–76)
NEUTROPHILS NFR BLD AUTO: 83.8 % (ref 42.7–76)
NEUTROPHILS NFR BLD AUTO: 83.9 % (ref 42.7–76)
NEUTROPHILS NFR BLD AUTO: 84.5 % (ref 42.7–76)
NEUTROPHILS NFR BLD AUTO: 85.3 % (ref 42.7–76)
NEUTROPHILS NFR BLD AUTO: 87 % (ref 42.7–76)
NEUTROPHILS NFR BLD AUTO: 87.4 % (ref 42.7–76)
NEUTROPHILS NFR BLD AUTO: 9.87 10*3/MM3 (ref 1.7–7)
NEUTROPHILS NFR BLD AUTO: 91.5 % (ref 42.7–76)
NITRITE UR QL STRIP: NEGATIVE
NOTE: ABNORMAL
NRBC BLD AUTO-RTO: 0 /100 WBC (ref 0–0.2)
NT-PROBNP SERPL-MCNC: 1785 PG/ML (ref 0–1800)
NT-PROBNP SERPL-MCNC: 4221 PG/ML (ref 0–1800)
NT-PROBNP SERPL-MCNC: 962.5 PG/ML (ref 0–1800)
OPIATES UR QL: NEGATIVE
OXYCODONE UR QL SCN: NEGATIVE
OXYHGB MFR BLDV: 94.6 % (ref 94–99)
PCO2 BLDA: 49.8 MM HG (ref 35–45)
PCO2 TEMP ADJ BLD: ABNORMAL MM[HG]
PCP UR QL SCN: NEGATIVE
PH BLDA: 7.53 PH UNITS (ref 7.35–7.45)
PH UR STRIP.AUTO: 6.5 [PH] (ref 5–8)
PH UR STRIP.AUTO: 7 [PH] (ref 5–8)
PH UR STRIP.AUTO: 8 [PH] (ref 5–8)
PH, TEMP CORRECTED: ABNORMAL
PHOSPHATE SERPL-MCNC: 1.1 MG/DL (ref 2.5–4.5)
PHOSPHATE SERPL-MCNC: 1.4 MG/DL (ref 2.5–4.5)
PHOSPHATE SERPL-MCNC: 1.4 MG/DL (ref 2.5–4.5)
PHOSPHATE SERPL-MCNC: 1.5 MG/DL (ref 2.5–4.5)
PHOSPHATE SERPL-MCNC: 1.7 MG/DL (ref 2.5–4.5)
PHOSPHATE SERPL-MCNC: 1.7 MG/DL (ref 2.5–4.5)
PHOSPHATE SERPL-MCNC: 1.8 MG/DL (ref 2.5–4.5)
PHOSPHATE SERPL-MCNC: 2 MG/DL (ref 2.5–4.5)
PHOSPHATE SERPL-MCNC: 2.1 MG/DL (ref 2.5–4.5)
PHOSPHATE SERPL-MCNC: 2.2 MG/DL (ref 2.5–4.5)
PHOSPHATE SERPL-MCNC: 2.3 MG/DL (ref 2.5–4.5)
PHOSPHATE SERPL-MCNC: 2.6 MG/DL (ref 2.5–4.5)
PHOSPHATE SERPL-MCNC: 2.8 MG/DL (ref 2.5–4.5)
PHOSPHATE SERPL-MCNC: 2.9 MG/DL (ref 2.5–4.5)
PLATELET # BLD AUTO: 207 10*3/MM3 (ref 140–450)
PLATELET # BLD AUTO: 212 10*3/MM3 (ref 140–450)
PLATELET # BLD AUTO: 214 10*3/MM3 (ref 140–450)
PLATELET # BLD AUTO: 219 10*3/MM3 (ref 140–450)
PLATELET # BLD AUTO: 236 10*3/MM3 (ref 140–450)
PLATELET # BLD AUTO: 241 10*3/MM3 (ref 140–450)
PLATELET # BLD AUTO: 242 10*3/MM3 (ref 140–450)
PLATELET # BLD AUTO: 247 10*3/MM3 (ref 140–450)
PLATELET # BLD AUTO: 247 10*3/MM3 (ref 140–450)
PLATELET # BLD AUTO: 250 10*3/MM3 (ref 140–450)
PLATELET # BLD AUTO: 251 10*3/MM3 (ref 140–450)
PLATELET # BLD AUTO: 255 10*3/MM3 (ref 140–450)
PLATELET # BLD AUTO: 261 10*3/MM3 (ref 140–450)
PLATELET # BLD AUTO: 385 10*3/MM3 (ref 140–450)
PMV BLD AUTO: 10 FL (ref 6–12)
PMV BLD AUTO: 10.3 FL (ref 6–12)
PMV BLD AUTO: 10.4 FL (ref 6–12)
PMV BLD AUTO: 10.5 FL (ref 6–12)
PMV BLD AUTO: 10.5 FL (ref 6–12)
PMV BLD AUTO: 10.6 FL (ref 6–12)
PMV BLD AUTO: 10.7 FL (ref 6–12)
PMV BLD AUTO: 10.8 FL (ref 6–12)
PMV BLD AUTO: 10.8 FL (ref 6–12)
PMV BLD AUTO: 11 FL (ref 6–12)
PMV BLD AUTO: 11.2 FL (ref 6–12)
PO2 BLDA: 73.2 MM HG (ref 83–108)
PO2 TEMP ADJ BLD: ABNORMAL MM[HG]
POTASSIUM SERPL-SCNC: 1.9 MMOL/L (ref 3.5–5.2)
POTASSIUM SERPL-SCNC: 2 MMOL/L (ref 3.5–5.2)
POTASSIUM SERPL-SCNC: 2.3 MMOL/L (ref 3.5–5.2)
POTASSIUM SERPL-SCNC: 2.3 MMOL/L (ref 3.5–5.2)
POTASSIUM SERPL-SCNC: 2.4 MMOL/L (ref 3.5–5.2)
POTASSIUM SERPL-SCNC: 2.5 MMOL/L (ref 3.5–5.2)
POTASSIUM SERPL-SCNC: 2.7 MMOL/L (ref 3.5–5.2)
POTASSIUM SERPL-SCNC: 3 MMOL/L (ref 3.5–5.2)
POTASSIUM SERPL-SCNC: 3.1 MMOL/L (ref 3.5–5.2)
POTASSIUM SERPL-SCNC: 3.2 MMOL/L (ref 3.5–5.2)
POTASSIUM SERPL-SCNC: 3.3 MMOL/L (ref 3.5–5.2)
POTASSIUM SERPL-SCNC: 3.3 MMOL/L (ref 3.5–5.2)
POTASSIUM SERPL-SCNC: 3.4 MMOL/L (ref 3.5–5.2)
POTASSIUM SERPL-SCNC: 3.5 MMOL/L (ref 3.5–5.2)
POTASSIUM SERPL-SCNC: 3.6 MMOL/L (ref 3.5–5.2)
POTASSIUM SERPL-SCNC: 4 MMOL/L (ref 3.5–5.2)
POTASSIUM SERPL-SCNC: 4.2 MMOL/L (ref 3.5–5.2)
POTASSIUM SERPL-SCNC: 4.2 MMOL/L (ref 3.5–5.2)
POTASSIUM SERPL-SCNC: 4.3 MMOL/L (ref 3.5–5.2)
POTASSIUM SERPL-SCNC: 4.4 MMOL/L (ref 3.5–5.2)
POTASSIUM SERPL-SCNC: 4.5 MMOL/L (ref 3.5–5.2)
POTASSIUM SERPL-SCNC: 4.6 MMOL/L (ref 3.5–5.2)
POTASSIUM SERPL-SCNC: 4.9 MMOL/L (ref 3.5–5.2)
PROPOXYPH UR QL: NEGATIVE
PROT ?TM UR-MCNC: 44.5 MG/DL
PROT SERPL-MCNC: 5.5 G/DL (ref 6–8.5)
PROT SERPL-MCNC: 5.5 G/DL (ref 6–8.5)
PROT SERPL-MCNC: 5.7 G/DL (ref 6–8.5)
PROT SERPL-MCNC: 5.9 G/DL (ref 6–8.5)
PROT SERPL-MCNC: 5.9 G/DL (ref 6–8.5)
PROT SERPL-MCNC: 6 G/DL (ref 6–8.5)
PROT SERPL-MCNC: 6.1 G/DL (ref 6–8.5)
PROT SERPL-MCNC: 6.3 G/DL (ref 6–8.5)
PROT SERPL-MCNC: 6.6 G/DL (ref 6–8.5)
PROT SERPL-MCNC: 6.6 G/DL (ref 6–8.5)
PROT SERPL-MCNC: 6.8 G/DL (ref 6–8.5)
PROT SERPL-MCNC: 6.9 G/DL (ref 6–8.5)
PROT SERPL-MCNC: 7.2 G/DL (ref 6–8.5)
PROT SERPL-MCNC: 7.5 G/DL (ref 6–8.5)
PROT UR QL STRIP: ABNORMAL
PROT UR QL STRIP: ABNORMAL
PROT UR QL STRIP: NEGATIVE
PROT UR-MCNC: 18 MG/DL
PROT UR-MCNC: 20 MG/DL
PROT/CREAT UR: 166.7 MG/G CREA (ref 0–200)
PROT/CREAT UR: 167.5 MG/G CREA (ref 0–200)
PROT/CREAT UR: 199.2 MG/G CREA (ref 0–200)
PTH RELATED PROT SERPL-SCNC: <2 PMOL/L
PTH RELATED PROT SERPL-SCNC: <2 PMOL/L
PTH-INTACT SERPL-MCNC: 106.9 PG/ML (ref 15–65)
PTH-INTACT SERPL-MCNC: 34.4 PG/ML (ref 15–65)
PTH-INTACT SERPL-MCNC: 555.7 PG/ML (ref 15–65)
QT INTERVAL: 412 MS
QT INTERVAL: 428 MS
QT INTERVAL: 430 MS
QT INTERVAL: 436 MS
QT INTERVAL: 444 MS
QT INTERVAL: 446 MS
QT INTERVAL: 452 MS
QT INTERVAL: 458 MS
QTC INTERVAL: 441 MS
QTC INTERVAL: 449 MS
QTC INTERVAL: 464 MS
QTC INTERVAL: 469 MS
QTC INTERVAL: 470 MS
QTC INTERVAL: 481 MS
QTC INTERVAL: 512 MS
QTC INTERVAL: 514 MS
RBC # BLD AUTO: 2.86 10*6/MM3 (ref 3.77–5.28)
RBC # BLD AUTO: 3.02 10*6/MM3 (ref 3.77–5.28)
RBC # BLD AUTO: 3.13 10*6/MM3 (ref 3.77–5.28)
RBC # BLD AUTO: 3.14 10*6/MM3 (ref 3.77–5.28)
RBC # BLD AUTO: 3.21 10*6/MM3 (ref 3.77–5.28)
RBC # BLD AUTO: 3.22 10*6/MM3 (ref 3.77–5.28)
RBC # BLD AUTO: 3.27 10*6/MM3 (ref 3.77–5.28)
RBC # BLD AUTO: 3.43 10*6/MM3 (ref 3.77–5.28)
RBC # BLD AUTO: 3.45 10*6/MM3 (ref 3.77–5.28)
RBC # BLD AUTO: 3.52 10*6/MM3 (ref 3.77–5.28)
RBC # BLD AUTO: 3.77 10*6/MM3 (ref 3.77–5.28)
RBC # BLD AUTO: 3.86 10*6/MM3 (ref 3.77–5.28)
RBC # BLD AUTO: 4.34 10*6/MM3 (ref 3.77–5.28)
RBC # BLD AUTO: 4.41 10*6/MM3 (ref 3.77–5.28)
RBC # UR STRIP: ABNORMAL /HPF
RBC # UR STRIP: ABNORMAL /HPF
RBC # UR: ABNORMAL /HPF
REF LAB TEST METHOD: ABNORMAL
S AUREUS DNA SPEC QL NAA+PROBE: NEGATIVE
SAO2 % BLDCOA: 96.1 % (ref 94–99)
SARS-COV-2 RNA PNL SPEC NAA+PROBE: NOT DETECTED
SARS-COV-2 RNA RESP QL NAA+PROBE: NOT DETECTED
SARS-COV-2 RNA RESP QL NAA+PROBE: NOT DETECTED
SODIUM SERPL-SCNC: 136 MMOL/L (ref 136–145)
SODIUM SERPL-SCNC: 136 MMOL/L (ref 136–145)
SODIUM SERPL-SCNC: 137 MMOL/L (ref 136–145)
SODIUM SERPL-SCNC: 138 MMOL/L (ref 136–145)
SODIUM SERPL-SCNC: 139 MMOL/L (ref 136–145)
SODIUM SERPL-SCNC: 139 MMOL/L (ref 136–145)
SODIUM SERPL-SCNC: 140 MMOL/L (ref 136–145)
SODIUM SERPL-SCNC: 140 MMOL/L (ref 136–145)
SODIUM SERPL-SCNC: 141 MMOL/L (ref 136–145)
SODIUM SERPL-SCNC: 142 MMOL/L (ref 136–145)
SODIUM SERPL-SCNC: 143 MMOL/L (ref 136–145)
SODIUM SERPL-SCNC: 145 MMOL/L (ref 136–145)
SODIUM SERPL-SCNC: 145 MMOL/L (ref 136–145)
SP GR UR STRIP: 1.01 (ref 1–1.03)
SP GR UR STRIP: 1.02 (ref 1–1.03)
SP GR UR STRIP: <=1.005 (ref 1–1.03)
SQUAMOUS #/AREA URNS HPF: ABNORMAL /HPF
STRESS TARGET HR: 115 BPM
TIBC SERPL-MCNC: 188 MCG/DL (ref 298–536)
TRANSFERRIN SERPL-MCNC: 126 MG/DL (ref 200–360)
TRICYCLICS UR QL SCN: NEGATIVE
TROPONIN T SERPL-MCNC: 0.02 NG/ML (ref 0–0.03)
TROPONIN T SERPL-MCNC: 0.02 NG/ML (ref 0–0.03)
TROPONIN T SERPL-MCNC: 0.22 NG/ML (ref 0–0.03)
TROPONIN T SERPL-MCNC: 0.23 NG/ML (ref 0–0.03)
TROPONIN T SERPL-MCNC: 0.26 NG/ML (ref 0–0.03)
TROPONIN T SERPL-MCNC: 0.26 NG/ML (ref 0–0.03)
TROPONIN T SERPL-MCNC: 0.32 NG/ML (ref 0–0.03)
TROPONIN T SERPL-MCNC: <0.01 NG/ML (ref 0–0.03)
TSH SERPL DL<=0.05 MIU/L-ACNC: 3.07 UIU/ML (ref 0.27–4.2)
UROBILINOGEN UR QL STRIP: ABNORMAL
VANCOMYCIN SERPL-MCNC: 14.4 MCG/ML (ref 5–40)
VANCOMYCIN SERPL-MCNC: 15.8 MCG/ML (ref 5–40)
VANCOMYCIN SERPL-MCNC: 18.5 MCG/ML (ref 5–40)
VANCOMYCIN SERPL-MCNC: 23.9 MCG/ML (ref 5–40)
VENTILATOR MODE: ABNORMAL
WBC # BLD AUTO: 8.77 10*3/MM3 (ref 3.4–10.8)
WBC # UR STRIP: ABNORMAL /HPF
WBC # UR STRIP: ABNORMAL /HPF
WBC NRBC COR # BLD: 11.92 10*3/MM3 (ref 3.4–10.8)
WBC NRBC COR # BLD: 12.19 10*3/MM3 (ref 3.4–10.8)
WBC NRBC COR # BLD: 12.23 10*3/MM3 (ref 3.4–10.8)
WBC NRBC COR # BLD: 12.47 10*3/MM3 (ref 3.4–10.8)
WBC NRBC COR # BLD: 12.77 10*3/MM3 (ref 3.4–10.8)
WBC NRBC COR # BLD: 12.92 10*3/MM3 (ref 3.4–10.8)
WBC NRBC COR # BLD: 13.08 10*3/MM3 (ref 3.4–10.8)
WBC NRBC COR # BLD: 13.93 10*3/MM3 (ref 3.4–10.8)
WBC NRBC COR # BLD: 15.95 10*3/MM3 (ref 3.4–10.8)
WBC NRBC COR # BLD: 16.34 10*3/MM3 (ref 3.4–10.8)
WBC NRBC COR # BLD: 17.63 10*3/MM3 (ref 3.4–10.8)
WBC NRBC COR # BLD: 8.91 10*3/MM3 (ref 3.4–10.8)
WBC NRBC COR # BLD: 9.53 10*3/MM3 (ref 3.4–10.8)
WBC UR QL AUTO: ABNORMAL /HPF
WHOLE BLOOD HOLD SPECIMEN: NORMAL
WHOLE BLOOD HOLD SPECIMEN: NORMAL

## 2021-01-01 PROCEDURE — 87086 URINE CULTURE/COLONY COUNT: CPT | Performed by: PHYSICIAN ASSISTANT

## 2021-01-01 PROCEDURE — 83735 ASSAY OF MAGNESIUM: CPT | Performed by: HOSPITALIST

## 2021-01-01 PROCEDURE — 99283 EMERGENCY DEPT VISIT LOW MDM: CPT

## 2021-01-01 PROCEDURE — 63710000001 PROMETHAZINE PER 12.5 MG: Performed by: HOSPITALIST

## 2021-01-01 PROCEDURE — 97530 THERAPEUTIC ACTIVITIES: CPT

## 2021-01-01 PROCEDURE — 84484 ASSAY OF TROPONIN QUANT: CPT | Performed by: PHYSICIAN ASSISTANT

## 2021-01-01 PROCEDURE — 83605 ASSAY OF LACTIC ACID: CPT | Performed by: PHYSICIAN ASSISTANT

## 2021-01-01 PROCEDURE — 25010000002 SODIUM CHLORIDE 0.9 % WITH KCL 20 MEQ 20-0.9 MEQ/L-% SOLUTION: Performed by: HOSPITALIST

## 2021-01-01 PROCEDURE — 87040 BLOOD CULTURE FOR BACTERIA: CPT | Performed by: PHYSICIAN ASSISTANT

## 2021-01-01 PROCEDURE — 97110 THERAPEUTIC EXERCISES: CPT

## 2021-01-01 PROCEDURE — 99223 1ST HOSP IP/OBS HIGH 75: CPT | Performed by: HOSPITALIST

## 2021-01-01 PROCEDURE — 25010000002 MAGNESIUM SULFATE 2 GM/50ML SOLUTION: Performed by: HOSPITALIST

## 2021-01-01 PROCEDURE — 84132 ASSAY OF SERUM POTASSIUM: CPT | Performed by: HOSPITALIST

## 2021-01-01 PROCEDURE — 93005 ELECTROCARDIOGRAM TRACING: CPT | Performed by: INTERNAL MEDICINE

## 2021-01-01 PROCEDURE — C9803 HOPD COVID-19 SPEC COLLECT: HCPCS

## 2021-01-01 PROCEDURE — G0279 TOMOSYNTHESIS, MAMMO: HCPCS

## 2021-01-01 PROCEDURE — 82652 VIT D 1 25-DIHYDROXY: CPT | Performed by: INTERNAL MEDICINE

## 2021-01-01 PROCEDURE — 80306 DRUG TEST PRSMV INSTRMNT: CPT | Performed by: PHYSICIAN ASSISTANT

## 2021-01-01 PROCEDURE — 80053 COMPREHEN METABOLIC PANEL: CPT | Performed by: INTERNAL MEDICINE

## 2021-01-01 PROCEDURE — 80053 COMPREHEN METABOLIC PANEL: CPT | Performed by: HOSPITALIST

## 2021-01-01 PROCEDURE — 99233 SBSQ HOSP IP/OBS HIGH 50: CPT | Performed by: INTERNAL MEDICINE

## 2021-01-01 PROCEDURE — 99232 SBSQ HOSP IP/OBS MODERATE 35: CPT | Performed by: HOSPITALIST

## 2021-01-01 PROCEDURE — 71045 X-RAY EXAM CHEST 1 VIEW: CPT

## 2021-01-01 PROCEDURE — 84100 ASSAY OF PHOSPHORUS: CPT | Performed by: INTERNAL MEDICINE

## 2021-01-01 PROCEDURE — 82397 CHEMILUMINESCENT ASSAY: CPT | Performed by: INTERNAL MEDICINE

## 2021-01-01 PROCEDURE — 80053 COMPREHEN METABOLIC PANEL: CPT | Performed by: PHYSICIAN ASSISTANT

## 2021-01-01 PROCEDURE — 25010000002 PIPERACILLIN SOD-TAZOBACTAM PER 1 G: Performed by: HOSPITALIST

## 2021-01-01 PROCEDURE — 81001 URINALYSIS AUTO W/SCOPE: CPT

## 2021-01-01 PROCEDURE — 70450 CT HEAD/BRAIN W/O DYE: CPT | Performed by: RADIOLOGY

## 2021-01-01 PROCEDURE — 82375 ASSAY CARBOXYHB QUANT: CPT

## 2021-01-01 PROCEDURE — 85652 RBC SED RATE AUTOMATED: CPT | Performed by: PHYSICIAN ASSISTANT

## 2021-01-01 PROCEDURE — 93005 ELECTROCARDIOGRAM TRACING: CPT | Performed by: STUDENT IN AN ORGANIZED HEALTH CARE EDUCATION/TRAINING PROGRAM

## 2021-01-01 PROCEDURE — 81001 URINALYSIS AUTO W/SCOPE: CPT | Performed by: PHYSICIAN ASSISTANT

## 2021-01-01 PROCEDURE — 86140 C-REACTIVE PROTEIN: CPT | Performed by: PHYSICIAN ASSISTANT

## 2021-01-01 PROCEDURE — 99232 SBSQ HOSP IP/OBS MODERATE 35: CPT | Performed by: INTERNAL MEDICINE

## 2021-01-01 PROCEDURE — 84100 ASSAY OF PHOSPHORUS: CPT | Performed by: HOSPITALIST

## 2021-01-01 PROCEDURE — 93010 ELECTROCARDIOGRAM REPORT: CPT | Performed by: INTERNAL MEDICINE

## 2021-01-01 PROCEDURE — 77066 DX MAMMO INCL CAD BI: CPT | Performed by: RADIOLOGY

## 2021-01-01 PROCEDURE — 84156 ASSAY OF PROTEIN URINE: CPT

## 2021-01-01 PROCEDURE — 99204 OFFICE O/P NEW MOD 45 MIN: CPT | Performed by: THORACIC SURGERY (CARDIOTHORACIC VASCULAR SURGERY)

## 2021-01-01 PROCEDURE — 85025 COMPLETE CBC W/AUTO DIFF WBC: CPT | Performed by: HOSPITALIST

## 2021-01-01 PROCEDURE — 25010000002 VANCOMYCIN 5 G RECONSTITUTED SOLUTION: Performed by: PHYSICIAN ASSISTANT

## 2021-01-01 PROCEDURE — 99239 HOSP IP/OBS DSCHRG MGMT >30: CPT | Performed by: HOSPITALIST

## 2021-01-01 PROCEDURE — 93005 ELECTROCARDIOGRAM TRACING: CPT | Performed by: HOSPITALIST

## 2021-01-01 PROCEDURE — G0279 TOMOSYNTHESIS, MAMMO: HCPCS | Performed by: RADIOLOGY

## 2021-01-01 PROCEDURE — 25010000002 MAGNESIUM SULFATE IN D5W 1G/100ML (PREMIX) 1-5 GM/100ML-% SOLUTION: Performed by: INTERNAL MEDICINE

## 2021-01-01 PROCEDURE — 80053 COMPREHEN METABOLIC PANEL: CPT

## 2021-01-01 PROCEDURE — 36415 COLL VENOUS BLD VENIPUNCTURE: CPT

## 2021-01-01 PROCEDURE — 71045 X-RAY EXAM CHEST 1 VIEW: CPT | Performed by: RADIOLOGY

## 2021-01-01 PROCEDURE — U0005 INFEC AGEN DETEC AMPLI PROBE: HCPCS

## 2021-01-01 PROCEDURE — 63710000001 PROMETHAZINE PER 12.5 MG: Performed by: INTERNAL MEDICINE

## 2021-01-01 PROCEDURE — 74176 CT ABD & PELVIS W/O CONTRAST: CPT

## 2021-01-01 PROCEDURE — 0 POTASSIUM CHLORIDE 10 MEQ/100ML SOLUTION: Performed by: HOSPITALIST

## 2021-01-01 PROCEDURE — 82570 ASSAY OF URINE CREATININE: CPT

## 2021-01-01 PROCEDURE — 87493 C DIFF AMPLIFIED PROBE: CPT | Performed by: INTERNAL MEDICINE

## 2021-01-01 PROCEDURE — 25010000002 CALCIUM GLUCONATE PER 10 ML: Performed by: INTERNAL MEDICINE

## 2021-01-01 PROCEDURE — 82043 UR ALBUMIN QUANTITATIVE: CPT

## 2021-01-01 PROCEDURE — 87635 SARS-COV-2 COVID-19 AMP PRB: CPT | Performed by: HOSPITALIST

## 2021-01-01 PROCEDURE — 94799 UNLISTED PULMONARY SVC/PX: CPT

## 2021-01-01 PROCEDURE — 80048 BASIC METABOLIC PNL TOTAL CA: CPT | Performed by: HOSPITALIST

## 2021-01-01 PROCEDURE — 99214 OFFICE O/P EST MOD 30 MIN: CPT | Performed by: INTERNAL MEDICINE

## 2021-01-01 PROCEDURE — 63710000001 CALCITONIN PER 400 UNITS: Performed by: STUDENT IN AN ORGANIZED HEALTH CARE EDUCATION/TRAINING PROGRAM

## 2021-01-01 PROCEDURE — 99213 OFFICE O/P EST LOW 20 MIN: CPT | Performed by: NURSE PRACTITIONER

## 2021-01-01 PROCEDURE — 25010000002 FUROSEMIDE PER 20 MG: Performed by: INTERNAL MEDICINE

## 2021-01-01 PROCEDURE — 84132 ASSAY OF SERUM POTASSIUM: CPT | Performed by: INTERNAL MEDICINE

## 2021-01-01 PROCEDURE — 87636 SARSCOV2 & INF A&B AMP PRB: CPT | Performed by: STUDENT IN AN ORGANIZED HEALTH CARE EDUCATION/TRAINING PROGRAM

## 2021-01-01 PROCEDURE — 63710000001 CALCITONIN PER 400 UNITS: Performed by: EMERGENCY MEDICINE

## 2021-01-01 PROCEDURE — 85025 COMPLETE CBC W/AUTO DIFF WBC: CPT | Performed by: INTERNAL MEDICINE

## 2021-01-01 PROCEDURE — 74176 CT ABD & PELVIS W/O CONTRAST: CPT | Performed by: RADIOLOGY

## 2021-01-01 PROCEDURE — 82962 GLUCOSE BLOOD TEST: CPT

## 2021-01-01 PROCEDURE — 85025 COMPLETE CBC W/AUTO DIFF WBC: CPT | Performed by: PHYSICIAN ASSISTANT

## 2021-01-01 PROCEDURE — 25010000002 PIPERACILLIN SOD-TAZOBACTAM PER 1 G: Performed by: INTERNAL MEDICINE

## 2021-01-01 PROCEDURE — 83735 ASSAY OF MAGNESIUM: CPT | Performed by: INTERNAL MEDICINE

## 2021-01-01 PROCEDURE — 83690 ASSAY OF LIPASE: CPT | Performed by: HOSPITALIST

## 2021-01-01 PROCEDURE — 77066 DX MAMMO INCL CAD BI: CPT

## 2021-01-01 PROCEDURE — 80202 ASSAY OF VANCOMYCIN: CPT

## 2021-01-01 PROCEDURE — 82397 CHEMILUMINESCENT ASSAY: CPT

## 2021-01-01 PROCEDURE — 82330 ASSAY OF CALCIUM: CPT | Performed by: INTERNAL MEDICINE

## 2021-01-01 PROCEDURE — 96372 THER/PROPH/DIAG INJ SC/IM: CPT

## 2021-01-01 PROCEDURE — 25010000002 PIPERACILLIN SOD-TAZOBACTAM PER 1 G: Performed by: STUDENT IN AN ORGANIZED HEALTH CARE EDUCATION/TRAINING PROGRAM

## 2021-01-01 PROCEDURE — 25010000002 HEPARIN (PORCINE) PER 1000 UNITS: Performed by: HOSPITALIST

## 2021-01-01 PROCEDURE — 97166 OT EVAL MOD COMPLEX 45 MIN: CPT

## 2021-01-01 PROCEDURE — 80076 HEPATIC FUNCTION PANEL: CPT | Performed by: HOSPITALIST

## 2021-01-01 PROCEDURE — 84443 ASSAY THYROID STIM HORMONE: CPT | Performed by: PHYSICIAN ASSISTANT

## 2021-01-01 PROCEDURE — 25010000002 VANCOMYCIN 5 G RECONSTITUTED SOLUTION: Performed by: INTERNAL MEDICINE

## 2021-01-01 PROCEDURE — 82330 ASSAY OF CALCIUM: CPT | Performed by: EMERGENCY MEDICINE

## 2021-01-01 PROCEDURE — 80053 COMPREHEN METABOLIC PANEL: CPT | Performed by: STUDENT IN AN ORGANIZED HEALTH CARE EDUCATION/TRAINING PROGRAM

## 2021-01-01 PROCEDURE — 86140 C-REACTIVE PROTEIN: CPT | Performed by: HOSPITALIST

## 2021-01-01 PROCEDURE — 87640 STAPH A DNA AMP PROBE: CPT | Performed by: PHYSICIAN ASSISTANT

## 2021-01-01 PROCEDURE — 94726 PLETHYSMOGRAPHY LUNG VOLUMES: CPT

## 2021-01-01 PROCEDURE — 83050 HGB METHEMOGLOBIN QUAN: CPT

## 2021-01-01 PROCEDURE — 83970 ASSAY OF PARATHORMONE: CPT | Performed by: INTERNAL MEDICINE

## 2021-01-01 PROCEDURE — 25010000002 MAGNESIUM SULFATE 2 GM/50ML SOLUTION: Performed by: INTERNAL MEDICINE

## 2021-01-01 PROCEDURE — 71250 CT THORAX DX C-: CPT | Performed by: RADIOLOGY

## 2021-01-01 PROCEDURE — 25010000002 DENOSUMAB 60 MG/ML SOLUTION PREFILLED SYRINGE: Performed by: INTERNAL MEDICINE

## 2021-01-01 PROCEDURE — 93321 DOPPLER ECHO F-UP/LMTD STD: CPT

## 2021-01-01 PROCEDURE — 93005 ELECTROCARDIOGRAM TRACING: CPT | Performed by: PHYSICIAN ASSISTANT

## 2021-01-01 PROCEDURE — U0003 INFECTIOUS AGENT DETECTION BY NUCLEIC ACID (DNA OR RNA); SEVERE ACUTE RESPIRATORY SYNDROME CORONAVIRUS 2 (SARS-COV-2) (CORONAVIRUS DISEASE [COVID-19]), AMPLIFIED PROBE TECHNIQUE, MAKING USE OF HIGH THROUGHPUT TECHNOLOGIES AS DESCRIBED BY CMS-2020-01-R: HCPCS

## 2021-01-01 PROCEDURE — 84484 ASSAY OF TROPONIN QUANT: CPT | Performed by: STUDENT IN AN ORGANIZED HEALTH CARE EDUCATION/TRAINING PROGRAM

## 2021-01-01 PROCEDURE — 71250 CT THORAX DX C-: CPT

## 2021-01-01 PROCEDURE — 80048 BASIC METABOLIC PNL TOTAL CA: CPT | Performed by: INTERNAL MEDICINE

## 2021-01-01 PROCEDURE — 99232 SBSQ HOSP IP/OBS MODERATE 35: CPT | Performed by: SPECIALIST

## 2021-01-01 PROCEDURE — 80202 ASSAY OF VANCOMYCIN: CPT | Performed by: HOSPITALIST

## 2021-01-01 PROCEDURE — 99213 OFFICE O/P EST LOW 20 MIN: CPT | Performed by: SURGERY

## 2021-01-01 PROCEDURE — 0 POTASSIUM CHLORIDE 10 MEQ/100ML SOLUTION: Performed by: STUDENT IN AN ORGANIZED HEALTH CARE EDUCATION/TRAINING PROGRAM

## 2021-01-01 PROCEDURE — 83735 ASSAY OF MAGNESIUM: CPT | Performed by: EMERGENCY MEDICINE

## 2021-01-01 PROCEDURE — 82310 ASSAY OF CALCIUM: CPT | Performed by: INTERNAL MEDICINE

## 2021-01-01 PROCEDURE — 84100 ASSAY OF PHOSPHORUS: CPT

## 2021-01-01 PROCEDURE — 87641 MR-STAPH DNA AMP PROBE: CPT | Performed by: PHYSICIAN ASSISTANT

## 2021-01-01 PROCEDURE — 97116 GAIT TRAINING THERAPY: CPT

## 2021-01-01 PROCEDURE — 84484 ASSAY OF TROPONIN QUANT: CPT | Performed by: HOSPITALIST

## 2021-01-01 PROCEDURE — 25010000002 CALCIUM GLUCONATE-NACL 1-0.675 GM/50ML-% SOLUTION: Performed by: INTERNAL MEDICINE

## 2021-01-01 PROCEDURE — 83880 ASSAY OF NATRIURETIC PEPTIDE: CPT | Performed by: PHYSICIAN ASSISTANT

## 2021-01-01 PROCEDURE — 84100 ASSAY OF PHOSPHORUS: CPT | Performed by: EMERGENCY MEDICINE

## 2021-01-01 PROCEDURE — 93308 TTE F-UP OR LMTD: CPT

## 2021-01-01 PROCEDURE — 25010000002 MAGNESIUM SULFATE IN D5W 1G/100ML (PREMIX) 1-5 GM/100ML-% SOLUTION: Performed by: HOSPITALIST

## 2021-01-01 PROCEDURE — 82308 ASSAY OF CALCITONIN: CPT

## 2021-01-01 PROCEDURE — 82150 ASSAY OF AMYLASE: CPT | Performed by: STUDENT IN AN ORGANIZED HEALTH CARE EDUCATION/TRAINING PROGRAM

## 2021-01-01 PROCEDURE — 83880 ASSAY OF NATRIURETIC PEPTIDE: CPT | Performed by: NURSE PRACTITIONER

## 2021-01-01 PROCEDURE — 84484 ASSAY OF TROPONIN QUANT: CPT | Performed by: EMERGENCY MEDICINE

## 2021-01-01 PROCEDURE — 83970 ASSAY OF PARATHORMONE: CPT

## 2021-01-01 PROCEDURE — 99284 EMERGENCY DEPT VISIT MOD MDM: CPT

## 2021-01-01 PROCEDURE — 70450 CT HEAD/BRAIN W/O DYE: CPT

## 2021-01-01 PROCEDURE — 25010000002 MAGNESIUM SULFATE IN D5W 1G/100ML (PREMIX) 1-5 GM/100ML-% SOLUTION: Performed by: STUDENT IN AN ORGANIZED HEALTH CARE EDUCATION/TRAINING PROGRAM

## 2021-01-01 PROCEDURE — 82652 VIT D 1 25-DIHYDROXY: CPT

## 2021-01-01 PROCEDURE — 82805 BLOOD GASES W/O2 SATURATION: CPT

## 2021-01-01 PROCEDURE — 99233 SBSQ HOSP IP/OBS HIGH 50: CPT | Performed by: HOSPITALIST

## 2021-01-01 PROCEDURE — 83540 ASSAY OF IRON: CPT | Performed by: INTERNAL MEDICINE

## 2021-01-01 PROCEDURE — 83735 ASSAY OF MAGNESIUM: CPT | Performed by: STUDENT IN AN ORGANIZED HEALTH CARE EDUCATION/TRAINING PROGRAM

## 2021-01-01 PROCEDURE — 97161 PT EVAL LOW COMPLEX 20 MIN: CPT

## 2021-01-01 PROCEDURE — 83690 ASSAY OF LIPASE: CPT | Performed by: STUDENT IN AN ORGANIZED HEALTH CARE EDUCATION/TRAINING PROGRAM

## 2021-01-01 PROCEDURE — 84466 ASSAY OF TRANSFERRIN: CPT | Performed by: INTERNAL MEDICINE

## 2021-01-01 PROCEDURE — 0 MAGNESIUM SULFATE 4 GM/100ML SOLUTION: Performed by: HOSPITALIST

## 2021-01-01 PROCEDURE — 36600 WITHDRAWAL OF ARTERIAL BLOOD: CPT

## 2021-01-01 RX ORDER — FUROSEMIDE 20 MG/1
20 TABLET ORAL DAILY
Qty: 30 TABLET | Refills: 0 | Status: SHIPPED | OUTPATIENT
Start: 2021-01-01

## 2021-01-01 RX ORDER — POTASSIUM CHLORIDE 750 MG/1
10 TABLET, FILM COATED, EXTENDED RELEASE ORAL DAILY
Qty: 30 TABLET | Refills: 0 | Status: SHIPPED | OUTPATIENT
Start: 2021-01-01

## 2021-01-01 RX ORDER — LOPERAMIDE HYDROCHLORIDE 2 MG/1
2 CAPSULE ORAL 4 TIMES DAILY PRN
Status: DISCONTINUED | OUTPATIENT
Start: 2021-01-01 | End: 2021-01-01 | Stop reason: HOSPADM

## 2021-01-01 RX ORDER — AMIODARONE HYDROCHLORIDE 200 MG/1
200 TABLET ORAL
Qty: 30 TABLET | Refills: 3 | Status: SHIPPED | OUTPATIENT
Start: 2021-01-01

## 2021-01-01 RX ORDER — POTASSIUM CHLORIDE 20 MEQ/1
40 TABLET, EXTENDED RELEASE ORAL EVERY 4 HOURS
Status: COMPLETED | OUTPATIENT
Start: 2021-01-01 | End: 2021-01-01

## 2021-01-01 RX ORDER — CALCIUM CARBONATE 200(500)MG
1 TABLET,CHEWABLE ORAL DAILY
Status: CANCELLED | OUTPATIENT
Start: 2021-01-01

## 2021-01-01 RX ORDER — PROMETHAZINE HYDROCHLORIDE 12.5 MG/1
6.25 TABLET ORAL EVERY 6 HOURS PRN
Status: DISCONTINUED | OUTPATIENT
Start: 2021-01-01 | End: 2021-01-01 | Stop reason: HOSPADM

## 2021-01-01 RX ORDER — POTASSIUM CHLORIDE 7.45 MG/ML
10 INJECTION INTRAVENOUS
Status: COMPLETED | OUTPATIENT
Start: 2021-01-01 | End: 2021-01-01

## 2021-01-01 RX ORDER — PROMETHAZINE HYDROCHLORIDE 12.5 MG/1
12.5 TABLET ORAL EVERY 4 HOURS PRN
Status: CANCELLED | OUTPATIENT
Start: 2021-01-01

## 2021-01-01 RX ORDER — MAGNESIUM SULFATE HEPTAHYDRATE 40 MG/ML
2 INJECTION, SOLUTION INTRAVENOUS ONCE
Status: COMPLETED | OUTPATIENT
Start: 2021-01-01 | End: 2021-01-01

## 2021-01-01 RX ORDER — POTASSIUM CHLORIDE 20 MEQ/1
40 TABLET, EXTENDED RELEASE ORAL ONCE
Status: COMPLETED | OUTPATIENT
Start: 2021-01-01 | End: 2021-01-01

## 2021-01-01 RX ORDER — POTASSIUM CHLORIDE 7.45 MG/ML
10 INJECTION INTRAVENOUS
Status: DISPENSED | OUTPATIENT
Start: 2021-01-01 | End: 2021-01-01

## 2021-01-01 RX ORDER — POTASSIUM CHLORIDE 7.45 MG/ML
10 INJECTION INTRAVENOUS
Status: DISCONTINUED | OUTPATIENT
Start: 2021-01-01 | End: 2021-01-01 | Stop reason: HOSPADM

## 2021-01-01 RX ORDER — AMLODIPINE BESYLATE 10 MG/1
10 TABLET ORAL DAILY
Status: DISCONTINUED | OUTPATIENT
Start: 2021-01-01 | End: 2021-01-01

## 2021-01-01 RX ORDER — LOSARTAN POTASSIUM 50 MG/1
50 TABLET ORAL DAILY
Status: ON HOLD | COMMUNITY
End: 2021-01-01

## 2021-01-01 RX ORDER — CALCIUM CARBONATE 200(500)MG
1 TABLET,CHEWABLE ORAL DAILY
COMMUNITY
End: 2021-01-01 | Stop reason: HOSPADM

## 2021-01-01 RX ORDER — CALCITONIN SALMON 200 [USP'U]/ML
4 INJECTION, SOLUTION INTRAMUSCULAR; SUBCUTANEOUS ONCE
Status: COMPLETED | OUTPATIENT
Start: 2021-01-01 | End: 2021-01-01

## 2021-01-01 RX ORDER — CALCIUM GLUCONATE 20 MG/ML
1 INJECTION, SOLUTION INTRAVENOUS ONCE
Status: COMPLETED | OUTPATIENT
Start: 2021-01-01 | End: 2021-01-01

## 2021-01-01 RX ORDER — SODIUM CHLORIDE 0.9 % (FLUSH) 0.9 %
10 SYRINGE (ML) INJECTION AS NEEDED
Status: DISCONTINUED | OUTPATIENT
Start: 2021-01-01 | End: 2021-01-01 | Stop reason: HOSPADM

## 2021-01-01 RX ORDER — MAGNESIUM SULFATE HEPTAHYDRATE 40 MG/ML
2 INJECTION, SOLUTION INTRAVENOUS AS NEEDED
Status: DISCONTINUED | OUTPATIENT
Start: 2021-01-01 | End: 2021-01-01 | Stop reason: HOSPADM

## 2021-01-01 RX ORDER — POTASSIUM CHLORIDE 1.5 G/1.77G
40 POWDER, FOR SOLUTION ORAL AS NEEDED
Status: DISCONTINUED | OUTPATIENT
Start: 2021-01-01 | End: 2021-01-01 | Stop reason: HOSPADM

## 2021-01-01 RX ORDER — PANTOPRAZOLE SODIUM 40 MG/10ML
40 INJECTION, POWDER, LYOPHILIZED, FOR SOLUTION INTRAVENOUS
Status: DISCONTINUED | OUTPATIENT
Start: 2021-01-01 | End: 2021-01-01

## 2021-01-01 RX ORDER — POTASSIUM CHLORIDE 7.45 MG/ML
10 INJECTION INTRAVENOUS
Status: DISCONTINUED | OUTPATIENT
Start: 2021-01-01 | End: 2021-01-01

## 2021-01-01 RX ORDER — AMLODIPINE BESYLATE 5 MG/1
2.5 TABLET ORAL DAILY
Status: DISCONTINUED | OUTPATIENT
Start: 2021-01-01 | End: 2021-01-01

## 2021-01-01 RX ORDER — PANTOPRAZOLE SODIUM 40 MG/1
40 TABLET, DELAYED RELEASE ORAL
Qty: 30 TABLET | Refills: 3 | Status: SHIPPED | OUTPATIENT
Start: 2021-01-01

## 2021-01-01 RX ORDER — MAGNESIUM SULFATE HEPTAHYDRATE 40 MG/ML
4 INJECTION, SOLUTION INTRAVENOUS AS NEEDED
Status: DISCONTINUED | OUTPATIENT
Start: 2021-01-01 | End: 2021-01-01 | Stop reason: HOSPADM

## 2021-01-01 RX ORDER — MAGNESIUM SULFATE 1 G/100ML
1 INJECTION INTRAVENOUS
Status: COMPLETED | OUTPATIENT
Start: 2021-01-01 | End: 2021-01-01

## 2021-01-01 RX ORDER — AMLODIPINE BESYLATE 10 MG/1
10 TABLET ORAL DAILY
COMMUNITY
End: 2021-01-01

## 2021-01-01 RX ORDER — HEPARIN SODIUM 5000 [USP'U]/ML
5000 INJECTION, SOLUTION INTRAVENOUS; SUBCUTANEOUS EVERY 12 HOURS SCHEDULED
Status: DISCONTINUED | OUTPATIENT
Start: 2021-01-01 | End: 2021-01-01

## 2021-01-01 RX ORDER — ATENOLOL 50 MG/1
100 TABLET ORAL DAILY
Status: CANCELLED | OUTPATIENT
Start: 2021-01-01

## 2021-01-01 RX ORDER — MAGNESIUM SULFATE HEPTAHYDRATE 40 MG/ML
4 INJECTION, SOLUTION INTRAVENOUS ONCE
Status: COMPLETED | OUTPATIENT
Start: 2021-01-01 | End: 2021-01-01

## 2021-01-01 RX ORDER — PANTOPRAZOLE SODIUM 40 MG/1
40 TABLET, DELAYED RELEASE ORAL
Status: DISCONTINUED | OUTPATIENT
Start: 2021-01-01 | End: 2021-01-01 | Stop reason: HOSPADM

## 2021-01-01 RX ORDER — SODIUM CHLORIDE 0.9 % (FLUSH) 0.9 %
10 SYRINGE (ML) INJECTION EVERY 12 HOURS SCHEDULED
Status: DISCONTINUED | OUTPATIENT
Start: 2021-01-01 | End: 2021-01-01 | Stop reason: HOSPADM

## 2021-01-01 RX ORDER — MAGNESIUM SULFATE 1 G/100ML
1 INJECTION INTRAVENOUS ONCE
Status: COMPLETED | OUTPATIENT
Start: 2021-01-01 | End: 2021-01-01

## 2021-01-01 RX ORDER — MAGNESIUM SULFATE 1 G/100ML
1 INJECTION INTRAVENOUS AS NEEDED
Status: DISCONTINUED | OUTPATIENT
Start: 2021-01-01 | End: 2021-01-01 | Stop reason: HOSPADM

## 2021-01-01 RX ORDER — FUROSEMIDE 10 MG/ML
40 INJECTION INTRAMUSCULAR; INTRAVENOUS ONCE
Status: COMPLETED | OUTPATIENT
Start: 2021-01-01 | End: 2021-01-01

## 2021-01-01 RX ORDER — ESOMEPRAZOLE MAGNESIUM 40 MG/1
40 CAPSULE, DELAYED RELEASE ORAL
Status: ON HOLD | COMMUNITY
End: 2021-01-01

## 2021-01-01 RX ORDER — FUROSEMIDE 20 MG/1
20 TABLET ORAL DAILY
Status: DISCONTINUED | OUTPATIENT
Start: 2021-01-01 | End: 2021-01-01 | Stop reason: HOSPADM

## 2021-01-01 RX ORDER — AMIODARONE HYDROCHLORIDE 400 MG/1
400 TABLET ORAL
Qty: 8 TABLET | Refills: 0 | Status: SHIPPED | OUTPATIENT
Start: 2021-01-01 | End: 2021-01-01

## 2021-01-01 RX ORDER — MAGNESIUM SULFATE 1 G/100ML
1 INJECTION INTRAVENOUS
Status: DISPENSED | OUTPATIENT
Start: 2021-01-01 | End: 2021-01-01

## 2021-01-01 RX ORDER — POTASSIUM CHLORIDE 20 MEQ/1
40 TABLET, EXTENDED RELEASE ORAL AS NEEDED
Status: DISCONTINUED | OUTPATIENT
Start: 2021-01-01 | End: 2021-01-01 | Stop reason: HOSPADM

## 2021-01-01 RX ORDER — ANASTROZOLE 1 MG/1
1 TABLET ORAL DAILY
Status: DISCONTINUED | OUTPATIENT
Start: 2021-01-01 | End: 2021-01-01 | Stop reason: HOSPADM

## 2021-01-01 RX ORDER — ATENOLOL 50 MG/1
50 TABLET ORAL
Qty: 30 TABLET | Refills: 0 | Status: SHIPPED | OUTPATIENT
Start: 2021-01-01

## 2021-01-01 RX ORDER — ATENOLOL 50 MG/1
50 TABLET ORAL
Status: DISCONTINUED | OUTPATIENT
Start: 2021-01-01 | End: 2021-01-01 | Stop reason: HOSPADM

## 2021-01-01 RX ORDER — METHYLPREDNISOLONE SODIUM SUCCINATE 125 MG/2ML
80 INJECTION, POWDER, LYOPHILIZED, FOR SOLUTION INTRAMUSCULAR; INTRAVENOUS ONCE
Status: DISCONTINUED | OUTPATIENT
Start: 2021-01-01 | End: 2021-01-01

## 2021-01-01 RX ORDER — AMIODARONE HYDROCHLORIDE 200 MG/1
200 TABLET ORAL
Status: DISCONTINUED | OUTPATIENT
Start: 2021-01-01 | End: 2021-01-01 | Stop reason: HOSPADM

## 2021-01-01 RX ORDER — CHLORHEXIDINE GLUCONATE 500 MG/1
1 CLOTH TOPICAL EVERY 12 HOURS PRN
Status: CANCELLED | OUTPATIENT
Start: 2022-01-01

## 2021-01-01 RX ORDER — MAGNESIUM OXIDE 400 MG/1
400 TABLET ORAL DAILY
Qty: 30 TABLET | Refills: 0 | Status: SHIPPED | OUTPATIENT
Start: 2021-01-01

## 2021-01-01 RX ORDER — PROMETHAZINE HYDROCHLORIDE 12.5 MG/1
6.25 SUPPOSITORY RECTAL EVERY 6 HOURS PRN
Status: DISCONTINUED | OUTPATIENT
Start: 2021-01-01 | End: 2021-01-01 | Stop reason: HOSPADM

## 2021-01-01 RX ORDER — FUROSEMIDE 10 MG/ML
40 INJECTION INTRAMUSCULAR; INTRAVENOUS EVERY 12 HOURS
Status: DISCONTINUED | OUTPATIENT
Start: 2021-01-01 | End: 2021-01-01

## 2021-01-01 RX ORDER — HEPARIN SODIUM 5000 [USP'U]/ML
5000 INJECTION, SOLUTION INTRAVENOUS; SUBCUTANEOUS EVERY 8 HOURS SCHEDULED
Status: DISCONTINUED | OUTPATIENT
Start: 2021-01-01 | End: 2021-01-01

## 2021-01-01 RX ORDER — LOPERAMIDE HYDROCHLORIDE 2 MG/1
2 CAPSULE ORAL ONCE
Status: COMPLETED | OUTPATIENT
Start: 2021-01-01 | End: 2021-01-01

## 2021-01-01 RX ORDER — HYDRALAZINE HYDROCHLORIDE 20 MG/ML
10 INJECTION INTRAMUSCULAR; INTRAVENOUS EVERY 6 HOURS PRN
Status: DISCONTINUED | OUTPATIENT
Start: 2021-01-01 | End: 2021-01-01 | Stop reason: HOSPADM

## 2021-01-01 RX ORDER — PROMETHAZINE HYDROCHLORIDE 12.5 MG/1
12.5 TABLET ORAL EVERY 4 HOURS PRN
COMMUNITY
End: 2021-01-01 | Stop reason: HOSPADM

## 2021-01-01 RX ORDER — SODIUM CHLORIDE AND POTASSIUM CHLORIDE 150; 900 MG/100ML; MG/100ML
75 INJECTION, SOLUTION INTRAVENOUS CONTINUOUS
Status: DISCONTINUED | OUTPATIENT
Start: 2021-01-01 | End: 2021-01-01

## 2021-01-01 RX ORDER — POTASSIUM CHLORIDE 1.5 G/1.77G
40 POWDER, FOR SOLUTION ORAL EVERY 4 HOURS
Status: COMPLETED | OUTPATIENT
Start: 2021-01-01 | End: 2021-01-01

## 2021-01-01 RX ORDER — AMLODIPINE BESYLATE 5 MG/1
5 TABLET ORAL DAILY
Status: DISCONTINUED | OUTPATIENT
Start: 2021-01-01 | End: 2021-01-01

## 2021-01-01 RX ORDER — MAGNESIUM OXIDE 400 MG/1
400 TABLET ORAL DAILY
Qty: 14 TABLET | Refills: 0 | Status: SHIPPED | OUTPATIENT
Start: 2021-01-01 | End: 2021-01-01

## 2021-01-01 RX ORDER — AMIODARONE HYDROCHLORIDE 200 MG/1
400 TABLET ORAL
Status: DISCONTINUED | OUTPATIENT
Start: 2021-01-01 | End: 2021-01-01 | Stop reason: HOSPADM

## 2021-01-01 RX ADMIN — APIXABAN 2.5 MG: 2.5 TABLET, FILM COATED ORAL at 20:29

## 2021-01-01 RX ADMIN — PANTOPRAZOLE SODIUM 40 MG: 40 TABLET, DELAYED RELEASE ORAL at 05:23

## 2021-01-01 RX ADMIN — POTASSIUM PHOSPHATE, MONOBASIC AND POTASSIUM PHOSPHATE, DIBASIC 9 MMOL: 224; 236 INJECTION, SOLUTION, CONCENTRATE INTRAVENOUS at 12:10

## 2021-01-01 RX ADMIN — SODIUM CHLORIDE, PRESERVATIVE FREE 10 ML: 5 INJECTION INTRAVENOUS at 08:14

## 2021-01-01 RX ADMIN — ATENOLOL 50 MG: 50 TABLET ORAL at 09:04

## 2021-01-01 RX ADMIN — SODIUM CHLORIDE, PRESERVATIVE FREE 10 ML: 5 INJECTION INTRAVENOUS at 00:39

## 2021-01-01 RX ADMIN — POTASSIUM CHLORIDE 10 MEQ: 10 INJECTION, SOLUTION INTRAVENOUS at 15:30

## 2021-01-01 RX ADMIN — PROMETHAZINE HYDROCHLORIDE 6.25 MG: 12.5 TABLET ORAL at 19:27

## 2021-01-01 RX ADMIN — APIXABAN 2.5 MG: 2.5 TABLET, FILM COATED ORAL at 08:04

## 2021-01-01 RX ADMIN — POTASSIUM CHLORIDE 40 MEQ: 20 TABLET, EXTENDED RELEASE ORAL at 08:14

## 2021-01-01 RX ADMIN — APIXABAN 2.5 MG: 2.5 TABLET, FILM COATED ORAL at 20:41

## 2021-01-01 RX ADMIN — AMIODARONE HYDROCHLORIDE 400 MG: 200 TABLET ORAL at 09:15

## 2021-01-01 RX ADMIN — ATENOLOL 50 MG: 50 TABLET ORAL at 10:50

## 2021-01-01 RX ADMIN — POTASSIUM CHLORIDE 40 MEQ: 20 TABLET, EXTENDED RELEASE ORAL at 08:04

## 2021-01-01 RX ADMIN — SODIUM CHLORIDE, PRESERVATIVE FREE 10 ML: 5 INJECTION INTRAVENOUS at 23:27

## 2021-01-01 RX ADMIN — APIXABAN 2.5 MG: 2.5 TABLET, FILM COATED ORAL at 08:13

## 2021-01-01 RX ADMIN — MAGNESIUM SULFATE HEPTAHYDRATE 2 G: 40 INJECTION, SOLUTION INTRAVENOUS at 03:48

## 2021-01-01 RX ADMIN — POTASSIUM PHOSPHATE, MONOBASIC POTASSIUM PHOSPHATE, DIBASIC 9 MMOL: 224; 236 INJECTION, SOLUTION, CONCENTRATE INTRAVENOUS at 00:59

## 2021-01-01 RX ADMIN — MAGNESIUM SULFATE HEPTAHYDRATE 1 G: 1 INJECTION, SOLUTION INTRAVENOUS at 15:41

## 2021-01-01 RX ADMIN — CALCITONIN SALMON 342 UNITS: 200 INJECTION, SOLUTION INTRAMUSCULAR; SUBCUTANEOUS at 13:32

## 2021-01-01 RX ADMIN — AMIODARONE HYDROCHLORIDE 400 MG: 200 TABLET ORAL at 08:04

## 2021-01-01 RX ADMIN — SODIUM CHLORIDE, PRESERVATIVE FREE 10 ML: 5 INJECTION INTRAVENOUS at 19:27

## 2021-01-01 RX ADMIN — APIXABAN 2.5 MG: 2.5 TABLET, FILM COATED ORAL at 09:37

## 2021-01-01 RX ADMIN — LOPERAMIDE HYDROCHLORIDE 2 MG: 2 CAPSULE ORAL at 09:15

## 2021-01-01 RX ADMIN — SODIUM CHLORIDE, PRESERVATIVE FREE 10 ML: 5 INJECTION INTRAVENOUS at 08:49

## 2021-01-01 RX ADMIN — MAGNESIUM SULFATE 4 G: 4 INJECTION INTRAVENOUS at 13:36

## 2021-01-01 RX ADMIN — MAGNESIUM SULFATE HEPTAHYDRATE 2 G: 40 INJECTION, SOLUTION INTRAVENOUS at 08:03

## 2021-01-01 RX ADMIN — SODIUM CHLORIDE, PRESERVATIVE FREE 10 ML: 5 INJECTION INTRAVENOUS at 23:12

## 2021-01-01 RX ADMIN — DENOSUMAB 60 MG: 60 INJECTION SUBCUTANEOUS at 14:07

## 2021-01-01 RX ADMIN — POTASSIUM CHLORIDE 10 MEQ: 7.46 INJECTION, SOLUTION INTRAVENOUS at 03:49

## 2021-01-01 RX ADMIN — SODIUM CHLORIDE 1000 ML: 9 INJECTION, SOLUTION INTRAVENOUS at 13:08

## 2021-01-01 RX ADMIN — MAGNESIUM SULFATE 1 G: 1 INJECTION INTRAVENOUS at 23:04

## 2021-01-01 RX ADMIN — POTASSIUM CHLORIDE 10 MEQ: 10 INJECTION, SOLUTION INTRAVENOUS at 17:50

## 2021-01-01 RX ADMIN — NYSTATIN 500000 UNITS: 100000 SUSPENSION ORAL at 20:27

## 2021-01-01 RX ADMIN — SODIUM CHLORIDE, PRESERVATIVE FREE 10 ML: 5 INJECTION INTRAVENOUS at 19:51

## 2021-01-01 RX ADMIN — SODIUM CHLORIDE, PRESERVATIVE FREE 10 ML: 5 INJECTION INTRAVENOUS at 23:23

## 2021-01-01 RX ADMIN — PANTOPRAZOLE SODIUM 40 MG: 40 TABLET, DELAYED RELEASE ORAL at 05:38

## 2021-01-01 RX ADMIN — POTASSIUM CHLORIDE 40 MEQ: 1.5 POWDER, FOR SOLUTION ORAL at 04:53

## 2021-01-01 RX ADMIN — MAGNESIUM SULFATE 1 G: 1 INJECTION INTRAVENOUS at 21:15

## 2021-01-01 RX ADMIN — MAGNESIUM SULFATE HEPTAHYDRATE 2 G: 40 INJECTION, SOLUTION INTRAVENOUS at 03:57

## 2021-01-01 RX ADMIN — ANASTROZOLE 1 MG: 1 TABLET ORAL at 08:04

## 2021-01-01 RX ADMIN — POTASSIUM CHLORIDE AND SODIUM CHLORIDE 150 ML/HR: 900; 150 INJECTION, SOLUTION INTRAVENOUS at 15:49

## 2021-01-01 RX ADMIN — ANASTROZOLE 1 MG: 1 TABLET ORAL at 09:00

## 2021-01-01 RX ADMIN — MAGNESIUM SULFATE HEPTAHYDRATE 2 G: 40 INJECTION, SOLUTION INTRAVENOUS at 12:09

## 2021-01-01 RX ADMIN — PANTOPRAZOLE SODIUM 40 MG: 40 TABLET, DELAYED RELEASE ORAL at 06:06

## 2021-01-01 RX ADMIN — SODIUM CHLORIDE, PRESERVATIVE FREE 10 ML: 5 INJECTION INTRAVENOUS at 09:24

## 2021-01-01 RX ADMIN — CALCIUM GLUCONATE 6 G: 98 INJECTION, SOLUTION INTRAVENOUS at 20:27

## 2021-01-01 RX ADMIN — PROMETHAZINE HYDROCHLORIDE 6.25 MG: 12.5 SUPPOSITORY RECTAL at 20:40

## 2021-01-01 RX ADMIN — APIXABAN 2.5 MG: 2.5 TABLET, FILM COATED ORAL at 20:27

## 2021-01-01 RX ADMIN — PIPERACILLIN SODIUM AND TAZOBACTAM SODIUM 3.38 G: 3; .375 INJECTION, POWDER, LYOPHILIZED, FOR SOLUTION INTRAVENOUS at 17:25

## 2021-01-01 RX ADMIN — APIXABAN 2.5 MG: 2.5 TABLET, FILM COATED ORAL at 19:26

## 2021-01-01 RX ADMIN — CALCITONIN SALMON 342 UNITS: 200 INJECTION, SOLUTION INTRAMUSCULAR; SUBCUTANEOUS at 21:23

## 2021-01-01 RX ADMIN — ATENOLOL 50 MG: 50 TABLET ORAL at 09:00

## 2021-01-01 RX ADMIN — AMLODIPINE BESYLATE 5 MG: 5 TABLET ORAL at 09:32

## 2021-01-01 RX ADMIN — LOPERAMIDE HYDROCHLORIDE 2 MG: 2 CAPSULE ORAL at 17:24

## 2021-01-01 RX ADMIN — POTASSIUM CHLORIDE 10 MEQ: 7.46 INJECTION, SOLUTION INTRAVENOUS at 07:48

## 2021-01-01 RX ADMIN — PIPERACILLIN SODIUM AND TAZOBACTAM SODIUM 3.38 G: 3; .375 INJECTION, POWDER, LYOPHILIZED, FOR SOLUTION INTRAVENOUS at 11:46

## 2021-01-01 RX ADMIN — APIXABAN 2.5 MG: 2.5 TABLET, FILM COATED ORAL at 09:24

## 2021-01-01 RX ADMIN — FUROSEMIDE 20 MG: 20 TABLET ORAL at 08:04

## 2021-01-01 RX ADMIN — POTASSIUM CHLORIDE 10 MEQ: 10 INJECTION, SOLUTION INTRAVENOUS at 19:17

## 2021-01-01 RX ADMIN — AMLODIPINE BESYLATE 10 MG: 10 TABLET ORAL at 08:07

## 2021-01-01 RX ADMIN — NYSTATIN 500000 UNITS: 100000 SUSPENSION ORAL at 18:10

## 2021-01-01 RX ADMIN — CALCIUM GLUCONATE 1 G: 20 INJECTION, SOLUTION INTRAVENOUS at 17:23

## 2021-01-01 RX ADMIN — POTASSIUM CHLORIDE 10 MEQ: 7.46 INJECTION, SOLUTION INTRAVENOUS at 06:42

## 2021-01-01 RX ADMIN — POTASSIUM CHLORIDE AND SODIUM CHLORIDE 150 ML/HR: 900; 150 INJECTION, SOLUTION INTRAVENOUS at 07:32

## 2021-01-01 RX ADMIN — VANCOMYCIN HYDROCHLORIDE 1250 MG: 5 INJECTION, POWDER, LYOPHILIZED, FOR SOLUTION INTRAVENOUS at 16:33

## 2021-01-01 RX ADMIN — PROMETHAZINE HYDROCHLORIDE 6.25 MG: 12.5 TABLET ORAL at 00:39

## 2021-01-01 RX ADMIN — SODIUM CHLORIDE, PRESERVATIVE FREE 10 ML: 5 INJECTION INTRAVENOUS at 20:07

## 2021-01-01 RX ADMIN — PANTOPRAZOLE SODIUM 40 MG: 40 TABLET, DELAYED RELEASE ORAL at 05:00

## 2021-01-01 RX ADMIN — FUROSEMIDE 20 MG: 20 TABLET ORAL at 09:38

## 2021-01-01 RX ADMIN — FUROSEMIDE 40 MG: 10 INJECTION, SOLUTION INTRAMUSCULAR; INTRAVENOUS at 16:09

## 2021-01-01 RX ADMIN — PIPERACILLIN SODIUM AND TAZOBACTAM SODIUM 3.38 G: 3; .375 INJECTION, POWDER, LYOPHILIZED, FOR SOLUTION INTRAVENOUS at 08:36

## 2021-01-01 RX ADMIN — PIPERACILLIN SODIUM AND TAZOBACTAM SODIUM 3.38 G: 3; .375 INJECTION, POWDER, LYOPHILIZED, FOR SOLUTION INTRAVENOUS at 05:15

## 2021-01-01 RX ADMIN — SODIUM CHLORIDE, PRESERVATIVE FREE 10 ML: 5 INJECTION INTRAVENOUS at 09:01

## 2021-01-01 RX ADMIN — ATENOLOL 50 MG: 50 TABLET ORAL at 11:18

## 2021-01-01 RX ADMIN — LOPERAMIDE HYDROCHLORIDE 2 MG: 2 CAPSULE ORAL at 13:57

## 2021-01-01 RX ADMIN — FUROSEMIDE 20 MG: 20 TABLET ORAL at 09:16

## 2021-01-01 RX ADMIN — POTASSIUM PHOSPHATE, MONOBASIC POTASSIUM PHOSPHATE, DIBASIC 9 MMOL: 224; 236 INJECTION, SOLUTION, CONCENTRATE INTRAVENOUS at 08:36

## 2021-01-01 RX ADMIN — SODIUM CHLORIDE, PRESERVATIVE FREE 10 ML: 5 INJECTION INTRAVENOUS at 09:32

## 2021-01-01 RX ADMIN — POTASSIUM PHOSPHATE, MONOBASIC POTASSIUM PHOSPHATE, DIBASIC 15 MMOL: 224; 236 INJECTION, SOLUTION, CONCENTRATE INTRAVENOUS at 11:08

## 2021-01-01 RX ADMIN — VANCOMYCIN HYDROCHLORIDE 1500 MG: 5 INJECTION, POWDER, LYOPHILIZED, FOR SOLUTION INTRAVENOUS at 05:21

## 2021-01-01 RX ADMIN — AMLODIPINE BESYLATE 10 MG: 10 TABLET ORAL at 09:00

## 2021-01-01 RX ADMIN — ATENOLOL 50 MG: 50 TABLET ORAL at 08:04

## 2021-01-01 RX ADMIN — SODIUM CHLORIDE, PRESERVATIVE FREE 10 ML: 5 INJECTION INTRAVENOUS at 09:05

## 2021-01-01 RX ADMIN — APIXABAN 2.5 MG: 2.5 TABLET, FILM COATED ORAL at 08:32

## 2021-01-01 RX ADMIN — AMLODIPINE BESYLATE 5 MG: 5 TABLET ORAL at 08:13

## 2021-01-01 RX ADMIN — MAGNESIUM SULFATE HEPTAHYDRATE 2 G: 40 INJECTION, SOLUTION INTRAVENOUS at 05:15

## 2021-01-01 RX ADMIN — MAGNESIUM SULFATE HEPTAHYDRATE 1 G: 1 INJECTION, SOLUTION INTRAVENOUS at 05:00

## 2021-01-01 RX ADMIN — POTASSIUM CHLORIDE 40 MEQ: 20 TABLET, EXTENDED RELEASE ORAL at 17:14

## 2021-01-01 RX ADMIN — AMIODARONE HYDROCHLORIDE 400 MG: 200 TABLET ORAL at 08:48

## 2021-01-01 RX ADMIN — PANTOPRAZOLE SODIUM 40 MG: 40 TABLET, DELAYED RELEASE ORAL at 04:46

## 2021-01-01 RX ADMIN — PIPERACILLIN SODIUM AND TAZOBACTAM SODIUM 3.38 G: 3; .375 INJECTION, POWDER, LYOPHILIZED, FOR SOLUTION INTRAVENOUS at 05:21

## 2021-01-01 RX ADMIN — PIPERACILLIN SODIUM AND TAZOBACTAM SODIUM 3.38 G: 3; .375 INJECTION, POWDER, LYOPHILIZED, FOR SOLUTION INTRAVENOUS at 04:03

## 2021-01-01 RX ADMIN — MAGNESIUM SULFATE HEPTAHYDRATE 1 G: 1 INJECTION, SOLUTION INTRAVENOUS at 10:36

## 2021-01-01 RX ADMIN — PROMETHAZINE HYDROCHLORIDE 6.25 MG: 12.5 SUPPOSITORY RECTAL at 07:32

## 2021-01-01 RX ADMIN — SODIUM CHLORIDE, PRESERVATIVE FREE 10 ML: 5 INJECTION INTRAVENOUS at 09:16

## 2021-01-01 RX ADMIN — SODIUM CHLORIDE, PRESERVATIVE FREE 10 ML: 5 INJECTION INTRAVENOUS at 21:14

## 2021-01-01 RX ADMIN — ATENOLOL 50 MG: 50 TABLET ORAL at 08:32

## 2021-01-01 RX ADMIN — POTASSIUM PHOSPHATE, MONOBASIC POTASSIUM PHOSPHATE, DIBASIC 15 MMOL: 224; 236 INJECTION, SOLUTION, CONCENTRATE INTRAVENOUS at 22:48

## 2021-01-01 RX ADMIN — SODIUM CHLORIDE, PRESERVATIVE FREE 10 ML: 5 INJECTION INTRAVENOUS at 09:40

## 2021-01-01 RX ADMIN — SODIUM CHLORIDE, PRESERVATIVE FREE 10 ML: 5 INJECTION INTRAVENOUS at 08:32

## 2021-01-01 RX ADMIN — PANTOPRAZOLE SODIUM 40 MG: 40 TABLET, DELAYED RELEASE ORAL at 04:08

## 2021-01-01 RX ADMIN — SODIUM CHLORIDE, PRESERVATIVE FREE 10 ML: 5 INJECTION INTRAVENOUS at 08:05

## 2021-01-01 RX ADMIN — PIPERACILLIN SODIUM AND TAZOBACTAM SODIUM 3.38 G: 3; .375 INJECTION, POWDER, LYOPHILIZED, FOR SOLUTION INTRAVENOUS at 08:04

## 2021-01-01 RX ADMIN — POTASSIUM CHLORIDE 40 MEQ: 20 TABLET, EXTENDED RELEASE ORAL at 13:08

## 2021-01-01 RX ADMIN — ATENOLOL 50 MG: 50 TABLET ORAL at 09:23

## 2021-01-01 RX ADMIN — HEPARIN SODIUM 5000 UNITS: 5000 INJECTION INTRAVENOUS; SUBCUTANEOUS at 13:45

## 2021-01-01 RX ADMIN — APIXABAN 2.5 MG: 2.5 TABLET, FILM COATED ORAL at 20:25

## 2021-01-01 RX ADMIN — VANCOMYCIN HYDROCHLORIDE 1250 MG: 5 INJECTION, POWDER, LYOPHILIZED, FOR SOLUTION INTRAVENOUS at 13:34

## 2021-01-01 RX ADMIN — SODIUM CHLORIDE, PRESERVATIVE FREE 10 ML: 5 INJECTION INTRAVENOUS at 20:12

## 2021-01-01 RX ADMIN — PROMETHAZINE HYDROCHLORIDE 6.25 MG: 12.5 TABLET ORAL at 19:45

## 2021-01-01 RX ADMIN — SODIUM CHLORIDE, PRESERVATIVE FREE 10 ML: 5 INJECTION INTRAVENOUS at 09:38

## 2021-01-01 RX ADMIN — ATENOLOL 50 MG: 50 TABLET ORAL at 09:38

## 2021-01-01 RX ADMIN — HEPARIN SODIUM 5000 UNITS: 5000 INJECTION INTRAVENOUS; SUBCUTANEOUS at 23:04

## 2021-01-01 RX ADMIN — POTASSIUM CHLORIDE 40 MEQ: 1500 TABLET, EXTENDED RELEASE ORAL at 21:23

## 2021-01-01 RX ADMIN — ANASTROZOLE 1 MG: 1 TABLET ORAL at 08:14

## 2021-01-01 RX ADMIN — AMIODARONE HYDROCHLORIDE 400 MG: 200 TABLET ORAL at 09:37

## 2021-01-01 RX ADMIN — SODIUM CHLORIDE, PRESERVATIVE FREE 10 ML: 5 INJECTION INTRAVENOUS at 19:46

## 2021-01-01 RX ADMIN — HEPARIN SODIUM 5000 UNITS: 5000 INJECTION INTRAVENOUS; SUBCUTANEOUS at 21:32

## 2021-01-01 RX ADMIN — FUROSEMIDE 40 MG: 10 INJECTION, SOLUTION INTRAMUSCULAR; INTRAVENOUS at 17:41

## 2021-01-01 RX ADMIN — MAGNESIUM SULFATE HEPTAHYDRATE 1 G: 1 INJECTION, SOLUTION INTRAVENOUS at 09:51

## 2021-01-01 RX ADMIN — MAGNESIUM SULFATE HEPTAHYDRATE 1 G: 1 INJECTION, SOLUTION INTRAVENOUS at 04:04

## 2021-01-01 RX ADMIN — POTASSIUM CHLORIDE 10 MEQ: 7.46 INJECTION, SOLUTION INTRAVENOUS at 05:41

## 2021-01-01 RX ADMIN — POTASSIUM CHLORIDE 10 MEQ: 10 INJECTION, SOLUTION INTRAVENOUS at 16:40

## 2021-01-01 RX ADMIN — ANASTROZOLE 1 MG: 1 TABLET ORAL at 08:32

## 2021-01-01 RX ADMIN — PIPERACILLIN SODIUM AND TAZOBACTAM SODIUM 3.38 G: 3; .375 INJECTION, POWDER, LYOPHILIZED, FOR SOLUTION INTRAVENOUS at 23:24

## 2021-01-01 RX ADMIN — POTASSIUM CHLORIDE 10 MEQ: 10 INJECTION, SOLUTION INTRAVENOUS at 21:14

## 2021-01-01 RX ADMIN — APIXABAN 2.5 MG: 2.5 TABLET, FILM COATED ORAL at 09:15

## 2021-01-01 RX ADMIN — ANASTROZOLE 1 MG: 1 TABLET ORAL at 08:35

## 2021-01-01 RX ADMIN — PANTOPRAZOLE SODIUM 40 MG: 40 TABLET, DELAYED RELEASE ORAL at 05:15

## 2021-01-01 RX ADMIN — MAGNESIUM SULFATE HEPTAHYDRATE 1 G: 1 INJECTION, SOLUTION INTRAVENOUS at 06:02

## 2021-01-01 RX ADMIN — MAGNESIUM SULFATE 1 G: 1 INJECTION INTRAVENOUS at 22:12

## 2021-01-01 RX ADMIN — POTASSIUM CHLORIDE 40 MEQ: 20 TABLET, EXTENDED RELEASE ORAL at 08:48

## 2021-01-01 RX ADMIN — AMLODIPINE BESYLATE 10 MG: 10 TABLET ORAL at 09:04

## 2021-01-01 RX ADMIN — HEPARIN SODIUM 5000 UNITS: 5000 INJECTION INTRAVENOUS; SUBCUTANEOUS at 05:38

## 2021-01-01 RX ADMIN — SODIUM CHLORIDE, PRESERVATIVE FREE 10 ML: 5 INJECTION INTRAVENOUS at 08:08

## 2021-01-01 RX ADMIN — POTASSIUM CHLORIDE AND SODIUM CHLORIDE 150 ML/HR: 900; 150 INJECTION, SOLUTION INTRAVENOUS at 22:29

## 2021-01-01 RX ADMIN — PIPERACILLIN SODIUM AND TAZOBACTAM SODIUM 3.38 G: 3; .375 INJECTION, POWDER, LYOPHILIZED, FOR SOLUTION INTRAVENOUS at 17:14

## 2021-01-01 RX ADMIN — AMIODARONE HYDROCHLORIDE 400 MG: 200 TABLET ORAL at 10:50

## 2021-01-01 RX ADMIN — ANASTROZOLE 1 MG: 1 TABLET ORAL at 09:37

## 2021-01-01 RX ADMIN — PANTOPRAZOLE SODIUM 40 MG: 40 TABLET, DELAYED RELEASE ORAL at 05:04

## 2021-01-01 RX ADMIN — CALCIUM GLUCONATE 1 G: 20 INJECTION, SOLUTION INTRAVENOUS at 21:14

## 2021-01-01 RX ADMIN — POTASSIUM CHLORIDE 40 MEQ: 20 TABLET, EXTENDED RELEASE ORAL at 12:00

## 2021-01-01 RX ADMIN — NYSTATIN 500000 UNITS: 100000 SUSPENSION ORAL at 08:02

## 2021-01-01 RX ADMIN — SODIUM CHLORIDE, PRESERVATIVE FREE 10 ML: 5 INJECTION INTRAVENOUS at 20:25

## 2021-01-01 RX ADMIN — PIPERACILLIN SODIUM AND TAZOBACTAM SODIUM 3.38 G: 3; .375 INJECTION, POWDER, LYOPHILIZED, FOR SOLUTION INTRAVENOUS at 23:48

## 2021-01-01 RX ADMIN — MAGNESIUM SULFATE HEPTAHYDRATE 2 G: 40 INJECTION, SOLUTION INTRAVENOUS at 03:26

## 2021-01-01 RX ADMIN — SODIUM CHLORIDE, PRESERVATIVE FREE 10 ML: 5 INJECTION INTRAVENOUS at 20:29

## 2021-01-01 RX ADMIN — HEPARIN SODIUM 5000 UNITS: 5000 INJECTION INTRAVENOUS; SUBCUTANEOUS at 21:14

## 2021-01-01 RX ADMIN — POTASSIUM CHLORIDE 10 MEQ: 7.46 INJECTION, SOLUTION INTRAVENOUS at 03:54

## 2021-01-01 RX ADMIN — PANTOPRAZOLE SODIUM 40 MG: 40 INJECTION, POWDER, FOR SOLUTION INTRAVENOUS at 05:08

## 2021-01-01 RX ADMIN — APIXABAN 2.5 MG: 2.5 TABLET, FILM COATED ORAL at 19:51

## 2021-01-01 RX ADMIN — AMIODARONE HYDROCHLORIDE 400 MG: 200 TABLET ORAL at 12:11

## 2021-01-01 RX ADMIN — AMLODIPINE BESYLATE 5 MG: 5 TABLET ORAL at 08:32

## 2021-01-01 RX ADMIN — PROMETHAZINE HYDROCHLORIDE 6.25 MG: 12.5 TABLET ORAL at 22:56

## 2021-01-01 RX ADMIN — PIPERACILLIN SODIUM AND TAZOBACTAM SODIUM 3.38 G: 3; .375 INJECTION, POWDER, LYOPHILIZED, FOR SOLUTION INTRAVENOUS at 05:04

## 2021-01-01 RX ADMIN — ATENOLOL 50 MG: 50 TABLET ORAL at 09:32

## 2021-01-01 RX ADMIN — HEPARIN SODIUM 5000 UNITS: 5000 INJECTION INTRAVENOUS; SUBCUTANEOUS at 13:56

## 2021-01-01 RX ADMIN — APIXABAN 2.5 MG: 2.5 TABLET, FILM COATED ORAL at 14:55

## 2021-01-01 RX ADMIN — LOPERAMIDE HYDROCHLORIDE 2 MG: 2 CAPSULE ORAL at 20:07

## 2021-01-01 RX ADMIN — PIPERACILLIN SODIUM AND TAZOBACTAM SODIUM 3.38 G: 3; .375 INJECTION, POWDER, LYOPHILIZED, FOR SOLUTION INTRAVENOUS at 16:09

## 2021-01-01 RX ADMIN — POTASSIUM PHOSPHATE, MONOBASIC AND POTASSIUM PHOSPHATE, DIBASIC 15 MMOL: 224; 236 INJECTION, SOLUTION, CONCENTRATE INTRAVENOUS at 12:09

## 2021-01-01 RX ADMIN — POTASSIUM CHLORIDE 10 MEQ: 7.46 INJECTION, SOLUTION INTRAVENOUS at 10:30

## 2021-01-01 RX ADMIN — POTASSIUM CHLORIDE 10 MEQ: 7.46 INJECTION, SOLUTION INTRAVENOUS at 04:42

## 2021-01-01 RX ADMIN — APIXABAN 2.5 MG: 2.5 TABLET, FILM COATED ORAL at 09:32

## 2021-01-01 RX ADMIN — SODIUM CHLORIDE, PRESERVATIVE FREE 10 ML: 5 INJECTION INTRAVENOUS at 20:41

## 2021-01-01 RX ADMIN — POTASSIUM CHLORIDE 10 MEQ: 7.46 INJECTION, SOLUTION INTRAVENOUS at 05:22

## 2021-01-01 RX ADMIN — ATENOLOL 50 MG: 50 TABLET ORAL at 08:48

## 2021-01-01 RX ADMIN — PIPERACILLIN SODIUM AND TAZOBACTAM SODIUM 3.38 G: 3; .375 INJECTION, POWDER, LYOPHILIZED, FOR SOLUTION INTRAVENOUS at 16:41

## 2021-01-01 RX ADMIN — APIXABAN 5 MG: 5 TABLET, FILM COATED ORAL at 08:48

## 2021-01-01 RX ADMIN — SODIUM CHLORIDE, PRESERVATIVE FREE 10 ML: 5 INJECTION INTRAVENOUS at 07:58

## 2021-01-01 RX ADMIN — NYSTATIN 500000 UNITS: 100000 SUSPENSION ORAL at 21:21

## 2021-01-01 RX ADMIN — PANTOPRAZOLE SODIUM 40 MG: 40 INJECTION, POWDER, FOR SOLUTION INTRAVENOUS at 05:24

## 2021-01-01 RX ADMIN — MAGNESIUM SULFATE HEPTAHYDRATE 1 G: 1 INJECTION, SOLUTION INTRAVENOUS at 08:14

## 2021-01-01 RX ADMIN — PROMETHAZINE HYDROCHLORIDE 6.25 MG: 12.5 TABLET ORAL at 20:07

## 2021-01-01 RX ADMIN — APIXABAN 2.5 MG: 2.5 TABLET, FILM COATED ORAL at 10:50

## 2021-01-01 RX ADMIN — POTASSIUM CHLORIDE 10 MEQ: 7.46 INJECTION, SOLUTION INTRAVENOUS at 08:07

## 2021-01-01 RX ADMIN — POTASSIUM PHOSPHATE, MONOBASIC POTASSIUM PHOSPHATE, DIBASIC 9 MMOL: 224; 236 INJECTION, SOLUTION, CONCENTRATE INTRAVENOUS at 11:10

## 2021-01-01 RX ADMIN — POTASSIUM PHOSPHATE, MONOBASIC POTASSIUM PHOSPHATE, DIBASIC 9 MMOL: 224; 236 INJECTION, SOLUTION, CONCENTRATE INTRAVENOUS at 21:21

## 2021-01-01 RX ADMIN — APIXABAN 2.5 MG: 2.5 TABLET, FILM COATED ORAL at 20:07

## 2021-01-01 RX ADMIN — LOPERAMIDE HYDROCHLORIDE 2 MG: 2 CAPSULE ORAL at 18:11

## 2021-01-01 RX ADMIN — POTASSIUM CHLORIDE 40 MEQ: 20 TABLET, EXTENDED RELEASE ORAL at 11:04

## 2021-01-01 RX ADMIN — SODIUM CHLORIDE, PRESERVATIVE FREE 10 ML: 5 INJECTION INTRAVENOUS at 20:27

## 2021-01-01 RX ADMIN — LOPERAMIDE HYDROCHLORIDE 2 MG: 2 CAPSULE ORAL at 16:33

## 2021-01-01 RX ADMIN — VANCOMYCIN HYDROCHLORIDE 1250 MG: 5 INJECTION, POWDER, LYOPHILIZED, FOR SOLUTION INTRAVENOUS at 13:09

## 2021-01-01 RX ADMIN — HEPARIN SODIUM 5000 UNITS: 5000 INJECTION INTRAVENOUS; SUBCUTANEOUS at 05:24

## 2021-01-01 RX ADMIN — AMLODIPINE BESYLATE 5 MG: 5 TABLET ORAL at 09:24

## 2021-01-01 RX ADMIN — PROMETHAZINE HYDROCHLORIDE 6.25 MG: 12.5 TABLET ORAL at 21:15

## 2021-01-01 RX ADMIN — POTASSIUM CHLORIDE 40 MEQ: 1.5 POWDER, FOR SOLUTION ORAL at 09:32

## 2021-01-01 RX ADMIN — POTASSIUM CHLORIDE AND SODIUM CHLORIDE 150 ML/HR: 900; 150 INJECTION, SOLUTION INTRAVENOUS at 23:56

## 2021-01-01 RX ADMIN — MAGNESIUM SULFATE HEPTAHYDRATE 2 G: 40 INJECTION, SOLUTION INTRAVENOUS at 03:37

## 2021-01-01 RX ADMIN — PROMETHAZINE HYDROCHLORIDE 6.25 MG: 12.5 TABLET ORAL at 22:31

## 2021-01-01 RX ADMIN — POTASSIUM CHLORIDE 10 MEQ: 7.46 INJECTION, SOLUTION INTRAVENOUS at 05:36

## 2021-01-01 RX ADMIN — MAGNESIUM SULFATE HEPTAHYDRATE 2 G: 40 INJECTION, SOLUTION INTRAVENOUS at 08:33

## 2021-01-01 RX ADMIN — NYSTATIN 500000 UNITS: 100000 SUSPENSION ORAL at 11:04

## 2021-01-01 RX ADMIN — HEPARIN SODIUM 5000 UNITS: 5000 INJECTION INTRAVENOUS; SUBCUTANEOUS at 23:22

## 2021-01-01 RX ADMIN — FUROSEMIDE 20 MG: 20 TABLET ORAL at 10:51

## 2021-01-01 RX ADMIN — APIXABAN 2.5 MG: 2.5 TABLET, FILM COATED ORAL at 19:45

## 2021-01-01 RX ADMIN — ATENOLOL 50 MG: 50 TABLET ORAL at 09:15

## 2021-01-01 RX ADMIN — POTASSIUM CHLORIDE 10 MEQ: 7.46 INJECTION, SOLUTION INTRAVENOUS at 09:15

## 2021-01-01 RX ADMIN — ANASTROZOLE 1 MG: 1 TABLET ORAL at 09:04

## 2021-01-01 RX ADMIN — POTASSIUM PHOSPHATE, MONOBASIC POTASSIUM PHOSPHATE, DIBASIC 15 MMOL: 224; 236 INJECTION, SOLUTION, CONCENTRATE INTRAVENOUS at 04:04

## 2021-01-01 RX ADMIN — HEPARIN SODIUM 5000 UNITS: 5000 INJECTION INTRAVENOUS; SUBCUTANEOUS at 05:08

## 2021-01-01 RX ADMIN — ANASTROZOLE 1 MG: 1 TABLET ORAL at 09:16

## 2021-01-01 RX ADMIN — FUROSEMIDE 40 MG: 10 INJECTION, SOLUTION INTRAMUSCULAR; INTRAVENOUS at 01:12

## 2021-01-01 RX ADMIN — MAGNESIUM SULFATE HEPTAHYDRATE 1 G: 1 INJECTION, SOLUTION INTRAVENOUS at 22:58

## 2021-01-01 RX ADMIN — NYSTATIN 500000 UNITS: 100000 SUSPENSION ORAL at 08:13

## 2021-01-01 RX ADMIN — FUROSEMIDE 20 MG: 20 TABLET ORAL at 08:49

## 2021-01-01 RX ADMIN — SODIUM CHLORIDE, PRESERVATIVE FREE 10 ML: 5 INJECTION INTRAVENOUS at 21:32

## 2021-01-01 RX ADMIN — PROMETHAZINE HYDROCHLORIDE 6.25 MG: 12.5 TABLET ORAL at 20:13

## 2021-01-01 RX ADMIN — PROMETHAZINE HYDROCHLORIDE 6.25 MG: 12.5 TABLET ORAL at 08:13

## 2021-01-01 RX ADMIN — POTASSIUM PHOSPHATE, MONOBASIC POTASSIUM PHOSPHATE, DIBASIC 21 MMOL: 224; 236 INJECTION, SOLUTION, CONCENTRATE INTRAVENOUS at 05:07

## 2021-01-01 RX ADMIN — POTASSIUM CHLORIDE 10 MEQ: 10 INJECTION, SOLUTION INTRAVENOUS at 20:23

## 2021-01-01 RX ADMIN — ANASTROZOLE 1 MG: 1 TABLET ORAL at 09:23

## 2021-01-01 RX ADMIN — PIPERACILLIN SODIUM AND TAZOBACTAM SODIUM 3.38 G: 3; .375 INJECTION, POWDER, LYOPHILIZED, FOR SOLUTION INTRAVENOUS at 18:21

## 2021-01-01 RX ADMIN — ANASTROZOLE 1 MG: 1 TABLET ORAL at 08:07

## 2021-01-01 RX ADMIN — POTASSIUM CHLORIDE 10 MEQ: 7.46 INJECTION, SOLUTION INTRAVENOUS at 06:30

## 2021-01-01 RX ADMIN — ANASTROZOLE 1 MG: 1 TABLET ORAL at 08:48

## 2021-01-01 RX ADMIN — HEPARIN SODIUM 5000 UNITS: 5000 INJECTION INTRAVENOUS; SUBCUTANEOUS at 07:57

## 2021-01-01 RX ADMIN — SODIUM CHLORIDE, PRESERVATIVE FREE 10 ML: 5 INJECTION INTRAVENOUS at 07:59

## 2021-01-01 RX ADMIN — POTASSIUM CHLORIDE 10 MEQ: 7.46 INJECTION, SOLUTION INTRAVENOUS at 06:44

## 2021-01-01 RX ADMIN — PIPERACILLIN SODIUM AND TAZOBACTAM SODIUM 3.38 G: 3; .375 INJECTION, POWDER, LYOPHILIZED, FOR SOLUTION INTRAVENOUS at 16:33

## 2021-01-01 RX ADMIN — APIXABAN 2.5 MG: 2.5 TABLET, FILM COATED ORAL at 20:12

## 2021-01-01 RX ADMIN — POTASSIUM CHLORIDE 10 MEQ: 10 INJECTION, SOLUTION INTRAVENOUS at 22:57

## 2021-01-01 RX ADMIN — ANASTROZOLE 1 MG: 1 TABLET ORAL at 09:32

## 2021-01-01 RX ADMIN — ATENOLOL 50 MG: 50 TABLET ORAL at 08:14

## 2021-01-01 RX ADMIN — LOPERAMIDE HYDROCHLORIDE 2 MG: 2 CAPSULE ORAL at 13:14

## 2021-01-01 RX ADMIN — PIPERACILLIN SODIUM AND TAZOBACTAM SODIUM 3.38 G: 3; .375 INJECTION, POWDER, LYOPHILIZED, FOR SOLUTION INTRAVENOUS at 16:36

## 2021-01-01 RX ADMIN — DENOSUMAB 60 MG: 60 INJECTION SUBCUTANEOUS at 13:43

## 2021-01-08 ENCOUNTER — TELEPHONE (OUTPATIENT)
Dept: SURGERY | Facility: CLINIC | Age: 86
End: 2021-01-08

## 2021-01-27 ENCOUNTER — LAB (OUTPATIENT)
Dept: LAB | Facility: HOSPITAL | Age: 86
End: 2021-01-27

## 2021-01-27 ENCOUNTER — TRANSCRIBE ORDERS (OUTPATIENT)
Dept: ADMINISTRATIVE | Facility: HOSPITAL | Age: 86
End: 2021-01-27

## 2021-01-27 DIAGNOSIS — M05.9 RHEUMATOID ARTHRITIS WITH POSITIVE RHEUMATOID FACTOR, INVOLVING UNSPECIFIED SITE (HCC): Primary | ICD-10-CM

## 2021-01-27 DIAGNOSIS — M25.50 ARTHRALGIA, UNSPECIFIED JOINT: ICD-10-CM

## 2021-01-27 DIAGNOSIS — M05.9 RHEUMATOID ARTHRITIS WITH POSITIVE RHEUMATOID FACTOR, INVOLVING UNSPECIFIED SITE (HCC): ICD-10-CM

## 2021-01-27 LAB
ALBUMIN SERPL-MCNC: 4.11 G/DL (ref 3.5–5.2)
ALBUMIN/GLOB SERPL: 1.2 G/DL
ALP SERPL-CCNC: 98 U/L (ref 39–117)
ALT SERPL W P-5'-P-CCNC: 16 U/L (ref 1–33)
ANION GAP SERPL CALCULATED.3IONS-SCNC: 12.4 MMOL/L (ref 5–15)
AST SERPL-CCNC: 19 U/L (ref 1–32)
BASOPHILS # BLD AUTO: 0.05 10*3/MM3 (ref 0–0.2)
BASOPHILS NFR BLD AUTO: 0.5 % (ref 0–1.5)
BILIRUB SERPL-MCNC: 0.3 MG/DL (ref 0–1.2)
BUN SERPL-MCNC: 34 MG/DL (ref 8–23)
BUN/CREAT SERPL: 15.2 (ref 7–25)
CALCIUM SPEC-SCNC: 12.8 MG/DL (ref 8.6–10.5)
CHLORIDE SERPL-SCNC: 98 MMOL/L (ref 98–107)
CO2 SERPL-SCNC: 29.6 MMOL/L (ref 22–29)
CREAT SERPL-MCNC: 2.24 MG/DL (ref 0.57–1)
CRP SERPL-MCNC: 0.32 MG/DL (ref 0–0.5)
DEPRECATED RDW RBC AUTO: 43.3 FL (ref 37–54)
EOSINOPHIL # BLD AUTO: 0.09 10*3/MM3 (ref 0–0.4)
EOSINOPHIL NFR BLD AUTO: 1 % (ref 0.3–6.2)
ERYTHROCYTE [DISTWIDTH] IN BLOOD BY AUTOMATED COUNT: 13.4 % (ref 12.3–15.4)
ERYTHROCYTE [SEDIMENTATION RATE] IN BLOOD: 17 MM/HR (ref 0–30)
GFR SERPL CREATININE-BSD FRML MDRD: 21 ML/MIN/1.73
GLOBULIN UR ELPH-MCNC: 3.3 GM/DL
GLUCOSE SERPL-MCNC: 127 MG/DL (ref 65–99)
HBV SURFACE AG SERPL QL IA: NORMAL
HCT VFR BLD AUTO: 39.3 % (ref 34–46.6)
HGB BLD-MCNC: 12.6 G/DL (ref 12–15.9)
IMM GRANULOCYTES # BLD AUTO: 0.05 10*3/MM3 (ref 0–0.05)
IMM GRANULOCYTES NFR BLD AUTO: 0.5 % (ref 0–0.5)
LYMPHOCYTES # BLD AUTO: 1.96 10*3/MM3 (ref 0.7–3.1)
LYMPHOCYTES NFR BLD AUTO: 20.8 % (ref 19.6–45.3)
MCH RBC QN AUTO: 28.4 PG (ref 26.6–33)
MCHC RBC AUTO-ENTMCNC: 32.1 G/DL (ref 31.5–35.7)
MCV RBC AUTO: 88.5 FL (ref 79–97)
MONOCYTES # BLD AUTO: 0.62 10*3/MM3 (ref 0.1–0.9)
MONOCYTES NFR BLD AUTO: 6.6 % (ref 5–12)
NEUTROPHILS NFR BLD AUTO: 6.66 10*3/MM3 (ref 1.7–7)
NEUTROPHILS NFR BLD AUTO: 70.6 % (ref 42.7–76)
NRBC BLD AUTO-RTO: 0 /100 WBC (ref 0–0.2)
PLATELET # BLD AUTO: 251 10*3/MM3 (ref 140–450)
PMV BLD AUTO: 10.7 FL (ref 6–12)
POTASSIUM SERPL-SCNC: 3.3 MMOL/L (ref 3.5–5.2)
PROT SERPL-MCNC: 7.4 G/DL (ref 6–8.5)
RBC # BLD AUTO: 4.44 10*6/MM3 (ref 3.77–5.28)
SODIUM SERPL-SCNC: 140 MMOL/L (ref 136–145)
WBC # BLD AUTO: 9.43 10*3/MM3 (ref 3.4–10.8)

## 2021-01-27 PROCEDURE — 86140 C-REACTIVE PROTEIN: CPT

## 2021-01-27 PROCEDURE — 87340 HEPATITIS B SURFACE AG IA: CPT

## 2021-01-27 PROCEDURE — 86803 HEPATITIS C AB TEST: CPT

## 2021-01-27 PROCEDURE — 85025 COMPLETE CBC W/AUTO DIFF WBC: CPT

## 2021-01-27 PROCEDURE — 86200 CCP ANTIBODY: CPT

## 2021-01-27 PROCEDURE — 80053 COMPREHEN METABOLIC PANEL: CPT

## 2021-01-27 PROCEDURE — 85652 RBC SED RATE AUTOMATED: CPT

## 2021-01-27 PROCEDURE — 86480 TB TEST CELL IMMUN MEASURE: CPT

## 2021-01-27 PROCEDURE — 36415 COLL VENOUS BLD VENIPUNCTURE: CPT

## 2021-01-27 PROCEDURE — 86431 RHEUMATOID FACTOR QUANT: CPT

## 2021-01-27 PROCEDURE — 86704 HEP B CORE ANTIBODY TOTAL: CPT

## 2021-01-27 PROCEDURE — 83520 IMMUNOASSAY QUANT NOS NONAB: CPT

## 2021-01-28 ENCOUNTER — TRANSCRIBE ORDERS (OUTPATIENT)
Dept: ADMINISTRATIVE | Facility: HOSPITAL | Age: 86
End: 2021-01-28

## 2021-01-28 ENCOUNTER — HOSPITAL ENCOUNTER (OUTPATIENT)
Dept: GENERAL RADIOLOGY | Facility: HOSPITAL | Age: 86
Discharge: HOME | End: 2021-01-28
Admitting: INTERNAL MEDICINE

## 2021-01-28 DIAGNOSIS — G89.3 MALIGNANT BONE PAIN: Primary | ICD-10-CM

## 2021-01-28 DIAGNOSIS — M89.8X9 MALIGNANT BONE PAIN: Primary | ICD-10-CM

## 2021-01-28 DIAGNOSIS — M89.8X9 MALIGNANT BONE PAIN: ICD-10-CM

## 2021-01-28 DIAGNOSIS — G89.3 MALIGNANT BONE PAIN: ICD-10-CM

## 2021-01-28 LAB
HBV CORE AB SERPL QL IA: NEGATIVE
HCV AB S/CO SERPL IA: <0.1 S/CO RATIO (ref 0–0.9)
HCV AB SERPL QL IA: NORMAL

## 2021-01-28 PROCEDURE — 77075 RADEX OSSEOUS SURVEY COMPL: CPT

## 2021-01-28 PROCEDURE — 77075 RADEX OSSEOUS SURVEY COMPL: CPT | Performed by: RADIOLOGY

## 2021-01-29 LAB
CCP IGA+IGG SERPL IA-ACNC: 6 UNITS (ref 0–19)
CCP IGA+IGG SERPL IA-ACNC: 6 UNITS (ref 0–19)
CRP SERPL-MCNC: 3 MG/L (ref 0–10)
ERYTHROCYTE [SEDIMENTATION RATE] IN BLOOD BY WESTERGREN METHOD: 36 MM/HR (ref 0–40)
RHEUMATOID FACT SERPL-ACNC: <10 IU/ML (ref 0–13.9)

## 2021-01-31 LAB
GAMMA INTERFERON BACKGROUND BLD IA-ACNC: 0.04 IU/ML
M TB IFN-G BLD-IMP: NEGATIVE
M TB IFN-G CD4+ BCKGRND COR BLD-ACNC: 0.04 IU/ML
M TB IFN-G CD4+CD8+ BCKGRND COR BLD-ACNC: 0.04 IU/ML
MITOGEN IGNF BLD-ACNC: >10 IU/ML
SERVICE CMNT-IMP: NORMAL

## 2021-02-05 LAB
Lab: NORMAL
VECTRA(R) DA SCORE: NORMAL

## 2021-02-09 LAB — 14-3-3 ETA AG SER IA-MCNC: 0.22 NG/ML

## 2021-02-10 DIAGNOSIS — M81.0 AGE-RELATED OSTEOPOROSIS WITHOUT CURRENT PATHOLOGICAL FRACTURE: Primary | ICD-10-CM

## 2021-02-11 ENCOUNTER — TRANSCRIBE ORDERS (OUTPATIENT)
Dept: ADMINISTRATIVE | Facility: HOSPITAL | Age: 86
End: 2021-02-11

## 2021-02-11 ENCOUNTER — LAB (OUTPATIENT)
Dept: LAB | Facility: HOSPITAL | Age: 86
End: 2021-02-11

## 2021-02-11 DIAGNOSIS — N18.30 MALIGNANT HYPERTENSIVE HEART AND CHRONIC KIDNEY DISEASE STAGE III (HCC): Primary | ICD-10-CM

## 2021-02-11 DIAGNOSIS — I13.10 MALIGNANT HYPERTENSIVE HEART AND CHRONIC KIDNEY DISEASE STAGE III (HCC): ICD-10-CM

## 2021-02-11 DIAGNOSIS — N18.30 MALIGNANT HYPERTENSIVE HEART AND CHRONIC KIDNEY DISEASE STAGE III (HCC): ICD-10-CM

## 2021-02-11 DIAGNOSIS — I13.10 MALIGNANT HYPERTENSIVE HEART AND CHRONIC KIDNEY DISEASE STAGE III (HCC): Primary | ICD-10-CM

## 2021-02-11 LAB
ALBUMIN SERPL-MCNC: 3.9 G/DL (ref 3.5–5.2)
ALBUMIN/GLOB SERPL: 1.3 G/DL
ALP SERPL-CCNC: 109 U/L (ref 39–117)
ALT SERPL W P-5'-P-CCNC: 10 U/L (ref 1–33)
ANION GAP SERPL CALCULATED.3IONS-SCNC: 8.8 MMOL/L (ref 5–15)
AST SERPL-CCNC: 14 U/L (ref 1–32)
BILIRUB SERPL-MCNC: 0.3 MG/DL (ref 0–1.2)
BUN SERPL-MCNC: 14 MG/DL (ref 8–23)
BUN/CREAT SERPL: 13.1 (ref 7–25)
CALCIUM SPEC-SCNC: 10 MG/DL (ref 8.6–10.5)
CHLORIDE SERPL-SCNC: 105 MMOL/L (ref 98–107)
CO2 SERPL-SCNC: 24.2 MMOL/L (ref 22–29)
CREAT SERPL-MCNC: 1.07 MG/DL (ref 0.57–1)
GFR SERPL CREATININE-BSD FRML MDRD: 49 ML/MIN/1.73
GLOBULIN UR ELPH-MCNC: 3 GM/DL
GLUCOSE SERPL-MCNC: 91 MG/DL (ref 65–99)
POTASSIUM SERPL-SCNC: 4.6 MMOL/L (ref 3.5–5.2)
PROT SERPL-MCNC: 6.9 G/DL (ref 6–8.5)
SODIUM SERPL-SCNC: 138 MMOL/L (ref 136–145)

## 2021-02-11 PROCEDURE — 80053 COMPREHEN METABOLIC PANEL: CPT

## 2021-02-11 PROCEDURE — 36415 COLL VENOUS BLD VENIPUNCTURE: CPT

## 2021-03-02 NOTE — PROGRESS NOTES
Name:  Geeta Renee  :  1935  Date:  2021     REFERRING PHYSICIAN  Vishal Mckenzie MD, PhD    PRIMARY CARE PROVIDER  Alejandra Dozier APRN    REASON FOR FOLLOW UP  1. Esophageal adenocarcinoma (CMS/HCC)    2. Malignant neoplasm of upper-outer quadrant of left breast in female, estrogen receptor positive (CMS/HCC)      CHIEF COMPLAINT  None.    Dear Dr. Mckenzie,    HISTORY OF PRESENT ILLNESS:   I saw Ms. Renee in follow up today in our medical oncology clinic. As you are aware, she is a pleasant, 85 y.o., white female with a history of hypertension, arthritis and Addison's esophagus who has been undergoing periodic repeat endoscopic evaluations for the latter since she was first diagnosed in ~. She was again found to have high grade dysplasia in late summer 2017 and was referred back to the [facility] (where she had undergone prior radiofrequency ablations). A repeat local evaluation at  in 2017, unfortunately, was concerning for a component of carcinoma invasive into the submucosa. Imaging at the time (including a PET scan) was consistent with early stage disease, with no definite evidence of either mediastinal node involvement or distant mets. Ultimately, following an evaluation by CT surgery (neither they nor the patient were interested in pursuing an esophagectomy), she was referred to you, closer to her home in [place], [place], to consider treatment with localized radiotherapy. The patient was agreeable to this and was subsequently referred to our clinic to consider providing concomitant chemotherapy with the XRT. She completed planned treatment with concomitant Xeloda/XRT on 2018; and she has been doing overall very well ever since then, with no symptoms/signs of residual/recurernt disease to date.    INTERIM HISTORY:  Ms. Renee presents today for follow up. Overall, she has been doing well since her last visit. She reports a good appetite, stable  weight. Denies any dysphagia. She did not have EGD in September as previously planned. However, she reports today that she will get that arranged when she leaves our office today. She was diagnosed with an early stage, left-sided, ER/SC strongly positive, well-differentiated breast cancer in 2019. With her age and comorbidities, her surgeon ultimately recommended treatment with lumpectomy followed by adjuvant Arimidex therapy alone (which she has been on for a total of about 1 year and 3 months now and continues to tolerate well). She is also on [id number] Prolia which she is tolerating well without any significant side effects. Of note, she did not have CT imaging as requested and she has not followed up with thoracic surgery in [place], [place]. She reports today that their office never called her back. She is otherwise well and without any specific complaints today.    Past Medical History:   Diagnosis Date   • Arthritis    • Breast cancer (CMS/HCC)    • Breast lump     left   • Esophageal cancer (CMS/HCC)    • GERD (gastroesophageal reflux disease)    • Hypertension    • Snoring        Past Surgical History:   Procedure Laterality Date   • BREAST LUMPECTOMY Left 2019    Procedure: BREAST LUMPECTOMY WITH ULTRASOUND;  Surgeon: Nurys Selby MD;  Location: [facility];  Service: General   • CATARACT EXTRACTION, BILATERAL  09/10/2019   • CHOLECYSTECTOMY     • COLONOSCOPY     • ELBOW PROCEDURE Right    • ENDOSCOPY     • FRACTURE SURGERY      right elbow       Social History     Socioeconomic History   • Marital status:      Spouse name: Not on file   • Number of children: Not on file   • Years of education: Not on file   • Highest education level: Not on file   Tobacco Use   • Smoking status: Former Smoker     Packs/day: 0.50     Years: 30.00     Pack years: 15.00     Types: Cigarettes     Quit date:      Years since quittin.1   • Smokeless tobacco: Never Used   Substance and Sexual  Activity   • Alcohol use: No   • Drug use: No   • Sexual activity: Defer       Family History   Problem Relation Age of Onset   • Hypertension Mother    • Heart disease Brother    • Breast cancer Neg Hx        Allergies   Allergen Reactions   • Codeine Nausea Only   • Sulfa Antibiotics Hives       Current Outpatient Medications   Medication Sig Dispense Refill   • amLODIPine (NORVASC) 5 MG tablet Take 10 mg by mouth.     • anastrozole (ARIMIDEX) 1 MG tablet Take  by mouth Daily.     • atenolol (TENORMIN) 100 MG tablet Take 100 mg by mouth Daily.     • Calcium Carb-Cholecalciferol (Calcium-Vitamin D) 600-400 MG-UNIT tablet Take 1 tablet by mouth 2 (two) times a day. 60 tablet 11   • dexlansoprazole (DEXILANT) 60 MG capsule Take 60 mg by mouth Daily.     • HYDROcodone-acetaminophen (NORCO) 5-325 MG per tablet Take 1 tablet by mouth Every 6 (Six) Hours As Needed for Moderate Pain . 10 tablet 0   • potassium chloride (K-DUR,KLOR-CON) 20 MEQ CR tablet Take 1 tablet by mouth 2 (Two) Times a Day. 20 tablet 0   • triamterene-hydrochlorothiazide (MAXZIDE-25) 37.5-25 MG per tablet Take 1 tablet by mouth Daily.       No current facility-administered medications for this visit.      REVIEW OF SYSTEMS  CONSTITUTIONAL:  No fever, chills or night sweats.   EYES:  No blurry vision, diplopia or other vision changes.  ENT:  No hearing loss, nosebleeds or sore throat.  CARDIOVASCULAR:  No palpitations, arrhythmia, syncopal episodes or edema.  PULMONARY:  No hemoptysis, wheezing, chronic cough or shortness of breath.  GASTROINTESTINAL:  No nausea or vomiting. No constipation or diarrhea. No dysphagia.  GENITOURINARY:  No hematuria, kidney stones or frequent urination.  MUSCULOSKELETAL: Chronic, mild joint and back pains, stable.  INTEGUMENTARY: No rashes or pruritus.  ENDOCRINE:  No excessive thirst or hot flashes.  HEMATOLOGIC:  No history of free bleeding, spontaneous bleeding or clotting.  IMMUNOLOGIC:  No allergies or frequent  infections.  NEUROLOGIC: No numbness, tingling, seizures or weakness.  PSYCHIATRIC:  No anxiety or depression.    PHYSICAL EXAMINATION  /88   Pulse 79   Temp 97.5 °F (36.4 °C) (Temporal)   Resp 18   Wt 86.2 kg (190 lb)   SpO2 97%   BMI 32.61 kg/m²     GENERAL:  A well-developed, well-nourished, elderly, white female in no acute distress  HEENT:  Pupils equally round and reactive to light. Extraocular muscles intact.  CARDIOVASCULAR:  Regular rate and rhythm.  No murmurs, gallops or rubs.  LUNGS:  Clear to auscultation bilaterally.  ABDOMEN:  Soft, nontender, nondistended with positive bowel sounds.  EXTREMITIES:  No clubbing, cyanosis or edema bilaterally.  SKIN:  No rashes or petechiae.  NEURO:  Cranial nerves grossly intact.  No focal deficits.  PSYCH:  Alert and oriented x3.        LABORATORY  Lab Results   Component Value Date    WBC 9.43 01/27/2021    HGB 12.6 01/27/2021    HCT 39.3 01/27/2021    MCV 88.5 01/27/2021     01/27/2021    NEUTROABS 6.66 01/27/2021       Lab Results   Component Value Date     03/01/2021    K 4.0 03/01/2021     03/01/2021    CO2 26.7 03/01/2021    BUN 11 03/01/2021    CREATININE 1.15 (H) 03/01/2021    GLUCOSE 111 (H) 03/01/2021    CALCIUM 9.4 03/01/2021    AST 16 03/01/2021    ALT 12 03/01/2021    ALKPHOS 125 (H) 03/01/2021    BILITOT 0.3 03/01/2021    PROTEINTOT 7.5 03/01/2021    ALBUMIN 3.88 03/01/2021     IMAGING  CT chest, abdomen and pelvis with contrast (11/09/2017):  Impression:  1) Mild, nonspecific thickening of the distal esophagus which may be related to diagnosis of esophageal cancer. Multiple subcentimeter right paraesophageal lymph nodes are noted.  2) 1.3 mm ground glass nodule in the left upper lobe. No suspicious solid nodules that would suggest metastatic disease.  3) No evidence of metastatic disease to the abdomen or pelvis.  4) Bilobed fusiform AAA measuring up to 4.6 cm beneath the renal arteries.  5) Cardiomegaly with artery  atherosclerosis.    NM PET with fusion CT, skull base to mid-thigh (12/07/2017, at ):  Impression:  1) No suspicious focal FDG uptake within the esophagus to suggest a site of invasive malignancy.  2) No suspicious metabolically active mediastinal adenopathy.  3) No suspicious hypermetabolic distal metastasis.  4) Few non-FDG avid groundglass nodules, the largest in the left upper lobe of the lung, unchanged since the most recent comparison CT of the chest of one month prior.    CT chest with contrast (07/02/2018):  Impression: Thickening of the mucosa in the distal portion of the esophagus consistent with clinical history of adenocarcinoma of the esophagus. There is nothing seen on the CT to suggest metastatic disease in the lung parenchyma or in the mediastinal lymph nodes.    CT abdomen and pelvis with contrast (07/02/2018):  Impression: No CT findings of metastatic disease in the abdomen or pelvis. There were benign cysts in the kidneys. There is aneurysmal dilatation of the infrarenal abdominal aorta with a maximum diameter of 4.7 cm.    CT chest with contrast (10/08/2018):  Impression: Thickening of the esophageal wall, similar to the previous exam. No new pulmonary pathology.    CT abdomen and pelvis with contrast (10/08/2018):  Impression:  1) Thickening of the distal esophageal wall, similar to the previous exam.  2) Very slight interval increase in size of AAA (from 4.7 to 4.8 cm).    CT chest with contrast (01/07/2019):  Impression:  1) Esophageal wall thickening of the mid-distal esophagus with no increase since the previous exam. No mediastinal masses or adenopathy identified.  2) Remainder of chest is stable from previous.    CT abdomen and pelvis with contrast (01/07/2019):  Impression:  1) Stable exam demonstrating no evidence of metastatic disease to the abdomen or pelvis. Distal esophageal wall thickening and hiatal hernia similar to previous.  2) Based on coronal reformatted sequences, likely no  significant change in transverse diameter of infrarenal abdominal aortic aneurysm (4.7 cm and was previously 4.7 cm).    CT chest with contrast (04/04/2019)  Impression: Stable CT scan of the thorax. There continues to be some  mild mucosal thickening in the distal portion of the esophagus, stable  in comparing with the earlier exam.     CT abdomen pelvis with contrast (04/04/2019)  Impression: Nothing seen to suggest metastatic disease in the abdomen  and pelvis. There is aneurysmal dilatation of the infrarenal portion of  the abdominal aorta. The diameter is increased slightly. Currently the  aortic diameter measures 4.9 cm and previously measures 4.7.    Bone densitometry (DEXA) scan (12/16/2019):  Impression: Osteoporosis with a T-score of -3.1 in the left hip.    CT chest with contrast (01/02/2020):  Impression: Stable CT of the thorax. Mild thickening of the mucosa remains in the esophagus. No enlarged lymph nodes have developed. There is a stable area of ground-glass parenchymal infiltrate in the left upper lobe.    CT abdomen and pelvis with contrast (01/02/2020):  Impression:  1) The biliary tree is not as dilated as it was on the previous CT done 10/03/2019.  2) The pancreas shows atrophy with fatty infiltrate. There are benign cysts in the kidneys. There is an infrarenal abdominal aortic aneurysm which now has a diameter of 5.3 cm. There is a hiatal hernia in the lower mediastinum.    Bilateral diagnostic mammogram with tomosynthesis (05/12/2020):  Impression:  1) Stable mammographic appearance of the right breast with no findings suspicious for malignancy.  2) Presumed postoperative and post-treament related changes in the left breast.  Bi-Rads Category 3: Probably Benign.    PROCEDURES  Esophagogastroduodenoscopy with esophageal biopsies and dilatation (06/26/2018):  Impression:  1) Radiation esophagitis with ulceration, dense exudate and tight stenosis balloon dilated to 12 mm and biopsies were  performed.  2) Appearance of residual Addison's esophagus with papillary mucosal appearance in the distal most esophagus.  3) Large hiatal hernia.  4) Gastric food retention consistent with gastroparesis.    PATHOLOGY  Esophagus, nodules (10/19/2017):  Adenocarcinoma, invasive at least into the muscularis mucosa.    Esophageal biopsy, stricture (06/26/2018):  Gastric type mucosa with ulceration, acute and chronic inflammation, and focal intestinal metaplasia, negative for dysplasia.    Esophagus, biopsy, distal (01/18/2019):  Addison's esophagitis without dysplasia. Ulceration with acute inflammation and reactive epithelial changes. Negative for malignancy.    Left breast, mass, needle core biopsies (10/09/2019):  Invasive, well-differentiated ductal adenocarcinoma. Palm-Velasquez grade 1 (3+1+1). ER strongly positive (100%), NH strongly positive (100%), HER2/lisa negative (1+ by IHC).    Breast margin, left, deep margin (11/19/2019):  Invasive, well-differentiated ductal carcinoma with lobular features, involving previously inked deep resection margin.    Breast, lumpectomy, left (11/19/2019):  Invasive, well-differentiated ductal carcinoma (1.9 cm) with associated low-to-intermediate grade DCIS, solid and cribiform types. Margins are free of tumor. Pathologic tumor stage [id number].    IMPRESSION AND PLAN  Ms. Renee is a 85 y.o., white female with:  1. Esophageal adenocarcinoma: Diagnosed with clinical stage [id number] disease in late 2017 (although she did not undergo an endoscopic ultrasound). Regardless, even though she was (and is) in overall good health, she was not eligible for an esophagectomy due to her advanced age and comorbidities (and she has repeatedly stated that she would not have wanted one anyway). Therefore, we agreed with CT surgery and radiation oncology's initial recommendations for localized radiotherapy. It was also agreed that she would be a good candidate for adding concomitant  chemotherapy to this treatment plan, and PO capecitabine (Xeloda) was a convenient and fairly well tolerable choice. A Rx for 1500 mg BID M-F throughout the planned course of localized XRT was provided. She began this treatment regimen in early March 2018 and completed it on 04/26/2018. Overall, she tolerated this treatment regimen very well, with fatigue and mild dysphagia (both of which are now improved) as her only noticeable side effects. Clinically she continues to feel overall well. Repeat CT scans performed on 07/02/2018 (and summarized above) were consistent with treatment related changes only, with no definite evidence of residual/recurrent disease; and this continues to be the case on the most recent follow up imaging (CT scans performed on 01/02/2020 and also summarized above). These were scheduled to be repeated prior to today's appointment, but she once again missed them due to her own personal choice. Discussed with patient that I would like to reschedule CT scans today and she adamantly declines. She states that she will have them done after she has EGD repeated. The most recent repeat, surveillance EGD (performed in August 2019) again reportedly showed no evidence of residual/recurrent disease (another repeat endoscopy was reportedly planned for September). However, she did not have repeat EGD in September. She is planning to call and arrange this today. We will see her back in our clinic in six months.  2. Dysphagia: The patient had no such issues prior to starting Xeloda/XRT; however, toward the end of this regimen, she began to experience some increasing difficulty with swallowing, particularly with her pills. This issue remained manageable throughout the remainder of her treatment and has resolved since the completion of it, particularly after she underwent an esophageal dilatation in late June 2018. She will continue to follow up with gastroenterology as planned (a repeat EGD was reportedly  planned for September 2020). However, this was not done per patient and she is planning to arrange today. Continue to monitor.  3. AAA: A known issue. The most recent repeat CT scan of the abdomen/pelvis (performed on 01/02/2020 and summarized above) showed a substantial increase in diameter (from 4.9 cm to 5.3 cm). Although the risks would obviously be high, she is nearing the typical threshold (5.5 cm or greater) where intervention is usually considered. She has been following with thoracic surgery in [place], [place] for routine monitoring. She reports that she has not followed with thoracic surgery as they never called her back. Strongly, advised patient to follow up as soon as possible. Also advised patient that she would greatly benefit with repeat CT imaging to monitor any change in diameter of AAA, but she adamantly declines. Discussed with patient if she should change her mind will be happy to arrange for repeat CT imaging in the upcoming weeks. Will continue to monitor.  4. Breast carcinoma: She was diagnosed with an early stage, ER/FL strongly positive, well-differentiated ductal adenocarcinoma of the left breast in fall 2019. With her age and comorbidities, her surgeon ultimately recommended treatment with lumpectomy (performed on 11/19/2019) followed by adjuvant endocrine (with an AI) therapy alone (and the patient agreed). She has been on Arimidex for a total of about 1 year and 3 months now and is still tolerating it overall well, with no significant side effects. The current standard of care is to remain on this medication for at least a total of five years (through ~late 2024).  5. Osteoporosis: A known, longstanding issue for which she had been on Fosamax. She stopped this medication when she was diagnosed with issue #1, as it seemed to exacerbate the reflux and discomfort that the cancer was causing. Her most recent bone densitometry (DEXA) scan performed on 12/16/2019 and summarized above showed that  her BMD remains solidly within the osteoporotic range. She will be due for repeat DEXA in December 2021. Particularly now that she is on adjuvant Arimidex for issue #4 (see above), restarting treatment for this issue (the osteoporosis) was strongly recommended; and she began [id number] Prolia in February 2020. She is tolerating this well, with no noticeable side effects.   The patient was in agreement with these plans.    It is a pleasure to participate in Ms. Renee's care. Please do not hesitate to call with any questions or concerns that you may have.    A total of 25 minutes were spent coordinating this patient’s care in clinic today; more than 50% of this time was spent face-to-face with the patient, reviewing her interim medical history and counseling on the current evaluation, treatment and followup plan. All questions were answered to her satisfaction.    FOLLOW UP  Continue adjuvant Arimidex. With gastroenterology for a repeat EGD as soon as possible. With vascular/thoracic surgery in [place], [place], as previously planned AAA. With breast surgery, as previously planned. Proceed with [id number] Proila (denosumab), as planned (3rd cycle today). Return to our clinic in 6 months (September 2021), on the day of the 4th cycle of [id number] Prolia.            This document was electronically signed by PRISCILLA Chavez March 3, 2021       CC: Vishal Mckenzie MD, PhD   MD Guerrero Arce MD James H. Shoptaw, MD Janey P. Phipps, APRN

## 2021-07-01 NOTE — PROGRESS NOTES
Subjective   Geeta Renee is a 85 y.o. female here today for mammogram review.    History of Present Illness  Ms. Renee was seen in the office today for breast cancer follow-up.  She is status post a left lumpectomy on 11/19/2019 for a T2 carcinoma.  The patient's primary tumor was excised with clear margins but just deep and separate from the primary tumor there was an area of gritty tissue that did demonstrate cancer and appeared to be a separate tumor.  This was at the deep margin.  Given the patient's age and comorbidities sentinel node evaluation was not recommended and after discussion of the pathology, close follow-up and hormonal therapy was recommended.  Bone density was obtained in December which demonstrated osteoporosis with a T score of -3.1 and the patient was started on Fosamax in December.  She was subsequently switched to Prolia. She was started on anastrozole in January, 2020 which she states she is tolerating well.    Ms. Renee had a left mammogram on 11/12/2020 which demonstrated presumed postop changes.  She had a bilateral mammogram on 5/12/2021 which demonstrated presumed postop changes for which a 6-month follow-up of the left breast was recommended.  She denies any palpable masses in the breast or the axilla.  She denies any arm edema.  She denies any symptoms of metastatic disease.  Allergies   Allergen Reactions   • Codeine Nausea Only   • Sulfa Antibiotics Hives       Current Outpatient Medications   Medication Sig Dispense Refill   • amLODIPine (NORVASC) 5 MG tablet Take 10 mg by mouth.     • anastrozole (ARIMIDEX) 1 MG tablet Take  by mouth Daily.     • atenolol (TENORMIN) 100 MG tablet Take 100 mg by mouth Daily.     • Calcium Carb-Cholecalciferol (Calcium-Vitamin D) 600-400 MG-UNIT tablet Take 1 tablet by mouth 2 (two) times a day. 60 tablet 11   • dexlansoprazole (DEXILANT) 60 MG capsule Take 60 mg by mouth Daily.     • potassium chloride (K-DUR,KLOR-CON) 20 MEQ CR  "tablet Take 1 tablet by mouth 2 (Two) Times a Day. 20 tablet 0   • triamterene-hydrochlorothiazide (MAXZIDE-25) 37.5-25 MG per tablet Take 1 tablet by mouth Daily.     • HYDROcodone-acetaminophen (NORCO) 5-325 MG per tablet Take 1 tablet by mouth Every 6 (Six) Hours As Needed for Moderate Pain . 10 tablet 0     No current facility-administered medications for this visit.     Past Medical History:   Diagnosis Date   • Arthritis    • Breast cancer (CMS/HCC)    • Breast lump     left   • Esophageal cancer (CMS/HCC)    • GERD (gastroesophageal reflux disease)    • Hypertension    • Snoring      Past Surgical History:   Procedure Laterality Date   • BREAST LUMPECTOMY Left 11/19/2019    Procedure: BREAST LUMPECTOMY WITH ULTRASOUND;  Surgeon: Nurys Selby MD;  Location: [facility];  Service: General   • CATARACT EXTRACTION, BILATERAL  09/10/2019   • CHOLECYSTECTOMY     • COLONOSCOPY     • ELBOW PROCEDURE Right    • ENDOSCOPY     • FRACTURE SURGERY      right elbow       Pertinent Review of Systems      Objective   /86 (BP Location: Left arm)   Pulse 77   Ht 162.6 cm (64\")   Wt 87.2 kg (192 lb 3.2 oz)   BMI 32.99 kg/m²    Physical Exam  General:  This is a WD WN female in no acute distress  Vital signs stable, afebrile  HEENT exam:  WNL. Sclera are anicteric.  EOMI  Neck:  supple, FROM.  No JVD.  Trachea midline.  No palpable thyroid nodules. No supraclavicular adenopathy  Lungs:  Respiratory effort normal. Auscultation: Clear, without wheezes, rhonchi, rales  Heart:  Regular rate and rhythm, without murmur, gallop, rub.  No pedal edema  Breasts: On visual inspection the breasts are symmetrical.  Examination of the right breast demonstrates no discrete mass, skin change, or axillary adenopathy.  Examination of the left breast demonstrates a surgical scar laterally in the 3 o'clock position in the axilla.  There is no palpable mass or adenopathy.  Procedures     Results/Data:  Imaging: Mammogram reports and " images were reviewed.  I agree with the assessment  Notes:   Lab:   Other:     Assessment/Plan   T1c NX grade 1 invasive ductal carcinoma, ER positive, VA positive, HER-2 negative  Separate small focus of ductal carcinoma with lobular features distinct from primary tumor, at deep margin    Plan: Continue anastrozole  Continue Prolia injections and calcium and vitamin D  Patient will be scheduled for a bone density and left mammogram in December, 2021 with return to the office following       Discussion/Summary:    Time spent:     Patient's Body mass index is 32.99 kg/m². indicating that she is overweight (BMI 25-29.9). Obesity-related health conditions include the following: none. Obesity is improving with lifestyle modifications. BMI is is above average; BMI management plan is completed. We discussed portion control and increasing exercise..         Future Appointments   Date Time Provider Department Center   9/2/2021 12:30 PM KERA NURSE LAB [facility] [facility]   9/2/2021  1:00 PM BERTHA Berrios MD [facility]   9/2/2021  1:30 PM  [facility] OP INF[facility] CHAIR [facility]  [facility] INF [facility]         Please note that portions of this note were completed with a voice recognition program.

## 2021-09-02 PROBLEM — I71.40 AAA (ABDOMINAL AORTIC ANEURYSM) (HCC): Status: ACTIVE | Noted: 2021-01-01

## 2021-09-02 NOTE — PROGRESS NOTES
Name:  Geeta Renee  :  1935  Date:  2021     REFERRING PHYSICIAN  Vishal Mckenzie MD, PhD    PRIMARY CARE PROVIDER  Alejandra Dozier APRN    REASON FOR FOLLOW UP  1. Esophageal adenocarcinoma (CMS/HCC)    2. Malignant neoplasm of upper-outer quadrant of left breast in female, estrogen receptor positive (CMS/HCC)    3. Age-related osteoporosis without current pathological fracture    4. Abdominal aortic aneurysm (AAA) without rupture (CMS/HCC)      CHIEF COMPLAINT  None.    Dear MsKalin Dozier,    HISTORY OF PRESENT ILLNESS:   I saw Ms. Renee in follow up today in our medical oncology clinic. As you are aware, she is a pleasant, 85 y.o., white female with a history of hypertension, arthritis and Addison's esophagus who has been undergoing periodic repeat endoscopic evaluations for the latter since she was first diagnosed in ~. She was again found to have high grade dysplasia in late summer 2017 and was referred back to the [facility] (where she had undergone prior radiofrequency ablations). A repeat local evaluation at  in 2017, unfortunately, was concerning for a component of carcinoma invasive into the submucosa. Imaging at the time (including a PET scan) was consistent with early stage disease, with no definite evidence of either mediastinal node involvement or distant mets. Ultimately, following an evaluation by CT surgery (neither they nor the patient were interested in pursuing an esophagectomy), she was referred to both our clinic and local radiation oncology, to receive this treatment closer to her home in [place], [place]. She completed concomitant Xeloda/XRT on 2018; and she has been doing overall very well ever since then, with no symptoms/signs of residual/recurrent disease to date. Meanwhile, she was, unfortunately, diagnosed with an early stage, left-sided, ER/GA strongly positive, well-differentiated breast cancer in 2019. With her age and  comorbidities, her surgeon ultimately recommended treatment with lumpectomy followed by adjuvant Arimidex therapy alone; and she has been doing overall very well from the standpoint of this malignancy also, with no signs of recurrent disease to date.    INTERIM HISTORY:  Ms. Renee returns to clinic today for follow up. Overall, she been doing very well since her last visit. She continues to have a good appetite and stable weight. She denies any dysphagia. She most recently follow up with local GI for a repeat EGD earlier this summer, and she received another good report (showing stable Addison's esophagus only). She continues to tolerate both daily Arimidex (which she has been on for nearly two (2) full years now) and [id number] Prolia. She overall continues to be in her usual state of health and again has no specific complaints.    Past Medical History:   Diagnosis Date   • Arthritis    • Breast cancer (CMS/HCC)    • Breast lump     left   • Esophageal cancer (CMS/HCC)    • GERD (gastroesophageal reflux disease)    • Hypertension    • Snoring        Past Surgical History:   Procedure Laterality Date   • BREAST LUMPECTOMY Left 2019    Procedure: BREAST LUMPECTOMY WITH ULTRASOUND;  Surgeon: Nurys Selby MD;  Location: [facility];  Service: General   • CATARACT EXTRACTION, BILATERAL  09/10/2019   • CHOLECYSTECTOMY     • COLONOSCOPY     • ELBOW PROCEDURE Right    • ENDOSCOPY     • FRACTURE SURGERY      right elbow       Social History     Socioeconomic History   • Marital status:      Spouse name: Not on file   • Number of children: Not on file   • Years of education: Not on file   • Highest education level: Not on file   Tobacco Use   • Smoking status: Former Smoker     Packs/day: 0.50     Years: 30.00     Pack years: 15.00     Types: Cigarettes     Quit date:      Years since quittin.6   • Smokeless tobacco: Never Used   Vaping Use   • Vaping Use: Never used   Substance and Sexual  Activity   • Alcohol use: No   • Drug use: No   • Sexual activity: Defer       Family History   Problem Relation Age of Onset   • Hypertension Mother    • Heart disease Brother    • Breast cancer Neg Hx        Allergies   Allergen Reactions   • Codeine Nausea Only   • Sulfa Antibiotics Hives       Current Outpatient Medications   Medication Sig Dispense Refill   • amLODIPine (NORVASC) 5 MG tablet Take 10 mg by mouth.     • anastrozole (ARIMIDEX) 1 MG tablet Take  by mouth Daily.     • atenolol (TENORMIN) 100 MG tablet Take 100 mg by mouth Daily.     • Calcium Carb-Cholecalciferol (Calcium-Vitamin D) 600-400 MG-UNIT tablet Take 1 tablet by mouth 2 (two) times a day. 60 tablet 11   • dexlansoprazole (DEXILANT) 60 MG capsule Take 60 mg by mouth Daily.     • HYDROcodone-acetaminophen (NORCO) 5-325 MG per tablet Take 1 tablet by mouth Every 6 (Six) Hours As Needed for Moderate Pain . 10 tablet 0   • potassium chloride (K-DUR,KLOR-CON) 20 MEQ CR tablet Take 1 tablet by mouth 2 (Two) Times a Day. 20 tablet 0   • triamterene-hydrochlorothiazide (MAXZIDE-25) 37.5-25 MG per tablet Take 1 tablet by mouth Daily.       No current facility-administered medications for this visit.     REVIEW OF SYSTEMS  CONSTITUTIONAL:  No fever, chills or night sweats.   EYES:  No blurry vision, diplopia or other vision changes.  ENT:  No hearing loss, nosebleeds or sore throat.  CARDIOVASCULAR:  No palpitations, arrhythmia, syncopal episodes or edema.  PULMONARY:  No hemoptysis, wheezing, chronic cough or shortness of breath.  GASTROINTESTINAL:  No nausea or vomiting. No constipation or diarrhea. No dysphagia.  GENITOURINARY:  No hematuria, kidney stones or frequent urination.  MUSCULOSKELETAL: Chronic, mild joint and back pains, stable.  INTEGUMENTARY: No rashes or pruritus.  ENDOCRINE:  No excessive thirst or hot flashes.  HEMATOLOGIC:  No history of free bleeding, spontaneous bleeding or clotting.  IMMUNOLOGIC:  No allergies or frequent  "infections.  NEUROLOGIC: No numbness, tingling, seizures or weakness.  PSYCHIATRIC:  No anxiety or depression.    PHYSICAL EXAMINATION  /79   Pulse 72   Temp 96.6 °F (35.9 °C) (Temporal)   Resp 16   Ht 162.6 cm (64\")   Wt 84.8 kg (187 lb)   SpO2 96%   BMI 32.10 kg/m²     GENERAL:  A well-developed, well-nourished, elderly, white female in no acute distress  HEENT:  Pupils equally round and reactive to light. Extraocular muscles intact.  CARDIOVASCULAR:  Regular rate and rhythm.  No murmurs, gallops or rubs.  LUNGS:  Clear to auscultation bilaterally.  ABDOMEN:  Soft, nontender, nondistended with positive bowel sounds.  EXTREMITIES:  No clubbing, cyanosis or edema bilaterally.  SKIN:  No rashes or petechiae.  NEURO:  Cranial nerves grossly intact.  No focal deficits.  PSYCH:  Alert and oriented x3.    The physical exam is unchanged from priors.    LABORATORY  Lab Results   Component Value Date    WBC 8.77 09/02/2021    HGB 12.4 09/02/2021    HCT 39.0 09/02/2021    MCV 88.4 09/02/2021     09/02/2021    NEUTROABS 6.32 09/02/2021       Lab Results   Component Value Date     09/02/2021    K 3.4 (L) 09/02/2021     09/02/2021    CO2 25.7 09/02/2021    BUN 19 09/02/2021    CREATININE 1.26 (H) 09/02/2021    GLUCOSE 111 (H) 09/02/2021    CALCIUM 9.4 09/02/2021    AST 12 09/02/2021    ALT 9 09/02/2021    ALKPHOS 119 (H) 09/02/2021    BILITOT 0.3 09/02/2021    PROTEINTOT 7.2 09/02/2021    ALBUMIN 3.75 09/02/2021     IMAGING  CT chest, abdomen and pelvis with contrast (11/09/2017):  Impression:  1) Mild, nonspecific thickening of the distal esophagus which may be related to diagnosis of esophageal cancer. Multiple subcentimeter right paraesophageal lymph nodes are noted.  2) 1.3 mm ground glass nodule in the left upper lobe. No suspicious solid nodules that would suggest metastatic disease.  3) No evidence of metastatic disease to the abdomen or pelvis.  4) Bilobed fusiform AAA measuring up to 4.6 " cm beneath the renal arteries.  5) Cardiomegaly with artery atherosclerosis.    NM PET with fusion CT, skull base to mid-thigh (12/07/2017, at ):  Impression:  1) No suspicious focal FDG uptake within the esophagus to suggest a site of invasive malignancy.  2) No suspicious metabolically active mediastinal adenopathy.  3) No suspicious hypermetabolic distal metastasis.  4) Few non-FDG avid groundglass nodules, the largest in the left upper lobe of the lung, unchanged since the most recent comparison CT of the chest of one month prior.    CT chest with contrast (07/02/2018):  Impression: Thickening of the mucosa in the distal portion of the esophagus consistent with clinical history of adenocarcinoma of the esophagus. There is nothing seen on the CT to suggest metastatic disease in the lung parenchyma or in the mediastinal lymph nodes.    CT abdomen and pelvis with contrast (07/02/2018):  Impression: No CT findings of metastatic disease in the abdomen or pelvis. There were benign cysts in the kidneys. There is aneurysmal dilatation of the infrarenal abdominal aorta with a maximum diameter of 4.7 cm.    CT chest with contrast (10/08/2018):  Impression: Thickening of the esophageal wall, similar to the previous exam. No new pulmonary pathology.    CT abdomen and pelvis with contrast (10/08/2018):  Impression:  1) Thickening of the distal esophageal wall, similar to the previous exam.  2) Very slight interval increase in size of AAA (from 4.7 to 4.8 cm).    CT chest with contrast (01/07/2019):  Impression:  1) Esophageal wall thickening of the mid-distal esophagus with no increase since the previous exam. No mediastinal masses or adenopathy identified.  2) Remainder of chest is stable from previous.    CT abdomen and pelvis with contrast (01/07/2019):  Impression:  1) Stable exam demonstrating no evidence of metastatic disease to the abdomen or pelvis. Distal esophageal wall thickening and hiatal hernia similar to  previous.  2) Based on coronal reformatted sequences, likely no significant change in transverse diameter of infrarenal abdominal aortic aneurysm (4.7 cm and was previously 4.7 cm).    CT chest with contrast (04/04/2019)  Impression: Stable CT scan of the thorax. There continues to be some  mild mucosal thickening in the distal portion of the esophagus, stable  in comparing with the earlier exam.     CT abdomen pelvis with contrast (04/04/2019)  Impression: Nothing seen to suggest metastatic disease in the abdomen  and pelvis. There is aneurysmal dilatation of the infrarenal portion of  the abdominal aorta. The diameter is increased slightly. Currently the  aortic diameter measures 4.9 cm and previously measures 4.7.    Bone densitometry (DEXA) scan (12/16/2019):  Impression: Osteoporosis with a T-score of -3.1 in the left hip.    CT chest with contrast (01/02/2020):  Impression: Stable CT of the thorax. Mild thickening of the mucosa remains in the esophagus. No enlarged lymph nodes have developed. There is a stable area of ground-glass parenchymal infiltrate in the left upper lobe.    CT abdomen and pelvis with contrast (01/02/2020):  Impression:  1) The biliary tree is not as dilated as it was on the previous CT done 10/03/2019.  2) The pancreas shows atrophy with fatty infiltrate. There are benign cysts in the kidneys. There is an infrarenal abdominal aortic aneurysm which now has a diameter of 5.3 cm. There is a hiatal hernia in the lower mediastinum.    Bilateral diagnostic mammogram with tomosynthesis (05/12/2020):  Impression:  1) Stable mammographic appearance of the right breast with no findings suspicious for malignancy.  2) Presumed postoperative and post-treament related changes in the left breast.  Bi-Rads Category 3: Probably Benign.    PROCEDURES  Esophagogastroduodenoscopy with esophageal biopsies and dilatation (06/26/2018):  Impression:  1) Radiation esophagitis with ulceration, dense exudate and  tight stenosis balloon dilated to 12 mm and biopsies were performed.  2) Appearance of residual Addison's esophagus with papillary mucosal appearance in the distal most esophagus.  3) Large hiatal hernia.  4) Gastric food retention consistent with gastroparesis.    PATHOLOGY  Esophagus, nodules (10/19/2017):  Adenocarcinoma, invasive at least into the muscularis mucosa.    Esophageal biopsy, stricture (06/26/2018):  Gastric type mucosa with ulceration, acute and chronic inflammation, and focal intestinal metaplasia, negative for dysplasia.    Esophagus, biopsy, distal (01/18/2019):  Addison's esophagitis without dysplasia. Ulceration with acute inflammation and reactive epithelial changes. Negative for malignancy.    Left breast, mass, needle core biopsies (10/09/2019):  Invasive, well-differentiated ductal adenocarcinoma. Palm-Velasquez grade 1 (3+1+1). ER strongly positive (100%), VA strongly positive (100%), HER2/lisa negative (1+ by IHC).    Breast margin, left, deep margin (11/19/2019):  Invasive, well-differentiated ductal carcinoma with lobular features, involving previously inked deep resection margin.    Breast, lumpectomy, left (11/19/2019):  Invasive, well-differentiated ductal carcinoma (1.9 cm) with associated low-to-intermediate grade DCIS, solid and cribiform types. Margins are free of tumor. Pathologic tumor stage [id number].    IMPRESSION AND PLAN  Ms. Renee is a 85 y.o., white female with:  1. Esophageal adenocarcinoma: Diagnosed with clinical stage [id number] disease in late 2017 (although she did not undergo an endoscopic ultrasound). Regardless, even though she was (and is) in overall good health, she was not eligible for an esophagectomy due to her advanced age and comorbidities (and she has repeatedly stated that she would not have wanted one anyway). Therefore, we agreed with CT surgery and radiation oncology's initial recommendations for localized radiotherapy. It was also agreed that  she would be a good candidate for adding concomitant chemotherapy to this treatment plan, and PO capecitabine (Xeloda) was a convenient and fairly well tolerable choice. A Rx for 1500 mg BID M-F throughout the planned course of localized XRT was provided. She began this treatment regimen in early March 2018 and completed it on 04/26/2018. Overall, she tolerated this treatment regimen very well, with fatigue and mild dysphagia (both of which have since resolved and remain resolved) as her only noticeable side effects. Clinically she continues to feel overall well. Repeat CT scans performed on 07/02/2018 (and summarized above) were consistent with treatment related changes only, with no definite evidence of residual/recurrent disease; and this continues to be the case on the most recent follow up imaging (CT scans performed on 01/02/2020 and also summarized above). Due to the ongoing COVID-19 crisis, she has been repeatedly resistant to schedule/reschedule any repeat imaging over the course of the ~one and one half (1.5) years since then (01/02/2020). Particularly because of issue #3, she states today that she is now agreeable. We will therefore order some new CT scans of the chest, abdomen and pelvis to be scheduled within the next ~two to three weeks. Meanwhile, she has been following routinely with local gastroenterology; and she most recently underwent a repeat EGD about one (1) month ago (July 2021). She reportedly received another good report (that of Addison's esophagus only, with no signs of recurrent cancer). This exam is scheduled to be repeated at least annually. We will see her back in our clinic in six months (on the day of the next cycle of Prolia).  2. Dysphagia: The patient had no such issues prior to starting Xeloda/XRT; however, toward the end of this regimen, she began to experience some increasing difficulty with swallowing, particularly with her pills. This issue remained manageable throughout the  remainder of her treatment and has resolved since the completion of it, particularly after she underwent an esophageal dilatation in late June 2018. She will continue to follow up with gastroenterology as planned (a repeat EGD was most recently performed in July 2021; and this reportedly showed continued Addison's esophagus only, with no evidence of recurrent cancer). Continue to follow routinely with gastroenterology, as previously planned.  3. AAA: A known issue. The most recent repeat CT scan of the abdomen/pelvis (performed on 01/02/2020 and summarized above) showed a substantial increase in diameter (from 4.9 cm to 5.3 cm). Although the risks would obviously be high, she is nearing the typical threshold (5.5 cm or greater) where intervention is usually considered. She has been following with thoracic surgery in [place], [place] for routine monitoring; but she is now finally agreeable to undergoing some new, repeat imaging (due to the ongoing COVID-19 crisis, she had previously been resistant to do this for the past ~one and one half (1.5) years). We will schedule these within the next ~two to three weeks.  4. Breast carcinoma: She was diagnosed with an early stage, ER/VT strongly positive, well-differentiated ductal adenocarcinoma of the left breast in fall 2019. With her age and comorbidities, her surgeon ultimately recommended treatment with lumpectomy (performed on 11/19/2019) followed by adjuvant endocrine (with an AI) therapy alone (and the patient agreed). She has been on Arimidex for a total of nearly two (2) full years now and is still tolerating it overall well, with no significant side effects. The current standard of care is to remain on this medication for at least a total of five years (through ~late 2024).  5. Osteoporosis: A known, longstanding issue for which she had been on Fosamax. She stopped this medication when she was diagnosed with issue #1, as it seemed to exacerbate the reflux and discomfort  that the cancer was causing. Her most recent bone densitometry (DEXA) scan performed on 12/16/2019 and summarized above showed that her BMD remains solidly within the osteoporotic range. She will be due for repeat DEXA this December (she is agreeable to letting us order this today). Particularly now that she is on adjuvant Arimidex for issue #4 (see above), restarting treatment for this issue (the osteoporosis) was strongly recommended; and she began [id number] Prolia in February 2020 (fourth cycle today). She is still tolerating this well, with no noticeable side effects.  The patient was in agreement with these plans.    It is a pleasure to participate in Ms. Renee's care. Please do not hesitate to call with any questions or concerns that you may have.    A total of 30 minutes were spent coordinating this patient’s care in clinic today; more than 50% of this time was spent face-to-face with the patient, reviewing her interim medical history and counseling on the current evaluation, treatment and followup plan. All questions were answered to her satisfaction.    FOLLOW UP  Continue adjuvant, daily PO Arimidex and [id number] Prolia (4th cycle of the latter scheduled for today). CTs of the chest, abdomen and pelvis with contrast within the next ~2-3 weeks. With gastroenterology, as previously planned, for routine, repeat EGDs. With vascular/thoracic surgery in [place], [place], as previously planned. With breast surgery, as previously planned. Repeat a DEXA scan this December. Return to our clinic in 6 months (~late February 2022), on the day of the 5th cycle of [id number] Prolia, with a CBC and CMP.            This document was electronically signed by BERTHA Berrios MD September 2, 2021       CC: PRISCILLA Booker MD Michael E. Simons, MD James H. Shoptaw, MD

## 2021-10-13 NOTE — PROGRESS NOTES
10/13/2021  Patient Information  Geeta Renee                                                                                          [address]  [place] [address]   1935  'PCP/Referring Physician'  Alejandra Dozier, PRISCILLA  [phone]  BERTHA Berrios MD  [phone]  Chief Complaint   Patient presents with   • Consult     N/p per Dr. Guerrero berrios for AAA 6 cm. Pt states that she is feeling well, SOB w/ exertion some fatigue and  lower back pain. Complaints of  dizzines loss of balance and light headed.        History of Present Illness: 85-year-old  female with a history of hypertension, breast cancer, esophageal cancer, chronic kidney disease and remote tobacco abuse who presents with abdominal aortic aneurysm.  The patient denies abdominal pain.  She has chronic back pain down her entire spine secondary to arthritis that is unchanged.  The patient denies a family history of aneurysms or sudden death.      Patient Active Problem List   Diagnosis   • Esophageal adenocarcinoma (HCC)   • Malignant neoplasm of upper-outer quadrant of left breast in female, estrogen receptor positive (HCC)   • Osteoporosis   • Aromatase inhibitor use   • AAA (abdominal aortic aneurysm) (HCC)     Past Medical History:   Diagnosis Date   • Arthritis    • Breast cancer (HCC)    • Breast lump     left   • Esophageal cancer (HCC)    • GERD (gastroesophageal reflux disease)    • Hiatal hernia    • Hypertension    • Snoring      Past Surgical History:   Procedure Laterality Date   • BREAST LUMPECTOMY Left 11/19/2019    Procedure: BREAST LUMPECTOMY WITH ULTRASOUND;  Surgeon: Nurys Selby MD;  Location: [facility];  Service: General   • CATARACT EXTRACTION, BILATERAL  09/10/2019   • CHOLECYSTECTOMY     • COLONOSCOPY     • ELBOW PROCEDURE Right    • ENDOSCOPY     • FRACTURE SURGERY      right elbow       Current Outpatient Medications:   •  amLODIPine (NORVASC) 5 MG tablet, Take 10 mg by mouth., Disp: , Rfl:   •   anastrozole (ARIMIDEX) 1 MG tablet, Take  by mouth Daily., Disp: , Rfl:   •  atenolol (TENORMIN) 100 MG tablet, Take 100 mg by mouth Daily., Disp: , Rfl:   •  Calcium Carb-Cholecalciferol (Calcium-Vitamin D) 600-400 MG-UNIT tablet, Take 1 tablet by mouth 2 (two) times a day., Disp: 60 tablet, Rfl: 11  •  dexlansoprazole (DEXILANT) 60 MG capsule, Take 60 mg by mouth Daily., Disp: , Rfl:   •  esomeprazole (nexIUM) 40 MG capsule, Take 40 mg by mouth Every Morning Before Breakfast., Disp: , Rfl:   •  losartan (COZAAR) 50 MG tablet, Take 50 mg by mouth Daily., Disp: , Rfl:   •  HYDROcodone-acetaminophen (NORCO) 5-325 MG per tablet, Take 1 tablet by mouth Every 6 (Six) Hours As Needed for Moderate Pain ., Disp: 10 tablet, Rfl: 0  •  potassium chloride (K-DUR,KLOR-CON) 20 MEQ CR tablet, Take 1 tablet by mouth 2 (Two) Times a Day., Disp: 20 tablet, Rfl: 0  •  triamterene-hydrochlorothiazide (MAXZIDE-25) 37.5-25 MG per tablet, Take 1 tablet by mouth Daily., Disp: , Rfl:   Allergies   Allergen Reactions   • Codeine Nausea Only   • Sulfa Antibiotics Hives     Social History     Socioeconomic History   • Marital status:    • Number of children: 2   Tobacco Use   • Smoking status: Former Smoker     Packs/day: 0.50     Years: 30.00     Pack years: 15.00     Types: Cigarettes     Quit date:      Years since quittin.8   • Smokeless tobacco: Never Used   Vaping Use   • Vaping Use: Never used   Substance and Sexual Activity   • Alcohol use: No   • Drug use: No   • Sexual activity: Defer     Family History   Problem Relation Age of Onset   • Hypertension Mother    • Alzheimer's disease Mother    • Heart disease Brother    • Tuberculosis Father    • Breast cancer Neg Hx      Review of Systems   Constitutional: Negative for chills, fever, malaise/fatigue, night sweats and weight loss.   HENT: Negative for congestion, hearing loss, nosebleeds and odynophagia.    Cardiovascular: Positive for dyspnea on exertion. Negative  "for chest pain, claudication, leg swelling, orthopnea, palpitations and syncope.   Respiratory: Negative for cough, hemoptysis, shortness of breath and wheezing.    Endocrine: Negative for cold intolerance, heat intolerance, polydipsia, polyphagia and polyuria.   Hematologic/Lymphatic: Does not bruise/bleed easily.   Skin: Negative for itching, poor wound healing and rash.   Musculoskeletal: Positive for back pain and joint pain. Negative for arthritis, joint swelling and myalgias.   Gastrointestinal: Positive for diarrhea. Negative for abdominal pain, constipation, hematemesis, melena, nausea and vomiting.   Genitourinary: Negative for dysuria, frequency, hematuria, nocturia and urgency.   Neurological: Positive for light-headedness and loss of balance. Negative for dizziness and numbness.   Psychiatric/Behavioral: Negative for depression and suicidal ideas. The patient is not nervous/anxious.    Allergic/Immunologic: Negative for environmental allergies and HIV exposure.     Vitals:    10/13/21 1226   BP: 142/80   Pulse: 76   Temp: 98.1 °F (36.7 °C)   SpO2: 98%   Weight: 86.6 kg (191 lb)   Height: 162.6 cm (64\")      Physical Exam  Vitals reviewed.   Constitutional:       General: She is not in acute distress.     Appearance: She is well-developed. She is not diaphoretic.      Comments:  female who appears stated age and is present with her son   HENT:      Head: Normocephalic and atraumatic.   Eyes:      General: No scleral icterus.     Conjunctiva/sclera: Conjunctivae normal.   Neck:      Vascular: No JVD.      Trachea: No tracheal deviation.   Cardiovascular:      Rate and Rhythm: Normal rate and regular rhythm.      Heart sounds: Normal heart sounds. No murmur heard.  No friction rub. No gallop.    Pulmonary:      Effort: Pulmonary effort is normal. No respiratory distress.      Breath sounds: Normal breath sounds. No wheezing or rales.   Abdominal:      General: There is no distension.      " Palpations: Abdomen is soft. There is no mass.      Tenderness: There is no abdominal tenderness. There is no guarding or rebound.   Musculoskeletal:         General: Normal range of motion.      Cervical back: Neck supple.   Skin:     General: Skin is warm and dry.      Findings: No erythema or rash.   Neurological:      Mental Status: She is alert and oriented to person, place, and time.   Psychiatric:         Behavior: Behavior normal.         Thought Content: Thought content normal.         Judgment: Judgment normal.         The ROS, past medical history, surgical history, family history, social history and vitals were reviewed by myself and corrected as needed.      Labs/Imaging:  -CT of the abdomen and pelvis without contrast performed 9/20/2021, personally reviewed, demonstrates a 6 cm infrarenal abdominal aortic aneurysm.  There is scattered aortic, iliac and femoral artery calcifications.    Assessment/Plan:  85-year-old  female with a history of hypertension, breast cancer, esophageal cancer, chronic kidney disease and remote tobacco abuse who presents with a 6 cm infrarenal abdominal aortic aneurysm.  I discussed with the patient performing endovascular repair of this abdominal aortic aneurysm.  The risks and benefits of surgery were discussed with the patient including pain, bleeding, infection, renal dysfunction/failure, myocardial infarction and death.  The patient understood these risks and wished to proceed with surgery.  A CT scan with contrast will be obtained to better visualize the renal ostia and improve preoperative planning.    Patient Active Problem List   Diagnosis   • Esophageal adenocarcinoma (HCC)   • Malignant neoplasm of upper-outer quadrant of left breast in female, estrogen receptor positive (HCC)   • Osteoporosis   • Aromatase inhibitor use   • AAA (abdominal aortic aneurysm) (HCC)

## 2021-11-22 NOTE — TELEPHONE ENCOUNTER
Called Miss Coats and made sure she knew to take her Fosamax Calcum and Vit D. Also checked on her Anastrozole refill.   [facility]  PROGRESS NOTE  ATTENDING NOTE    Chief Complaint   Patient presents with    Abdominal Pain     Started a couple days ago. -n/v/d. Seen at [facility] and sent to ED. S: 69-year-old male status post perforated duodenal ulcer. I reviewed the risk factors with the patient he did not appear to have any risk factors. H. pylori is pending. Patient tolerating diet having normal bowel function denying any pain. Noted 15 bowel movements yesterday. I discussed with nursing staff they said it has been running diarrhea but does not appear to be like C. difficile. She states it is gritty in nature. BP (!) 141/79   Pulse 90   Temp 97.8 °F (36.6 °C) (Oral)   Resp 18   Ht 5' 10\" (1.778 m)   Wt 251 lb 1.6 oz (113.9 kg)   SpO2 98%   BMI 36.03 kg/m²   Physical Exam  Constitutional:       Appearance: Normal appearance. He is obese. HENT:      Head: Normocephalic and atraumatic. Nose: Nose normal.      Mouth/Throat:      Mouth: Mucous membranes are moist.      Pharynx: Oropharynx is clear. Eyes:      Extraocular Movements: Extraocular movements intact. Pupils: Pupils are equal, round, and reactive to light. Cardiovascular:      Rate and Rhythm: Normal rate and regular rhythm. Pulses: Normal pulses. Heart sounds: Normal heart sounds. Pulmonary:      Effort: Pulmonary effort is normal.      Breath sounds: Normal breath sounds. Abdominal:      General: There is no distension. Palpations: Abdomen is soft. Tenderness: There is no abdominal tenderness. Comments: Wounds clean dry and intact  BRIGIDO with minimal serosanguineous drainage   Musculoskeletal:         General: No tenderness or signs of injury. Cervical back: Normal range of motion and neck supple. Skin:     General: Skin is warm and dry. Neurological:      General: No focal deficit present. Mental Status: He is alert and oriented to person, place, and time.    Psychiatric: Mood and Affect: Mood normal.         Behavior: Behavior normal.         Thought Content:  Thought content normal.         Judgment: Judgment normal.       ASSESSMENT/PLAN:  Perforated duodenal ulcer-status post laparoscopic modified Webster Ronak patch  --Advance diet as tolerated to carb controlled diet  --Diabetes non-insulin-dependent--continue high sliding scale change diet  --Hypokalemia--replace  --SIRS--continue antibiotics until tomorrow  --Acute respiratory failure status post surgery--wean oxygen, continue pulmonary toilet, continue senna spirometer  --acute blood loss anemia--monitor    --Diarrhea--start fiber capsules and Imodium as needed; could be from drinking the contrast for the upper GI the other day    DVT/GI ppx--lovenox, PPI    Tabatha Spaulding MD, MSc, FACS  11/22/2021  12:42 PM

## 2021-12-08 PROBLEM — K85.90 ACUTE PANCREATITIS: Status: ACTIVE | Noted: 2021-01-01

## 2021-12-08 NOTE — ED NOTES
Critical labs reported to Dr. Cho  Lactic 2.7  K+ 2.0  Calcium 15.5  Troponin 0.324     Ramon Caldwell, RN  12/08/21 4503

## 2021-12-08 NOTE — ED NOTES
MEDICAL SCREENING:    Reason for Visit: Patient c/o of loss of appetite, nausea, vomiting, and diarrhea    Patient initially seen in triage.  The patient was advised further evaluation and diagnostic testing will be needed, some of the treatment and testing will be initiated in the lobby in order to begin the process.  The patient will be returned to the waiting area for the time being and possibly be re-assessed by a subsequent ED provider.  The patient will be brought back to the treatment area in as timely manner as possible.         Nabila Foote PA-C  12/08/21 2132

## 2021-12-08 NOTE — ED PROVIDER NOTES
Subjective   86-year-old female with past medical history of breast cancer, hypertension, and esophageal cancer presents to the ER due to concerns for increasing weakness, fatigue, slight ataxia, and abdominal pain with nausea/diarrhea.  Patient denied hematic emesis or hematochezia.  Denied fever chills.  Denied chest pain or shortness of breath.  Patient denied obvious vision changes or focal neurological deficits.  Denied headache.  Patient's family noted she appears more sleepy than usual and has difficulty ambulating around the house more than usual.  Patient denied changes in urinary habits.  Vitals stable          Review of Systems   Constitutional: Positive for fatigue.   Gastrointestinal: Positive for abdominal pain and diarrhea.   Neurological: Positive for dizziness and weakness.   All other systems reviewed and are negative.      Past Medical History:   Diagnosis Date   • Arthritis    • Breast cancer (HCC)    • Breast lump     left   • Esophageal cancer (HCC)    • GERD (gastroesophageal reflux disease)    • Hiatal hernia    • Hypertension    • Snoring        Allergies   Allergen Reactions   • Codeine Nausea Only   • Sulfa Antibiotics Hives       Past Surgical History:   Procedure Laterality Date   • BREAST LUMPECTOMY Left 11/19/2019    Procedure: BREAST LUMPECTOMY WITH ULTRASOUND;  Surgeon: Nurys Selby MD;  Location: [facility];  Service: General   • CATARACT EXTRACTION, BILATERAL  09/10/2019   • CHOLECYSTECTOMY     • COLONOSCOPY     • ELBOW PROCEDURE Right    • ENDOSCOPY     • FRACTURE SURGERY      right elbow       Family History   Problem Relation Age of Onset   • Hypertension Mother    • Alzheimer's disease Mother    • Heart disease Brother    • Tuberculosis Father    • Breast cancer Neg Hx        Social History     Socioeconomic History   • Marital status:    • Number of children: 2   Tobacco Use   • Smoking status: Former Smoker     Packs/day: 0.50     Years: 30.00     Pack years:  15.00     Types: Cigarettes     Quit date:      Years since quittin.9   • Smokeless tobacco: Never Used   Vaping Use   • Vaping Use: Never used   Substance and Sexual Activity   • Alcohol use: No   • Drug use: No   • Sexual activity: Defer           Objective   Physical Exam  Constitutional:       General: She is not in acute distress.     Appearance: Normal appearance. She is not ill-appearing.   HENT:      Head: Normocephalic and atraumatic.      Right Ear: External ear normal.      Left Ear: External ear normal.      Nose: Nose normal.      Mouth/Throat:      Mouth: Mucous membranes are moist.   Eyes:      Extraocular Movements: Extraocular movements intact.      Pupils: Pupils are equal, round, and reactive to light.   Cardiovascular:      Rate and Rhythm: Normal rate and regular rhythm.      Heart sounds: No murmur heard.      Pulmonary:      Effort: Pulmonary effort is normal. No respiratory distress.      Breath sounds: Normal breath sounds. No wheezing.   Abdominal:      General: Bowel sounds are normal.      Palpations: Abdomen is soft.      Tenderness: There is generalized abdominal tenderness and tenderness in the epigastric area.   Musculoskeletal:         General: No deformity or signs of injury. Normal range of motion.      Cervical back: Normal range of motion and neck supple.   Skin:     General: Skin is warm and dry.      Findings: No erythema.   Neurological:      General: No focal deficit present.      Mental Status: She is alert and oriented to person, place, and time. Mental status is at baseline.      Cranial Nerves: No cranial nerve deficit.   Psychiatric:         Mood and Affect: Mood normal.         Behavior: Behavior normal.         Thought Content: Thought content normal.         Procedures           ED Course  ED Course as of 21 2108   Wed Dec 08, 2021   1844 CBC notes leukocytosis.  CCP noted significant hypokalemia and hypercalcemia.  Elevated troponin was noted as well  but elevated creatinine is noted.  Patient immediately received IV fluid resuscitation, p.o. potassium, and IM calcitonin.  CT the abdomen pelvis noted concerns for pancreatitis.  Elevated lipase and amylase noted.  Patient received IV Zosyn.  Urinalysis notes concern for UTI.  Repeat troponin notes non-ACS trend.  Repeat CCP after calcitonin noted mild improvement in hypercalcemia but noted persistent hypokalemia.  Patient was started on a potassium run IV or 60 mEq.  I spoke with the hospitalist team and they notified me that no telemetry beds are available at this point time but a PCU bed may become available later. [SF]   1848 Care of this patient was transferred to the next attending physician at shift change.  Complete discussion of presentation, labs, imaging, care, and expected course occurred during transition of providers.  Vitals stable at transfer of care. [SF]   1848 Electronically signed by Nir Cho DO, 12/08/21, 6:48 PM EST.   [SF]   1910 Patient received in signout from Dr. Cho. [DH]   2020 Discussed with Dr. Boone per Dr. Marr request. [DH]   2100 Discussed with Dr. Marr for admission. [DH]      ED Course User Index  [DH] Trent Ware MD  [SF] Nir Cho DO                                                 MDM    Final diagnoses:   Acute renal failure, unspecified acute renal failure type (HCC)   Hypokalemia   Hypercalcemia   Acute pancreatitis without infection or necrosis, unspecified pancreatitis type       ED Disposition  ED Disposition     ED Disposition Condition Comment    Decision to Admit  Level of Care: Critical Care [6]   Diagnosis: Acute pancreatitis [577.0.ICD-9-CM]   Admitting Physician: CHICO MARR [[id number]]   Attending Physician: CHICO MARR [[id number]]   Certification: I Certify That Inpatient Hospital Services Are Medically Necessary For Greater Than 2 Midnights            No follow-up provider specified.       Medication List      No  changes were made to your prescriptions during this visit.          Trent Ware MD  12/08/21 6553

## 2021-12-09 NOTE — CONSULTS
Nephrology Consult Note    Referring Provider: Dr. Marr  Reason for Consultation: LILIA, hypercalcemia, hypokalemia    Subjective       History of present illness:  Geeta Renee is a 86 y.o. female who presented to [facility] emergency department with chief complaint of worsening abdominal pain. Pt is known to have esophageal cancer and breast cancer, both in remission, grider esophagus, GERD, arthritis, HTN, and hiatal hernia. Pt has been admitted with acute pancreatitis, suggestive of on CT abdomen. Pt has been having very poor oral intake for past few days. On admission patient had elevated Calcium received calcitonin twice and was started on IVF. She feels fine today and feels better, no chest pain or shortness of breath. Pt had history of LILIA in Jan 2021 with Cr peaked to around 2.5 and returned to 1 and had hyperclacemia as well at that time  Pt denied any history of Chronic NSAIDS use. Patient denies hematuria, dysuria, difficulty passing urine. No prior history of renal stones. No family history of renal disease    History  Past Medical History:   Diagnosis Date   • Arthritis    • Breast cancer (HCC)    • Breast lump     left   • Esophageal cancer (HCC)    • GERD (gastroesophageal reflux disease)    • Hiatal hernia    • Hypertension    • Snoring    ,   Past Surgical History:   Procedure Laterality Date   • BREAST LUMPECTOMY Left 11/19/2019    Procedure: BREAST LUMPECTOMY WITH ULTRASOUND;  Surgeon: Nurys Selby MD;  Location: [facility];  Service: General   • CATARACT EXTRACTION, BILATERAL  09/10/2019   • CHOLECYSTECTOMY     • COLONOSCOPY     • ELBOW PROCEDURE Right    • ENDOSCOPY     • FRACTURE SURGERY      right elbow   ,   Family History   Problem Relation Age of Onset   • Hypertension Mother    • Alzheimer's disease Mother    • Heart disease Brother    • Tuberculosis Father    • Breast cancer Neg Hx    ,   Social History     Tobacco Use   • Smoking status: Former Smoker      Packs/day: 0.50     Years: 30.00     Pack years: 15.00     Types: Cigarettes     Quit date:      Years since quittin.9   • Smokeless tobacco: Never Used   Vaping Use   • Vaping Use: Never used   Substance Use Topics   • Alcohol use: No   • Drug use: No   ,   Medications Prior to Admission   Medication Sig Dispense Refill Last Dose   • amLODIPine (NORVASC) 10 MG tablet Take 10 mg by mouth Daily.   2021   • calcium carbonate (TUMS) 500 MG chewable tablet Chew 1 tablet Daily.   2021   • promethazine (PHENERGAN) 12.5 MG tablet Take 12.5 mg by mouth Every 4 (Four) Hours As Needed for Nausea or Vomiting.   2021   • anastrozole (ARIMIDEX) 1 MG tablet Take  by mouth Daily.   2021   • atenolol (TENORMIN) 100 MG tablet Take 100 mg by mouth Daily.   2021   , Scheduled Meds:  amLODIPine, 10 mg, Oral, Daily  anastrozole, 1 mg, Oral, Daily  atenolol, 50 mg, Oral, Q24H  heparin (porcine), 5,000 Units, Subcutaneous, Q8H  magnesium sulfate, 2 g, Intravenous, Once  [START ON 12/10/2021] pantoprazole, 40 mg, Intravenous, Q AM  potassium phosphate, 15 mmol, Intravenous, Once  sodium chloride, 10 mL, Intravenous, Q12H  sodium chloride, 10 mL, Intravenous, Q12H    , Continuous Infusions:  sodium chloride 0.9 % with KCl 20 mEq, 150 mL/hr, Last Rate: 150 mL/hr (21 0732)    , PRN Meds:  hydrALAZINE  •  magnesium sulfate **OR** magnesium sulfate in D5W 1g/100mL (PREMIX) **OR** magnesium sulfate  •  potassium chloride  •  potassium phosphate infusion greater than 15 mMoles **OR** potassium phosphate **OR** potassium phosphate  •  promethazine  •  promethazine  •  [COMPLETED] Insert peripheral IV **AND** sodium chloride  •  sodium chloride  •  sodium chloride and Allergies:  Codeine and Sulfa antibiotics    Review of Systems  More than 10 point review of systems was done. Pertinent items are noted in HPI, all other systems reviewed and negative    Objective     Vital Signs  Temp:  [97.9 °F (36.6 °C)-98.8  °F (37.1 °C)] 97.9 °F (36.6 °C)  Heart Rate:  [50-87] 57  Resp:  [16-18] 18  BP: ()/(52-99) 124/71    I/O this shift:  In: 150 [I.V.:50; IV Piggyback:100]  Out: 750 [Urine:750]  I/O last 3 completed shifts:  In: 1300 [I.V.:100; IV Piggyback:1200]  Out: 800 [Urine:800]    Physical Examination:  General Appearance: not in acute distress  Neck: No  JVD  Lungs: Clear to auscultation, respirations regular and unlabored  Heart: Regular rhythm & normal rate, normal S1, S2, no murmur, no gallop, no rub   Abdomen: mild tender abd at epigesrim and left hypochondrium  Extremities: No edema  Pulses: Palpable and equal bilaterally  Neurologic: Orientated to person, place, time, grossly no focal deficitis    Laboratory Data :      WBC WBC   Date Value Ref Range Status   12/09/2021 17.63 (H) 3.40 - 10.80 10*3/mm3 Final   12/08/2021 15.95 (H) 3.40 - 10.80 10*3/mm3 Final      HGB Hemoglobin   Date Value Ref Range Status   12/09/2021 10.9 (L) 12.0 - 15.9 g/dL Final   12/08/2021 12.5 12.0 - 15.9 g/dL Final      HCT Hematocrit   Date Value Ref Range Status   12/09/2021 32.4 (L) 34.0 - 46.6 % Final   12/08/2021 36.4 34.0 - 46.6 % Final      Platlets No results found for: LABPLAT   MCV MCV   Date Value Ref Range Status   12/09/2021 83.9 79.0 - 97.0 fL Final   12/08/2021 83.9 79.0 - 97.0 fL Final          Sodium Sodium   Date Value Ref Range Status   12/09/2021 138 136 - 145 mmol/L Final   12/08/2021 139 136 - 145 mmol/L Final   12/08/2021 137 136 - 145 mmol/L Final   12/08/2021 136 136 - 145 mmol/L Final   12/07/2021 136 136 - 145 mmol/L Final      Potassium Potassium   Date Value Ref Range Status   12/09/2021 2.4 (C) 3.5 - 5.2 mmol/L Final   12/09/2021 2.5 (C) 3.5 - 5.2 mmol/L Final   12/08/2021 2.3 (C) 3.5 - 5.2 mmol/L Final   12/08/2021 1.9 (C) 3.5 - 5.2 mmol/L Final   12/08/2021 2.0 (C) 3.5 - 5.2 mmol/L Final   12/07/2021 2.3 (C) 3.5 - 5.2 mmol/L Final      Chloride Chloride   Date Value Ref Range Status   12/09/2021 94 (L) 98  - 107 mmol/L Final   12/08/2021 94 (L) 98 - 107 mmol/L Final   12/08/2021 93 (L) 98 - 107 mmol/L Final   12/08/2021 89 (L) 98 - 107 mmol/L Final   12/07/2021 86 (L) 98 - 107 mmol/L Final      CO2 CO2   Date Value Ref Range Status   12/09/2021 31.3 (H) 22.0 - 29.0 mmol/L Final   12/08/2021 33.5 (H) 22.0 - 29.0 mmol/L Final   12/08/2021 30.3 (H) 22.0 - 29.0 mmol/L Final   12/08/2021 33.6 (H) 22.0 - 29.0 mmol/L Final   12/07/2021 37.9 (H) 22.0 - 29.0 mmol/L Final      BUN BUN   Date Value Ref Range Status   12/09/2021 28 (H) 8 - 23 mg/dL Final   12/08/2021 28 (H) 8 - 23 mg/dL Final   12/08/2021 27 (H) 8 - 23 mg/dL Final   12/08/2021 29 (H) 8 - 23 mg/dL Final   12/07/2021 22 8 - 23 mg/dL Final      Creatinine Creatinine   Date Value Ref Range Status   12/09/2021 3.24 (H) 0.57 - 1.00 mg/dL Final   12/08/2021 3.35 (H) 0.57 - 1.00 mg/dL Final   12/08/2021 3.28 (H) 0.57 - 1.00 mg/dL Final   12/08/2021 3.51 (H) 0.57 - 1.00 mg/dL Final   12/07/2021 3.37 (H) 0.57 - 1.00 mg/dL Final      Calcium Calcium   Date Value Ref Range Status   12/09/2021 13.3 (C) 8.6 - 10.5 mg/dL Final   12/08/2021 13.8 (C) 8.6 - 10.5 mg/dL Final   12/08/2021 14.1 (C) 8.6 - 10.5 mg/dL Final   12/08/2021 15.5 (C) 8.6 - 10.5 mg/dL Final   12/07/2021 16.9 (C) 8.6 - 10.5 mg/dL Final      PO4 No results found for: CAPO4   Albumin Albumin   Date Value Ref Range Status   12/09/2021 3.14 (L) 3.50 - 5.20 g/dL Final   12/08/2021 3.22 (L) 3.50 - 5.20 g/dL Final   12/08/2021 3.56 3.50 - 5.20 g/dL Final   12/07/2021 3.60 3.50 - 5.20 g/dL Final      Magnesium Magnesium   Date Value Ref Range Status   12/09/2021 1.6 1.6 - 2.4 mg/dL Final   12/09/2021 1.7 1.6 - 2.4 mg/dL Final   12/08/2021 1.4 (L) 1.6 - 2.4 mg/dL Final   12/08/2021 1.1 (L) 1.6 - 2.4 mg/dL Final      Uric Acid No results found for: URICACID     Radiology results :     Imaging Results (Last 72 Hours)     Procedure Component Value Units Date/Time    CT Abdomen Pelvis Without Contrast [[id number]]  Collected: 12/08/21 1426     Updated: 12/08/21 1459    Narrative:      EXAM:    CT Abdomen and Pelvis Without Intravenous Contrast     EXAM DATE:    12/8/2021 1:39 PM     CLINICAL HISTORY:    Abdominal pain, acute, nonlocalized     TECHNIQUE:    Axial computed tomography images of the abdomen and pelvis without  intravenous contrast.  Sagittal and coronal reformatted images were  created and reviewed.  This CT exam was performed using one or more of  the following dose reduction techniques:  automated exposure control,  adjustment of the mA and/or kV according to patient size, and/or use of  iterative reconstruction technique.     COMPARISON:    09/20/2021     FINDINGS:    Lung bases:  Lung bases appear clear.    Heart:  Cardiomegaly is noted.    Mediastinum:  Moderate hiatal hernia.      ABDOMEN:    Liver:  Unremarkable.    Gallbladder and bile ducts:  Unremarkable.  No calcified stones.  No  ductal dilation.    Pancreas:  Inflammation surrounds the head of the pancreas and along  the second portion of the duodenum which is most consistent with acute  pancreatitis.  No ductal dilation.    Spleen:  Unremarkable.  No splenomegaly.    Adrenals:  Unremarkable.  No mass.    Kidneys and ureters:  Unremarkable.  No obstructing stones.  No  hydronephrosis.    Stomach and bowel:  Unremarkable.  No obstruction.  No mucosal  thickening.      PELVIS:    Appendix:  No findings to suggest acute appendicitis.    Bladder:  Unremarkable.  No stones.    Reproductive:  Unremarkable as visualized.      ABDOMEN and PELVIS:    Intraperitoneal space:  No pneumoperitoneum is identified.  No  loculated fluid collections.    Bones/joints:  No acute fracture.  No dislocation.    Soft tissues:  Unremarkable.    Vasculature:  Abdominal aortic aneurysm is 5.9 cm previously 5.7 cm  probably indicating no significant change given differences in  technique.    Lymph nodes:  Unremarkable.  No enlarged lymph nodes.    Other findings:  No pseudocyst  identified.       Impression:      1.  CT findings which are most consistent with acute pancreatitis. No  evidence of hemorrhage or pseudocysts.  2.  Stable abdominal aortic aneurysm as detailed above. 5.9 cm.  3.  Other incidental and nonacute findings as above appear stable.     This report was finalized on 12/8/2021 2:57 PM by Dr. Hernando Banks MD.       XR Chest 1 View [[id number]] Collected: 12/08/21 1426     Updated: 12/08/21 1428    Narrative:      EXAM:    XR Chest, 1 View     EXAM DATE:    12/8/2021 1:34 PM     CLINICAL HISTORY:    pain     TECHNIQUE:    Frontal view of the chest.     COMPARISON:    No relevant prior studies available.     FINDINGS:    Lungs:  Unremarkable.  No consolidation.    Pleural space:  Unremarkable.  No pneumothorax.    Heart:  Cardiomegaly is noted.    Mediastinum:  Unremarkable.    Bones/joints:  Degenerative changes bilateral shoulders.       Impression:        Cardiomegaly. Otherwise unremarkable chest.     This report was finalized on 12/8/2021 2:26 PM by Dr. Hernando Banks MD.       CT Head Without Contrast [[id number]] Collected: 12/08/21 1424     Updated: 12/08/21 1428    Narrative:      EXAM:    CT Head Without Intravenous Contrast     EXAM DATE:    12/8/2021 1:39 PM     CLINICAL HISTORY:    Mental status change, unknown cause     TECHNIQUE:    Axial computed tomography images of the head/brain without intravenous  contrast.  Sagittal and coronal reformatted images were created and  reviewed.  This CT exam was performed using one or more of the following  dose reduction techniques:  automated exposure control, adjustment of  the mA and/or kV according to patient size, and/or use of iterative  reconstruction technique.     COMPARISON:    No relevant prior studies available.     FINDINGS:    Brain:  Moderate generalized cerebral atrophy which is in part  age-related.  Mild chronic small vessel ischemic disease noted.  No  hemorrhage.    Ventricles:  Unremarkable.  No  ventriculomegaly.    Bones/joints:  Unremarkable.  No acute fracture.    Soft tissues:  Unremarkable.    Sinuses:  Unremarkable as visualized.  No acute sinusitis.    Mastoid air cells:  Unremarkable as visualized.  No mastoid effusion.       Impression:      1.  Generalized cerebral atrophy and mild chronic small vessel ischemic  disease.  2.  No CT evidence of acute intracranial abnormality.     This report was finalized on 12/8/2021 2:26 PM by Dr. Hernando Banks MD.               Medications:      amLODIPine, 10 mg, Oral, Daily  anastrozole, 1 mg, Oral, Daily  atenolol, 50 mg, Oral, Q24H  heparin (porcine), 5,000 Units, Subcutaneous, Q8H  magnesium sulfate, 2 g, Intravenous, Once  [START ON 12/10/2021] pantoprazole, 40 mg, Intravenous, Q AM  potassium phosphate, 15 mmol, Intravenous, Once  sodium chloride, 10 mL, Intravenous, Q12H  sodium chloride, 10 mL, Intravenous, Q12H      sodium chloride 0.9 % with KCl 20 mEq, 150 mL/hr, Last Rate: 150 mL/hr (12/09/21 0732)        Assessment/Plan       Acute pancreatitis    1. LILIA, non oliguric multifactorial, including ATN from sepsis, hypercalcemia  2. Sepsis with pancreatitis  3. Sever hypercalcemia  4. History of esophageal cancer and breast cancer  5. Hypokalemia  6. Metabolic alkalosis likely volume contraction    Baseline Cr around 1, admitted with CR of 3.5  Admission calcium was >15  -metabolic alkalosis is expected to improve with improvement in renal functions   -continue on IVF NS with 20meq/L of KCL and run at 150ml/h  -continue on Hypokalemia protocol as well  -send workup for hypocalcemia, PTH, PTHrp, 1,25 (OH)2 cholcaciferol level.   -strict I/O  -avoid any nephrotoxic agents, hypotension and adjust medications according to eGFR    Thanks Dr Marr for the consult. Nephrology will follow the patient.   I discussed the patient's findings and my recommendations with patient and nursing staff    Stanley Gastelum MD  12/09/21  12:14 EST

## 2021-12-09 NOTE — H&P
Hospitalist History and Physical        Patient Identification  Name: Geeta Renee  Age/Sex: 86 y.o. female  :  1935        MRN: [id number]  Visit Number: [id number]  Admit Date: 2021   PCP: Alejandra Dozier APRN          Chief complaint abdominal pain    History of Present Illness:  Patient is a 86 y.o. female with history of esophageal cancer and breast cancer, both in remission, grider esophagus, GERD, arthritis, HTN, and hiatal hernia, who presents with complaints of worsening epigastric abdominal pain over the last 2-3 days. She describes the pain as sharp but also cramping, with radition into her back. Pain is exacerbated by eating anything solid, but she can tolerate liquids without increased pain. She reports accompanying nausea with several bouts of vomiting. She also reports diarrhea, but attributes this to prilosec which she recently quit taking as a result. She denies chest pain, dyspnea, fever or chills. She does report generalized weakness, fatigue and even some ataxia making it difficult to perform basic tasks or even walk.     Review of Systems  Review of Systems   Constitutional: Positive for activity change, appetite change and fatigue. Negative for chills, diaphoresis, fever and unexpected weight change.   HENT: Negative for postnasal drip, rhinorrhea, sinus pressure, sinus pain and sore throat.    Eyes: Negative for photophobia, pain, discharge, redness, itching and visual disturbance.   Respiratory: Negative for cough, shortness of breath and wheezing.    Cardiovascular: Negative for chest pain, palpitations and leg swelling.   Gastrointestinal: Positive for abdominal pain, diarrhea, nausea and vomiting. Negative for abdominal distention and constipation.   Endocrine: Negative for cold intolerance, heat intolerance, polydipsia, polyphagia and polyuria.   Genitourinary: Negative for difficulty urinating, dysuria, flank pain, frequency and hematuria.   Musculoskeletal: Positive  for back pain (radiating from abdomen). Negative for arthralgias, joint swelling, myalgias, neck pain and neck stiffness.   Neurological: Positive for weakness (generalized). Negative for dizziness, tremors, seizures, syncope, light-headedness, numbness and headaches.        Ataxia   Hematological: Negative for adenopathy. Does not bruise/bleed easily.   Psychiatric/Behavioral: Negative for agitation, behavioral problems and confusion.       History  Past Medical History:   Diagnosis Date   • Arthritis    • Breast cancer (HCC)    • Breast lump     left   • Esophageal cancer (HCC)    • GERD (gastroesophageal reflux disease)    • Hiatal hernia    • Hypertension    • Snoring      Past Surgical History:   Procedure Laterality Date   • BREAST LUMPECTOMY Left 2019    Procedure: BREAST LUMPECTOMY WITH ULTRASOUND;  Surgeon: Nurys Selby MD;  Location: [facility];  Service: General   • CATARACT EXTRACTION, BILATERAL  09/10/2019   • CHOLECYSTECTOMY     • COLONOSCOPY     • ELBOW PROCEDURE Right    • ENDOSCOPY     • FRACTURE SURGERY      right elbow     Family History   Problem Relation Age of Onset   • Hypertension Mother    • Alzheimer's disease Mother    • Heart disease Brother    • Tuberculosis Father    • Breast cancer Neg Hx      Social History     Tobacco Use   • Smoking status: Former Smoker     Packs/day: 0.50     Years: 30.00     Pack years: 15.00     Types: Cigarettes     Quit date:      Years since quittin.9   • Smokeless tobacco: Never Used   Vaping Use   • Vaping Use: Never used   Substance Use Topics   • Alcohol use: No   • Drug use: No     (Not in a hospital admission)    Allergies:  Codeine and Sulfa antibiotics    Objective     Vital Signs  Temp:  [98.5 °F (36.9 °C)] 98.5 °F (36.9 °C)  Heart Rate:  [50-87] 68  Resp:  [16] 16  BP: (111-164)/(63-99) 131/73  Body mass index is 32.27 kg/m².    Physical Exam:  Physical Exam  Constitutional:       Comments: Pleasant elderly female, no acute  distress but appears fatigued   HENT:      Head: Normocephalic and atraumatic.      Right Ear: External ear normal.      Left Ear: External ear normal.      Nose: Nose normal.      Mouth/Throat:      Mouth: Mucous membranes are dry.      Pharynx: Oropharynx is clear.   Eyes:      Extraocular Movements: Extraocular movements intact.      Conjunctiva/sclera: Conjunctivae normal.      Pupils: Pupils are equal, round, and reactive to light.   Cardiovascular:      Rate and Rhythm: Normal rate and regular rhythm.      Pulses: Normal pulses.      Heart sounds: Normal heart sounds. No murmur heard.      Pulmonary:      Effort: Pulmonary effort is normal. No respiratory distress.      Breath sounds: Normal breath sounds. No wheezing or rales.   Abdominal:      General: Abdomen is flat. Bowel sounds are normal. There is no distension.      Palpations: Abdomen is soft.      Tenderness: There is abdominal tenderness (epigstric region). There is guarding. There is no rebound.   Musculoskeletal:         General: Normal range of motion.      Cervical back: Normal range of motion and neck supple.      Right lower leg: Edema (trace around ankles) present.      Left lower leg: Edema (trace around ankles) present.   Lymphadenopathy:      Cervical: No cervical adenopathy.   Skin:     General: Skin is warm and dry.      Capillary Refill: Capillary refill takes less than 2 seconds.      Coloration: Skin is not jaundiced or pale.   Neurological:      General: No focal deficit present.      Comments: Finger-to-nose coordination intact. Upper extremity strength 5/5 and symmetric; lower extremity strength 4/5 and symmetric   Psychiatric:         Mood and Affect: Mood normal.         Behavior: Behavior normal.         Thought Content: Thought content normal.         Judgment: Judgment normal.           Results Review:       Lab Results:  Results from last 7 days   Lab Units 12/08/21  1158   WBC 10*3/mm3 15.95*   HEMOGLOBIN g/dL 12.5    PLATELETS 10*3/mm3 255     Results from last 7 days   Lab Units 12/08/21  1158   CRP mg/dL 4.13*     Results from last 7 days   Lab Units 12/08/21 2031 12/08/21  1622 12/08/21  1158 12/07/21  1118   SODIUM mmol/L 139 137 136 136   POTASSIUM mmol/L 2.3* 1.9* 2.0* 2.3*   CHLORIDE mmol/L 94* 93* 89* 86*   CO2 mmol/L 33.5* 30.3* 33.6* 37.9*   BUN mg/dL 28* 27* 29* 22   CREATININE mg/dL 3.35* 3.28* 3.51* 3.37*   CALCIUM mg/dL 13.8* 14.1* 15.5* 16.9*   GLUCOSE mg/dL 121* 110* 113* 121*     Results from last 7 days   Lab Units 12/08/21 2031 12/08/21  1310   MAGNESIUM mg/dL 1.4* 1.1*     No results found for: HGBA1C  Results from last 7 days   Lab Units 12/08/21  1622 12/08/21  1158 12/07/21  1118   BILIRUBIN mg/dL 0.5 0.5 0.5   ALK PHOS U/L 90 100 100   AST (SGOT) U/L 19 23 26   ALT (SGPT) U/L 17 19 23     Results from last 7 days   Lab Units 12/08/21 2031 12/08/21 1622 12/08/21  1158   TROPONIN T ng/mL 0.262* 0.255* 0.324*             Results from last 7 days   Lab Units 12/08/21 2037   PH, ARTERIAL pH units 7.531*   PO2 ART mm Hg 73.2*   PCO2, ARTERIAL mm Hg 49.8*   HCO3 ART mmol/L 41.7*       I have reviewed the patient's laboratory results.    Imaging:  Imaging Results (Last 72 Hours)     Procedure Component Value Units Date/Time    CT Abdomen Pelvis Without Contrast [[id number]] Collected: 12/08/21 1426     Updated: 12/08/21 1459    Narrative:      EXAM:    CT Abdomen and Pelvis Without Intravenous Contrast     EXAM DATE:    12/8/2021 1:39 PM     CLINICAL HISTORY:    Abdominal pain, acute, nonlocalized     TECHNIQUE:    Axial computed tomography images of the abdomen and pelvis without  intravenous contrast.  Sagittal and coronal reformatted images were  created and reviewed.  This CT exam was performed using one or more of  the following dose reduction techniques:  automated exposure control,  adjustment of the mA and/or kV according to patient size, and/or use of  iterative reconstruction technique.      COMPARISON:    09/20/2021     FINDINGS:    Lung bases:  Lung bases appear clear.    Heart:  Cardiomegaly is noted.    Mediastinum:  Moderate hiatal hernia.      ABDOMEN:    Liver:  Unremarkable.    Gallbladder and bile ducts:  Unremarkable.  No calcified stones.  No  ductal dilation.    Pancreas:  Inflammation surrounds the head of the pancreas and along  the second portion of the duodenum which is most consistent with acute  pancreatitis.  No ductal dilation.    Spleen:  Unremarkable.  No splenomegaly.    Adrenals:  Unremarkable.  No mass.    Kidneys and ureters:  Unremarkable.  No obstructing stones.  No  hydronephrosis.    Stomach and bowel:  Unremarkable.  No obstruction.  No mucosal  thickening.      PELVIS:    Appendix:  No findings to suggest acute appendicitis.    Bladder:  Unremarkable.  No stones.    Reproductive:  Unremarkable as visualized.      ABDOMEN and PELVIS:    Intraperitoneal space:  No pneumoperitoneum is identified.  No  loculated fluid collections.    Bones/joints:  No acute fracture.  No dislocation.    Soft tissues:  Unremarkable.    Vasculature:  Abdominal aortic aneurysm is 5.9 cm previously 5.7 cm  probably indicating no significant change given differences in  technique.    Lymph nodes:  Unremarkable.  No enlarged lymph nodes.    Other findings:  No pseudocyst identified.       Impression:      1.  CT findings which are most consistent with acute pancreatitis. No  evidence of hemorrhage or pseudocysts.  2.  Stable abdominal aortic aneurysm as detailed above. 5.9 cm.  3.  Other incidental and nonacute findings as above appear stable.     This report was finalized on 12/8/2021 2:57 PM by Dr. Hernando Banks MD.       XR Chest 1 View [[id number]] Collected: 12/08/21 1426     Updated: 12/08/21 1428    Narrative:      EXAM:    XR Chest, 1 View     EXAM DATE:    12/8/2021 1:34 PM     CLINICAL HISTORY:    pain     TECHNIQUE:    Frontal view of the chest.     COMPARISON:    No relevant prior  studies available.     FINDINGS:    Lungs:  Unremarkable.  No consolidation.    Pleural space:  Unremarkable.  No pneumothorax.    Heart:  Cardiomegaly is noted.    Mediastinum:  Unremarkable.    Bones/joints:  Degenerative changes bilateral shoulders.       Impression:        Cardiomegaly. Otherwise unremarkable chest.     This report was finalized on 12/8/2021 2:26 PM by Dr. Hernando Banks MD.       CT Head Without Contrast [[id number]] Collected: 12/08/21 1424     Updated: 12/08/21 1428    Narrative:      EXAM:    CT Head Without Intravenous Contrast     EXAM DATE:    12/8/2021 1:39 PM     CLINICAL HISTORY:    Mental status change, unknown cause     TECHNIQUE:    Axial computed tomography images of the head/brain without intravenous  contrast.  Sagittal and coronal reformatted images were created and  reviewed.  This CT exam was performed using one or more of the following  dose reduction techniques:  automated exposure control, adjustment of  the mA and/or kV according to patient size, and/or use of iterative  reconstruction technique.     COMPARISON:    No relevant prior studies available.     FINDINGS:    Brain:  Moderate generalized cerebral atrophy which is in part  age-related.  Mild chronic small vessel ischemic disease noted.  No  hemorrhage.    Ventricles:  Unremarkable.  No ventriculomegaly.    Bones/joints:  Unremarkable.  No acute fracture.    Soft tissues:  Unremarkable.    Sinuses:  Unremarkable as visualized.  No acute sinusitis.    Mastoid air cells:  Unremarkable as visualized.  No mastoid effusion.       Impression:      1.  Generalized cerebral atrophy and mild chronic small vessel ischemic  disease.  2.  No CT evidence of acute intracranial abnormality.     This report was finalized on 12/8/2021 2:26 PM by Dr. Hernando Banks MD.             I have personally reviewed the patient's radiologic imaging.        EKG:   Normal sinus rhythm, HR 83, QTc 512 (prolonged)  Nonspecific ST and T wave  abnormality  Abnormal ECG  When compared with ECG of 15-NOV-2019 10:08,  ST now depressed in Inferior leads  ST now depressed in Lateral leads  QT has lengthened  Confirmed by Kenton Coombs ([id number]) on 12/8/2021 6:53:01 PM    I have personally reviewed the patient's EKG. Agree that ST and T wave changes are nonspecific; I do not appreciate any overt ST depression or elevation.         Assessment/Plan     - Acute pancreatitis: treat with aggressive IV fluid hydration, bowel rest with exception of sips with meds, and antiemetics and analgesics if needed. No LFT or bilirubin elevation to suggest obstructive etiology, and no gallstones or signs of biliary obstruction such as common bile duct dilation mentioned on CT imaging either. Patient denies alcohol use. Etiology possibly medication-induced as prelim home med list includes a couple PPI's and HCTZ. Hold off on further antibiotics at this time as no evidence of pancreatic abscess or necrosis noted on CT imaging. Continue to trend amylase and lipase along with inflammatory markers.  - Severe electrolyte derangements, including hypokalemia, hypomagnesemia, and hypercalcemia: likely secondary to above, in combination with GI fluid losses from vomiting/diarrhea and effects of home HCTZ (if confirmed by pharmacy as home med) which could cause both low K+/mag as well as high calcium. Calcitonin given in ED and additional dose ordered by nephrology, along with aggressive IV fluid hydration. Replace K+ through IV fluids as well as orally per nephrology recommendations. Magnesium replacement protocol ordered (renally dosed). Continue to monitor electrolytes closely.   - LILIA superimposed on stage III CKD: baseline creatinine and GFR around 1-1.3 and 40s, respectively. Cr currently 3.35. BUN 28, with BUN:cr ratio of 8.4. Nephrology consulted, aggressive IV fluid hydration ordered. Bentley placed per nephrology recommendation as well for more accurate I/O's. Continue to monito urine  output and renal function closely.   - Hypertension: BP currently controlled at 131/73. Hold off nephrotoxic home agents. Will make IV hydralazine available PRN.   - Elevated troponin: suspect likely skewed from LILIA. Troponin trending downward overall since initiation of aggressive IV fluids; continue to trend overnight. EKG showed only nonspecific ST and T wave changes. Patient denies any chest pain or dyspnea. Evaluate further with ECHO in the morning.   - QT prolongation: anticipate will shorten as K+ and mag improve. Repeat EKG in the morning. Avoid QT prolonging agents in the mean time, including most antiemetics (PO phenergan ordered PRN for now).    - Ataxia: CT head showed no acute abnormality. Likely secondary to electrolyte derangements above. Finger-to-nose coordination intact on my exam. No focal deficits appreciated. Neuro checks ordered q4h while addressing electrolytes.   - History of esophageal cancer and breast cancer, currently in remission: followed by [facility].     DVT Prophylaxis: SQ heparin    Estimated Length of Stay >2 midnights    I discussed the patient's findings, assessment and plan with the patient and nursing staff in the PCU.     * patient is high risk due to acute pancreatitis, severe hypokalemia, severe hypomagnesemia, severe hypercalcemia, acute on CKD stage III, troponin elevation, QT prolongation    Shar Marr MD  12/08/21  21:10 EST

## 2021-12-09 NOTE — CASE MANAGEMENT/SOCIAL WORK
Discharge Planning Assessment   [facility]     Patient Name: Geeta Renee  MRN: [id number]  Today's Date: 12/9/2021    Admit Date: 12/8/2021     Discharge Needs Assessment     Row Name 12/09/21 1525       Living Environment    Lives With alone    Unique Family Situation son lives next door    Current Living Arrangements home/apartment/condo    Primary Care Provided by self    Provides Primary Care For no one    Family Caregiver if Needed child(shelton), adult    Family Caregiver Names son, Ash    Quality of Family Relationships helpful; involved; supportive    Able to Return to Prior Arrangements yes       Resource/Environmental Concerns    Resource/Environmental Concerns none    Transportation Concerns car, none       Transition Planning    Patient/Family Anticipates Transition to home    Patient/Family Anticipated Services at Transition none    Transportation Anticipated family or friend will provide       Discharge Needs Assessment    Equipment Currently Used at Home wheelchair; walker, standard    Concerns to be Addressed no discharge needs identified    Anticipated Changes Related to Illness none    Equipment Needed After Discharge none               Discharge Plan     Row Name 12/09/21 1525       Plan    Plan SS spoke with pt's son, Ash. Pt lives alone, son lives next door. Pt does not utilize home health services. Pt has a standard walker and wheelchair that she uses as needed. PCP is Alejandra Dozier. Pt does not have a POA or living will on file. Pt's son to provide transportation home at discharge. SS to follow and assist.    Patient/Family in Agreement with Plan yes               Demographic Summary     Row Name 12/09/21 1524       General Information    Referral Source nursing    Reason for Consult --  advanced age (86), discharge planning              ANTONIA Hall

## 2021-12-09 NOTE — PLAN OF CARE
Problem: Adult Inpatient Plan of Care  Goal: Plan of Care Review  12/9/2021 0722 by Bina Gonzalez RN  Outcome: Ongoing, Progressing  Flowsheets (Taken 12/9/2021 0722)  Progress: no change  Plan of Care Reviewed With: patient  12/9/2021 0722 by Bina Gonzalez RN  Outcome: Ongoing, Progressing  Flowsheets (Taken 12/9/2021 0722)  Progress: no change  Plan of Care Reviewed With: patient  Goal: Patient-Specific Goal (Individualized)  12/9/2021 0722 by Bina Gonzalez RN  Outcome: Ongoing, Progressing  12/9/2021 0722 by Bina Gonzalez RN  Outcome: Ongoing, Progressing  Goal: Absence of Hospital-Acquired Illness or Injury  12/9/2021 0722 by Bina Gonzalez RN  Outcome: Ongoing, Progressing  12/9/2021 0722 by Bnia Gonzalez RN  Outcome: Ongoing, Progressing  Intervention: Identify and Manage Fall Risk  Recent Flowsheet Documentation  Taken 12/9/2021 0500 by Bina Gonzalez RN  Safety Promotion/Fall Prevention:   activity supervised   clutter free environment maintained   fall prevention program maintained   lighting adjusted   nonskid shoes/slippers when out of bed   room organization consistent   safety round/check completed  Taken 12/9/2021 0300 by Bina Gonzalez RN  Safety Promotion/Fall Prevention:   activity supervised   clutter free environment maintained   fall prevention program maintained   lighting adjusted   nonskid shoes/slippers when out of bed   room organization consistent   safety round/check completed  Taken 12/9/2021 0100 by Bina Gonzalez RN  Safety Promotion/Fall Prevention:   activity supervised   clutter free environment maintained   fall prevention program maintained   lighting adjusted   nonskid shoes/slippers when out of bed   room organization consistent   safety round/check completed  Taken 12/8/2021 2300 by Bina Gonzalez RN  Safety Promotion/Fall Prevention:   activity supervised   clutter free environment maintained   fall prevention program maintained   lighting  adjusted   nonskid shoes/slippers when out of bed   room organization consistent   safety round/check completed  Taken 12/8/2021 2200 by Bina Gonzalez RN  Safety Promotion/Fall Prevention:   activity supervised   clutter free environment maintained   fall prevention program maintained   lighting adjusted   nonskid shoes/slippers when out of bed   room organization consistent   safety round/check completed  Taken 12/8/2021 2145 by Bina Gonzalez RN  Safety Promotion/Fall Prevention:   activity supervised   clutter free environment maintained   fall prevention program maintained   lighting adjusted   nonskid shoes/slippers when out of bed   room organization consistent   safety round/check completed  Intervention: Prevent Skin Injury  Recent Flowsheet Documentation  Taken 12/9/2021 0500 by Bina Gonzalez RN  Body Position: (pt encouraged to move/turn) position changed independently  Taken 12/9/2021 0300 by Bina Gonzalez RN  Body Position: (pt encouraged to move/turn) position changed independently  Taken 12/9/2021 0135 by Bina Gonzalez RN  Skin Protection:   adhesive use limited   tubing/devices free from skin contact  Taken 12/9/2021 0100 by Bina Gonzalez RN  Body Position: (pt encouraged to move/turn) position changed independently  Taken 12/8/2021 2300 by Bina Gonzalez RN  Body Position: (pt encouraged to move/turn) position changed independently  Taken 12/8/2021 2205 by Bina Gonzalez RN  Skin Protection:   adhesive use limited   tubing/devices free from skin contact  Taken 12/8/2021 2200 by Bina Gonzalez RN  Body Position: position changed independently  Taken 12/8/2021 2145 by Bina Gonzalez RN  Body Position: (pt encouraged to move/turn) position changed independently  Intervention: Prevent and Manage VTE (venous thromboembolism) Risk  Recent Flowsheet Documentation  Taken 12/9/2021 0135 by Bina Gonzalez RN  VTE Prevention/Management: (heparin subq) other (see  comments)  Taken 12/8/2021 2205 by Bina Gonzalez RN  VTE Prevention/Management: (heparin subq) other (see comments)  Intervention: Prevent Infection  Recent Flowsheet Documentation  Taken 12/9/2021 0500 by Bina Gonzalez RN  Infection Prevention:   environmental surveillance performed   hand hygiene promoted   rest/sleep promoted   single patient room provided  Taken 12/9/2021 0300 by Bina Gonzalez RN  Infection Prevention:   environmental surveillance performed   hand hygiene promoted   rest/sleep promoted   single patient room provided  Taken 12/9/2021 0100 by Bina Gonzalez RN  Infection Prevention:   environmental surveillance performed   hand hygiene promoted   rest/sleep promoted   single patient room provided  Taken 12/8/2021 2300 by Bina Gonzalez RN  Infection Prevention:   environmental surveillance performed   hand hygiene promoted   rest/sleep promoted   single patient room provided  Taken 12/8/2021 2200 by Bina Gonzalez RN  Infection Prevention:   environmental surveillance performed   rest/sleep promoted   single patient room provided  Taken 12/8/2021 2145 by Bina Gonzalez RN  Infection Prevention:   environmental surveillance performed   hand hygiene promoted   rest/sleep promoted   single patient room provided  Goal: Optimal Comfort and Wellbeing  12/9/2021 0722 by Bina Gonzalez RN  Outcome: Ongoing, Progressing  12/9/2021 0722 by Bina Gonzalez RN  Outcome: Ongoing, Progressing  Intervention: Provide Person-Centered Care  Recent Flowsheet Documentation  Taken 12/9/2021 0135 by Bina Gonzalez RN  Trust Relationship/Rapport:   care explained   choices provided   emotional support provided   empathic listening provided   questions answered   questions encouraged   reassurance provided   thoughts/feelings acknowledged  Taken 12/8/2021 2205 by Bina Gonzalez RN  Trust Relationship/Rapport:   care explained   choices provided   emotional support provided   empathic  listening provided   questions answered   questions encouraged   reassurance provided   thoughts/feelings acknowledged  Goal: Readiness for Transition of Care  12/9/2021 0722 by Bina Gonzalez RN  Outcome: Ongoing, Progressing  12/9/2021 0722 by Bina Gonzalez RN  Outcome: Ongoing, Progressing     Problem: Fall Injury Risk  Goal: Absence of Fall and Fall-Related Injury  12/9/2021 0722 by Bina Gonzalez RN  Outcome: Ongoing, Progressing  12/9/2021 0722 by Bina Gonzalez RN  Outcome: Ongoing, Progressing  Intervention: Identify and Manage Contributors to Fall Injury Risk  Recent Flowsheet Documentation  Taken 12/9/2021 0500 by Bina Gonzalez RN  Medication Review/Management: medications reviewed  Taken 12/9/2021 0300 by Bina Gonzalez RN  Medication Review/Management: medications reviewed  Taken 12/9/2021 0135 by Bina Gonzalez RN  Self-Care Promotion:   independence encouraged   BADL personal objects within reach  Taken 12/9/2021 0100 by Bina Gonzalez RN  Medication Review/Management: medications reviewed  Taken 12/8/2021 2300 by Bina Gonzalez RN  Medication Review/Management: medications reviewed  Taken 12/8/2021 2205 by Bina Gonzalez RN  Self-Care Promotion:   independence encouraged   BADL personal objects within reach  Taken 12/8/2021 2200 by Bina Gonzalez RN  Medication Review/Management: medications reviewed  Taken 12/8/2021 2145 by Bina Gonzalez RN  Medication Review/Management: medications reviewed  Intervention: Promote Injury-Free Environment  Recent Flowsheet Documentation  Taken 12/9/2021 0500 by Bina Gonzalez RN  Safety Promotion/Fall Prevention:   activity supervised   clutter free environment maintained   fall prevention program maintained   lighting adjusted   nonskid shoes/slippers when out of bed   room organization consistent   safety round/check completed  Taken 12/9/2021 0300 by Bnia Gonzalez RN  Safety Promotion/Fall Prevention:   activity  supervised   clutter free environment maintained   fall prevention program maintained   lighting adjusted   nonskid shoes/slippers when out of bed   room organization consistent   safety round/check completed  Taken 12/9/2021 0100 by Bina Gonzalez RN  Safety Promotion/Fall Prevention:   activity supervised   clutter free environment maintained   fall prevention program maintained   lighting adjusted   nonskid shoes/slippers when out of bed   room organization consistent   safety round/check completed  Taken 12/8/2021 2300 by Bina Gonzalez RN  Safety Promotion/Fall Prevention:   activity supervised   clutter free environment maintained   fall prevention program maintained   lighting adjusted   nonskid shoes/slippers when out of bed   room organization consistent   safety round/check completed  Taken 12/8/2021 2200 by Bina Gonzalez RN  Safety Promotion/Fall Prevention:   activity supervised   clutter free environment maintained   fall prevention program maintained   lighting adjusted   nonskid shoes/slippers when out of bed   room organization consistent   safety round/check completed  Taken 12/8/2021 2145 by Bina Gonzalez RN  Safety Promotion/Fall Prevention:   activity supervised   clutter free environment maintained   fall prevention program maintained   lighting adjusted   nonskid shoes/slippers when out of bed   room organization consistent   safety round/check completed     Problem: Skin Injury Risk Increased  Goal: Skin Health and Integrity  12/9/2021 0722 by Bina Gonzalez RN  Outcome: Ongoing, Progressing  12/9/2021 0722 by Bina Gonzalez RN  Outcome: Ongoing, Progressing  Intervention: Optimize Skin Protection  Recent Flowsheet Documentation  Taken 12/9/2021 0500 by Bina Gonzalez RN  Head of Bed (HOB): HOB elevated  Taken 12/9/2021 0300 by Bina Gonzalez RN  Head of Bed (HOB): HOB elevated  Taken 12/9/2021 0135 by Bina Gonzalez RN  Pressure Reduction Techniques: frequent  weight shift encouraged  Pressure Reduction Devices:   positioning supports utilized   pressure-redistributing mattress utilized  Skin Protection:   adhesive use limited   tubing/devices free from skin contact  Taken 12/9/2021 0100 by Bina Gonzalez RN  Head of Bed ([facility]): HOB elevated  Taken 12/8/2021 2300 by Bina Gonzalez RN  Head of Bed (HOB): HOB elevated  Taken 12/8/2021 2205 by Bina Gonzalez RN  Skin Protection:   adhesive use limited   tubing/devices free from skin contact  Taken 12/8/2021 2200 by Bina Gonzalez RN  Head of Bed (HOB): HOB elevated  Taken 12/8/2021 2145 by Bina Gonzalez RN  Head of Bed ([facility]): HOB elevated     Problem: Hypertension Comorbidity  Goal: Blood Pressure in Desired Range  12/9/2021 0722 by Bina Gonzalez RN  Outcome: Ongoing, Progressing  12/9/2021 0722 by Bina Gonzalez RN  Outcome: Ongoing, Progressing  Intervention: Maintain Hypertension-Management Strategies  Recent Flowsheet Documentation  Taken 12/9/2021 0500 by Bina Gonzalez RN  Medication Review/Management: medications reviewed  Taken 12/9/2021 0300 by Bina Gonzalez RN  Medication Review/Management: medications reviewed  Taken 12/9/2021 0100 by Bina Gonzalez RN  Medication Review/Management: medications reviewed  Taken 12/8/2021 2300 by Bina Gonzalez RN  Medication Review/Management: medications reviewed  Taken 12/8/2021 2200 by Bina Gonzalez RN  Medication Review/Management: medications reviewed  Taken 12/8/2021 2145 by Bina Gonzalez RN  Medication Review/Management: medications reviewed     Problem: Electrolyte Imbalance  Goal: Electrolyte Balance  12/9/2021 0722 by Bina Gonzalez RN  Outcome: Ongoing, Progressing  12/9/2021 0722 by Bina Gonzalez RN  Outcome: Ongoing, Progressing   Goal Outcome Evaluation:

## 2021-12-09 NOTE — PROGRESS NOTES
[facility] HOSPITALIST PROGRESS NOTE     Patient Identification:  Name:  Geeta Renee  Age:  86 y.o.  Sex:  female  :  1935  MRN:  [id number]  Visit Number:  [id number]  Primary Care Provider:  Alejandra Dozier APRN    Length of stay:  1    Subjective: Patient seen and examined assisted by Blanca Peters RN present inside the room patient reports feeling better, no longer nauseous, still weak, chart reviewed, patient has significant hypercalcemia, hypokalemia.  Getting IV hydration, also received calcitonin per nephrology.  Review of EKG and telemetry also revealed prominence of U waves.  Potassium and magnesium is being replaced    Chief Complaint: Weakness nausea  ----------------------------------------------------------------------------------------------------------------------  [facility] Meds:  atenolol, 50 mg, Oral, Q24H  heparin (porcine), 5,000 Units, Subcutaneous, Q12H  magnesium sulfate, 2 g, Intravenous, Once  potassium phosphate, 15 mmol, Intravenous, Once  sodium chloride, 10 mL, Intravenous, Q12H  sodium chloride, 10 mL, Intravenous, Q12H      sodium chloride 0.9 % with KCl 20 mEq, 150 mL/hr, Last Rate: 150 mL/hr (21 0732)      ----------------------------------------------------------------------------------------------------------------------  Vital Signs:  Temp:  [97.9 °F (36.6 °C)-98.8 °F (37.1 °C)] 97.9 °F (36.6 °C)  Heart Rate:  [50-87] 74  Resp:  [16-18] 18  BP: ()/(52-99) 131/76       Tele: Sinus rhythm 75 bpm      21  1158 21  0500   Weight: 85.3 kg (188 lb) 79.7 kg (175 lb 9.6 oz)     Body mass index is 30.14 kg/m².    Intake/Output Summary (Last 24 hours) at 2021 1121  Last data filed at 2021 1049  Gross per 24 hour   Intake 1400 ml   Output 1550 ml   Net -150 ml     NPO Diet NPO Except: Sips With  Meds  ----------------------------------------------------------------------------------------------------------------------  Physical exam:  General: Chronically ill-appearing, comfortable,awake, alert, oriented to self, place, and time, well-developed  No respiratory distress.    Skin:  Skin is warm and dry. No rash noted. No pallor.    HENT:  Head:  Normocephalic and atraumatic.  Mouth:  Moist mucous membranes.    Eyes:  Conjunctivae and EOM are normal.  Pupils are equal, round, and reactive to light.  No scleral icterus.    Neck:  Neck supple.  No JVD present.  No lymphadenopathy no hepatojugular reflux   Pulmonary/Chest:  No respiratory distress, no wheezes, no crackles, with normal breath sounds and good air movement.  Cardiovascular:  Normal rate, regular rhythm and normal heart sounds with no murmur.  Abdominal: Subjective tenderness epigastric and upper quadrants predominantly left side, no rigidity bowel sounds present, soft.    Extremities:  No edema, no tenderness, and no deformity.  No red or swollen joints anywhere.  Strong pulses in all 4 extremities with no clubbing, no cyanosis, no edema.  Neurological:  Motor strength equal no obvious deficit, sensory grossly intact.   No cranial nerve deficit.  No tongue deviation.  No facial droop.  No slurred speech.    Genitourinary: Catheter in place  Back:  ----------------------------------------------------------------------------------------------------------------------  ----------------------------------------------------------------------------------------------------------------------  Results from last 7 days   Lab Units 12/09/21  0500 12/09/21  0028 12/08/21  2031   TROPONIN T ng/mL 0.224* 0.234* 0.262*     Results from last 7 days   Lab Units 12/09/21  0028 12/08/21  1622 12/08/21  1158   CRP mg/dL 10.96*  --  4.13*   LACTATE mmol/L  --  1.7 2.7*   WBC 10*3/mm3 17.63*  --  15.95*   HEMOGLOBIN g/dL 10.9*  --  12.5   HEMATOCRIT % 32.4*  --  36.4   MCV  fL 83.9  --  83.9   MCHC g/dL 33.6  --  34.3   PLATELETS 10*3/mm3 219  --  255     Results from last 7 days   Lab Units 12/08/21 2037   PH, ARTERIAL pH units 7.531*   PO2 ART mm Hg 73.2*   PCO2, ARTERIAL mm Hg 49.8*   HCO3 ART mmol/L 41.7*     Results from last 7 days   Lab Units 12/09/21  0810 12/09/21  0429 12/09/21  0028 12/08/21 2031 12/08/21  1622 12/08/21  1310 12/08/21  1158   SODIUM mmol/L  --   --  138 139 137   < > 136   POTASSIUM mmol/L  --  2.4* 2.5* 2.3* 1.9*   < > 2.0*   MAGNESIUM mg/dL 1.6 1.7  --  1.4*  --    < >  --    CHLORIDE mmol/L  --   --  94* 94* 93*   < > 89*   CO2 mmol/L  --   --  31.3* 33.5* 30.3*   < > 33.6*   BUN mg/dL  --   --  28* 28* 27*   < > 29*   CREATININE mg/dL  --   --  3.24* 3.35* 3.28*   < > 3.51*   EGFR IF NONAFRICN AM mL/min/1.73  --   --  14* 13* 13*   < > 12*   CALCIUM mg/dL  --   --  13.3* 13.8* 14.1*   < > 15.5*   GLUCOSE mg/dL  --   --  115* 121* 110*   < > 113*   ALBUMIN g/dL  --   --  3.14*  --  3.22*  --  3.56   BILIRUBIN mg/dL  --   --  0.6  --  0.5  --  0.5   ALK PHOS U/L  --   --  87  --  90  --  100   AST (SGOT) U/L  --   --  20  --  19  --  23   ALT (SGPT) U/L  --   --  16  --  17  --  19    < > = values in this interval not displayed.   Estimated Creatinine Clearance: 13 mL/min (A) (by C-G formula based on SCr of 3.24 mg/dL (H)).    No results found for: AMMONIA      No results found for: BLOODCX  Urine Culture   Date Value Ref Range Status   12/08/2021 No growth  Preliminary     No results found for: WOUNDCX  No results found for: STOOLCX    I have personally looked at the labs and they are summarized above.  ----------------------------------------------------------------------------------------------------------------------  Imaging Results (Last 24 Hours)     Procedure Component Value Units Date/Time    CT Abdomen Pelvis Without Contrast [[id number]] Collected: 12/08/21 1426     Updated: 12/08/21 0941    Narrative:      EXAM:    CT Abdomen and Pelvis  Without Intravenous Contrast     EXAM DATE:    12/8/2021 1:39 PM     CLINICAL HISTORY:    Abdominal pain, acute, nonlocalized     TECHNIQUE:    Axial computed tomography images of the abdomen and pelvis without  intravenous contrast.  Sagittal and coronal reformatted images were  created and reviewed.  This CT exam was performed using one or more of  the following dose reduction techniques:  automated exposure control,  adjustment of the mA and/or kV according to patient size, and/or use of  iterative reconstruction technique.     COMPARISON:    09/20/2021     FINDINGS:    Lung bases:  Lung bases appear clear.    Heart:  Cardiomegaly is noted.    Mediastinum:  Moderate hiatal hernia.      ABDOMEN:    Liver:  Unremarkable.    Gallbladder and bile ducts:  Unremarkable.  No calcified stones.  No  ductal dilation.    Pancreas:  Inflammation surrounds the head of the pancreas and along  the second portion of the duodenum which is most consistent with acute  pancreatitis.  No ductal dilation.    Spleen:  Unremarkable.  No splenomegaly.    Adrenals:  Unremarkable.  No mass.    Kidneys and ureters:  Unremarkable.  No obstructing stones.  No  hydronephrosis.    Stomach and bowel:  Unremarkable.  No obstruction.  No mucosal  thickening.      PELVIS:    Appendix:  No findings to suggest acute appendicitis.    Bladder:  Unremarkable.  No stones.    Reproductive:  Unremarkable as visualized.      ABDOMEN and PELVIS:    Intraperitoneal space:  No pneumoperitoneum is identified.  No  loculated fluid collections.    Bones/joints:  No acute fracture.  No dislocation.    Soft tissues:  Unremarkable.    Vasculature:  Abdominal aortic aneurysm is 5.9 cm previously 5.7 cm  probably indicating no significant change given differences in  technique.    Lymph nodes:  Unremarkable.  No enlarged lymph nodes.    Other findings:  No pseudocyst identified.       Impression:      1.  CT findings which are most consistent with acute pancreatitis.  No  evidence of hemorrhage or pseudocysts.  2.  Stable abdominal aortic aneurysm as detailed above. 5.9 cm.  3.  Other incidental and nonacute findings as above appear stable.     This report was finalized on 12/8/2021 2:57 PM by Dr. Hernando Banks MD.       XR Chest 1 View [[id number]] Collected: 12/08/21 1426     Updated: 12/08/21 1428    Narrative:      EXAM:    XR Chest, 1 View     EXAM DATE:    12/8/2021 1:34 PM     CLINICAL HISTORY:    pain     TECHNIQUE:    Frontal view of the chest.     COMPARISON:    No relevant prior studies available.     FINDINGS:    Lungs:  Unremarkable.  No consolidation.    Pleural space:  Unremarkable.  No pneumothorax.    Heart:  Cardiomegaly is noted.    Mediastinum:  Unremarkable.    Bones/joints:  Degenerative changes bilateral shoulders.       Impression:        Cardiomegaly. Otherwise unremarkable chest.     This report was finalized on 12/8/2021 2:26 PM by Dr. Hernando Banks MD.       CT Head Without Contrast [[id number]] Collected: 12/08/21 1424     Updated: 12/08/21 1428    Narrative:      EXAM:    CT Head Without Intravenous Contrast     EXAM DATE:    12/8/2021 1:39 PM     CLINICAL HISTORY:    Mental status change, unknown cause     TECHNIQUE:    Axial computed tomography images of the head/brain without intravenous  contrast.  Sagittal and coronal reformatted images were created and  reviewed.  This CT exam was performed using one or more of the following  dose reduction techniques:  automated exposure control, adjustment of  the mA and/or kV according to patient size, and/or use of iterative  reconstruction technique.     COMPARISON:    No relevant prior studies available.     FINDINGS:    Brain:  Moderate generalized cerebral atrophy which is in part  age-related.  Mild chronic small vessel ischemic disease noted.  No  hemorrhage.    Ventricles:  Unremarkable.  No ventriculomegaly.    Bones/joints:  Unremarkable.  No acute fracture.    Soft tissues:  Unremarkable.     Sinuses:  Unremarkable as visualized.  No acute sinusitis.    Mastoid air cells:  Unremarkable as visualized.  No mastoid effusion.       Impression:      1.  Generalized cerebral atrophy and mild chronic small vessel ischemic  disease.  2.  No CT evidence of acute intracranial abnormality.     This report was finalized on 12/8/2021 2:26 PM by Dr. Hernando Banks MD.           ----------------------------------------------------------------------------------------------------------------------  Assessment and Plan:  -Acute pancreatitis  -Acute kidney injury  -Electrolyte derangement including severe hypercalcemia, severe hypokalemia, hypophosphatemia and hypomagnesemia  -QTC prolongation improved  -Acute kidney injury on CKD stage III  -Acute hypokalemia with EKG changes with prominence of U wave  -Apparent ataxia and unsteady gait likely multifactorial from above including electrolyte derangement  -Large infrarenal abdominal aneurysm 5.9 cm in size no change from previous CAT scan followed by vascular specialty Dr. Arredondo  -History of esophageal adenocarcinoma  -Left breast cancer  -Acute metabolic alkalosis  -Dyslipidemia  -Essential hypertension  -Troponinemia likely type II    Aggressively replace potassium, continue hydration, nephrology assisting with electrolyte derangement, patient reports improving, monitor lipase, CBC, electrolytes, monitor abdominal status.  Continue n.p.o. status except meds, restart atenolol and blood pressure regimen.  DVT prophylaxis.  IV PPI.  Out of bed to chair when able.    Total time spent in this encounter is 35 minutes.    Disposition pending improvement      Jennie Galvan MD  12/09/21  11:21 EST

## 2021-12-10 NOTE — PLAN OF CARE
Goal Outcome Evaluation:           Progress: no change  Outcome Summary: Pt continues to wear 3L NC this shift. VSS. Pt denies any pain or discomfort. Pt is resting in bed at this time with no distress noted. Will continue to monitor and follow plan of care.   Spoke with pt, as per her report she was prescribed Cipro in the past, does not recall any problems with this med, pt will  and start med today

## 2021-12-10 NOTE — PLAN OF CARE
Goal Outcome Evaluation:  Plan of Care Reviewed With: patient        Progress: improving  Outcome Summary: PT is asleep in bed at this time. PT continues to have NS with 20mEq/l of potassium infusing at 150mL/hr. PT has not had any vomiting episodes throughout shift. PT has complained of nausea once and PRN suppository given. VSS. Afebrile. Electrolyte replacement protocol followed. No other complaints noted. VSS. Afebrile. Will Continue to Monitor PT.      Problem: Adult Inpatient Plan of Care  Goal: Plan of Care Review  Outcome: Ongoing, Progressing  Flowsheets (Taken 12/10/2021 0340)  Progress: improving  Plan of Care Reviewed With: patient  Outcome Summary: PT is asleep in bed at this time. PT continues to have NS with 20mEq/l of potassium infusing at 150mL/hr. PT has not had any vomiting episodes throughout shift. PT has complained of nausea once and PRN suppository given. VSS. Afebrile. Electrolyte replacement protocol followed. No other complaints noted. VSS. Afebrile. Will Continue to Monitor PT.  Goal: Patient-Specific Goal (Individualized)  Outcome: Ongoing, Progressing  Goal: Absence of Hospital-Acquired Illness or Injury  Outcome: Ongoing, Progressing  Intervention: Identify and Manage Fall Risk  Recent Flowsheet Documentation  Taken 12/10/2021 0300 by Stacey Mercedes, RN  Safety Promotion/Fall Prevention:   activity supervised   assistive device/personal items within reach   clutter free environment maintained   fall prevention program maintained   nonskid shoes/slippers when out of bed   safety round/check completed   room organization consistent  Taken 12/10/2021 0220 by Stacey Mercedes, RN  Safety Promotion/Fall Prevention:   activity supervised   assistive device/personal items within reach   clutter free environment maintained   fall prevention program maintained   nonskid shoes/slippers when out of bed   room organization consistent   safety round/check completed  Taken 12/10/2021 0100 by Daren  Stacey REYES RN  Safety Promotion/Fall Prevention:   activity supervised   assistive device/personal items within reach   clutter free environment maintained   fall prevention program maintained   nonskid shoes/slippers when out of bed   safety round/check completed   room organization consistent  Taken 12/9/2021 2100 by Stacey Mercedes RN  Safety Promotion/Fall Prevention:   assistive device/personal items within reach   clutter free environment maintained   activity supervised   fall prevention program maintained   nonskid shoes/slippers when out of bed   room organization consistent   safety round/check completed  Taken 12/9/2021 2045 by Stacey Mercedes RN  Safety Promotion/Fall Prevention:   activity supervised   assistive device/personal items within reach   clutter free environment maintained   fall prevention program maintained   nonskid shoes/slippers when out of bed   room organization consistent   safety round/check completed  Taken 12/9/2021 1900 by Stacey Mercedes RN  Safety Promotion/Fall Prevention:   activity supervised   assistive device/personal items within reach   clutter free environment maintained   fall prevention program maintained   nonskid shoes/slippers when out of bed   room organization consistent   safety round/check completed  Intervention: Prevent Skin Injury  Recent Flowsheet Documentation  Taken 12/10/2021 0220 by Stacey Mercedes RN  Body Position: position changed independently  Skin Protection: adhesive use limited  Taken 12/9/2021 2045 by Stacey Mercedes RN  Body Position: position changed independently  Skin Protection:   adhesive use limited   drying agents applied  Intervention: Prevent and Manage VTE (venous thromboembolism) Risk  Recent Flowsheet Documentation  Taken 12/10/2021 0220 by Stacey Mercedes RN  VTE Prevention/Management: (See MAR) bleeding risk factor(s) identified  Taken 12/9/2021 2045 by Stacey Mercedes RN  VTE Prevention/Management: (See MAR) bleeding risk  factor(s) identified  Intervention: Prevent Infection  Recent Flowsheet Documentation  Taken 12/10/2021 0300 by Stacey Mercedes RN  Infection Prevention: rest/sleep promoted  Taken 12/10/2021 0220 by Stacey Mercedes RN  Infection Prevention: rest/sleep promoted  Taken 12/10/2021 0100 by Stacey Mercedes RN  Infection Prevention: rest/sleep promoted  Taken 12/9/2021 2100 by Stacey Mercedes RN  Infection Prevention: rest/sleep promoted  Taken 12/9/2021 2045 by Stacey Mercedes RN  Infection Prevention: rest/sleep promoted  Taken 12/9/2021 1900 by Stacey Mercedes RN  Infection Prevention: rest/sleep promoted  Goal: Optimal Comfort and Wellbeing  Outcome: Ongoing, Progressing  Intervention: Provide Person-Centered Care  Recent Flowsheet Documentation  Taken 12/10/2021 0220 by Stacey Mercedes RN  Trust Relationship/Rapport:   care explained   choices provided   empathic listening provided   reassurance provided   questions answered   thoughts/feelings acknowledged  Taken 12/9/2021 2045 by Stacey Mercedes RN  Trust Relationship/Rapport:   care explained   choices provided   empathic listening provided   questions answered   reassurance provided   thoughts/feelings acknowledged  Goal: Readiness for Transition of Care  Outcome: Ongoing, Progressing     Problem: Fall Injury Risk  Goal: Absence of Fall and Fall-Related Injury  Outcome: Ongoing, Progressing  Intervention: Identify and Manage Contributors to Fall Injury Risk  Recent Flowsheet Documentation  Taken 12/10/2021 0300 by Stacey Mercedes, RN  Medication Review/Management: medications reviewed  Taken 12/10/2021 0220 by Stacey Mercedes RN  Medication Review/Management: medications reviewed  Self-Care Promotion:   independence encouraged   BADL personal routines maintained   BADL personal objects within reach  Taken 12/10/2021 0100 by Stacey Mercedes, RN  Medication Review/Management: medications reviewed  Taken 12/9/2021 2100 by Stacey Mercedes, RN  Medication  Review/Management: medications reviewed  Taken 12/9/2021 2045 by Stacey Mercedes, RN  Medication Review/Management: medications reviewed  Self-Care Promotion:   independence encouraged   BADL personal objects within reach   BADL personal routines maintained  Taken 12/9/2021 1900 by Stacey Mercedes, RN  Medication Review/Management: medications reviewed  Intervention: Promote Injury-Free Environment  Recent Flowsheet Documentation  Taken 12/10/2021 0300 by Stacey Mercedes, RN  Safety Promotion/Fall Prevention:   activity supervised   assistive device/personal items within reach   clutter free environment maintained   fall prevention program maintained   nonskid shoes/slippers when out of bed   safety round/check completed   room organization consistent  Taken 12/10/2021 0220 by Stacey Mercedes, RN  Safety Promotion/Fall Prevention:   activity supervised   assistive device/personal items within reach   clutter free environment maintained   fall prevention program maintained   nonskid shoes/slippers when out of bed   room organization consistent   safety round/check completed  Taken 12/10/2021 0100 by Stacey Mercedes, RN  Safety Promotion/Fall Prevention:   activity supervised   assistive device/personal items within reach   clutter free environment maintained   fall prevention program maintained   nonskid shoes/slippers when out of bed   safety round/check completed   room organization consistent  Taken 12/9/2021 2100 by Stacey Mercedes, RN  Safety Promotion/Fall Prevention:   assistive device/personal items within reach   clutter free environment maintained   activity supervised   fall prevention program maintained   nonskid shoes/slippers when out of bed   room organization consistent   safety round/check completed  Taken 12/9/2021 2045 by Stacey Mercedes, RN  Safety Promotion/Fall Prevention:   activity supervised   assistive device/personal items within reach   clutter free environment maintained   fall prevention  program maintained   nonskid shoes/slippers when out of bed   room organization consistent   safety round/check completed  Taken 12/9/2021 1900 by Stacey Mercedes RN  Safety Promotion/Fall Prevention:   activity supervised   assistive device/personal items within reach   clutter free environment maintained   fall prevention program maintained   nonskid shoes/slippers when out of bed   room organization consistent   safety round/check completed     Problem: Skin Injury Risk Increased  Goal: Skin Health and Integrity  Outcome: Ongoing, Progressing  Intervention: Optimize Skin Protection  Recent Flowsheet Documentation  Taken 12/10/2021 0220 by Stacey Mercedes RN  Pressure Reduction Techniques: frequent weight shift encouraged  Head of Bed (HOB): HOB at 30-45 degrees  Pressure Reduction Devices: pressure-redistributing mattress utilized  Skin Protection: adhesive use limited  Taken 12/9/2021 2045 by Stacey Mercedes RN  Pressure Reduction Techniques: frequent weight shift encouraged  Head of Bed (HOB): HOB at 30-45 degrees  Pressure Reduction Devices: pressure-redistributing mattress utilized  Skin Protection:   adhesive use limited   drying agents applied  Intervention: Promote and Optimize Oral Intake  Recent Flowsheet Documentation  Taken 12/10/2021 0220 by Stacey Mercedes RN  Oral Nutrition Promotion: rest periods promoted  Taken 12/9/2021 2045 by Stacey Mercedes RN  Oral Nutrition Promotion: rest periods promoted     Problem: Hypertension Comorbidity  Goal: Blood Pressure in Desired Range  Outcome: Ongoing, Progressing  Intervention: Maintain Hypertension-Management Strategies  Recent Flowsheet Documentation  Taken 12/10/2021 0300 by Stacey Mercedes, RN  Medication Review/Management: medications reviewed  Taken 12/10/2021 0220 by Stacey Mercedes, RN  Medication Review/Management: medications reviewed  Taken 12/10/2021 0100 by Stacey Mercedes, RN  Medication Review/Management: medications reviewed  Taken  12/9/2021 2100 by Stacey Mercedes, RN  Medication Review/Management: medications reviewed  Taken 12/9/2021 2045 by Stacey Mercedes, RN  Medication Review/Management: medications reviewed  Taken 12/9/2021 1900 by Stacey Mercedes, RN  Medication Review/Management: medications reviewed     Problem: Electrolyte Imbalance  Goal: Electrolyte Balance  Outcome: Ongoing, Progressing  Intervention: Monitor and Manage Electrolyte Imbalance  Recent Flowsheet Documentation  Taken 12/10/2021 0220 by Stacey Mercedes, RN  Fluid/Electrolyte Management: fluids provided  Taken 12/9/2021 2045 by Stacey Mercedes, RN  Fluid/Electrolyte Management: fluids provided

## 2021-12-10 NOTE — PROGRESS NOTES
[facility]     Patient Identification:  Name:  Geeta Renee  Age:  86 y.o.  Sex:  female  :  1935  MRN:  [id number]  Visit Number:  [id number]  Primary Care Provider:  Alejandra Dozier APRN    Length of stay:  2    Subjective: Patient seen and examined assisted by Yane Ross RN.  Patient is in bed she is not orthopneic, awake and alert, no nausea or vomiting, lipase is improving calcium is now normal, no function slightly improved.  Patient is hungry.    Chief Complaint: Follow-up of pancreatitis  ----------------------------------------------------------------------------------------------------------------------  [facility] Meds:  amLODIPine, 10 mg, Oral, Daily  anastrozole, 1 mg, Oral, Daily  atenolol, 50 mg, Oral, Q24H  heparin (porcine), 5,000 Units, Subcutaneous, Q8H  pantoprazole, 40 mg, Intravenous, Q AM  sodium chloride, 10 mL, Intravenous, Q12H  sodium chloride, 10 mL, Intravenous, Q12H      sodium chloride 0.9 % with KCl 20 mEq, 100 mL/hr, Last Rate: 150 mL/hr (21 8986)      ----------------------------------------------------------------------------------------------------------------------  Vital Signs:  Temp:  [97.3 °F (36.3 °C)-97.7 °F (36.5 °C)] 97.6 °F (36.4 °C)  Heart Rate:  [50-68] 59  Resp:  [16-20] 20  BP: (113-156)/(57-79) 130/58       Tele: Sinus bradycardia 56 bpm      21  0500 21  1345 12/10/21  0517   Weight: 79.7 kg (175 lb 9.6 oz) 79.7 kg (175 lb 9.6 oz) 79.9 kg (176 lb 3.2 oz)     Body mass index is 30.23 kg/m².    Intake/Output Summary (Last 24 hours) at 12/10/2021 1440  Last data filed at 12/10/2021 1200  Gross per 24 hour   Intake 5473.38 ml   Output 550 ml   Net 4923.38 ml     Diet Clear Liquid  ----------------------------------------------------------------------------------------------------------------------  Physical exam:  General: Comfortable,awake, alert, oriented to self, place, and time,  well-developed chronically appearing, in bed, she is not orthopneic   Skin:  Skin is warm and dry. No rash noted. No pallor.    HENT:  Head:  Normocephalic and atraumatic.  Mouth:  Moist mucous membranes.    Eyes:  Conjunctivae and EOM are normal.  Pupils are equal, round, and reactive to light.  No scleral icterus.    Neck:  Neck supple.  No JVD present.    She has hepatojugular reflux  Pulmonary/Chest:  No respiratory distress, no wheezes, no crackles, with normal breath sounds and good air movement.  Cardiovascular:  Normal rate, regular rhythm and normal heart sounds with no murmur.  Abdominal:  Soft.  Bowel sounds are normal.  No distension and no tenderness.   Extremities:  No edema, no tenderness, and no deformity.  No red or swollen joints anywhere.  Strong pulses in all 4 extremities with no clubbing, no cyanosis, no edema.  Neurological:  Motor strength equal no obvious deficit, sensory grossly intact.   No cranial nerve deficit.  No tongue deviation.  No facial droop.  No slurred speech.    Genitourinary: No Bentley catheter  Back:  ----------------------------------------------------------------------------------------------------------------------  ----------------------------------------------------------------------------------------------------------------------  Results from last 7 days   Lab Units 12/09/21  0500 12/09/21  0028 12/08/21 2031   TROPONIN T ng/mL 0.224* 0.234* 0.262*     Results from last 7 days   Lab Units 12/10/21  0058 12/09/21  0028 12/08/21  1622 12/08/21  1158   CRP mg/dL  --  10.96*  --  4.13*   LACTATE mmol/L  --   --  1.7 2.7*   WBC 10*3/mm3 16.34* 17.63*  --  15.95*   HEMOGLOBIN g/dL 9.9* 10.9*  --  12.5   HEMATOCRIT % 30.4* 32.4*  --  36.4   MCV fL 86.4 83.9  --  83.9   MCHC g/dL 32.6 33.6  --  34.3   PLATELETS 10*3/mm3 214 219  --  255     Results from last 7 days   Lab Units 12/08/21 2037   PH, ARTERIAL pH units 7.531*   PO2 ART mm Hg 73.2*   PCO2, ARTERIAL mm Hg 49.8*    HCO3 ART mmol/L 41.7*     Results from last 7 days   Lab Units 12/10/21  0058 12/09/21  2156 12/09/21  1145 12/09/21  0810 12/09/21  0429 12/09/21  0429 12/09/21  0028 12/08/21 2031 12/08/21 2031 12/08/21  1622 12/08/21  1622 12/08/21  1310 12/08/21  1158   SODIUM mmol/L 139  --   --   --   --   --  138  --  139   < > 137   < > 136   POTASSIUM mmol/L 3.0* 3.1* 2.7*  --    < > 2.4* 2.5*   < > 2.3*   < > 1.9*   < > 2.0*   MAGNESIUM mg/dL 1.9  --   --  1.6  --  1.7  --    < > 1.4*   < >  --    < >  --    CHLORIDE mmol/L 101  --   --   --   --   --  94*  --  94*   < > 93*   < > 89*   CO2 mmol/L 26.9  --   --   --   --   --  31.3*  --  33.5*   < > 30.3*   < > 33.6*   BUN mg/dL 29*  --   --   --   --   --  28*  --  28*   < > 27*   < > 29*   CREATININE mg/dL 2.89*  --   --   --   --   --  3.24*  --  3.35*   < > 3.28*   < > 3.51*   EGFR IF NONAFRICN AM mL/min/1.73 15*  --   --   --   --   --  14*  --  13*   < > 13*   < > 12*   CALCIUM mg/dL 10.4  --   --   --   --   --  13.3*  --  13.8*   < > 14.1*   < > 15.5*   GLUCOSE mg/dL 83  --   --   --   --   --  115*  --  121*   < > 110*   < > 113*   ALBUMIN g/dL  --   --   --   --   --   --  3.14*  --   --   --  3.22*  --  3.56   BILIRUBIN mg/dL  --   --   --   --   --   --  0.6  --   --   --  0.5  --  0.5   ALK PHOS U/L  --   --   --   --   --   --  87  --   --   --  90  --  100   AST (SGOT) U/L  --   --   --   --   --   --  20  --   --   --  19  --  23   ALT (SGPT) U/L  --   --   --   --   --   --  16  --   --   --  17  --  19    < > = values in this interval not displayed.   Estimated Creatinine Clearance: 14.6 mL/min (A) (by C-G formula based on SCr of 2.89 mg/dL (H)).    No results found for: AMMONIA      Blood Culture   Date Value Ref Range Status   12/08/2021 No growth at 2 days  Preliminary   12/08/2021 No growth at 2 days  Preliminary     Urine Culture   Date Value Ref Range Status   12/08/2021 <25,000 CFU/mL Mixed Saranya Isolated  Final     No results found for:  WOUNDCX  No results found for: STOOLCX    I have personally looked at the labs and they are summarized above.  ----------------------------------------------------------------------------------------------------------------------  Imaging Results (Last 24 Hours)     ** No results found for the last 24 hours. **        ----------------------------------------------------------------------------------------------------------------------  Assessment and Plan:  -Acute pancreatitis  -Severe hypercalcemia now improved after hydration work-up result pending  -Severe electrolyte derangement with EKG changes including prominent U waves, hypomagnesemia and hypokalemia now improved  -Large infrarenal aneurysm 5.9 cm in size  -Essential hypertension  -Left breast cancer status post lumpectomy  -Dyslipidemia  -Troponinemia remained flat likely type II  -History of esophageal adenocarcinoma    Decrease IV fluids, monitor for volume overload, out of bed to chair, start with liquid diet, monitor abdominal symptoms.      Disposition pending improvement      Jennie Galvan MD  12/10/21  14:40 EST

## 2021-12-10 NOTE — CASE MANAGEMENT/SOCIAL WORK
Discharge Planning Assessment   [facility]     Patient Name: Geeta Renee  MRN: [id number]  Today's Date: 12/10/2021    Admit Date: 12/8/2021     Discharge Needs Assessment    No documentation.                Discharge Plan     Row Name 12/10/21 1749       Plan    Plan Pt was admitted on 12/08/21. Pt lives alone with son living next door and plans to return home at discharge. Pt does not utilize home health services. Pt has a standard walker and wheelchair via unknown provider for DME needs. SS to follow.                Erlinda Flores

## 2021-12-10 NOTE — PROGRESS NOTES
Nephrology Progress Note      Subjective     Patient feels better, denied any chest pain or shortness of breath. Abd pain is still there but lot better.     Objective       Vital signs :     Temp:  [97.3 °F (36.3 °C)-98.2 °F (36.8 °C)] 97.3 °F (36.3 °C)  Heart Rate:  [48-74] 53  Resp:  [16-20] 20  BP: (103-156)/(57-81) 156/69      Intake/Output Summary (Last 24 hours) at 12/10/2021 0740  Last data filed at 12/10/2021 0532  Gross per 24 hour   Intake 5353.38 ml   Output 1300 ml   Net 4053.38 ml       Physical Exam:    General Appearance : not in acute distress  Lungs : clear to auscultation, respirations regular  Heart :  regular rhythm & normal rate, normal S1, S2 and no murmur, no rub  Abdomen : normal bowel sounds, no masses, no hepatomegaly, no splenomegaly, mild tenderness generlized  Extremities : no edema, no cyanosis and no redness  Neurologic :   orientated to person, place, time and situation, Grossly no focal deficits  Acess :       Laboratory Data :     Albumin Albumin   Date Value Ref Range Status   12/09/2021 3.14 (L) 3.50 - 5.20 g/dL Final   12/08/2021 3.22 (L) 3.50 - 5.20 g/dL Final   12/08/2021 3.56 3.50 - 5.20 g/dL Final   12/07/2021 3.60 3.50 - 5.20 g/dL Final      Magnesium Magnesium   Date Value Ref Range Status   12/10/2021 1.9 1.6 - 2.4 mg/dL Final   12/09/2021 1.6 1.6 - 2.4 mg/dL Final   12/09/2021 1.7 1.6 - 2.4 mg/dL Final   12/08/2021 1.4 (L) 1.6 - 2.4 mg/dL Final   12/08/2021 1.1 (L) 1.6 - 2.4 mg/dL Final          PTH                 PTH, Intact   Date Value Ref Range Status   12/09/2021 34.4 15.0 - 65.0 pg/mL Final       CBC and coagulation:  Results from last 7 days   Lab Units 12/10/21  0058 12/09/21  0028 12/08/21  1622 12/08/21  1158   LACTATE mmol/L  --   --  1.7 2.7*   SED RATE mm/hr  --   --   --  21   CRP mg/dL  --  10.96*  --  4.13*   WBC 10*3/mm3 16.34* 17.63*  --  15.95*   HEMOGLOBIN g/dL 9.9* 10.9*  --  12.5   HEMATOCRIT % 30.4* 32.4*  --  36.4   MCV fL 86.4 83.9  --  83.9    MCHC g/dL 32.6 33.6  --  34.3   PLATELETS 10*3/mm3 214 219  --  255     Acid/base balance:  Results from last 7 days   Lab Units 12/08/21 2037   PH, ARTERIAL pH units 7.531*   PO2 ART mm Hg 73.2*   PCO2, ARTERIAL mm Hg 49.8*   HCO3 ART mmol/L 41.7*     Renal and electrolytes:  Results from last 7 days   Lab Units 12/10/21  0058 12/09/21  2156 12/09/21 2127 12/09/21  1145 12/09/21  0810 12/09/21  0429 12/09/21  0429 12/09/21  0028 12/08/21 2031 12/08/21 2031 12/08/21  1622 12/08/21  1622 12/08/21  1310 12/08/21  1158   SODIUM mmol/L 139  --   --   --   --   --   --  138  --  139  --  137  --  136   POTASSIUM mmol/L 3.0* 3.1*  --  2.7*  --    < > 2.4* 2.5*   < > 2.3*   < > 1.9*   < > 2.0*   MAGNESIUM mg/dL 1.9  --   --   --  1.6  --  1.7  --    < > 1.4*   < >  --    < >  --    CHLORIDE mmol/L 101  --   --   --   --   --   --  94*  --  94*   < > 93*   < > 89*   CO2 mmol/L 26.9  --   --   --   --   --   --  31.3*  --  33.5*   < > 30.3*   < > 33.6*   BUN mg/dL 29*  --   --   --   --   --   --  28*  --  28*   < > 27*   < > 29*   CREATININE mg/dL 2.89*  --   --   --   --   --   --  3.24*  --  3.35*  --  3.28*  --  3.51*   EGFR IF NONAFRICN AM mL/min/1.73 15*  --   --   --   --   --   --  14*  --  13*   < > 13*   < > 12*   CALCIUM mg/dL 10.4  --   --   --   --   --   --  13.3*  --  13.8*   < > 14.1*   < > 15.5*   IONIZED CALCIUM mmol/L  --   --   --   --   --   --   --   --   --  1.84*  --   --   --   --    PHOSPHORUS mg/dL 2.1*  --  2.2*  --  1.7*  --   --   --    < > 1.7*  --   --   --   --     < > = values in this interval not displayed.     Estimated Creatinine Clearance: 14.6 mL/min (A) (by C-G formula based on SCr of 2.89 mg/dL (H)).    Liver and pancreatic function:  Results from last 7 days   Lab Units 12/10/21  0058 12/09/21  0028 12/08/21  1622 12/08/21  1158   ALBUMIN g/dL  --  3.14* 3.22* 3.56   BILIRUBIN mg/dL  --  0.6 0.5 0.5   ALK PHOS U/L  --  87 90 100   AST (SGOT) U/L  --  20 19 23   ALT (SGPT) U/L   --  16 17 19   AMYLASE U/L  --   --  446*  --    LIPASE U/L 154*  --  788*  --          Cardiac:  Results from last 7 days   Lab Units 12/08/21  1158   PROBNP pg/mL 1,785.0     Liver and pancreatic function:  Results from last 7 days   Lab Units 12/10/21  0058 12/09/21  0028 12/08/21  1622 12/08/21  1158   ALBUMIN g/dL  --  3.14* 3.22* 3.56   BILIRUBIN mg/dL  --  0.6 0.5 0.5   ALK PHOS U/L  --  87 90 100   AST (SGOT) U/L  --  20 19 23   ALT (SGPT) U/L  --  16 17 19   AMYLASE U/L  --   --  446*  --    LIPASE U/L 154*  --  788*  --        Medications :     amLODIPine, 10 mg, Oral, Daily  anastrozole, 1 mg, Oral, Daily  atenolol, 50 mg, Oral, Q24H  heparin (porcine), 5,000 Units, Subcutaneous, Q8H  pantoprazole, 40 mg, Intravenous, Q AM  potassium chloride, 10 mEq, Intravenous, Q1H  potassium phosphate, 9 mmol, Intravenous, Once  sodium chloride, 10 mL, Intravenous, Q12H  sodium chloride, 10 mL, Intravenous, Q12H      sodium chloride 0.9 % with KCl 20 mEq, 150 mL/hr, Last Rate: 150 mL/hr (12/09/21 1476)          Assessment/Plan     1. LILIA, non oliguric multifactorial, including ATN from sepsis, hypercalcemia  2. Sepsis with pancreatitis  3. Sever hypercalcemia  4. History of esophageal cancer and breast cancer  5. Hypokalemia  6. Metabolic alkalosis likely volume contraction, improved.      Cr continue to improve and is 2.9 today, metabolic alkalosis is improved. Calcium is normalized, workup is pending.   Will reduce IVF to 75ml/h with K replacement.     Baseline Cr around 1, admitted with CR of 3.5  Admission calcium was >15  -continue on IVF NS with 20meq/L of KCL and run at 75ml/h  -follow up workup for hypocalcemia, PTH, PTHrp, 1,25 (OH)2 cholcaciferol level.   -strict I/O  -avoid any nephrotoxic agents, hypotension and adjust medications according to eGFR    Stanley Gastelum MD  12/10/21  07:40 EST

## 2021-12-11 NOTE — DISCHARGE INSTRUCTIONS
You have been enrolled into the transition from hospital to home program.  A nurse (Mariaelena) will be contacting you after you discharge to home to set up a time to come out to your home for a follow-up visit.  If you have any questions or concerns you can call this number anytime and a nurse will contact you:  [facility]  [phone].

## 2021-12-11 NOTE — NURSING NOTE
"Transitional Care Note    Enrolled in [facility] Transitional Care Note    Enrolled in [facility] Transitional Care Services under theTCM Model to be followed for 6 weeks post discharge.  [facility]TC will assist with support and education at time of transition home from hospital.  Hospital  will follow throughout the stay at [facility].  Home  will visit within 48 hours of discharge and follow with home vitals and telephone contact for 6 weeks.      Patient admitted to [facility] via Emergency Department, complaints of abd pain.  Admitted for further treatment of pancreatitis.     Patient contacted via phone.    Explained transition to home program and Patient is agreeable to home visits.  Home  will be Mariaelena Cool RN    Clinical Assessment Instrument Scores:  >Short Portable Mental Status 10, normal mental function  >Geriatric Depression Scale 5, normal  >IADL 4, Dependent with ADL's  >LINDSAY-ADL 3, Dependent with self-care  >Subjective Health Rating \"poor\"  >General Anxiety Scale  0    "

## 2021-12-11 NOTE — PROGRESS NOTES
Nephrology Progress Note    Interval History:     Patient Complaints: Patient is feeling better.  She says she is trying to eat a little bit more.  She denies any shortness of breath or bone pains.  No nausea or vomiting or constipation.        Vital Signs  Temp:  [97.5 °F (36.4 °C)-98.2 °F (36.8 °C)] 98.1 °F (36.7 °C)  Heart Rate:  [54-71] 71  Resp:  [16-18] 18  BP: (104-128)/(56-80) 104/56    Physical Exam:    General:           No distress      HEENT:  No pallor               Neck:  No JVD       Lungs:    Clear   Heart:   Regular,  no rub       Abdomen:   Normal bowel sounds, soft non-tender, non-distended, no guarding       Extremities:  No edema       Skin: No petechiae       Neurologic: Cranial nerves grossly intact, moves all extremities.        Results Review:    I reviewed the patient's new clinical results.    Lab Results (last 24 hours)     Procedure Component Value Units Date/Time    Vitamin D 1,25 Dihydroxy [[id number]]  (Abnormal) Collected: 12/09/21 0810    Specimen: Blood Updated: 12/11/21 1309     1,25-Dihydroxy, Vitamin D 8.0 pg/mL     Narrative:      Performed at:  [address] [place] [address]  [id number]  : Miguel Moreau MD, Phone:  [phone]    Blood Culture - Blood, Arm, Left [[id number]]  (Normal) Collected: 12/08/21 1158    Specimen: Blood from Arm, Left Updated: 12/11/21 1215     Blood Culture No growth at 3 days    Blood Culture - Blood, Arm, Left [[id number]]  (Normal) Collected: 12/08/21 1158    Specimen: Blood from Arm, Left Updated: 12/11/21 1215     Blood Culture No growth at 3 days    Phosphorus [[id number]]  (Abnormal) Collected: 12/10/21 2319    Specimen: Blood Updated: 12/11/21 0009     Phosphorus 1.1 mg/dL     Basic Metabolic Panel [[id number]]  (Abnormal) Collected: 12/10/21 2319    Specimen: Blood Updated: 12/10/21 2354     Glucose 85 mg/dL      BUN 29 mg/dL      Creatinine 2.40  mg/dL      Sodium 141 mmol/L      Potassium 3.5 mmol/L      Chloride 107 mmol/L      CO2 23.9 mmol/L      Calcium 9.5 mg/dL      eGFR Non African Amer 19 mL/min/1.73      BUN/Creatinine Ratio 12.1     Anion Gap 10.1 mmol/L     Narrative:      GFR Normal >60  Chronic Kidney Disease <60  Kidney Failure <15      Lipase [[id number]]  (Abnormal) Collected: 12/10/21 2319    Specimen: Blood Updated: 12/10/21 2354     Lipase 126 U/L     Hepatic Function Panel [[id number]]  (Abnormal) Collected: 12/10/21 2319    Specimen: Blood Updated: 12/10/21 2354     Total Protein 5.5 g/dL      Albumin 2.64 g/dL      ALT (SGPT) 12 U/L      AST (SGOT) 12 U/L      Alkaline Phosphatase 86 U/L      Total Bilirubin 0.4 mg/dL      Bilirubin, Direct <0.2 mg/dL      Bilirubin, Indirect --     Comment: Unable to calculate       C-reactive Protein [[id number]]  (Abnormal) Collected: 12/10/21 2319    Specimen: Blood Updated: 12/10/21 2354     C-Reactive Protein 19.54 mg/dL     Magnesium [[id number]]  (Normal) Collected: 12/10/21 2319    Specimen: Blood Updated: 12/10/21 2354     Magnesium 1.8 mg/dL     Potassium [[id number]]  (Normal) Collected: 12/10/21 2319    Specimen: Blood Updated: 12/10/21 2346     Potassium 3.5 mmol/L     CBC & Differential [[id number]]  (Abnormal) Collected: 12/10/21 2319    Specimen: Blood Updated: 12/10/21 2327    Narrative:      The following orders were created for panel order CBC & Differential.  Procedure                               Abnormality         Status                     ---------                               -----------         ------                     CBC Auto Differential[[id number]]        Abnormal            Final result                 Please view results for these tests on the individual orders.    CBC Auto Differential [[id number]]  (Abnormal) Collected: 12/10/21 2319    Specimen: Blood Updated: 12/10/21 2327     WBC 13.93 10*3/mm3      RBC 3.45 10*6/mm3      Hemoglobin 10.0 g/dL      Hematocrit 29.9 %       MCV 86.7 fL      MCH 29.0 pg      MCHC 33.4 g/dL      RDW 14.8 %      RDW-SD 47.3 fl      MPV 10.6 fL      Platelets 212 10*3/mm3      Neutrophil % 83.8 %      Lymphocyte % 8.5 %      Monocyte % 5.2 %      Eosinophil % 1.4 %      Basophil % 0.1 %      Immature Grans % 1.0 %      Neutrophils, Absolute 11.67 10*3/mm3      Lymphocytes, Absolute 1.18 10*3/mm3      Monocytes, Absolute 0.72 10*3/mm3      Eosinophils, Absolute 0.20 10*3/mm3      Basophils, Absolute 0.02 10*3/mm3      Immature Grans, Absolute 0.14 10*3/mm3      nRBC 0.0 /100 WBC     Potassium [[id number]]  (Abnormal) Collected: 12/10/21 1502    Specimen: Blood Updated: 12/10/21 1611     Potassium 3.2 mmol/L           Imaging Results (Last 24 Hours)     ** No results found for the last 24 hours. **          Assessment and Plan:       1. LILIA, non oliguric multifactorial, including ATN from sepsis, hypercalcemia  2. Sepsis  3. Severe hypercalcemia  4. History of esophageal cancer and breast cancer  5. Hypokalemia  6. Metabolic alkalosis likely volume contraction, improved.   7.  Pancreatitis   8. Hypophosphatemia    The patient's renal failure is gradually improving.  Her hypercalcemia is better.  Her PTH RP is pending.  Encouraged to drink more liquids as her IV fluids have been reduced.  The likely cause of pancreatitis is hypercalcemia and such patients often respond to steroids but she is clinically better.  The hypophosphatemia could be from pancreatitis or could be from the patient having primary hyperparathyroidism    Omid Boone MD  12/11/21  15:54 EST

## 2021-12-11 NOTE — PROGRESS NOTES
[facility] HOSPITALIST PROGRESS NOTE     Patient Identification:  Name:  Geeta Renee  Age:  86 y.o.  Sex:  female  :  1935  MRN:  [id number]  Visit Number:  [id number]  Primary Care Provider:  Alejandra Dozier APRN    Length of stay:  3    Subjective: Patient seen and examined assisted by Candice Pavon RN.  Patient is tolerating full liquid diet, she has diarrhea, her CRP is increasing but white count is improving, afebrile lipase is improving.  No nausea or vomiting.  Vital signs stable.  Telemetry revealed brief nonsustained V. tach asymptomatic as per chart.  Electrolyte normality noted being replaced.    Chief Complaint: Abdominal pain  ----------------------------------------------------------------------------------------------------------------------  [facility] [place] Meds:  amLODIPine, 10 mg, Oral, Daily  anastrozole, 1 mg, Oral, Daily  atenolol, 50 mg, Oral, Q24H  heparin (porcine), 5,000 Units, Subcutaneous, Q8H  loperamide, 2 mg, Oral, Once  pantoprazole, 40 mg, Intravenous, Q AM  sodium chloride, 10 mL, Intravenous, Q12H  sodium chloride, 10 mL, Intravenous, Q12H      sodium chloride 0.9 % with KCl 20 mEq, 82 mL/hr, Last Rate: 82 mL/hr (12/10/21 1516)      ----------------------------------------------------------------------------------------------------------------------  Vital Signs:  Temp:  [97.5 °F (36.4 °C)-98.2 °F (36.8 °C)] 97.5 °F (36.4 °C)  Heart Rate:  [54-66] 59  Resp:  [16-18] 18  BP: (104-130)/(56-80) 104/56       Tele: Sinus rhythm 62 bpm      21  1345 12/10/21  0517 21  0500   Weight: 79.7 kg (175 lb 9.6 oz) 79.9 kg (176 lb 3.2 oz) 79.8 kg (175 lb 14.4 oz)     Body mass index is 30.18 kg/m².    Intake/Output Summary (Last 24 hours) at 2021 1333  Last data filed at 2021 0900  Gross per 24 hour   Intake 799.67 ml   Output 1450 ml   Net -650.33 ml     Diet Clear  Liquid  ----------------------------------------------------------------------------------------------------------------------  Physical exam:  General: Comfortable,awake, alert, oriented to self, place, and time, well-developed.  No respiratory distress.    Skin:  Skin is warm and dry. No rash noted. No pallor.    HENT:  Head:  Normocephalic and atraumatic.  Mouth:  Moist mucous membranes.    Eyes:  Conjunctivae and EOM are normal.  Pupils are equal, round, and reactive to light.  No scleral icterus.    Neck:  Neck supple.  No JVD present.    No hepatojugular reflux  Pulmonary/Chest:  No respiratory distress, no wheezes, no crackles, with normal breath sounds and good air movement.  Cardiovascular:  Normal rate, regular rhythm and normal heart sounds with no murmur.  Abdominal: No tenderness no guarding no rigidity soft.  Bowel sounds are normal.  No distension Extremities:  No edema, no tenderness, and no deformity.  No red or swollen joints anywhere.  Strong pulses in all 4 extremities with no clubbing, no cyanosis, no edema.  Neurological:  Motor strength equal no obvious deficit, sensory grossly intact.   No cranial nerve deficit.  No tongue deviation.  No facial droop.  No slurred speech.    Genitourinary: No Bentley catheter  Back:  ----------------------------------------------------------------------------------------------------------------------  ----------------------------------------------------------------------------------------------------------------------  Results from last 7 days   Lab Units 12/09/21  0500 12/09/21  0028 12/08/21  2031   TROPONIN T ng/mL 0.224* 0.234* 0.262*     Results from last 7 days   Lab Units 12/10/21  2319 12/10/21  0058 12/09/21  0028 12/08/21  1622 12/08/21  1158 12/08/21  1158   CRP mg/dL 19.54*  --  10.96*  --   --  4.13*   LACTATE mmol/L  --   --   --  1.7  --  2.7*   WBC 10*3/mm3 13.93* 16.34* 17.63*  --    < > 15.95*   HEMOGLOBIN g/dL 10.0* 9.9* 10.9*  --    < > 12.5    HEMATOCRIT % 29.9* 30.4* 32.4*  --    < > 36.4   MCV fL 86.7 86.4 83.9  --    < > 83.9   MCHC g/dL 33.4 32.6 33.6  --    < > 34.3   PLATELETS 10*3/mm3 212 214 219  --    < > 255    < > = values in this interval not displayed.     Results from last 7 days   Lab Units 12/08/21 2037   PH, ARTERIAL pH units 7.531*   PO2 ART mm Hg 73.2*   PCO2, ARTERIAL mm Hg 49.8*   HCO3 ART mmol/L 41.7*     Results from last 7 days   Lab Units 12/10/21  2319 12/10/21  1502 12/10/21  0058 12/09/21  1145 12/09/21  0810 12/09/21  0429 12/09/21  0028 12/08/21 2031 12/08/21  1622   SODIUM mmol/L 141  --  139  --   --   --  138   < > 137   POTASSIUM mmol/L 3.5  3.5 3.2* 3.0*   < >  --    < > 2.5*   < > 1.9*   MAGNESIUM mg/dL 1.8  --  1.9  --  1.6   < >  --    < >  --    CHLORIDE mmol/L 107  --  101  --   --   --  94*   < > 93*   CO2 mmol/L 23.9  --  26.9  --   --   --  31.3*   < > 30.3*   BUN mg/dL 29*  --  29*  --   --   --  28*   < > 27*   CREATININE mg/dL 2.40*  --  2.89*  --   --   --  3.24*   < > 3.28*   EGFR IF NONAFRICN AM mL/min/1.73 19*  --  15*  --   --   --  14*   < > 13*   CALCIUM mg/dL 9.5  --  10.4  --   --   --  13.3*   < > 14.1*   GLUCOSE mg/dL 85  --  83  --   --   --  115*   < > 110*   ALBUMIN g/dL 2.64*  --   --   --   --   --  3.14*  --  3.22*   BILIRUBIN mg/dL 0.4  --   --   --   --   --  0.6  --  0.5   ALK PHOS U/L 86  --   --   --   --   --  87  --  90   AST (SGOT) U/L 12  --   --   --   --   --  20  --  19   ALT (SGPT) U/L 12  --   --   --   --   --  16  --  17    < > = values in this interval not displayed.   Estimated Creatinine Clearance: 17.5 mL/min (A) (by C-G formula based on SCr of 2.4 mg/dL (H)).    No results found for: AMMONIA      Blood Culture   Date Value Ref Range Status   12/08/2021 No growth at 3 days  Preliminary   12/08/2021 No growth at 3 days  Preliminary     Urine Culture   Date Value Ref Range Status   12/08/2021 <25,000 CFU/mL Mixed Saranya Isolated  Final     No results found for:  WOUNDCX  No results found for: STOOLCX    I have personally looked at the labs and they are summarized above.  ----------------------------------------------------------------------------------------------------------------------  Imaging Results (Last 24 Hours)     ** No results found for the last 24 hours. **        ----------------------------------------------------------------------------------------------------------------------  Assessment and Plan:  -Acute pancreatitis  -SIRS secondary to pancreatitis  -Nonsustained V. Tach  -Acute hypokalemia and hypomagnesemia replaced  -Acute kidney injury on CKD improved  -Troponinemia remained flat  -Acute hypercalcemia resolved, work-up pending results  -History of esophageal carcinoma  -History of left breast carcinoma  -Essential hypertension  -Infrarenal aortic aneurysm 5.9 cm in size no change from previous CT scan followed by vascular.    Decrease IV fluids, monitor diarrhea, advance diet low-fat as tolerated, monitor abdominal status, CRP is increased but white count and lipase trending down, will monitor.  Out of bed to chair    Disposition pending improvement      Jennie Galvan MD  12/11/21  13:33 EST

## 2021-12-11 NOTE — PROGRESS NOTES
Notified by RNAnne-Marie, that patient had a 12-beat run of VTach on telemetry. Patient asymptomatic. Patient had electrolyte abnormalities on a.m. labs consisting of potassium 3.5, magnesium 1.8, and phosphurus 1.1 which are currently getting replaced. We will continue to monitor closely on telemetry.

## 2021-12-11 NOTE — PLAN OF CARE
Goal Outcome Evaluation:  Plan of Care Reviewed With: patient        Progress: improving  Outcome Summary: Pt resting in bed. has ambulated and sat up in chair throughout shift. no other acute issues noted at this time. will continue with plan of care.,

## 2021-12-11 NOTE — PLAN OF CARE
Goal Outcome Evaluation:  Plan of Care Reviewed With: patient        Progress: no change  Outcome Summary: pt resting, had to get new IV, did mag replacement and potassium phosphate being replaced, pt ran 12 beat run of Sandra newby aware and stated to give scheduled electrolyte replacement and see how she does, will continue to monitor

## 2021-12-12 NOTE — PLAN OF CARE
Pt w/ SOA upon transfer to my care from . Pt O2 saturations in the mid 80's on 3LNC. Pt now on 4LNC w/ O2 saturations in low 90's. Pt stated she had developed a cough. PA ordered stat chest xray which showed new opacities in the lungs. IV abx ordered r/t poss PNA.

## 2021-12-12 NOTE — NURSING NOTE
Telemetry tech called about patient's O2 sat 84%; checked on her and she was not wearing her oxygen. Put oxygen back on her and her O2 came up to 92%; but dropped back to 89% on 3.5 L NC. Telemetry tech called again to say that she had converted to A fib. I ordered an EKG that showed that she was in A fib. Lynda Flores and [facility] texted Dr. Marr and he called me. I let him know that patient was in new onset of A fib with heart rate of 69 bpm. He said to move her to Telemetry for further evaluation.

## 2021-12-12 NOTE — CONSULTS
Cardiology  CONSULT NOTE    Consults    Patient Identification:  Name:  Geeta Renee  Age:  86 y.o.  Sex:  female  :  1935  MRN:  [id number]  Visit Number:  [id number]  Primary care provider:  Alejandra Dozier APRN    Subjective       Reason for the consult:  New onset A. fib    Chief complaints:  Abdominal pain      History of presenting illness:    86-year-old woman has been admitted with chief complaints of abdominal pain and diagnosed with acute pancreatitis.  She had her gallbladder surgery in the past.  No history of alcoholism.    I have been consulted as she developed new onset A. fib last night.  Ventricular rate is well controlled as she is already on atenolol 50 mg daily for hypertension.  She denied history of palpitations, chest discomfort or increased dyspnea.  No previous history of atrial fibrillation.  CHADS2 vascular score is for due to age more than 75, female gender and hypertension.  TSH level is normal.  Echocardiogram showed moderate left atrial enlargement, EF-60% and presence of diastolic dysfunction.    No history of any significant cardiac illness in the past.  No history of CHF, CAD or stroke.    She had hypokalemia, hypercalcemia and has renal failure.  She has history of hypertension, and history of breast and esophageal cancer.    --------------------------------------------------------------------------------------------------------------------  Review of Systems:     Constitutional-tiredness  ENT-none  Cardiovascular-as above  Respiratory-mild dyspnea  GI-abdominal pain  Genitourinary-kidney problems  Integumentary-history of breast cancer  Musculoskeletal-arthritis pain    ---------------------------------------------------------------------------------------------------------------------   Past History:  Family History   Problem Relation Age of Onset   • Hypertension Mother    • Alzheimer's disease Mother    • Heart disease Brother    • Tuberculosis Father    •  Breast cancer Neg Hx      Past Medical History:   Diagnosis Date   • Arthritis    • Breast cancer (HCC)    • Breast lump     left   • Esophageal cancer (HCC)    • GERD (gastroesophageal reflux disease)    • Hiatal hernia    • Hypertension    • Snoring      Past Surgical History:   Procedure Laterality Date   • BREAST LUMPECTOMY Left 2019    Procedure: BREAST LUMPECTOMY WITH ULTRASOUND;  Surgeon: Nurys Selby MD;  Location: [facility];  Service: General   • CATARACT EXTRACTION, BILATERAL  09/10/2019   • CHOLECYSTECTOMY     • COLONOSCOPY     • ELBOW PROCEDURE Right    • ENDOSCOPY     • FRACTURE SURGERY      right elbow     Social History     Socioeconomic History   • Marital status:    • Number of children: 2   Tobacco Use   • Smoking status: Former Smoker     Packs/day: 0.50     Years: 30.00     Pack years: 15.00     Types: Cigarettes     Quit date:      Years since quittin.9   • Smokeless tobacco: Never Used   Vaping Use   • Vaping Use: Never used   Substance and Sexual Activity   • Alcohol use: No   • Drug use: No   • Sexual activity: Defer     ---------------------------------------------------------------------------------------------------------------------   Allergies:  Codeine and Sulfa antibiotics  ---------------------------------------------------------------------------------------------------------------------     Hospital Meds:  amLODIPine, 10 mg, Oral, Daily  anastrozole, 1 mg, Oral, Daily  apixaban, 2.5 mg, Oral, Q12H  atenolol, 50 mg, Oral, Q24H  pantoprazole, 40 mg, Oral, Q AM  piperacillin-tazobactam, 3.375 g, Intravenous, Q8H  sodium chloride, 10 mL, Intravenous, Q12H  sodium chloride, 10 mL, Intravenous, Q12H  Vancomycin Pharmacy Intermittent Dosing, , Does not apply, Daily      sodium chloride 0.9 % with KCl 20 mEq, 40 mL/hr, Last Rate: 40 mL/hr (21  1357)      ---------------------------------------------------------------------------------------------------------------------     Objective     Vital Signs:  Temp:  [98 °F (36.7 °C)-99.7 °F (37.6 °C)] 98.1 °F (36.7 °C)  Heart Rate:  [57-72] 57  Resp:  [18-20] 20  BP: (101-132)/(56-89) 101/59      12/11/21  0500 12/12/21  0121 12/12/21  0449   Weight: 79.8 kg (175 lb 14.4 oz) 84 kg (185 lb 3.2 oz) 84 kg (185 lb 3.2 oz) (admit weight, less then 24hrs)     Body mass index is 31.79 kg/m².  ---------------------------------------------------------------------------------------------------------------------   Physical exam:      General Appearance:    Alert, cooperative, in no acute distress   Head:    Normocephalic, without obvious abnormality, atraumatic   Eyes:            Lids and lashes normal, conjunctivae and sclerae normal, no   icterus, no pallor, corneas clear, PERRLA   Ears:    Ears appear intact with no abnormalities noted   Throat:   No oral lesions, no thrush, oral mucosa moist   Neck:   No adenopathy, supple, trachea midline, no thyromegaly, no   carotid bruit, no JVD   Back:     No significant abnormality   Lungs:     Clear to auscultation,respirations regular, No added sounds    Heart:   Irregular heart rhythm.  Normal heart rate.  No murmurs.   Chest Wall:    No abnormalities observed   Abdomen:     Normal bowel sounds, no masses, no organomegaly, soft    non-tender, non-distended, no guarding, no rebound                tenderness       Extremities:  No pedal edema   Pulses:   Pulses palpable and equal bilaterally   Skin:   No bleeding, bruising or rash       Neurologic: No focal deficits         Telemetry:  A. fib with controlled ventricular rate    ---------------------------------------------------------------------------------------------------------------------   ECG:   ECG/EMG Results (last 24 hours)     Procedure Component Value Units Date/Time    ECG 12 Lead [[id number]] Collected: 12/12/21 0028      Updated: 12/12/21 0030     QT Interval 412 ms      QTC Interval 441 ms     Narrative:      Test Reason : a fib  Blood Pressure :   */*   mmHG  Vent. Rate :  69 BPM     Atrial Rate :  67 BPM     P-R Int :   * ms          QRS Dur :  86 ms      QT Int : 412 ms       P-R-T Axes :   *   8 -45 degrees     QTc Int : 441 ms    Atrial fibrillation  Nonspecific ST and T wave abnormality  Abnormal ECG  When compared with ECG of 10-DEC-2021 13:03,  Atrial fibrillation has replaced Sinus rhythm    Referred By:            Confirmed By:     Adult Transthoracic Echo Limited W/ Cont if Necessary Per Protocol [[id number]] Collected: 12/10/21 1058     Updated: 12/12/21 1332     BSA 1.9 m^2      IVSd 0.99 cm      LVIDd 4.5 cm      LVIDs 3.0 cm      LVPWd 1.5 cm      IVS/LVPW 0.67     FS 34.3 %      EDV(Teich) 93.4 ml      ESV(Teich) 34.2 ml      EF(Teich) 63.4 %      EDV(cubed) 92.3 ml      ESV(cubed) 26.2 ml      EF(cubed) 71.6 %      LV mass(C)d 209.6 grams      LV mass(C)dI 113.2 grams/m^2      SV(Teich) 59.3 ml      SI(Teich) 32.0 ml/m^2      SV(cubed) 66.1 ml      SI(cubed) 35.7 ml/m^2      LVLd ap4 7.3 cm      EDV(MOD-sp4) 33.2 ml      LVLs ap4 5.9 cm      ESV(MOD-sp4) 9.0 ml      EF(MOD-sp4) 73.0 %      SV(MOD-sp4) 24.2 ml      SI(MOD-sp4) 13.1 ml/m^2      LV Stevens Vol (BSA corrected) 17.9 ml/m^2      LV Sys Vol (BSA corrected) 4.8 ml/m^2       CV ECHO MOIRA - BZI_BMI 30.2 kilograms/m^2       CV ECHO MOIRA - BSA(HAYCOCK) 1.9 m^2       CV ECHO MOIRA - BZI_METRIC_WEIGHT 79.8 kg       CV ECHO MOIRA - BZI_METRIC_HEIGHT 162.6 cm      Target HR (85%) 115 bpm      Max. Pred. HR (100%) 135 bpm     Addenda:        · Limited study to evaluate LV function in a patient with ACS.  · Findings-  · Normal left ventricular cavity size and wall thickness noted.  · Left ventricular systolic function is normal. ''  · All left ventricular wall segments contract normally.  · Left ventricular ejection fraction appears to be 61 - 65%.  · Left  ventricular diastolic function is consistent with (grade I)   impaired relaxation.     Signed: 12/12/21 1332 by Eva Stubbs MD      Left atrium was moderately dilated    ---------------------------------------------------------------------------------------------------------------------   Results from last 7 days   Lab Units 12/12/21  0108 12/10/21  2319 12/10/21  0058 12/09/21  0028 12/09/21  0028 12/08/21  1622 12/08/21  1158 12/08/21  1158   CRP mg/dL  --  19.54*  --   --  10.96*  --   --  4.13*   LACTATE mmol/L  --   --   --   --   --  1.7  --  2.7*   WBC 10*3/mm3 12.47* 13.93* 16.34*   < > 17.63*  --    < > 15.95*   HEMOGLOBIN g/dL 10.7* 10.0* 9.9*   < > 10.9*  --    < > 12.5   HEMATOCRIT % 33.1* 29.9* 30.4*   < > 32.4*  --    < > 36.4   MCV fL 87.8 86.7 86.4   < > 83.9  --    < > 83.9   MCHC g/dL 32.3 33.4 32.6   < > 33.6  --    < > 34.3   PLATELETS 10*3/mm3 241 212 214   < > 219  --    < > 255    < > = values in this interval not displayed.     Results from last 7 days   Lab Units 12/08/21 2037   PH, ARTERIAL pH units 7.531*   PO2 ART mm Hg 73.2*   PCO2, ARTERIAL mm Hg 49.8*   HCO3 ART mmol/L 41.7*     Results from last 7 days   Lab Units 12/12/21  1022 12/12/21  0108 12/10/21  2319 12/10/21  1502 12/10/21  0058 12/09/21  0429 12/09/21  0028   SODIUM mmol/L  --  143 141  --  139   < > 138   POTASSIUM mmol/L 4.0 3.3* 3.5  3.5   < > 3.0*   < > 2.5*   MAGNESIUM mg/dL  --  1.7 1.8  --  1.9   < >  --    CHLORIDE mmol/L  --  107 107  --  101   < > 94*   CO2 mmol/L  --  22.3 23.9  --  26.9   < > 31.3*   BUN mg/dL  --  24* 29*  --  29*   < > 28*   CREATININE mg/dL  --  2.14* 2.40*  --  2.89*   < > 3.24*   EGFR IF NONAFRICN AM mL/min/1.73  --  22* 19*  --  15*   < > 14*   CALCIUM mg/dL  --  8.5* 9.5  --  10.4   < > 13.3*   GLUCOSE mg/dL  --  94 85  --  83   < > 115*   ALBUMIN g/dL  --  2.62* 2.64*  --   --   --  3.14*   BILIRUBIN mg/dL  --  0.5 0.4  --   --   --  0.6   ALK PHOS U/L  --  98 86  --   --   --  87    AST (SGOT) U/L  --  13 12  --   --   --  20   ALT (SGPT) U/L  --  11 12  --   --   --  16    < > = values in this interval not displayed.   Estimated Creatinine Clearance: 20.1 mL/min (A) (by C-G formula based on SCr of 2.14 mg/dL (H)).  No results found for: AMMONIA  Results from last 7 days   Lab Units 12/12/21  0744 12/12/21  0108 12/09/21  0500   TROPONIN T ng/mL 0.020 0.023 0.224*     Results from last 7 days   Lab Units 12/08/21  1158   PROBNP pg/mL 1,785.0     No results found for: HGBA1C  Lab Results   Component Value Date    TSH 3.070 12/08/2021     No results found for: PREGTESTUR, PREGSERUM, HCG, HCGQUANT  Pain Management Panel     Pain Management Panel Latest Ref Rng & Units 12/8/2021 12/7/2021    CREATININE UR mg/dL - 223.4    AMPHETAMINES SCREEN, URINE Negative Negative -    BARBITURATES SCREEN Negative Negative -    BENZODIAZEPINE SCREEN, URINE Negative Negative -    BUPRENORPHINEUR Negative Negative -    COCAINE SCREEN, URINE Negative Negative -    METHADONE SCREEN, URINE Negative Negative -    METHAMPHETAMINEUR Negative Negative -        Blood Culture   Date Value Ref Range Status   12/08/2021 No growth at 4 days  Preliminary   12/08/2021 No growth at 4 days  Preliminary     Urine Culture   Date Value Ref Range Status   12/08/2021 <25,000 CFU/mL Mixed Saranya Isolated  Final     No results found for: WOUNDCX  No results found for: STOOLCX      ---------------------------------------------------------------------------------------------------------------------   Radiology:    Results for orders placed during the hospital encounter of 01/02/20    CT Chest With Contrast    Narrative  CT CHEST WITH CONTRAST-    REASON FOR EXAM: Esophageal adenocarcinoma; C15.9-Malignant neoplasm of  esophagus, unspecified    COMPARISON: Today's CT chest is compared with a  previous CT chest done  10/03/2019.    FINDINGS: Contrast was injected in a bolus fashion and spiral scans were  obtained from the apices of the lung  and extended to the diaphragm. Both  lung and soft tissue windows were archived. On the lung windows, no  inflammatory infiltrates are seen within the lungs. No pulmonary  parenchymal lung nodules to suggest metastatic disease is identified.  The area of ground-glass density in the left upper lobe is stable. On  the soft tissue, there is air in the esophagus throughout its length.  There is a hiatal hernia in the lower mediastinum. There is very mild  mucosal thickening in the esophagus which is unchanged from the earlier  2 CTs. No mediastinal lymphadenopathy was demonstrated. There is  atherosclerotic plaque in the aorta. Heart was not enlarged. There were  no pericardial effusions.    Impression  Stable CT of the thorax. Mild thickening of the mucosa  remains in the esophagus. No enlarged lymph nodes have developed. There  is a stable area of ground-glass parenchymal infiltrate in the left  upper lobe.    1325.36 mGy.cm  The radiation dose reduction device was utilized for each scan per the  ALARA (as low as reasonably achievable) protocol.    This report was finalized on 1/2/2020 10:08 AM by Dr. Vishal Priest II, MD.      No results found for this or any previous visit.      Results for orders placed during the hospital encounter of 12/08/21    XR Chest 1 View    Narrative  CR Chest 1 Vw    INDICATION:  Shortness of air and cough.    COMPARISON:  12/8/2021    FINDINGS:  Single portable AP view(s) of the chest.    Patient is rotated. The heart remains enlarged. There is atherosclerotic disease in the aorta. There are new infiltrates in the right mid to lower lung and at the left base which may reflect atelectasis or pneumonia. No pneumothorax.    Impression  Stable cardiac enlargement with new bilateral opacities in the right mid to lower lung and at the left base. Findings may reflect atelectasis or pneumonia.    Signer Name: Parminder Wells MD  Signed: 12/12/2021 2:52 AM  Workstation Name:  [facility]  [facility]      No results found for this or any previous visit.      I have personally reviewed the radiology images and read the final radiology report.        Assessment      1.  New onset A. fib.  Ventricular rate is controlled.  Asymptomatic.  2.  CHADS2 vascular score-4  3.  Benign essential hypertension  4.  Electrolyte disturbances  5.  LILIA, nonoliguric    Recommendations     1.  Ventricular rate is well controlled on atenolol 50 mg p.o. daily.  Continue.  2.  Started long-term anticoagulation with Eliquis for stroke prevention.  I explained the purpose, risk and benefit of long-term anticoagulation to the patient.  3.  Reviewed TSH level-normal  4.  Reviewed echo images and it showed moderate left atrial enlargement and preserved EF.  5.  Maintain electrolyte balance.        Thank you for the opportunity to participate in the care of your patient. Please do not hesitate to call with any questions or concerns.     Eva Stubbs MD, [facility],  12/12/21  13:36 EST

## 2021-12-12 NOTE — PLAN OF CARE
Goal Outcome Evaluation:   Pt resting in bed. No complaints voiced remains afib. On 4.5 L NC. Will cont to follow plan of care

## 2021-12-12 NOTE — PROGRESS NOTES
Notified by RNNilesh, patient had increased oxygen requirements from 3 to 4 L/min nasal cannula once transferred to telemetry. I talked with patient at bedside who reports that she developed a productive cough yesterday and coughing up yellow sputum. I ordered a STAT chest XR which showed new bilateral opacities in the right mid to lower lung and at the left base which may reflect atelectasis or pneumonia. I have ordered IV Vancomycin and IV Zosyn per sepsis protocol for HCAP coverage. I have ordered an MRSA swab, if negative then will consider deescalating antibiotics.

## 2021-12-12 NOTE — PROGRESS NOTES
Pharmacy was consulted to dose zosyn and vancomycin for pneumonia. Based on an estimated CrCl of 20mL/min, an extended infusion zosyn dose of 3.375g q8hr has been ordered. The vancomycin will be intermittently dosed based on levels following the initial 1500mg loading dose with the goal AUC concentration of 400-600mg/L.hr. Thank you for the consult. Pharmacy will continue to follow.     Thank you,   Cordell Hawkins, PharmD  03:59 EST

## 2021-12-12 NOTE — PROGRESS NOTES
[facility] HOSPITALIST PROGRESS NOTE     Patient Identification:  Name:  Geeta Renee  Age:  86 y.o.  Sex:  female  :  1935  MRN:  [id number]  Visit Number:  [id number]  Primary Care Provider:  Alejandra Dozier APRN    Length of stay:  4    Subjective: Patient developed atrial fibrillation last night rate controlled, no history of A. fib in the past, patient is able to eat full liquid diet, she is nonprogressive diet, nephrology and cardiology recommendation noted appreciated.  Repeat x-ray due to shortness of breath reviewed right middle and lower lung field pneumonia and left basal pneumonia.  Patient reported started yesterday.  She does appear slightly dyspneic on conversation.    Chief Complaint: Cough  ----------------------------------------------------------------------------------------------------------------------  Current Hospital Meds:  [START ON 2021] amLODIPine, 5 mg, Oral, Daily  anastrozole, 1 mg, Oral, Daily  apixaban, 2.5 mg, Oral, Q12H  atenolol, 50 mg, Oral, Q24H  pantoprazole, 40 mg, Oral, Q AM  piperacillin-tazobactam, 3.375 g, Intravenous, Q8H  sodium chloride, 10 mL, Intravenous, Q12H  sodium chloride, 10 mL, Intravenous, Q12H  Vancomycin Pharmacy Intermittent Dosing, , Does not apply, Daily      sodium chloride 0.9 % with KCl 20 mEq, 40 mL/hr, Last Rate: 40 mL/hr (21 8397)      ----------------------------------------------------------------------------------------------------------------------  Vital Signs:  Temp:  [97.6 °F (36.4 °C)-99.7 °F (37.6 °C)] 97.6 °F (36.4 °C)  Heart Rate:  [57-72] 65  Resp:  [18-20] 20  BP: (101-132)/(59-89) 110/66       Tele: Atrial fibrillation 59 bpm      21  0500 21  0121 21  0449   Weight: 79.8 kg (175 lb 14.4 oz) 84 kg (185 lb 3.2 oz) 84 kg (185 lb 3.2 oz) (admit weight, less then 24hrs)     Body mass index is 31.79 kg/m².    Intake/Output Summary (Last 24 hours) at 2021 1556  Last data filed  at 12/12/2021 1500  Gross per 24 hour   Intake 440 ml   Output 2250 ml   Net -1810 ml     Diet Clear Liquid  ----------------------------------------------------------------------------------------------------------------------  Physical exam:  General: Tonically ill-appearing, she is able to converse, she appears mildly dyspneic with exertion..  No respiratory distress.    Skin:  Skin is warm and dry. No rash noted. No pallor.    HENT:  Head:  Normocephalic and atraumatic.  Mouth:  Moist mucous membranes.    Eyes:  Conjunctivae and EOM are normal.  Pupils are equal, round, and reactive to light.  No scleral icterus.    Neck:  Neck supple.  No JVD present.    No hepatojugular reflux  Pulmonary/Chest: Slight diminished breath sounds both bases, no rales or crackles, equal chest expansion.   Cardiovascular:  Normal rate, irregular irregular rhythm, I do not hear any murmur.    Abdominal:  Soft.  Bowel sounds are normal.  No distension and no tenderness.  No guarding  Extremities:  No edema, no tenderness, and no deformity.  No red or swollen joints anywhere.  Strong pulses in all 4 extremities with no clubbing, no cyanosis, no edema.  Neurological:  Motor strength equal no obvious deficit, sensory grossly intact.   No cranial nerve deficit.  No tongue deviation.  No facial droop.  No slurred speech.    Genitourinary: No Bentley catheter  Back:  ----------------------------------------------------------------------------------------------------------------------  ----------------------------------------------------------------------------------------------------------------------  Results from last 7 days   Lab Units 12/12/21  0744 12/12/21  0108 12/09/21  0500   TROPONIN T ng/mL 0.020 0.023 0.224*     Results from last 7 days   Lab Units 12/12/21  0108 12/10/21  2319 12/10/21  0058 12/09/21  0028 12/09/21  0028 12/08/21  1622 12/08/21  1158 12/08/21  1158   CRP mg/dL  --  19.54*  --   --  10.96*  --   --  4.13*    LACTATE mmol/L  --   --   --   --   --  1.7  --  2.7*   WBC 10*3/mm3 12.47* 13.93* 16.34*   < > 17.63*  --    < > 15.95*   HEMOGLOBIN g/dL 10.7* 10.0* 9.9*   < > 10.9*  --    < > 12.5   HEMATOCRIT % 33.1* 29.9* 30.4*   < > 32.4*  --    < > 36.4   MCV fL 87.8 86.7 86.4   < > 83.9  --    < > 83.9   MCHC g/dL 32.3 33.4 32.6   < > 33.6  --    < > 34.3   PLATELETS 10*3/mm3 241 212 214   < > 219  --    < > 255    < > = values in this interval not displayed.     Results from last 7 days   Lab Units 12/08/21 2037   PH, ARTERIAL pH units 7.531*   PO2 ART mm Hg 73.2*   PCO2, ARTERIAL mm Hg 49.8*   HCO3 ART mmol/L 41.7*     Results from last 7 days   Lab Units 12/12/21  1022 12/12/21  0108 12/10/21  2319 12/10/21  1502 12/10/21  0058 12/09/21  0429 12/09/21  0028   SODIUM mmol/L  --  143 141  --  139   < > 138   POTASSIUM mmol/L 4.0 3.3* 3.5  3.5   < > 3.0*   < > 2.5*   MAGNESIUM mg/dL  --  1.7 1.8  --  1.9   < >  --    CHLORIDE mmol/L  --  107 107  --  101   < > 94*   CO2 mmol/L  --  22.3 23.9  --  26.9   < > 31.3*   BUN mg/dL  --  24* 29*  --  29*   < > 28*   CREATININE mg/dL  --  2.14* 2.40*  --  2.89*   < > 3.24*   EGFR IF NONAFRICN AM mL/min/1.73  --  22* 19*  --  15*   < > 14*   CALCIUM mg/dL  --  8.5* 9.5  --  10.4   < > 13.3*   GLUCOSE mg/dL  --  94 85  --  83   < > 115*   ALBUMIN g/dL  --  2.62* 2.64*  --   --   --  3.14*   BILIRUBIN mg/dL  --  0.5 0.4  --   --   --  0.6   ALK PHOS U/L  --  98 86  --   --   --  87   AST (SGOT) U/L  --  13 12  --   --   --  20   ALT (SGPT) U/L  --  11 12  --   --   --  16    < > = values in this interval not displayed.   Estimated Creatinine Clearance: 20.1 mL/min (A) (by C-G formula based on SCr of 2.14 mg/dL (H)).    No results found for: AMMONIA      Blood Culture   Date Value Ref Range Status   12/08/2021 No growth at 4 days  Preliminary   12/08/2021 No growth at 4 days  Preliminary     Urine Culture   Date Value Ref Range Status   12/08/2021 <25,000 CFU/mL Mixed Saranya Isolated   Final     No results found for: WOUNDCX  No results found for: STOOLCX    I have personally looked at the labs and they are summarized above.  ----------------------------------------------------------------------------------------------------------------------  Imaging Results (Last 24 Hours)     Procedure Component Value Units Date/Time    XR Chest 1 View [[id number]] Collected: 12/12/21 0252     Updated: 12/12/21 0254    Narrative:      CR Chest 1 Vw    INDICATION:   Shortness of air and cough.     COMPARISON:    12/8/2021    FINDINGS:  Single portable AP view(s) of the chest.    Patient is rotated. The heart remains enlarged. There is atherosclerotic disease in the aorta. There are new infiltrates in the right mid to lower lung and at the left base which may reflect atelectasis or pneumonia. No pneumothorax.       Impression:      Stable cardiac enlargement with new bilateral opacities in the right mid to lower lung and at the left base. Findings may reflect atelectasis or pneumonia.    Signer Name: Parminder Wells MD   Signed: 12/12/2021 2:52 AM   Workstation Name: [email or web address]    [facility]        ----------------------------------------------------------------------------------------------------------------------  Assessment and Plan:  -Acute pancreatitis improving  -Severe hypercalcemia improved  -HCAP right mid and lower lung field left base  -New onset A. fib rate controlled  -Large infra abdominal aortic aneurysm 5.9 cm in size followed by vascular  -Essential hypertension  -SIRS secondary to pancreatitis  -History of esophageal carcinoma  -History of left breast carcinoma  -Cute hypokalemia and hypomagnesemia  -Troponinemia remained flat    Challenged with diet, patient on anticoagulation for CHADS2 score of 4, cardiology help appreciated, monitor blood pressure, heart rate is already low on atenolol, blood pressure still elevated, agree with Norvasc addition to control blood  pressure.  Cardiology and nephrology help appreciated.  Out of bed to chair, ambulate    Patient agreed to see another hospitalist starting tomorrow    Disposition Home when discharged      Jennie Galvan MD  12/12/21  15:54 EST

## 2021-12-12 NOTE — PROGRESS NOTES
Nephrology Progress Note    Interval History:     Patient Complaints: Feeling better.  Social smile.  No shortness of breath.        Vital Signs  Temp:  [97.6 °F (36.4 °C)-99.7 °F (37.6 °C)] 97.6 °F (36.4 °C)  Heart Rate:  [57-72] 65  Resp:  [18-20] 20  BP: (101-132)/(59-89) 110/66    Physical Exam:    General:           No distress      HEENT:  No pallor               Neck:  No JVD       Lungs:    Clear   Heart:   Irregular,  no rub       Abdomen:   Normal bowel sounds, soft non-tender, non-distended, no guarding       Extremities:  No edema       Skin: No petechiae       Neurologic: Cranial nerves grossly intact,  moves all extremities.        Results Review:    I reviewed the patient's new clinical results.    Lab Results (last 24 hours)     Procedure Component Value Units Date/Time    Blood Culture - Blood, Arm, Left [[id number]]  (Normal) Collected: 12/08/21 1158    Specimen: Blood from Arm, Left Updated: 12/12/21 1215     Blood Culture No growth at 4 days    Blood Culture - Blood, Arm, Left [[id number]]  (Normal) Collected: 12/08/21 1158    Specimen: Blood from Arm, Left Updated: 12/12/21 1215     Blood Culture No growth at 4 days    Potassium [[id number]]  (Normal) Collected: 12/12/21 1022    Specimen: Blood Updated: 12/12/21 1053     Potassium 4.0 mmol/L     Troponin [[id number]]  (Normal) Collected: 12/12/21 0744    Specimen: Blood Updated: 12/12/21 0830     Troponin T 0.020 ng/mL     Narrative:      Troponin T Reference Range:  <= 0.03 ng/mL-   Negative for AMI  >0.03 ng/mL-     Abnormal for myocardial necrosis.  Clinicians would have to utilize clinical acumen, EKG, Troponin and serial changes to determine if it is an Acute Myocardial Infarction or myocardial injury due to an underlying chronic condition.       Results may be falsely decreased if patient taking Biotin.      MRSA Screen, PCR (Inpatient) - Swab, Nares [[id number]]  (Normal) Collected:  12/12/21 0538    Specimen: Swab from Nares Updated: 12/12/21 0721     MRSA PCR Negative     Staph aureus by PCR Negative    Troponin [[id number]]  (Normal) Collected: 12/12/21 0108    Specimen: Blood Updated: 12/12/21 0154     Troponin T 0.023 ng/mL     Narrative:      Troponin T Reference Range:  <= 0.03 ng/mL-   Negative for AMI  >0.03 ng/mL-     Abnormal for myocardial necrosis.  Clinicians would have to utilize clinical acumen, EKG, Troponin and serial changes to determine if it is an Acute Myocardial Infarction or myocardial injury due to an underlying chronic condition.       Results may be falsely decreased if patient taking Biotin.      Lipase [[id number]]  (Abnormal) Collected: 12/12/21 0108    Specimen: Blood Updated: 12/12/21 0149     Lipase 188 U/L     Comprehensive Metabolic Panel [[id number]]  (Abnormal) Collected: 12/12/21 0108    Specimen: Blood Updated: 12/12/21 0149     Glucose 94 mg/dL      BUN 24 mg/dL      Creatinine 2.14 mg/dL      Sodium 143 mmol/L      Potassium 3.3 mmol/L      Chloride 107 mmol/L      CO2 22.3 mmol/L      Calcium 8.5 mg/dL      Total Protein 6.0 g/dL      Albumin 2.62 g/dL      ALT (SGPT) 11 U/L      AST (SGOT) 13 U/L      Alkaline Phosphatase 98 U/L      Total Bilirubin 0.5 mg/dL      eGFR Non African Amer 22 mL/min/1.73      Globulin 3.4 gm/dL      A/G Ratio 0.8 g/dL      BUN/Creatinine Ratio 11.2     Anion Gap 13.7 mmol/L     Narrative:      GFR Normal >60  Chronic Kidney Disease <60  Kidney Failure <15      Magnesium [[id number]]  (Normal) Collected: 12/12/21 0108    Specimen: Blood Updated: 12/12/21 0149     Magnesium 1.7 mg/dL     CBC & Differential [[id number]]  (Abnormal) Collected: 12/12/21 0108    Specimen: Blood Updated: 12/12/21 0122    Narrative:      The following orders were created for panel order CBC & Differential.  Procedure                               Abnormality         Status                     ---------                               -----------          ------                     CBC Auto Differential[[id number]]        Abnormal            Final result                 Please view results for these tests on the individual orders.    CBC Auto Differential [[id number]]  (Abnormal) Collected: 12/12/21 0108    Specimen: Blood Updated: 12/12/21 0122     WBC 12.47 10*3/mm3      RBC 3.77 10*6/mm3      Hemoglobin 10.7 g/dL      Hematocrit 33.1 %      MCV 87.8 fL      MCH 28.4 pg      MCHC 32.3 g/dL      RDW 15.0 %      RDW-SD 48.4 fl      MPV 10.4 fL      Platelets 241 10*3/mm3      Neutrophil % 82.0 %      Lymphocyte % 8.8 %      Monocyte % 5.9 %      Eosinophil % 1.8 %      Basophil % 0.2 %      Immature Grans % 1.3 %      Neutrophils, Absolute 10.23 10*3/mm3      Lymphocytes, Absolute 1.10 10*3/mm3      Monocytes, Absolute 0.73 10*3/mm3      Eosinophils, Absolute 0.22 10*3/mm3      Basophils, Absolute 0.03 10*3/mm3      Immature Grans, Absolute 0.16 10*3/mm3      nRBC 0.0 /100 WBC           Imaging Results (Last 24 Hours)     Procedure Component Value Units Date/Time    XR Chest 1 View [[id number]] Collected: 12/12/21 0252     Updated: 12/12/21 0254    Narrative:      CR Chest 1 Vw    INDICATION:   Shortness of air and cough.     COMPARISON:    12/8/2021    FINDINGS:  Single portable AP view(s) of the chest.    Patient is rotated. The heart remains enlarged. There is atherosclerotic disease in the aorta. There are new infiltrates in the right mid to lower lung and at the left base which may reflect atelectasis or pneumonia. No pneumothorax.       Impression:      Stable cardiac enlargement with new bilateral opacities in the right mid to lower lung and at the left base. Findings may reflect atelectasis or pneumonia.    Signer Name: Parminder Wells MD   Signed: 12/12/2021 2:52 AM   Workstation Name: [id number]    [facility]          Assessment and Plan:       1. LILIA, non oliguric multifactorial, including ATN from sepsis, hypercalcemia  2. Sepsis  3.  Severe hypercalcemia improved  4. History of esophageal cancer and breast cancer  5. Hypokalemia  6. Metabolic alkalosis likely volume contraction, improved.   7.  Pancreatitis   8. Hypophosphatemia  9.  Atrial fibrillation new onset    Continue IV fluids at the lower rate of 40 mL/h.  PTHRP still pending.  We probably want to repeat a PTH value when her renal function is back to her baseline.  This is between 1 and 1.25 mg/dL.  At that point it may be worthwhile also doing a 24-hour urine for calcium.  This will tell us whether she is hypocalciuric or not.  Since her blood pressure is lower with the addition of atenolol, I will lower the dose of amlodipine and set parameters for its administration. discussed with primary team.    Omid Boone MD  12/12/21  15:39 EST

## 2021-12-13 NOTE — CASE MANAGEMENT/SOCIAL WORK
Discharge Planning Assessment   [place]     Patient Name: Geeta Renee  MRN: [id number]  Today's Date: 12/13/2021    Admit Date: 12/8/2021       Discharge Plan     Row Name 12/13/21 1652       Plan    Plan Pt admitted on 12/8/21.  SS spoke with pt on this date.  Pt lives at home alone with son residing next door.  Pt currently does not utilize home health services. Pt currently utilizes standard walker and wheelchair via unknown provider. SS will follow.                  BRIDGER Cohn

## 2021-12-13 NOTE — PHARMACY PATIENT ASSISTANCE
Pharmacy checked on the price of Eliquis and according to the patients insurance its co-pay would be $47.  Spoke with the patient and she is ok with this price.  Profiled a free trial card.    Thank you,  Regino Arias, Pharmacy Intern  12/13/21  11:55 EST

## 2021-12-13 NOTE — PROGRESS NOTES
"  Patient Identification:  Name:  Geeta Renee  Age:  86 y.o.  Sex:  female  :  1935  MRN:  [id number]  Visit Number:  [id number]  Primary care provider:  Alejandra Dozier APRN    Reason for follow-up:  New onset A. fib    Subjective     A. fib-now converted to sinus rhythm since last night.  Patient is asymptomatic.  Denies history of chest pain or palpitations.  Her heart rate stayed stable when she developed A. fib as she is already on atenolol.  BP stable.      Medication Review:   amLODIPine, 5 mg, Oral, Daily  anastrozole, 1 mg, Oral, Daily  apixaban, 2.5 mg, Oral, Q12H  atenolol, 50 mg, Oral, Q24H  magnesium sulfate, 2 g, Intravenous, Once  pantoprazole, 40 mg, Oral, Q AM  piperacillin-tazobactam, 3.375 g, Intravenous, Q12H  potassium phosphate, 9 mmol, Intravenous, Once  sodium chloride, 10 mL, Intravenous, Q12H  sodium chloride, 10 mL, Intravenous, Q12H  vancomycin, 15 mg/kg, Intravenous, Once  Vancomycin Pharmacy Intermittent Dosing, , Does not apply, Daily          Vital Sign Min/Max for last 24 hours  Temp  Min: 97.6 °F (36.4 °C)  Max: 98.6 °F (37 °C)   BP  Min: 96/66  Max: 117/56   Pulse  Min: 57  Max: 73   Resp  Min: 20  Max: 20   SpO2  Min: 89 %  Max: 92 %   No data recorded   Weight  Min: 84.1 kg (185 lb 6.4 oz)  Max: 84.1 kg (185 lb 6.4 oz)     Flowsheet Rows      First Filed Value   Admission Height 162.6 cm (64\") Documented at 2021 1158   Admission Weight 85.3 kg (188 lb) Documented at 2021 1158          Objective       Physical Exam:     General Appearance:    Alert, cooperative, in no acute distress   Head:    Normocephalic, without obvious abnormality, atraumatic   Eyes:            Lids and lashes normal, conjunctivae and sclerae normal, no   icterus, no pallor, corneas clear, PERRLA   Ears:    Ears appear intact with no abnormalities noted   Throat:   No oral lesions, no thrush, oral mucosa moist   Neck:   No adenopathy, supple, trachea midline, no thyromegaly, no   " carotid bruit, no JVD   Back:     No significant abnormality   Lungs:     Clear to auscultation,respirations regular, No added sounds    Heart:   Normal heart rate.  Regular rhythm.  No murmurs.   Chest Wall:    No abnormalities observed   Abdomen:     Normal bowel sounds, no masses, no organomegaly, soft    non-tender, non-distended, no guarding, no rebound                tenderness         Extremities:  No pedal edema          Telemetry:  Sinus rhythm      Labs  Results from last 7 days   Lab Units 12/13/21  0100 12/12/21  0108 12/10/21  2319 12/10/21  0058 12/09/21  0028 12/08/21  1158   WBC 10*3/mm3 9.53 12.47* 13.93* 16.34* 17.63* 15.95*   HEMOGLOBIN g/dL 9.0* 10.7* 10.0* 9.9* 10.9* 12.5   HEMATOCRIT % 28.3* 33.1* 29.9* 30.4* 32.4* 36.4   PLATELETS 10*3/mm3 242 241 212 214 219 255     Results from last 7 days   Lab Units 12/13/21  0100 12/12/21  1022 12/12/21  0108 12/10/21  2319 12/10/21  1502 12/10/21  0058 12/09/21  2156 12/09/21  0429 12/09/21  0028 12/08/21  2031 12/08/21  2031 12/08/21  1622 12/08/21  1622   SODIUM mmol/L 145  --  143 141  --  139  --   --  138  --  139  --  137   POTASSIUM mmol/L 3.6 4.0 3.3* 3.5  3.5 3.2* 3.0* 3.1*   < > 2.5*   < > 2.3*   < > 1.9*   CHLORIDE mmol/L 110*  --  107 107  --  101  --   --  94*  --  94*  --  93*   CO2 mmol/L 23.8  --  22.3 23.9  --  26.9  --   --  31.3*  --  33.5*  --  30.3*   BUN mg/dL 25*  --  24* 29*  --  29*  --   --  28*  --  28*  --  27*   CREATININE mg/dL 2.31*  --  2.14* 2.40*  --  2.89*  --   --  3.24*  --  3.35*  --  3.28*   CALCIUM mg/dL 7.8*  --  8.5* 9.5  --  10.4  --   --  13.3*  --  13.8*  --  14.1*   GLUCOSE mg/dL 104*  --  94 85  --  83  --   --  115*  --  121*  --  110*    < > = values in this interval not displayed.     Results from last 7 days   Lab Units 12/13/21  0100 12/12/21  0108 12/10/21  2319 12/09/21  0028 12/08/21  1622 12/08/21  1158 12/07/21  1118   BILIRUBIN mg/dL 0.5 0.5 0.4 0.6 0.5 0.5 0.5   ALK PHOS U/L 120* 98 86 87 90 100  100   AST (SGOT) U/L 13 13 12 20 19 23 26   ALT (SGPT) U/L 9 11 12 16 17 19 23     Results from last 7 days   Lab Units 12/13/21  0100 12/12/21  0108 12/10/21  2319 12/10/21  0058 12/09/21  0810 12/09/21  0429 12/08/21  2031   MAGNESIUM mg/dL 1.7 1.7 1.8 1.9 1.6 1.7 1.4*         Results from last 7 days   Lab Units 12/10/21  2319 12/09/21  0028 12/08/21  1158   CRP mg/dL 19.54* 10.96* 4.13*     Results from last 7 days   Lab Units 12/12/21  0744 12/12/21  0108 12/09/21  0500   TROPONIN T ng/mL 0.020 0.023 0.224*     Results from last 7 days   Lab Units 12/08/21  2037   PH, ARTERIAL pH units 7.531*   PO2 ART mm Hg 73.2*   PCO2, ARTERIAL mm Hg 49.8*   HCO3 ART mmol/L 41.7*           Assessment      1.  New onset A. fib.  Now in sinus rhythm  2.  CHADS2 vascular score-4  3.  Benign essential hypertension  4.  Electrolyte disturbances  5.  LILIA, nonoliguric    Plan     1.  Continue atenolol 50 mg p.o. daily and she is anticoagulated with Eliquis  2.  Maintain electrolyte balance.      Eva Stubbs MD [facility]  12/13/21  09:20 EST

## 2021-12-13 NOTE — PLAN OF CARE
Goal Outcome Evaluation:   Patient resting in bed. No complaints. Will continue to monitor and follow plan of care.

## 2021-12-13 NOTE — PROGRESS NOTES
Nephrology Progress Note      Subjective     Pt feels much better, no abd pain, no chest pain or shortness of breath    Objective       Vital signs :     Temp:  [97.6 °F (36.4 °C)-98.6 °F (37 °C)] 97.8 °F (36.6 °C)  Heart Rate:  [57-73] 70  Resp:  [20] 20  BP: ()/(56-66) 117/56      Intake/Output Summary (Last 24 hours) at 12/13/2021 0728  Last data filed at 12/13/2021 0400  Gross per 24 hour   Intake 510 ml   Output 1950 ml   Net -1440 ml       Physical Exam:    General Appearance : not in acute distress  Lungs : clear to auscultation, respirations regular  Heart :  regular rhythm & normal rate, normal S1, S2 and no murmur, no rub  Abdomen : normal bowel sounds, no masses, no hepatomegaly, no splenomegaly, no tenderness  Extremities : no edema, no cyanosis and no redness  Neurologic :   orientated to person, place, time and situation, Grossly no focal deficits      Laboratory Data :     Albumin Albumin   Date Value Ref Range Status   12/13/2021 2.56 (L) 3.50 - 5.20 g/dL Final   12/12/2021 2.62 (L) 3.50 - 5.20 g/dL Final   12/10/2021 2.64 (L) 3.50 - 5.20 g/dL Final      Magnesium Magnesium   Date Value Ref Range Status   12/13/2021 1.7 1.6 - 2.4 mg/dL Final   12/12/2021 1.7 1.6 - 2.4 mg/dL Final   12/10/2021 1.8 1.6 - 2.4 mg/dL Final          PTH                 No results found for: PTH    CBC and coagulation:  Results from last 7 days   Lab Units 12/13/21  0100 12/12/21  0108 12/10/21  2319 12/10/21  0058 12/09/21  0028 12/08/21  1622 12/08/21  1158 12/08/21  1158   LACTATE mmol/L  --   --   --   --   --  1.7  --  2.7*   SED RATE mm/hr  --   --   --   --   --   --   --  21   CRP mg/dL  --   --  19.54*  --  10.96*  --   --  4.13*   WBC 10*3/mm3 9.53 12.47* 13.93*   < > 17.63*  --    < > 15.95*   HEMOGLOBIN g/dL 9.0* 10.7* 10.0*   < > 10.9*  --    < > 12.5   HEMATOCRIT % 28.3* 33.1* 29.9*   < > 32.4*  --    < > 36.4   MCV fL 88.2 87.8 86.7   < > 83.9  --    < > 83.9   MCHC g/dL 31.8 32.3 33.4   < > 33.6  --     < > 34.3   PLATELETS 10*3/mm3 242 241 212   < > 219  --    < > 255    < > = values in this interval not displayed.     Acid/base balance:  Results from last 7 days   Lab Units 12/08/21 2037   PH, ARTERIAL pH units 7.531*   PO2 ART mm Hg 73.2*   PCO2, ARTERIAL mm Hg 49.8*   HCO3 ART mmol/L 41.7*     Renal and electrolytes:  Results from last 7 days   Lab Units 12/13/21  0100 12/12/21  1022 12/12/21  0108 12/10/21  2319 12/10/21  2319 12/10/21  1502 12/10/21  0058 12/09/21 2156 12/09/21 2127 12/09/21  0429 12/09/21  0028 12/08/21 2031 12/08/21 2031   SODIUM mmol/L 145  --  143  --  141  --  139  --   --   --  138   < > 139   POTASSIUM mmol/L 3.6 4.0 3.3*   < > 3.5  3.5   < > 3.0*   < >  --    < > 2.5*   < > 2.3*   MAGNESIUM mg/dL 1.7  --  1.7  --  1.8  --  1.9  --   --    < >  --   --  1.4*   CHLORIDE mmol/L 110*  --  107  --  107  --  101  --   --    < > 94*   < > 94*   CO2 mmol/L 23.8  --  22.3  --  23.9  --  26.9  --   --    < > 31.3*   < > 33.5*   BUN mg/dL 25*  --  24*  --  29*  --  29*  --   --    < > 28*   < > 28*   CREATININE mg/dL 2.31*  --  2.14*  --  2.40*  --  2.89*  --   --   --  3.24*   < > 3.35*   EGFR IF NONAFRICN AM mL/min/1.73 20*  --  22*  --  19*  --  15*  --   --    < > 14*   < > 13*   CALCIUM mg/dL 7.8*  --  8.5*  --  9.5  --  10.4  --   --    < > 13.3*   < > 13.8*   IONIZED CALCIUM mmol/L  --   --   --   --   --   --   --   --   --   --   --   --  1.84*   PHOSPHORUS mg/dL  --   --   --   --  1.1*  --  2.1*  --  2.2*   < >  --   --  1.7*    < > = values in this interval not displayed.     Estimated Creatinine Clearance: 18.7 mL/min (A) (by C-G formula based on SCr of 2.31 mg/dL (H)).    Liver and pancreatic function:  Results from last 7 days   Lab Units 12/13/21  0100 12/12/21  0108 12/10/21  2319 12/10/21  0058 12/09/21  0028 12/08/21  1622   ALBUMIN g/dL 2.56* 2.62* 2.64*  --    < > 3.22*   BILIRUBIN mg/dL 0.5 0.5 0.4  --    < > 0.5   ALK PHOS U/L 120* 98 86  --    < > 90   AST (SGOT)  U/L 13 13 12  --    < > 19   ALT (SGPT) U/L 9 11 12  --    < > 17   AMYLASE U/L  --   --   --   --   --  446*   LIPASE U/L  --  188* 126* 154*  --  788*    < > = values in this interval not displayed.         Cardiac:  Results from last 7 days   Lab Units 12/08/21  1158   PROBNP pg/mL 1,785.0     Liver and pancreatic function:  Results from last 7 days   Lab Units 12/13/21  0100 12/12/21  0108 12/10/21  2319 12/10/21  0058 12/09/21  0028 12/08/21  1622   ALBUMIN g/dL 2.56* 2.62* 2.64*  --    < > 3.22*   BILIRUBIN mg/dL 0.5 0.5 0.4  --    < > 0.5   ALK PHOS U/L 120* 98 86  --    < > 90   AST (SGOT) U/L 13 13 12  --    < > 19   ALT (SGPT) U/L 9 11 12  --    < > 17   AMYLASE U/L  --   --   --   --   --  446*   LIPASE U/L  --  188* 126* 154*  --  788*    < > = values in this interval not displayed.       Medications :     amLODIPine, 5 mg, Oral, Daily  anastrozole, 1 mg, Oral, Daily  apixaban, 2.5 mg, Oral, Q12H  atenolol, 50 mg, Oral, Q24H  magnesium sulfate, 2 g, Intravenous, Once  pantoprazole, 40 mg, Oral, Q AM  piperacillin-tazobactam, 3.375 g, Intravenous, Q8H  sodium chloride, 10 mL, Intravenous, Q12H  sodium chloride, 10 mL, Intravenous, Q12H  Vancomycin Pharmacy Intermittent Dosing, , Does not apply, Daily      sodium chloride 0.9 % with KCl 20 mEq, 40 mL/hr, Last Rate: 40 mL/hr (12/11/21 1357)          Assessment/Plan     1. LILIA, non oliguric multifactorial, including ATN from sepsis, hypercalcemia  2. Sepsis with pancreatitis  3. Sever hypercalcemia  4. History of esophageal cancer and breast cancer  5. Hypokalemia  6. Metabolic alkalosis likely volume contraction, improved.      Cr is slightly worse today likely due to intra-vascular volume deletion due to post ATN polyuria, increase IVF to 75ml/h    Baseline Cr around 1, admitted with CR of 3.5  Admission calcium was >15  -increase IVF NS with 20meq/L of KCL and run at 75ml/h  -follow up workup for hypocalcemia, PTH, PTHrp, 1,25 (OH)2 cholcaciferol level.    -strict I/O  -avoid any nephrotoxic agents, hypotension and adjust medications according to eGFR    Stanley Gastelum MD  12/13/21  07:28 EST

## 2021-12-13 NOTE — THERAPY EVALUATION
Acute Care - Occupational Therapy Initial Evaluation   [place]     Patient Name: Geeta Renee  : 1935  MRN: [id number]  Today's Date: 2021             Admit Date: 2021       ICD-10-CM ICD-9-CM   1. Acute renal failure, unspecified acute renal failure type (HCC)  N17.9 584.9   2. Hypokalemia  E87.6 276.8   3. Hypercalcemia  E83.52 275.42   4. Acute pancreatitis without infection or necrosis, unspecified pancreatitis type  K85.90 577.0     Patient Active Problem List   Diagnosis   • Esophageal adenocarcinoma (HCC)   • Malignant neoplasm of upper-outer quadrant of left breast in female, estrogen receptor positive (HCC)   • Osteoporosis   • Aromatase inhibitor use   • AAA (abdominal aortic aneurysm) (HCC)   • Acute pancreatitis     Past Medical History:   Diagnosis Date   • Arthritis    • Breast cancer (HCC)    • Breast lump     left   • Esophageal cancer (HCC)    • GERD (gastroesophageal reflux disease)    • Hiatal hernia    • Hypertension    • Snoring      Past Surgical History:   Procedure Laterality Date   • BREAST LUMPECTOMY Left 2019    Procedure: BREAST LUMPECTOMY WITH ULTRASOUND;  Surgeon: Nurys Selby MD;  Location: [facility];  Service: General   • CATARACT EXTRACTION, BILATERAL  09/10/2019   • CHOLECYSTECTOMY     • COLONOSCOPY     • ELBOW PROCEDURE Right    • ENDOSCOPY     • FRACTURE SURGERY      right elbow         OT ASSESSMENT FLOWSHEET (last 12 hours)     OT Evaluation and Treatment     Row Name 21 0918                   OT Time and Intention    Document Type evaluation  -KR        Mode of Treatment occupational therapy  -KR        Patient Effort adequate  -KR                  General Information    General Observations of Patient alert/cooperative  -KR                  Cognition    Affect/Mental Status (Cognitive) WFL  -KR        Orientation Status (Cognition) oriented x 3  -KR        Follows Commands (Cognition) WFL  -KR                  Range of Motion  Comprehensive    Comment, General Range of Motion BUE WFL  -KR                  Strength Comprehensive (MMT)    Comment, General Manual Muscle Testing (MMT) Assessment BUE 3/5  -KR                  Activities of Daily Living    BADL Assessment/Intervention bathing; upper body dressing; lower body dressing; grooming; feeding; toileting  -KR                  Bathing Assessment/Intervention    [place] Level (Bathing) bathing skills; minimum assist (75% patient effort)  -KR                  Upper Body Dressing Assessment/Training    [place] Level (Upper Body Dressing) upper body dressing skills; set up  -KR                  Lower Body Dressing Assessment/Training    [place] Level (Lower Body Dressing) lower body dressing skills; minimum assist (75% patient effort)  -KR                  Grooming Assessment/Training    [place] Level (Grooming) grooming skills; set up  -KR                  Self-Feeding Assessment/Training    [place] Level (Feeding) feeding skills; set up  -KR                  Toileting Assessment/Training    [place] Level (Toileting) toileting skills; minimum assist (75% patient effort)  -KR                  Plan of Care Review    Plan of Care Reviewed With patient  -KR                  OT Goals    Dressing Goal Selection (OT) dressing, OT goal 1  -KR        Strength Goal Selection (OT) strength, OT goal 1  -KR                  Dressing Goal 1 (OT)    Activity/Device (Dressing Goal 1, OT) dressing skills, all  -KR        [place]/Cues Needed (Dressing Goal 1, OT) standby assist  -KR        Time Frame (Dressing Goal 1, OT) by discharge  -KR                  Strength Goal 1 (OT)    Strength Goal 1 (OT) BUE increase x 1 to enhance self care/mobility skills  -KR        Time Frame (Strength Goal 1, OT) by discharge  -KR                  Patient Education Goal (OT)    Activity (Patient Education Goal, OT) AE/DME training as needed to enhance safety/independence in home  environment  -KR        [place]/Cues/Accuracy (Memory Goal 2, OT) verbalizes understanding  -KR        Time Frame (Patient Education Goal, OT) by discharge  -KR                  Therapy Assessment/Plan (OT)    Planned Therapy Interventions (OT) activity tolerance training; adaptive equipment training; strengthening exercise  -KR                  Therapy Plan Review/Discharge Plan (OT)    Anticipated Discharge Disposition (OT) home  -KR              User Key  (r) = Recorded By, (t) = Taken By, (c) = Cosigned By    Initials Name Effective Dates    Hernando Gutierrez OT 06/16/21 -                        OT Recommendation and Plan  Planned Therapy Interventions (OT): activity tolerance training, adaptive equipment training, strengthening exercise  Plan of Care Review  Plan of Care Reviewed With: patient  Plan of Care Reviewed With: patient        Time Calculation:     Therapy Charges for Today     Code Description Service Date Service Provider Modifiers Qty    [id number]  OT EVAL MOD COMPLEXITY 4 12/13/2021 Hernando Joiner OT GO 1               Hernando Joiner OT  12/13/2021

## 2021-12-13 NOTE — PROGRESS NOTES
[facility]     Progress Note    Patient Name: Geeta Renee  : 1935  MRN: [id number]  Primary Care Physician:  Alejandra Dozier APRN  Date of admission: 2021    Subjective   Subjective     Chief Complaint: 87 yo female being seen in follow up for abdominal pain, SOA    History of Present Illness  Patient Reports overall feeling some better compared to admission.     Review of Systems  Reports ongoing shortness of air with occasional cough. Patient complains of generalized weakness. Patient denies abdominal pain, nausea and/or vomiting.     Objective   Objective     Vitals:   Temp:  [97.7 °F (36.5 °C)-98.6 °F (37 °C)] 98 °F (36.7 °C)  Heart Rate:  [67-74] 74  Resp:  [18-20] 18  BP: ()/(56-66) 119/63  Flow (L/min):  [4.5] 4.5    Physical Exam  Constitutional:       General: She is not in acute distress.     Appearance: She is well-developed.      Interventions: Nasal cannula in place.   HENT:      Head: Normocephalic and atraumatic.   Eyes:      Conjunctiva/sclera: Conjunctivae normal.   Neck:      Trachea: No tracheal deviation.   Cardiovascular:      Rate and Rhythm: Normal rate. Rhythm regularly irregular.      Pulses:           Dorsalis pedis pulses are 2+ on the right side and 2+ on the left side.      Heart sounds: No murmur heard.  No friction rub. No gallop.       Comments: No significant lower extremity edema  Pulmonary:      Effort: No respiratory distress.      Breath sounds: Wheezing (occasional, expiratory) present. No rales.   Abdominal:      General: Bowel sounds are normal. There is no distension.      Palpations: Abdomen is soft.      Tenderness: There is no abdominal tenderness. There is no guarding.   Skin:     General: Skin is warm and dry.      Findings: No erythema or rash.   Neurological:      Mental Status: She is alert and oriented to person, place, and time.      Cranial Nerves: No cranial nerve deficit.          Result Review    Result Review:  I have personally  reviewed the results from the time of this admission to 12/13/2021 17:53 EST and agree with these findings:  [x]  Laboratory  []  Microbiology  []  Radiology  []  EKG/Telemetry   []  Cardiology/Vascular   []  Pathology  []  Old records  []  Other:  Most notable findings include: H/H lower today at 9.0 and 28.3 respectively.  Creatinine slightly worsened today at 2.31.  Phosphorus 1.8, magnesium 1.7.    Assessment/Plan   Assessment / Plan     Brief Patient Summary:  Geeta Renee is a 86 y.o. female who presents with abdominal pain.  Patient found to have acute pancreatitis, pneumonia and new onset atrial fibrillation    Assessment/Plan:  -Severe sepsis, present on admission with acute pancreatitis: Continue her current diet.  The patient reports that she is tolerating it well without any abdominal pain, nausea and/or vomiting.    -Bilateral pneumonia: Unclear if possibly present on admission and not noted in the setting of dehydration and LILIA.  Continue to wean the patient's supplemental oxygen as tolerated.  For now, continue with pharmacy to dose vancomycin and Zosyn.    -Acute kidney injury, nonoliguric and likely multifactorial related to ATN from sepsis, poor oral intake in the setting of pancreatitis and hypercalcemia: Creatinine 2.31 today but is overall improved from admission, however, the patient's baseline creatinine is approximately 1.  Continue with normal saline with KCl but increase to 75 mL/hour as per nephrology recommendations.  I appreciate their assistance.  Attempt to avoid hypotension, dehydration, NSAIDs, etc.  Continue to monitor her intake and output.  Follow-up on her PTH, PTH RP and 1,25 OH/vitamin D level.  Patient's calcium is normal at this time.    -Severe electrolyte abnormalities to include hypercalcemia in the setting of known esophageal cancer and breast cancer, dehydration with hypophosphatemia, hypokalemia and hypomagnesemia: Overall, electrolytes are improved. Continue to  supplement her Mg/Phos/K as tolerated and repeat her chemistry panel in a.m. continue to monitor on telemetry.    -New onset atrial fibrillation, currently rate controlled: Continue with Eliquis.  Need to monitor her H/H and transfuse as needed.  No evidence of active bleeding at this time.    -Essential hypertension, currently controlled: Continue with amlodipine and atenolol for now.    -Generalized weakness/debility: PT and OT have been consulted.  I have requested that nursing staff assist patient to chair if tolerated.    -History of breast cancer: Continue with Arimidex.    -History of esophageal cancer.    DVT prophylaxis:  Eliquis    CODE STATUS:    Level Of Support Discussed With: Patient  Code Status (Patient has no pulse and is not breathing): CPR (Attempt to Resuscitate)  Medical Interventions (Patient has pulse or is breathing): Full Support    Disposition:  I expect patient to be discharged home with home health versus SNF pending PT/OT evaluations.    Rochelle Byrd, DO

## 2021-12-14 NOTE — PROGRESS NOTES
ReDS Value     Date: 12/14/21     ReDS Value: 37    36-41 Possible Hypervolemic Status        Sinan Forrest, RRT

## 2021-12-14 NOTE — PLAN OF CARE
Goal Outcome Evaluation:  Plan of Care Reviewed With: patient        Progress: no change  Outcome Summary: Pt resting with no complaints. Pt eletrolytes beign replaced. No s/s of distress noted. Continue with plan of care.

## 2021-12-14 NOTE — PROGRESS NOTES
Patient initiated on vancomycin for PNA. Patient's SCr is elevated at 1.77 mg/dL today. Will give patient 1250 mg vancomycin today based on vancomycin random level of 18.5. Will order a random vancomycin level tomorrow to assess for further dosing and continue to follow.    Thanks  Elayne Livingston, MoiseD

## 2021-12-14 NOTE — PROGRESS NOTES
LOS: 6 days     Name: Geeta Renee  Age/Sex: 86 y.o. female  :  1935        PCP: Alejandra Dozier APRN  REF: No ref. provider found    Active Problems:    Acute pancreatitis      Reason for follow-up: New onset atrial fibrillation    Subjective       Subjective     Geeta Renee is a 86-year-old female with a past medical history significant for esophageal cancer breast cancer, Addison esophagus, GERD, arthritis, hypertension and hiatal hernia.  Patient presented to the ER with complaints of abdominal pain.    Interval History: Patient reports she is short of breath today. Maintaining normal sinus rhythm with a controlled rate.  Blood pressure stable.  Kidney function proved to 1.77 today.    Vital Signs  Temp:  [97.7 °F (36.5 °C)-98.3 °F (36.8 °C)] 98 °F (36.7 °C)  Heart Rate:  [67-75] 72  Resp:  [18-20] 18  BP: (103-124)/(60-67) 118/60     Vital Signs (last 72 hrs)        0700   0659  0700   0659  0700   0659  0700   0924   Most Recent      Temp (°F) 97.5 -  99.7    97.6 -  98.6    97.7 -  98.3       98 (36.7)  0636    Heart Rate 59 -  72    57 -  73    67 -  75       72  0636    Resp 18 -  20      20    18 -  20       18  0636    /56 -  132/89    96/66 -  116/61    103/61 -  124/67       118/60  0636    SpO2 (%) 90 -  95    89 -  92    90 -  95       90  0636        Documented weights    21 1158 21 0500 21 1345 12/10/21 0517   Weight: 85.3 kg (188 lb) 79.7 kg (175 lb 9.6 oz) 79.7 kg (175 lb 9.6 oz) 79.9 kg (176 lb 3.2 oz)    21 0500 21 0121 21 0449 21 0500   Weight: 79.8 kg (175 lb 14.4 oz) 84 kg (185 lb 3.2 oz) 84 kg (185 lb 3.2 oz) 84.1 kg (185 lb 6.4 oz)    21 0500   Weight: 84.9 kg (187 lb 1.6 oz)      Body mass index is 32.12 kg/m².    Intake/Output Summary (Last 24 hours) at 2021 0924  Last data filed at 2021 0400  Gross per 24 hour   Intake 480 ml   Output  2175 ml   Net -1695 ml     Objective    Objective     I have seen and examined Ms. Renee today    Physical Exam:     General Appearance:    Alert, cooperative, in no acute distress   Head:    Normocephalic, without obvious abnormality, atraumatic   Eyes:            Conjunctivae and sclerae normal, no   icterus, no pallor, corneas clear.   Neck:   No adenopathy, supple, trachea midline, no thyromegaly, no   carotid bruit, no JVD   Lungs:    Mild respiratory distress with bibasilar rales and expiratory wheezing,     Heart:    Regular rhythm and normal rate, normal S1 and S2, no            murmur, no gallop, no rub, no click   Chest Wall:    No abnormalities observed   Abdomen:     Normal bowel sounds, no masses, no organomegaly, soft        non-tender, non-distended, no guarding, no rebound                tenderness   Extremities:   Moves all extremities well, no edema, no cyanosis, no             redness   Pulses:   Pulses palpable and equal bilaterally   Skin:   No bleeding, bruising or rash       Neurologic:  Alert and oriented     Results review       Results Review:   Results from last 7 days   Lab Units 12/14/21  0138 12/13/21  0100 12/12/21  0108 12/10/21  2319 12/10/21  0058 12/09/21  0028 12/08/21  1158   WBC 10*3/mm3 12.23* 9.53 12.47* 13.93* 16.34* 17.63* 15.95*   HEMOGLOBIN g/dL 9.2* 9.0* 10.7* 10.0* 9.9* 10.9* 12.5   PLATELETS 10*3/mm3 251 242 241 212 214 219 255     Results from last 7 days   Lab Units 12/14/21  0138 12/13/21  0100 12/12/21  1022 12/12/21  0108 12/10/21  2319 12/10/21  1502 12/10/21  0058 12/09/21  0429 12/09/21  0028 12/08/21  2031 12/08/21  2031 12/08/21  1622 12/08/21  1622 12/08/21  1158 12/08/21  1158   SODIUM mmol/L 142 145  --  143 141  --  139  --  138  --  139   < > 137   < > 136   POTASSIUM mmol/L 3.6 3.6 4.0 3.3* 3.5  3.5 3.2* 3.0*   < > 2.5*   < > 2.3*   < > 1.9*   < > 2.0*   CHLORIDE mmol/L 110* 110*  --  107 107  --  101  --  94*  --  94*   < > 93*   < > 89*   CO2  mmol/L 18.6* 23.8  --  22.3 23.9  --  26.9  --  31.3*  --  33.5*   < > 30.3*   < > 33.6*   BUN mg/dL 20 25*  --  24* 29*  --  29*  --  28*  --  28*   < > 27*   < > 29*   CREATININE mg/dL 1.77* 2.31*  --  2.14* 2.40*  --  2.89*  --  3.24*  --  3.35*   < > 3.28*   < > 3.51*   CALCIUM mg/dL 6.8* 7.8*  --  8.5* 9.5  --  10.4  --  13.3*  --  13.8*   < > 14.1*   < > 15.5*   GLUCOSE mg/dL 95 104*  --  94 85  --  83  --  115*  --  121*   < > 110*   < > 113*   ALT (SGPT) U/L 12 9  --  11 12  --   --   --  16  --   --   --  17  --  19   AST (SGOT) U/L 17 13  --  13 12  --   --   --  20  --   --   --  19  --  23    < > = values in this interval not displayed.     Results from last 7 days   Lab Units 12/12/21  0744 12/12/21  0108 12/09/21  0500 12/09/21  0028 12/08/21 2031   TROPONIN T ng/mL 0.020 0.023 0.224* 0.234* 0.262*     No results found for: INR  Lab Results   Component Value Date    MG 1.5 (L) 12/14/2021    MG 1.7 12/13/2021    MG 1.7 12/12/2021     Lab Results   Component Value Date    TSH 3.070 12/08/2021    CHLPL 149 07/15/2015    TRIG 172 (H) 07/15/2015    HDL 39 (L) 07/15/2015    LDL 75 07/15/2015      Imaging Results (Last 48 Hours)     ** No results found for the last 48 hours. **        Echo   Results for orders placed during the hospital encounter of 12/08/21    Adult Transthoracic Echo Limited W/ Cont if Necessary Per Protocol    Interpretation Summary  · Limited study to evaluate LV function in a patient with ACS.  · Findings-  · Normal left ventricular cavity size and wall thickness noted.  · Left ventricular systolic function is normal. ''  · All left ventricular wall segments contract normally.  · Left ventricular ejection fraction appears to be 61 - 65%.  · Left ventricular diastolic function is consistent with (grade I) impaired relaxation.     I reviewed the patient's new clinical results.    Telemetry: NSR 60-70 bpm     Medication Review:   amLODIPine, 5 mg, Oral, Daily  anastrozole, 1 mg, Oral,  Daily  apixaban, 2.5 mg, Oral, Q12H  atenolol, 50 mg, Oral, Q24H  pantoprazole, 40 mg, Oral, Q AM  piperacillin-tazobactam, 3.375 g, Intravenous, Q12H  potassium chloride, 40 mEq, Oral, Q4H  potassium phosphate, 15 mmol, Intravenous, Once  sodium chloride, 10 mL, Intravenous, Q12H  sodium chloride, 10 mL, Intravenous, Q12H  vancomycin, 1,250 mg, Intravenous, Once  Vancomycin Pharmacy Intermittent Dosing, , Does not apply, Daily        sodium chloride 0.9 % with KCl 20 mEq, 75 mL/hr, Last Rate: 75 mL/hr (12/13/21 2202)        Assessment      Assessment:  1. New onset atrial fibrillation, back in sinus rhythm anticoagulated with Eliquis  2. Class IV dyspnea, rule out acute heart failure.  3. Right lower lobe pneumonia.  4. Acute kidney injury, managed by the nephrology service.    Plan     Recommendations:  1. We will obtain a proBNP and Reds vest reading and if these are significant elevated then will consider diuretic therapy.  2. Continue with atenolol and Eliquis for her atrial fibrillation    I discussed the patients findings and my recommendations with patient.      Electronically signed by PRISCILLA Mckeon, 12/14/21, 9:25 AM EST.    Electronically signed by Kenton Coombs MD, 12/14/21, 4:51 PM EST.      Please note that portions of this note were completed with a voice recognition program.

## 2021-12-14 NOTE — PROGRESS NOTES
Nephrology Progress Note      Subjective     Pt feels much better and wants to go home. No chest pain or shortness of breath    Objective       Vital signs :     Temp:  [97.7 °F (36.5 °C)-98.3 °F (36.8 °C)] 98 °F (36.7 °C)  Heart Rate:  [67-75] 72  Resp:  [18-20] 18  BP: (103-124)/(60-67) 118/60      Intake/Output Summary (Last 24 hours) at 12/14/2021 0750  Last data filed at 12/14/2021 0400  Gross per 24 hour   Intake 820 ml   Output 2175 ml   Net -1355 ml       Physical Exam:    General Appearance : not in acute distress  Lungs : clear to auscultation, respirations regular  Heart :  regular rhythm & normal rate, normal S1, S2 and no murmur, no rub  Abdomen : normal bowel sounds, no masses, no hepatomegaly, no splenomegaly, no tenderness  Extremities : no edema, no cyanosis and no redness  Neurologic :   orientated to person, place, time and situation, Grossly no focal deficits    Laboratory Data :     Albumin Albumin   Date Value Ref Range Status   12/14/2021 2.50 (L) 3.50 - 5.20 g/dL Final   12/13/2021 2.56 (L) 3.50 - 5.20 g/dL Final   12/12/2021 2.62 (L) 3.50 - 5.20 g/dL Final      Magnesium Magnesium   Date Value Ref Range Status   12/14/2021 1.5 (L) 1.6 - 2.4 mg/dL Final   12/13/2021 1.7 1.6 - 2.4 mg/dL Final   12/12/2021 1.7 1.6 - 2.4 mg/dL Final          PTH                 No results found for: PTH    CBC and coagulation:  Results from last 7 days   Lab Units 12/14/21  0138 12/13/21  0100 12/12/21  0108 12/10/21  2319 12/10/21  2319 12/10/21  0058 12/09/21  0028 12/08/21  1622 12/08/21  1158 12/08/21  1158   LACTATE mmol/L  --   --   --   --   --   --   --  1.7  --  2.7*   SED RATE mm/hr  --   --   --   --   --   --   --   --   --  21   CRP mg/dL  --   --   --   --  19.54*  --  10.96*  --   --  4.13*   WBC 10*3/mm3 12.23* 9.53 12.47*   < > 13.93*   < > 17.63*  --    < > 15.95*   HEMOGLOBIN g/dL 9.2* 9.0* 10.7*   < > 10.0*   < > 10.9*  --    < > 12.5   HEMATOCRIT % 27.9* 28.3* 33.1*   < > 29.9*   < > 32.4*   --    < > 36.4   MCV fL 86.6 88.2 87.8   < > 86.7   < > 83.9  --    < > 83.9   MCHC g/dL 33.0 31.8 32.3   < > 33.4   < > 33.6  --    < > 34.3   PLATELETS 10*3/mm3 251 242 241   < > 212   < > 219  --    < > 255    < > = values in this interval not displayed.     Acid/base balance:  Results from last 7 days   Lab Units 12/08/21 2037   PH, ARTERIAL pH units 7.531*   PO2 ART mm Hg 73.2*   PCO2, ARTERIAL mm Hg 49.8*   HCO3 ART mmol/L 41.7*     Renal and electrolytes:  Results from last 7 days   Lab Units 12/14/21  0138 12/13/21  0100 12/12/21  1022 12/12/21  0108 12/12/21  0108 12/10/21  2319 12/10/21  2319 12/10/21  1502 12/10/21  0058 12/09/21  0028 12/08/21 2031   SODIUM mmol/L 142 145  --   --  143  --  141  --  139   < > 139   POTASSIUM mmol/L 3.6 3.6 4.0   < > 3.3*   < > 3.5  3.5   < > 3.0*   < > 2.3*   MAGNESIUM mg/dL 1.5* 1.7  --   --  1.7   < > 1.8  --  1.9   < > 1.4*   CHLORIDE mmol/L 110* 110*  --   --  107   < > 107  --  101   < > 94*   CO2 mmol/L 18.6* 23.8  --   --  22.3   < > 23.9  --  26.9   < > 33.5*   BUN mg/dL 20 25*  --   --  24*   < > 29*  --  29*   < > 28*   CREATININE mg/dL 1.77* 2.31*  --   --  2.14*  --  2.40*  --  2.89*   < > 3.35*   EGFR IF NONAFRICN AM mL/min/1.73 27* 20*  --   --  22*   < > 19*  --  15*   < > 13*   CALCIUM mg/dL 6.8* 7.8*  --   --  8.5*   < > 9.5  --  10.4   < > 13.8*   IONIZED CALCIUM mmol/L  --   --   --   --   --   --   --   --   --   --  1.84*   PHOSPHORUS mg/dL  --  1.8*  --   --   --   --  1.1*  --  2.1*   < > 1.7*    < > = values in this interval not displayed.     Estimated Creatinine Clearance: 24.5 mL/min (A) (by C-G formula based on SCr of 1.77 mg/dL (H)).    Liver and pancreatic function:  Results from last 7 days   Lab Units 12/14/21  0138 12/13/21  0100 12/12/21  0108 12/10/21  2319 12/10/21  2319 12/10/21  0058 12/09/21  0028 12/08/21  1622   ALBUMIN g/dL 2.50* 2.56* 2.62*   < > 2.64*  --    < > 3.22*   BILIRUBIN mg/dL 0.4 0.5 0.5   < > 0.4  --    < > 0.5    ALK PHOS U/L 117 120* 98   < > 86  --    < > 90   AST (SGOT) U/L 17 13 13   < > 12  --    < > 19   ALT (SGPT) U/L 12 9 11   < > 12  --    < > 17   AMYLASE U/L  --   --   --   --   --   --   --  446*   LIPASE U/L  --   --  188*  --  126* 154*  --  788*    < > = values in this interval not displayed.         Cardiac:  Results from last 7 days   Lab Units 12/08/21  1158   PROBNP pg/mL 1,785.0     Liver and pancreatic function:  Results from last 7 days   Lab Units 12/14/21  0138 12/13/21  0100 12/12/21  0108 12/10/21  2319 12/10/21  2319 12/10/21  0058 12/09/21  0028 12/08/21  1622   ALBUMIN g/dL 2.50* 2.56* 2.62*   < > 2.64*  --    < > 3.22*   BILIRUBIN mg/dL 0.4 0.5 0.5   < > 0.4  --    < > 0.5   ALK PHOS U/L 117 120* 98   < > 86  --    < > 90   AST (SGOT) U/L 17 13 13   < > 12  --    < > 19   ALT (SGPT) U/L 12 9 11   < > 12  --    < > 17   AMYLASE U/L  --   --   --   --   --   --   --  446*   LIPASE U/L  --   --  188*  --  126* 154*  --  788*    < > = values in this interval not displayed.       Medications :     amLODIPine, 5 mg, Oral, Daily  anastrozole, 1 mg, Oral, Daily  apixaban, 2.5 mg, Oral, Q12H  atenolol, 50 mg, Oral, Q24H  pantoprazole, 40 mg, Oral, Q AM  piperacillin-tazobactam, 3.375 g, Intravenous, Q12H  potassium chloride, 40 mEq, Oral, Q4H  sodium chloride, 10 mL, Intravenous, Q12H  sodium chloride, 10 mL, Intravenous, Q12H  Vancomycin Pharmacy Intermittent Dosing, , Does not apply, Daily      sodium chloride 0.9 % with KCl 20 mEq, 75 mL/hr, Last Rate: 75 mL/hr (12/13/21 2202)          Assessment/Plan     1. LILIA, non oliguric multifactorial, including ATN from sepsis, hypercalcemia  2. Sepsis with pancreatitis  3. Sever hypercalcemia  4. History of esophageal cancer and breast cancer  5. Hypokalemia  6. Metabolic alkalosis likely volume contraction, improved now with mild met acidosis     Renal functions significantly improved to 1.7 from 2.3 yesterday. Mild hypocalcemia, and hypophosphatemia, her  home medications include TUMS previously. Will repeat PtH, phosp and Ica, might do 24 hour urine calcium to r/u [facility].   DC IVF and encouraged PO intake     Baseline Cr around 1, admitted with CR of 3.5  Admission calcium was >15  -DC IVF  -follow up workup for hypocalcemia,  -strict I/O  -avoid any nephrotoxic agents, hypotension and adjust medications according to eGFR    Stanley Gastelum MD  12/14/21  07:50 EST

## 2021-12-14 NOTE — THERAPY EVALUATION
Acute Care - Physical Therapy Initial Evaluation   [facility]     Patient Name: Geeta Renee  : 1935  MRN: [id number]  Today's Date: 2021      Visit Dx:     ICD-10-CM ICD-9-CM   1. Acute renal failure, unspecified acute renal failure type (HCC)  N17.9 584.9   2. Hypokalemia  E87.6 276.8   3. Hypercalcemia  E83.52 275.42   4. Acute pancreatitis without infection or necrosis, unspecified pancreatitis type  K85.90 577.0     Patient Active Problem List   Diagnosis   • Esophageal adenocarcinoma (HCC)   • Malignant neoplasm of upper-outer quadrant of left breast in female, estrogen receptor positive (HCC)   • Osteoporosis   • Aromatase inhibitor use   • AAA (abdominal aortic aneurysm) (HCC)   • Acute pancreatitis     Past Medical History:   Diagnosis Date   • Arthritis    • Breast cancer (HCC)    • Breast lump     left   • Esophageal cancer (HCC)    • GERD (gastroesophageal reflux disease)    • Hiatal hernia    • Hypertension    • Snoring      Past Surgical History:   Procedure Laterality Date   • BREAST LUMPECTOMY Left 2019    Procedure: BREAST LUMPECTOMY WITH ULTRASOUND;  Surgeon: Nurys Selby MD;  Location: [facility];  Service: General   • CATARACT EXTRACTION, BILATERAL  09/10/2019   • CHOLECYSTECTOMY     • COLONOSCOPY     • ELBOW PROCEDURE Right    • ENDOSCOPY     • FRACTURE SURGERY      right elbow     PT Assessment (last 12 hours)     PT Evaluation and Treatment     Row Name 21 1400          Physical Therapy Time and Intention    Subjective Information complains of; weakness; fatigue; dyspnea  -KP     Document Type evaluation; therapy note (daily note)  -KP     Mode of Treatment individual therapy; physical therapy  -KP     Patient Effort adequate  -KP     Symptoms Noted During/After Treatment fatigue; shortness of breath  -KP     Comment Pt was agreeable to attempt PT but reported she feels like she is having a hard time breathing and is weak.  She looked like she was breathing  heavy but O2 remained above 92.  -     Row Name 12/14/21 1400          General Information    Patient Profile Reviewed yes  -     Referring Physician Oculam  -     Patient Observations alert; cooperative; agree to therapy  -     Prior Level of Function independent:; all household mobility  -     Equipment Currently Used at Home walker, rolling; wheelchair; other (see comments); ramp  Has w/c but she never used it.  -     Existing Precautions/Restrictions fall; oxygen therapy device and L/min  -KP     Limitations/Impairments safety/cognitive  -     Risks Reviewed patient:; LOB; dizziness; increased discomfort; change in vital signs  -     Benefits Reviewed patient:; improve function; increase independence; increase strength; increase balance; decrease pain; improve skin integrity; decrease risk of DVT; increase knowledge  -     Barriers to Rehab none identified  -     Row Name 12/14/21 1400          Living Environment    Current Living Arrangements home/apartment/condo  -     Home Accessibility wheelchair accessible  -     Lives With alone; other (see comments)  son lives next door  -     Row Name 12/14/21 1400          Cognition    Affect/Mental Status (Cognitive) [facility]  -     Follows Commands (Cognition) [facility]  -     Personal Safety Interventions fall prevention program maintained; gait belt; muscle strengthening facilitated; nonskid shoes/slippers when out of bed; supervised activity  -     Row Name 12/14/21 1400          Strength (Manual Muscle Testing)    Strength (Manual Muscle Testing) left lower extremity strength; right lower extremity strength  -     Left Lower Extremity Strength hip; knee  -KP     Hip, Left (Strength) 3/5  -KP     Knee, Left (Strength) 3-/5  -KP     Right Lower Extremity Strength hip; knee  -KP     Hip, Right (Strength) 3/5  -KP     Knee, Right (Strength) 3-/5  -KP     Row Name 12/14/21 1400          Bed Mobility    Bed Mobility rolling left; supine-sit  -   "   Rolling Left [place] (Bed Mobility) standby assist; 1 person assist  -     Supine-Sit [place] (Bed Mobility) standby assist; 1 person assist  -     Assistive Device (Bed Mobility) head of bed elevated; bed rails  -     Row Name 12/14/21 1400          Transfers    Transfers sit-stand transfer; stand-sit transfer; bed-chair transfer  -     Bed-Chair [place] (Transfers) contact guard; 1 person assist  -KP     Sit-Stand [place] (Transfers) contact guard; 1 person assist  -KP     Stand-Sit [place] (Transfers) contact guard; 1 person assist  -     Row Name 12/14/21 1400          Gait/Stairs (Locomotion)    Comment (Gait/Stairs) Pt refused ambulation this date.  -     Row Name 12/14/21 1400          Safety Issues, Functional Mobility    Safety Issues Affecting Function (Mobility) judgment; problem-solving  -     Impairments Affecting Function (Mobility) balance; endurance/activity tolerance; strength; shortness of breath  -     Row Name 12/14/21 1400          Plan of Care Review    Plan of Care Reviewed With patient  -     Progress no change  -     Outcome Summary Pt was willing to participate with therapy but stated she felt SOA and was weak.  She was able to perform transfers with CGA but refused ambulation today.  She stated she \"cannot return home like this.\"  She was agreeable to acute inpatient rehab placement to improve strength. safety, balance and functional activity tolerance.  -     Row Name 12/14/21 1400          Physical Therapy Goals    Bed Mobility Goal Selection (PT) bed mobility, PT goal 1  -     Transfer Goal Selection (PT) transfer, PT goal 1  -     Gait Training Goal Selection (PT) gait training, PT goal 1; gait training, PT goal 2  -     Row Name 12/14/21 1400          Bed Mobility Goal 1 (PT)    Activity/Assistive Device (Bed Mobility Goal 1, PT) bed mobility activities, all  -     [place] Level/Cues Needed (Bed Mobility Goal 1, PT) " modified independence  -KP     Time Frame (Bed Mobility Goal 1, PT) long term goal (LTG); 2 weeks  -     Row Name 12/14/21 1400          Transfer Goal 1 (PT)    Activity/Assistive Device (Transfer Goal 1, PT) transfers, all  -KP     [place] Level/Cues Needed (Transfer Goal 1, PT) modified independence  -KP     Time Frame (Transfer Goal 1, PT) long term goal (LTG); 2 weeks  -     Row Name 12/14/21 1400          Gait Training Goal 1 (PT)    Activity/Assistive Device (Gait Training Goal 1, PT) assistive device use; walker, rolling  -KP     [place] Level (Gait Training Goal 1, PT) contact guard assist; 1 person assist  -KP     Distance (Gait Training Goal 1, PT) 50ft  -KP     Time Frame (Gait Training Goal 1, PT) short term goal (STG); 1 week  -     Row Name 12/14/21 1400          Gait Training Goal 2 (PT)    Activity/Assistive Device (Gait Training Goal 2, PT) gait (walking locomotion); walker, rolling  -KP     [place] Level (Gait Training Goal 2, PT) modified independence  -KP     Distance (Gait Training Goal 2, PT) 75ft  -KP     Time Frame (Gait Training Goal 2, PT) long term goal (LTG); 2 weeks  -     Row Name 12/14/21 1400          Positioning and Restraints    Pre-Treatment Position in bed  -KP     Post Treatment Position chair  -KP     In Chair notified nsg; sitting; call light within reach; encouraged to call for assist  Lines in tact and needs in reach  -     Row Name 12/14/21 1400          Therapy Assessment/Plan (PT)    Patient/Family Therapy Goals Statement (PT) Wants to go to inpatient rehab to improve strength  -     Functional Level at Time of Evaluation (PT) CGA - but did not feel like she could ambulate today  -     PT Diagnosis (PT) Decreased strength, balance and functional activity tolerance  -     Rehab Potential (PT) good, to achieve stated therapy goals  -     Criteria for Skilled Interventions Met (PT) yes; meets criteria; skilled treatment is necessary  -      Predicted Duration of Therapy Intervention (PT) 2 wks  -     Problem List (PT) problems related to; balance; mobility; strength  -     Activity Limitations Related to Problem List (PT) unable to ambulate safely; unable to transfer safely  -     Row Name 12/14/21 1400          PT Evaluation Complexity    History, PT Evaluation Complexity 1-2 personal factors and/or comorbidities  -     Examination of Body Systems (PT Eval Complexity) 1-2 elements  -     Clinical Presentation (PT Evaluation Complexity) stable  -     Clinical Decision Making (PT Evaluation Complexity) low complexity  -     Overall Complexity (PT Evaluation Complexity) low complexity  -     Row Name 12/14/21 1400          Therapy Plan Review/Discharge Plan (PT)    Therapy Plan Review (PT) evaluation/treatment results reviewed; risks/benefits reviewed; patient  -           User Key  (r) = Recorded By, (t) = Taken By, (c) = Cosigned By    Initials Name Provider Type    Melissa Roberts, PT Physical Therapist                Physical Therapy Education                 Title: PT OT SLP Therapies (Done)     Topic: Physical Therapy (Done)     Point: Mobility training (Done)     Learning Progress Summary           Patient Acceptance, E,TB, VU,NR by  at 12/14/2021 1445    Comment: Pt was educated on safety, use of FWW and progression to ambulation with assistance from PT, PT POC and d/c recommendations.  Pt educated on always requiring assistance with mobility at this time.                   Point: Home exercise program (Done)     Learning Progress Summary           Patient Acceptance, E,TB, VU,NR by  at 12/14/2021 1445    Comment: Pt was educated on safety, use of FWW and progression to ambulation with assistance from PT, PT POC and d/c recommendations.  Pt educated on always requiring assistance with mobility at this time.                   Point: Body mechanics (Done)     Learning Progress Summary           Patient Acceptance, E,TB,  "VU,NR by  at 12/14/2021 1445    Comment: Pt was educated on safety, use of FWW and progression to ambulation with assistance from PT, PT POC and d/c recommendations.  Pt educated on always requiring assistance with mobility at this time.                   Point: Precautions (Done)     Learning Progress Summary           Patient Acceptance, E,TB, VU,NR by  at 12/14/2021 1445    Comment: Pt was educated on safety, use of FWW and progression to ambulation with assistance from PT, PT POC and d/c recommendations.  Pt educated on always requiring assistance with mobility at this time.                               User Key     Initials Effective Dates Name Provider Type Discipline     06/16/21 -  Melissa Thayer PT Physical Therapist PT              PT Recommendation and Plan  Anticipated Discharge Disposition (PT): inpatient rehabilitation facility  Planned Therapy Interventions (PT): balance training, bed mobility training, gait training, home exercise program, neuromuscular re-education, patient/family education, postural re-education, ROM (range of motion), stair training, strengthening, stretching, transfer training  Therapy Frequency (PT): other (see comments) (2-3 x/wk)  Plan of Care Reviewed With: patient  Progress: no change  Outcome Summary: Pt was willing to participate with therapy but stated she felt SOA and was weak.  She was able to perform transfers with CGA but refused ambulation today.  She stated she \"cannot return home like this.\"  She was agreeable to acute inpatient rehab placement to improve strength. safety, balance and functional activity tolerance.       Time Calculation:    PT Charges     Row Name 12/14/21 1446             Time Calculation    PT Received On 12/14/21  [facility]      PT Goal Re-Cert Due Date 12/21/21  -            User Key  (r) = Recorded By, (t) = Taken By, (c) = Cosigned By    Initials Name Provider Type     Melissa Thayer PT Physical Therapist              Therapy Charges " for Today     Code Description Service Date Service Provider Modifiers Qty    [id number] HC PT EVAL LOW COMPLEXITY 4 12/14/2021 Melissa Thayer, PT GP 1               Melissa Thayer PT  12/14/2021

## 2021-12-14 NOTE — PLAN OF CARE
Goal Outcome Evaluation:           Progress: improving   Pt resting in bed with no acute distress noted. Pt has complained of multiple episodes of diarrhea this shift. Stool specimen sent down for C diff rule out, presumptive negative results. VSS. Will continue to monitor

## 2021-12-14 NOTE — PLAN OF CARE
"Goal Outcome Evaluation:  Plan of Care Reviewed With: patient        Progress: no change  Outcome Summary: Pt was willing to participate with therapy but stated she felt SOA and was weak.  She was able to perform transfers with CGA but refused ambulation today.  She stated she \"cannot return home like this.\"  She was agreeable to acute inpatient rehab placement to improve strength. safety, balance and functional activity tolerance.  "

## 2021-12-15 NOTE — PROGRESS NOTES
Pharmacokinetics Service Note:     continues on day 4 of vancomycin for skin and soft tissue infection.  A random vancomycin level was reported as 23.9 mg/L.  Due to the elevated level and patient creatinine clearance the vancomycin dose will be held for today. Will obtain a random vancomycin level in the morning to guide further dosing.  Pharmacy will continue to follow.    Thank you,  Maria Isabel Elder, PharmD  12/15/21  07:52 EST

## 2021-12-15 NOTE — PROGRESS NOTES
[facility]     Progress Note    Patient Name: Geeta Renee  : 1935  MRN: [id number]  Primary Care Physician:  Alejandra Dozier APRN  Date of admission: 2021    Subjective   Subjective     Chief Complaint: 85 yo female being seen in follow up for abdominal pain, SOA    Vomiting     Fatigue  Associated symptoms include fatigue.     Patient Reports overall feeling some better compared to admission. Reports taking TUMS a couple of weeks ago; 2 tablets daily intermittently. No history of thyroid and/or parathyroid surgeries.    Review of Systems   Constitutional: Positive for fatigue.     Reports ongoing shortness of air with occasional cough. No nausea, vomiting or abdominal pain    Objective   Objective     Vitals:   Temp:  [97.7 °F (36.5 °C)-98.3 °F (36.8 °C)] 98.2 °F (36.8 °C)  Heart Rate:  [72-76] 76  Resp:  [18-22] 22  BP: (111-125)/(60-68) 111/60  Flow (L/min):  [4-4.5] 4    Physical Exam  Constitutional:       General: She is not in acute distress.     Appearance: She is well-developed.      Interventions: Nasal cannula in place.   HENT:      Head: Normocephalic and atraumatic.   Eyes:      Conjunctiva/sclera: Conjunctivae normal.   Neck:      Trachea: No tracheal deviation.   Cardiovascular:      Rate and Rhythm: Normal rate. Rhythm regularly irregular.      Pulses:           Dorsalis pedis pulses are 2+ on the right side and 2+ on the left side.      Heart sounds: No murmur heard.  No friction rub. No gallop.       Comments: No significant lower extremity edema  Pulmonary:      Effort: No respiratory distress.      Breath sounds: No wheezing or rales.   Abdominal:      General: Bowel sounds are normal. There is no distension.      Palpations: Abdomen is soft.      Tenderness: There is no abdominal tenderness. There is no guarding.   Skin:     General: Skin is warm and dry.      Findings: No erythema or rash.   Neurological:      Mental Status: She is alert and oriented to person, place, and  time.      Cranial Nerves: No cranial nerve deficit.          Result Review    Result Review:  I have personally reviewed the results from the time of this admission to 12/14/2021 19:43 EST and agree with these findings:  [x]  Laboratory  []  Microbiology  []  Radiology  []  EKG/Telemetry   []  Cardiology/Vascular   []  Pathology  []  Old records  []  Other:  Most notable findings include: WBC 12.23, H/H lower today at 9.2 and 27.9 respectively.  Creatinine improved at 1.77.  Phosphorus 1.5, magnesium 1.5.    Assessment/Plan   Assessment / Plan     Brief Patient Summary:  Geeta Renee is a 86 y.o. female who presents with abdominal pain.  Patient found to have acute pancreatitis, pneumonia and new onset atrial fibrillation    Assessment/Plan:  -Severe sepsis, present on admission with acute pancreatitis: Continue her current diet.  The patient reports that she is tolerating it well without any abdominal pain, nausea and/or vomiting.    -Bilateral pneumonia: Unclear if possibly present on admission and not noted in the setting of dehydration and LILIA.  Continue to wean the patient's supplemental oxygen as tolerated.  For now, continue with pharmacy to dose vancomycin and Zosyn.    -Acute kidney injury, nonoliguric and likely multifactorial related to ATN from sepsis, poor oral intake in the setting of pancreatitis and hypercalcemia: Creatinine 1.77 today but is overall improved from admission, however, the patient's baseline creatinine is approximately 1. Appreciate Nephrology's assistance. Attempt to avoid hypotension, dehydration, NSAIDs, etc.  Continue to monitor her intake and output.  Follow-up on her PTH, PTH RP and 1,25 OH/vitamin D level.  Patient's calcium is normal at this time given correction for albumin.    -Severe electrolyte abnormalities to include hypercalcemia in the setting of known esophageal cancer and breast cancer, dehydration with hypophosphatemia, hypokalemia and hypomagnesemia: Continue  to supplement her Mg/Phos/K as tolerated and repeat her chemistry panel in a.m. continue to monitor on telemetry.    -New onset atrial fibrillation, currently rate controlled: Continue with Eliquis.  Need to monitor her H/H and transfuse as needed.  No evidence of active bleeding at this time.    -Essential hypertension, currently controlled: Continue with amlodipine and atenolol for now.    -Generalized weakness/debility: PT and OT have been consulted.  I have requested that nursing staff assist patient to chair if tolerated.     -History of breast cancer: Continue with Arimidex.    -History of esophageal cancer.    DVT prophylaxis:  Eliquis    CODE STATUS:    Level Of Support Discussed With: Patient  Code Status (Patient has no pulse and is not breathing): CPR (Attempt to Resuscitate)  Medical Interventions (Patient has pulse or is breathing): Full Support    Disposition:  I expect patient to be discharged home with home health versus SNF pending PT/OT evaluations.    Rochelle Byrd, DO

## 2021-12-15 NOTE — PROGRESS NOTES
[facility]     Progress Note    Patient Name: Geeta Renee  : 1935  MRN: [id number]  Primary Care Physician:  Alejandra Dozier APRN  Date of admission: 2021    Subjective   Subjective     Chief Complaint: 85 yo female being seen in follow up for abdominal pain, SOA    Vomiting     Fatigue  Associated symptoms include fatigue and vomiting.     Patient Reports overall feeling some better compared to admission. Reports some mild improvement in her shortness of air today. She did work with PT yesterday but did not feel safe to stand yet.     Review of Systems   Constitutional: Positive for fatigue.   Gastrointestinal: Positive for vomiting.     Reports ongoing shortness of air with occasional cough. Reports poor appetite but drinking well. No nausea, vomiting or abdominal pain    Objective   Objective     Vitals:   Temp:  [97.3 °F (36.3 °C)-98.2 °F (36.8 °C)] 97.7 °F (36.5 °C)  Heart Rate:  [68-80] 70  Resp:  [18-22] 18  BP: ()/(53-67) 107/59  Flow (L/min):  [4] 4    Physical Exam  Constitutional:       General: She is not in acute distress.     Appearance: She is well-developed.      Interventions: Nasal cannula in place.   HENT:      Head: Normocephalic and atraumatic.   Eyes:      Conjunctiva/sclera: Conjunctivae normal.   Neck:      Trachea: No tracheal deviation.   Cardiovascular:      Rate and Rhythm: Normal rate. Rhythm regularly irregular.      Pulses:           Dorsalis pedis pulses are 2+ on the right side and 2+ on the left side.      Heart sounds: No murmur heard.  No friction rub. No gallop.       Comments: No significant lower extremity edema  Pulmonary:      Effort: No respiratory distress.      Breath sounds: Examination of the right-lower field reveals decreased breath sounds. Examination of the left-lower field reveals decreased breath sounds. Decreased breath sounds present. No wheezing or rales.   Abdominal:      General: Bowel sounds are normal. There is no distension.       Palpations: Abdomen is soft.      Tenderness: There is no abdominal tenderness. There is no guarding.   Skin:     General: Skin is warm and dry.      Findings: No erythema or rash.   Neurological:      Mental Status: She is alert and oriented to person, place, and time.      Cranial Nerves: No cranial nerve deficit.          Result Review    Result Review:  I have personally reviewed the results from the time of this admission to 12/15/2021 16:24 EST and agree with these findings:  [x]  Laboratory  []  Microbiology  []  Radiology  []  EKG/Telemetry   []  Cardiology/Vascular   []  Pathology  []  Old records  []  Other:  Most notable findings include: WBC 11.92, H/H lower today at 8.9 and 28.1 respectively.  Creatinine improved at 1.68.  Phosphorus 1.8, magnesium 1.7; .7 (?); PTH-rp <2.0    Assessment/Plan   Assessment / Plan     Brief Patient Summary:  Geeta Renee is a 86 y.o. female who presents with abdominal pain.  Patient found to have acute pancreatitis, pneumonia and new onset atrial fibrillation    Assessment/Plan:  -Severe sepsis, present on admission with acute pancreatitis: Clinically and symptomatically continues to improve. Continue her current diet.  The patient reports that she is tolerating it well without any abdominal pain, nausea and/or vomiting. Does report poor oral intake/appetite.    -Bilateral pneumonia: Unclear if possibly present on admission and not noted in the setting of dehydration and LILIA.  Continue to wean the patient's supplemental oxygen as tolerated.  For now, continue with pharmacy to dose vancomycin and Zosyn. Will update CXR today; remains on 4 lpm but clinically and symptomatically improved some from a respiratory standpoint.     -Acute kidney injury, nonoliguric and likely multifactorial related to ATN from sepsis, poor oral intake in the setting of pancreatitis and hypercalcemia: Creatinine 1.68 today but is overall improved from admission, however, the patient's  baseline creatinine is approximately 1. Appreciate Nephrology's assistance. Attempt to avoid hypotension, dehydration, NSAIDs, etc.  Continue to monitor her intake and output. Patient's erratic PTH level - unclear what to make of this at this point; discussed with Dr Gastelum; ?delayed response to calcitonin. PTH-rp was within normal.  Patient's calcium is still slightly low today (~7.5) given degree of hypoalbuminemia. Will follow up tomorrow and consider Ca2+ supplementation.     -Severe electrolyte abnormalities to include hypercalcemia in the setting of known esophageal cancer and breast cancer, dehydration with hypophosphatemia, hypokalemia and hypomagnesemia: Continue to supplement her Mg/Phos/K as tolerated and repeat her chemistry panel in a.m. continue to monitor on telemetry. Patient remains on phos/mag supplementation per protocol as needed.     -New onset atrial fibrillation, currently rate controlled: Continue with Eliquis.  Need to monitor her H/H and transfuse as needed.  No evidence of active bleeding at this time.    -Essential hypertension, currently controlled: Continue with amlodipine and atenolol for now.    -Generalized weakness/debility: PT and OT have been consulted.  I have requested that nursing staff assist patient to chair if tolerated. Inpatient physical rehabilitation consult placed as well.     -History of breast cancer: Continue with Arimidex.    -History of esophageal cancer.    DVT prophylaxis:  Eliquis    CODE STATUS:    Level Of Support Discussed With: Patient  Code Status (Patient has no pulse and is not breathing): CPR (Attempt to Resuscitate)  Medical Interventions (Patient has pulse or is breathing): Full Support    Disposition:  I expect patient to be discharged home with home health versus SNF/rehab     Rochelle Byrd, DO

## 2021-12-15 NOTE — NURSING NOTE
Patient has converted to AFIB that is rate controlled. Dr. Byrd and Natalia Fleming have been informed and their are no new orders.

## 2021-12-15 NOTE — PROGRESS NOTES
LOS: 7 days     Name: Geeta Renee  Age/Sex: 86 y.o. female  :  1935        PCP: Alejandra Dozier APRN  REF: No ref. provider found    Active Problems:    Acute pancreatitis      Reason for follow-up: New onset atrial fibrillation     Subjective       Subjective     Geeta Renee is a 86-year-old female with a past medical history significant for esophageal cancer breast cancer, Addison esophagus, GERD, arthritis, hypertension and hiatal hernia.  Patient presented to the ER with complaints of abdominal pain.    Interval History: Patient reports she is breathing better today. Had good urine output with IV diuresis. Kidney function mildly improved at 1.68.    Vital Signs  Temp:  [97.3 °F (36.3 °C)-98.2 °F (36.8 °C)] 97.7 °F (36.5 °C)  Heart Rate:  [68-80] 68  Resp:  [18-22] 18  BP: ()/(53-67) 103/60     Vital Signs (last 72 hrs)        0700   0659  0700   0659  0700  12/15 0659 12/15 0700  12/15 1158   Most Recent      Temp (°F) 97.6 -  98.6    97.7 -  98.3    97.3 -  98.2      97.7     97.7 (36.5) 12/15 1006    Heart Rate 57 -  73    67 -  75    73 -  80      68     68 12/15 1006    Resp   20    18 -  20      22      18     18 12/15 1006    BP 96/66 -  116/61    103/61 -  124/67    98/67 -  125/68      103/60     103/60 12/15 1006    SpO2 (%) 89 -  92    90 -  95    91 -  95      95     95 12/15 1006        Documented weights    21 1158 21 0500 21 1345 12/10/21 0517   Weight: 85.3 kg (188 lb) 79.7 kg (175 lb 9.6 oz) 79.7 kg (175 lb 9.6 oz) 79.9 kg (176 lb 3.2 oz)    21 0500 21 0121 21 0449 21 0500   Weight: 79.8 kg (175 lb 14.4 oz) 84 kg (185 lb 3.2 oz) 84 kg (185 lb 3.2 oz) 84.1 kg (185 lb 6.4 oz)    21 0500 12/15/21 0500   Weight: 84.9 kg (187 lb 1.6 oz) 83.9 kg (184 lb 14.4 oz)      Body mass index is 31.74 kg/m².    Intake/Output Summary (Last 24 hours) at 12/15/2021 1158  Last data filed at 12/15/2021 0852  Gross  per 24 hour   Intake 520 ml   Output 2650 ml   Net -2130 ml     Objective    Objective     I have seen and examined Ms. Renee today  Physical Exam:     General Appearance:    Alert, cooperative, in no acute distress   Head:    Normocephalic, without obvious abnormality, atraumatic   Eyes:            Conjunctivae and sclerae normal, no   icterus, no pallor, corneas clear.   Neck:   No adenopathy, supple, trachea midline, no thyromegaly, no   carotid bruit, no JVD   Lungs:    Rales at bases with improvement,respirations regular, even and unlabored    Heart:    Regular rhythm and normal rate, normal S1 and S2, no            murmur, no gallop, no rub, no click   Chest Wall:    No abnormalities observed   Abdomen:     Normal bowel sounds, no masses, no organomegaly, soft        non-tender, non-distended, no guarding, no rebound                tenderness   Extremities:   Moves all extremities well, no edema, no cyanosis, no             redness   Pulses:   Pulses palpable and equal bilaterally   Skin:   No bleeding, bruising or rash       Neurologic:   Alert and oriented      Results review       Results Review:   Results from last 7 days   Lab Units 12/15/21  0045 12/14/21  0138 12/13/21  0100 12/12/21  0108 12/10/21  2319 12/10/21  0058 12/09/21  0028   WBC 10*3/mm3 11.92* 12.23* 9.53 12.47* 13.93* 16.34* 17.63*   HEMOGLOBIN g/dL 8.9* 9.2* 9.0* 10.7* 10.0* 9.9* 10.9*   PLATELETS 10*3/mm3 247 251 242 241 212 214 219     Results from last 7 days   Lab Units 12/15/21  0045 12/14/21  0138 12/13/21  0100 12/12/21  1022 12/12/21  0108 12/10/21  2319 12/10/21  1502 12/10/21  0058 12/10/21  0058 12/09/21  0429 12/09/21  0028 12/08/21  2031 12/08/21  1622   SODIUM mmol/L 145 142 145  --  143 141  --   --  139  --  138   < > 137   POTASSIUM mmol/L 4.0 3.6 3.6 4.0 3.3* 3.5  3.5 3.2*   < > 3.0*   < > 2.5*   < > 1.9*   CHLORIDE mmol/L 112* 110* 110*  --  107 107  --   --  101  --  94*   < > 93*   CO2 mmol/L 20.0* 18.6* 23.8   --  22.3 23.9  --   --  26.9  --  31.3*   < > 30.3*   BUN mg/dL 15 20 25*  --  24* 29*  --   --  29*  --  28*   < > 27*   CREATININE mg/dL 1.68* 1.77* 2.31*  --  2.14* 2.40*  --   --  2.89*  --  3.24*   < > 3.28*   CALCIUM mg/dL 6.3* 6.8* 7.8*  --  8.5* 9.5  --   --  10.4  --  13.3*   < > 14.1*   GLUCOSE mg/dL 92 95 104*  --  94 85  --   --  83  --  115*   < > 110*   ALT (SGPT) U/L 9 12 9  --  11 12  --   --   --   --  16  --  17   AST (SGOT) U/L 12 17 13  --  13 12  --   --   --   --  20  --  19    < > = values in this interval not displayed.     Results from last 7 days   Lab Units 12/12/21  0744 12/12/21  0108 12/09/21  0500 12/09/21  0028 12/08/21 2031   TROPONIN T ng/mL 0.020 0.023 0.224* 0.234* 0.262*     No results found for: INR  Lab Results   Component Value Date    MG 1.7 12/15/2021    MG 1.5 (L) 12/14/2021    MG 1.7 12/13/2021     Lab Results   Component Value Date    TSH 3.070 12/08/2021    CHLPL 149 07/15/2015    TRIG 172 (H) 07/15/2015    HDL 39 (L) 07/15/2015    LDL 75 07/15/2015      Imaging Results (Last 48 Hours)     ** No results found for the last 48 hours. **        No results found for: BNP    Lab Results   Component Value Date    ABSOLUTELUNG 37 (A) 12/14/2021       Echo   Results for orders placed during the hospital encounter of 12/08/21    Adult Transthoracic Echo Limited W/ Cont if Necessary Per Protocol    Interpretation Summary  · Limited study to evaluate LV function in a patient with ACS.  · Findings-  · Normal left ventricular cavity size and wall thickness noted.  · Left ventricular systolic function is normal. ''  · All left ventricular wall segments contract normally.  · Left ventricular ejection fraction appears to be 61 - 65%.  · Left ventricular diastolic function is consistent with (grade I) impaired relaxation.       I reviewed the patient's new clinical results.    Telemetry: NSR 60-70 bpm      Medication Review:   amLODIPine, 5 mg, Oral, Daily  anastrozole, 1 mg, Oral,  Daily  apixaban, 2.5 mg, Oral, Q12H  atenolol, 50 mg, Oral, Q24H  pantoprazole, 40 mg, Oral, Q AM  piperacillin-tazobactam, 3.375 g, Intravenous, Q12H  sodium chloride, 10 mL, Intravenous, Q12H  sodium chloride, 10 mL, Intravenous, Q12H  Vancomycin Pharmacy Intermittent Dosing, , Does not apply, Daily             Assessment      Assessment:  1. Acute HFpEF, improving  2. New onset atrial fibrillation, patient is back in sinus rhythm.  3. Right lower lobe pneumonia, being managed by the hospital service  4. Acute kidney injury, improving.    Plan     Recommendations:  Continue with IV diuretics for 1 more day and continue to monitor the kidney function urine output closely and adjust the dose of diuretics as needed and tolerated.    I discussed the patients findings and my recommendations with patient and family      Electronically signed by PRISCILLA Mckeon, 12/15/21, 11:58 AM EST.    Electronically signed by Kenton Coombs MD, 12/15/21, 12:43 PM EST.      Please note that portions of this note were completed with a voice recognition program.

## 2021-12-15 NOTE — NURSING NOTE
Rehab consult noted, will see how patient works with therapy today, she refused to ambulate with them yesterday.

## 2021-12-15 NOTE — PLAN OF CARE
Goal Outcome Evaluation:  Plan of Care Reviewed With: patient        Progress: no change  Outcome Summary: Pt on 4L nc. Continuing to replace electroytes. Pt denies any pain. No s/s of distress noted. Continue with plan of care.

## 2021-12-15 NOTE — CASE MANAGEMENT/SOCIAL WORK
Discharge Planning Assessment   [facility]     Patient Name: Geeta Renee  MRN: [id number]  Today's Date: 12/15/2021    Admit Date: 12/8/2021          Discharge Plan     Row Name 12/15/21 1617       Plan    Plan Pt admitted on 12/8/21.  Pt lives at home alone with son residing next door.  Pt currently does not utilize home health services.  Pt currently utilizes standard walker and wheelchair via [facility] provider.  SS noted inpatient rehab consult.  SS will follow.                 BRIDGER Cohn

## 2021-12-15 NOTE — PROGRESS NOTES
Nephrology Progress Note      Subjective     Pt feels better, no chest pain or shortness of breath, drinking good amount of fluid    Objective       Vital signs :     Temp:  [97.3 °F (36.3 °C)-98.2 °F (36.8 °C)] 97.3 °F (36.3 °C)  Heart Rate:  [73-80] 80  Resp:  [22] 22  BP: ()/(53-68) 98/67      Intake/Output Summary (Last 24 hours) at 12/15/2021 0732  Last data filed at 12/15/2021 0230  Gross per 24 hour   Intake 240 ml   Output 2650 ml   Net -2410 ml       Physical Exam:    General Appearance : not in acute distress  Lungs : clear to auscultation, respirations regular  Heart :  regular rhythm & normal rate, normal S1, S2 and no murmur, no rub  Abdomen : normal bowel sounds, no masses, no hepatomegaly, no splenomegaly, no tenderness  Extremities : no edema, no cyanosis and no redness  Neurologic :   orientated to person, place, time and situation, Grossly no focal deficits    Laboratory Data :     Albumin Albumin   Date Value Ref Range Status   12/15/2021 2.49 (L) 3.50 - 5.20 g/dL Final   12/14/2021 2.50 (L) 3.50 - 5.20 g/dL Final   12/13/2021 2.56 (L) 3.50 - 5.20 g/dL Final      Magnesium Magnesium   Date Value Ref Range Status   12/15/2021 1.7 1.6 - 2.4 mg/dL Final   12/14/2021 1.5 (L) 1.6 - 2.4 mg/dL Final   12/13/2021 1.7 1.6 - 2.4 mg/dL Final          PTH                 PTH, Intact   Date Value Ref Range Status   12/14/2021 555.7 (H) 15.0 - 65.0 pg/mL Final       CBC and coagulation:  Results from last 7 days   Lab Units 12/15/21  0045 12/14/21  0138 12/13/21  0100 12/12/21  0108 12/10/21  2319 12/10/21  0058 12/09/21  0028 12/08/21  1622 12/08/21  1158 12/08/21  1158   LACTATE mmol/L  --   --   --   --   --   --   --  1.7  --  2.7*   SED RATE mm/hr  --   --   --   --   --   --   --   --   --  21   CRP mg/dL  --   --   --   --  19.54*  --  10.96*  --   --  4.13*   WBC 10*3/mm3 11.92* 12.23* 9.53   < > 13.93*   < > 17.63*  --    < > 15.95*   HEMOGLOBIN g/dL 8.9* 9.2* 9.0*   < > 10.0*   < > 10.9*  --     < > 12.5   HEMATOCRIT % 28.1* 27.9* 28.3*   < > 29.9*   < > 32.4*  --    < > 36.4   MCV fL 89.5 86.6 88.2   < > 86.7   < > 83.9  --    < > 83.9   MCHC g/dL 31.7 33.0 31.8   < > 33.4   < > 33.6  --    < > 34.3   PLATELETS 10*3/mm3 247 251 242   < > 212   < > 219  --    < > 255    < > = values in this interval not displayed.     Acid/base balance:  Results from last 7 days   Lab Units 12/08/21 2037   PH, ARTERIAL pH units 7.531*   PO2 ART mm Hg 73.2*   PCO2, ARTERIAL mm Hg 49.8*   HCO3 ART mmol/L 41.7*     Renal and electrolytes:  Results from last 7 days   Lab Units 12/15/21  0045 12/14/21  2105 12/14/21  1314 12/14/21  0138 12/13/21  0100 12/13/21  0100 12/12/21  1022 12/12/21  0108 12/10/21  2319 12/10/21  2319 12/09/21  0028 12/08/21 2031   SODIUM mmol/L 145  --   --  142  --  145  --  143  --  141   < > 139   POTASSIUM mmol/L 4.0  --   --  3.6  --  3.6   < > 3.3*   < > 3.5  3.5   < > 2.3*   MAGNESIUM mg/dL 1.7  --   --  1.5*  --  1.7  --  1.7   < > 1.8   < > 1.4*   CHLORIDE mmol/L 112*  --   --  110*  --  110*  --  107   < > 107   < > 94*   CO2 mmol/L 20.0*  --   --  18.6*  --  23.8  --  22.3   < > 23.9   < > 33.5*   BUN mg/dL 15  --   --  20  --  25*  --  24*   < > 29*   < > 28*   CREATININE mg/dL 1.68*  --   --  1.77*  --  2.31*  --  2.14*  --  2.40*   < > 3.35*   EGFR IF NONAFRICN AM mL/min/1.73 29*  --   --  27*  --  20*  --  22*   < > 19*   < > 13*   CALCIUM mg/dL 6.3*  --   --  6.8*  --  7.8*  --  8.5*   < > 9.5   < > 13.8*   IONIZED CALCIUM mmol/L  --   --  0.97*  --   --   --   --   --   --   --   --  1.84*   PHOSPHORUS mg/dL  --  1.4* 1.8* 1.5*   < > 1.8*  --   --    < > 1.1*   < > 1.7*    < > = values in this interval not displayed.     Estimated Creatinine Clearance: 25.7 mL/min (A) (by C-G formula based on SCr of 1.68 mg/dL (H)).    Liver and pancreatic function:  Results from last 7 days   Lab Units 12/15/21  0045 12/14/21  0138 12/13/21  0100 12/12/21  0108 12/12/21  0108 12/10/21  2319  12/10/21  2319 12/10/21  0058 12/09/21  0028 12/08/21  1622   ALBUMIN g/dL 2.49* 2.50* 2.56*   < > 2.62*   < > 2.64*  --    < > 3.22*   BILIRUBIN mg/dL 0.3 0.4 0.5   < > 0.5   < > 0.4  --    < > 0.5   ALK PHOS U/L 108 117 120*   < > 98   < > 86  --    < > 90   AST (SGOT) U/L 12 17 13   < > 13   < > 12  --    < > 19   ALT (SGPT) U/L 9 12 9   < > 11   < > 12  --    < > 17   AMYLASE U/L  --   --   --   --   --   --   --   --   --  446*   LIPASE U/L  --   --   --   --  188*  --  126* 154*  --  788*    < > = values in this interval not displayed.         Cardiac:  Results from last 7 days   Lab Units 12/14/21  1057 12/08/21  1158   PROBNP pg/mL 4,221.0* 1,785.0     Liver and pancreatic function:  Results from last 7 days   Lab Units 12/15/21  0045 12/14/21  0138 12/13/21  0100 12/12/21  0108 12/12/21 0108 12/10/21  2319 12/10/21  2319 12/10/21  0058 12/09/21  0028 12/08/21  1622   ALBUMIN g/dL 2.49* 2.50* 2.56*   < > 2.62*   < > 2.64*  --    < > 3.22*   BILIRUBIN mg/dL 0.3 0.4 0.5   < > 0.5   < > 0.4  --    < > 0.5   ALK PHOS U/L 108 117 120*   < > 98   < > 86  --    < > 90   AST (SGOT) U/L 12 17 13   < > 13   < > 12  --    < > 19   ALT (SGPT) U/L 9 12 9   < > 11   < > 12  --    < > 17   AMYLASE U/L  --   --   --   --   --   --   --   --   --  446*   LIPASE U/L  --   --   --   --  188*  --  126* 154*  --  788*    < > = values in this interval not displayed.       Medications :     amLODIPine, 5 mg, Oral, Daily  anastrozole, 1 mg, Oral, Daily  apixaban, 2.5 mg, Oral, Q12H  atenolol, 50 mg, Oral, Q24H  pantoprazole, 40 mg, Oral, Q AM  piperacillin-tazobactam, 3.375 g, Intravenous, Q12H  sodium chloride, 10 mL, Intravenous, Q12H  sodium chloride, 10 mL, Intravenous, Q12H  Vancomycin Pharmacy Intermittent Dosing, , Does not apply, Daily             Assessment/Plan     1. LILIA, non oliguric multifactorial, including ATN from sepsis, hypercalcemia  2. Sepsis with pancreatitis  3. Sever hypercalcemia  4. History of esophageal  cancer and breast cancer  5. Hypokalemia  6. Metabolic alkalosis likely volume contraction, improved now with mild met acidosis     Cr continue to improve 1.6 today, with adequate oral fluid intake and adequate urine output. Repeat PTH is >500 ?, likely explanation is prolonged calcitonin induced hypocalcemic effect and leading to 2nd hyperpara. Previous PtH readings are very confusing and would recommend Endo appointment on discharge. PRN PO calcium. Will do 24 hours calcium excretion outpatient.     Baseline Cr around 1, admitted with CR of 3.5  Admission calcium was >15  -follow up workup for hypocalcemia,  -strict I/O  -avoid any nephrotoxic agents, hypotension and adjust medications according to eGFR    I will sign off, please call if any questions.     Stanley Gastelum MD  12/15/21  07:32 EST

## 2021-12-16 NOTE — PLAN OF CARE
Goal Outcome Evaluation:  Plan of Care Reviewed With: patient        Progress: no change  Outcome Summary: Pt on 4 L nc. Electrolytes being replaced. Pt experienced SOA early in shift - pt stated feeling better when sitting up. No s/s of distress noted. Conitnue plan of care.

## 2021-12-16 NOTE — PROGRESS NOTES
[facility]     Progress Note    Patient Name: Geeta Renee  : 1935  MRN: [id number]  Primary Care Physician:  Alejandra Dozier APRN  Date of admission: 2021    Subjective   Subjective     Chief Complaint: 85 yo female being seen in follow up for abdominal pain, SOA    Vomiting     Fatigue  Associated symptoms include fatigue and vomiting.     Patient Reports overall feeling some better compared to admission. She was sitting up in bed; remains on 4 lpm nasal cannula.     Present during visit: KELSI Yi    Review of Systems   Constitutional: Positive for fatigue.   Gastrointestinal: Positive for vomiting.     Reports nausea earlier this morning prior to eating breakfast; has since resolved. Reports ongoing shortness of air with occasional cough; SOA slightly improved. No abdominal pain    Objective   Objective     Vitals:   Temp:  [97.6 °F (36.4 °C)-98.7 °F (37.1 °C)] 97.6 °F (36.4 °C)  Heart Rate:  [71-83] 72  Resp:  [18-20] 18  BP: ()/(58-68) 94/58  Flow (L/min):  [4] 4    Physical Exam  Constitutional:       General: She is not in acute distress.     Appearance: She is well-developed.      Interventions: Nasal cannula in place.   HENT:      Head: Normocephalic and atraumatic.      Mouth/Throat:      Mouth: Oral lesions (exudate/plaque noted on tongue) present.   Eyes:      Conjunctiva/sclera: Conjunctivae normal.   Neck:      Trachea: No tracheal deviation.   Cardiovascular:      Rate and Rhythm: Normal rate. Rhythm regularly irregular.      Pulses:           Dorsalis pedis pulses are 2+ on the right side and 2+ on the left side.      Heart sounds: No murmur heard.  No friction rub. No gallop.       Comments: No significant lower extremity edema  Pulmonary:      Effort: No respiratory distress.      Breath sounds: Examination of the right-lower field reveals decreased breath sounds. Examination of the left-lower field reveals decreased breath sounds. Decreased breath sounds present. No  wheezing or rales.   Abdominal:      General: Bowel sounds are normal. There is no distension.      Palpations: Abdomen is soft.      Tenderness: There is no abdominal tenderness. There is no guarding.   Skin:     General: Skin is warm and dry.      Findings: No erythema or rash.   Neurological:      Mental Status: She is alert and oriented to person, place, and time.      Cranial Nerves: No cranial nerve deficit.          Result Review    Result Review:  I have personally reviewed the results from the time of this admission to 12/16/2021 16:03 EST and agree with these findings:  [x]  Laboratory  []  Microbiology  []  Radiology  []  EKG/Telemetry   []  Cardiology/Vascular   []  Pathology  []  Old records  []  Other:  Most notable findings include: WBC 11.92, H/H lower today at 8.9 and 28.1 respectively.  Creatinine stable at 1.72.  Phosphorus 2.1 magnesium 1.3; WBC 13.08    Assessment/Plan   Assessment / Plan     Brief Patient Summary:  Geeta Renee is a 86 y.o. female who presents with abdominal pain.  Patient found to have acute pancreatitis, pneumonia and new onset atrial fibrillation    Assessment/Plan:  -Severe sepsis, present on admission with acute pancreatitis: Clinically and symptomatically continues to improve. Continue her current diet. Reported one episode of nausea earlier today prior to breakfast which has since resolved. Continue to monitor.    -Bilateral pneumonia: Unclear if possibly present on admission and not noted in the setting of dehydration and LILIA.  Continue to wean the patient's supplemental oxygen as tolerated.  For now, continue with pharmacy to dose vancomycin and Zosyn.     I have updated the patient's chest x-ray today and per my view it overall appears improved with a small left-sided pleural effusion and probable bibasilar atelectasis.    -Acute kidney injury, nonoliguric and likely multifactorial related to ATN from sepsis, poor oral intake in the setting of pancreatitis and  hypercalcemia: Creatinine 1.72 today but is overall improved from admission, however, the patient's baseline creatinine is approximately 1.     Attempt to avoid hypotension, dehydration, NSAIDs, etc.  Continue to monitor her intake and output. Patient's erratic PTH level - unclear what to make of this at this point; discussed with Dr Gastelum on 12/15/21; ?delayed response to calcitonin. PTH-rp was within normal.      Patient's calcium is still low today (~7.196 mg/dl) given degree of hypoalbuminemia. Will begin calcium supplementation per protocol.    -Severe electrolyte abnormalities to include hypercalcemia in the setting of known esophageal cancer and breast cancer, dehydration with hypophosphatemia, hypokalemia and hypomagnesemia: Continue to supplement her Mg/Phos/K as tolerated and repeat her chemistry panel in a.m.     Continue to monitor on telemetry. Patient remains on phos/mag supplementation per protocol as needed.     -New onset atrial fibrillation, currently rate controlled: Continue with Eliquis.  Need to monitor her H/H and transfuse as needed.  No evidence of active bleeding at this time. H/H stable at 9.3/28.4.    -Essential hypertension, currently slightly low; asymptomatic: Continue with amlodipine and atenolol for now with holding parameters.    -Generalized weakness/debility: PT and OT have been consulted.  I have requested that nursing staff assist patient to chair if tolerated. Inpatient physical rehabilitation consult placed as well.     -History of breast cancer: Continue with Arimidex.    -History of esophageal cancer.    DVT prophylaxis:  Eliquis    CODE STATUS:    Level Of Support Discussed With: Patient  Code Status (Patient has no pulse and is not breathing): CPR (Attempt to Resuscitate)  Medical Interventions (Patient has pulse or is breathing): Full Support    Disposition:  I expect patient to be discharged home with home health versus SNF/rehab     Rochelle Byrd, DO

## 2021-12-16 NOTE — PROGRESS NOTES
LOS: 8 days     Name: Geeta Renee  Age/Sex: 86 y.o. female  :  1935        PCP: Alejandra Dozier APRN  REF: No ref. provider found    Active Problems:    Acute pancreatitis      Reason for follow-up: New onset atrial fibrillation     Subjective       Subjective     Geeta Renee is a 86-year-old female with a past medical history significant for esophageal cancer breast cancer, Addison esophagus, GERD, arthritis, hypertension and hiatal hernia.  Patient presented to the ER with complaints of abdominal pain.     Interval History: Patient states her breathing is better today.  Did have an episode of atrial fibrillation yesterday with a controlled rate but converted to normal sinus rhythm.  Had good urine output with IV diuresis.  Creatinine slightly worse today at 1.72.    Vital Signs  Temp:  [97.7 °F (36.5 °C)-98.7 °F (37.1 °C)] 97.9 °F (36.6 °C)  Heart Rate:  [68-83] 83  Resp:  [18-20] 18  BP: (102-117)/(59-68) 117/67     Vital Signs (last 72 hrs)        0700   0659  0700  12/15 0659 12/15 0700   0659  0700   0859   Most Recent      Temp (°F) 97.7 -  98.3    97.3 -  98.2    97.7 -  98.7       97.9 (36.6)  0647    Heart Rate 67 -  75    73 -  80    68 -  83       83  0647    Resp 18 -  20      22    18 -  20       18  0647    /61 -  124/67    98/67 -  125/68    102/68 -  117/67       117/67  0647    SpO2 (%) 90 -  95    91 -  95    91 -  96       93  0647        Documented weights    21 1158 21 0500 21 1345 12/10/21 0517   Weight: 85.3 kg (188 lb) 79.7 kg (175 lb 9.6 oz) 79.7 kg (175 lb 9.6 oz) 79.9 kg (176 lb 3.2 oz)    21 0500 21 0121 21 0449 21 0500   Weight: 79.8 kg (175 lb 14.4 oz) 84 kg (185 lb 3.2 oz) 84 kg (185 lb 3.2 oz) 84.1 kg (185 lb 6.4 oz)    21 0500 12/15/21 0500 21 0500   Weight: 84.9 kg (187 lb 1.6 oz) 83.9 kg (184 lb 14.4 oz) 84.2 kg (185 lb 11.2 oz)      Body mass  index is 31.88 kg/m².    Intake/Output Summary (Last 24 hours) at 12/16/2021 0859  Last data filed at 12/16/2021 0236  Gross per 24 hour   Intake 470 ml   Output 2250 ml   Net -1780 ml     Objective    Objective       Physical Exam:     General Appearance:    Alert, cooperative, in no acute distress   Head:    Normocephalic, without obvious abnormality, atraumatic   Eyes:            Conjunctivae and sclerae normal, no   icterus, no pallor, corneas clear.   Neck:   No adenopathy, supple, trachea midline, no thyromegaly, no   carotid bruit, no JVD   Lungs:     Clear to auscultation,respirations regular, even and                  unlabored    Heart:    Regular rhythm and normal rate, normal S1 and S2, no            murmur, no gallop, no rub, no click   Chest Wall:    No abnormalities observed   Abdomen:     Normal bowel sounds, no masses, no organomegaly, soft        non-tender, non-distended, no guarding, no rebound                tenderness   Extremities:   Moves all extremities well, no edema, no cyanosis, no             redness   Pulses:   Pulses palpable and equal bilaterally   Skin:   No bleeding, bruising or rash       Neurologic:   Alert and oriented      Results review       Results Review:   Results from last 7 days   Lab Units 12/16/21  0401 12/15/21  0045 12/14/21  0138 12/13/21  0100 12/12/21  0108 12/10/21  2319 12/10/21  0058   WBC 10*3/mm3 13.08* 11.92* 12.23* 9.53 12.47* 13.93* 16.34*   HEMOGLOBIN g/dL 9.3* 8.9* 9.2* 9.0* 10.7* 10.0* 9.9*   PLATELETS 10*3/mm3 250 247 251 242 241 212 214     Results from last 7 days   Lab Units 12/16/21  0401 12/15/21  0045 12/14/21  0138 12/13/21  0100 12/12/21  1022 12/12/21  0108 12/10/21  2319 12/10/21  1502 12/10/21  0058   SODIUM mmol/L 140 145 142 145  --  143 141  --  139   POTASSIUM mmol/L 3.3* 4.0 3.6 3.6 4.0 3.3* 3.5  3.5   < > 3.0*   CHLORIDE mmol/L 105 112* 110* 110*  --  107 107  --  101   CO2 mmol/L 20.6* 20.0* 18.6* 23.8  --  22.3 23.9  --  26.9   BUN  mg/dL 18 15 20 25*  --  24* 29*  --  29*   CREATININE mg/dL 1.72* 1.68* 1.77* 2.31*  --  2.14* 2.40*  --  2.89*   CALCIUM mg/dL 6.1* 6.3* 6.8* 7.8*  --  8.5* 9.5  --  10.4   GLUCOSE mg/dL 114* 92 95 104*  --  94 85  --  83   ALT (SGPT) U/L 10 9 12 9  --  11 12  --   --    AST (SGOT) U/L 13 12 17 13  --  13 12  --   --     < > = values in this interval not displayed.     Results from last 7 days   Lab Units 12/12/21  0744 12/12/21  0108   TROPONIN T ng/mL 0.020 0.023     No results found for: INR  Lab Results   Component Value Date    MG 1.3 (L) 12/16/2021    MG 1.7 12/15/2021    MG 1.5 (L) 12/14/2021        Imaging Results (Last 48 Hours)     ** No results found for the last 48 hours. **        No results found for: BNP    Lab Results   Component Value Date    ABSOLUTELUNG 37 (A) 12/14/2021       Echo   Results for orders placed during the hospital encounter of 12/08/21    Adult Transthoracic Echo Limited W/ Cont if Necessary Per Protocol    Interpretation Summary  · Limited study to evaluate LV function in a patient with ACS.  · Findings-  · Normal left ventricular cavity size and wall thickness noted.  · Left ventricular systolic function is normal. ''  · All left ventricular wall segments contract normally.  · Left ventricular ejection fraction appears to be 61 - 65%.  · Left ventricular diastolic function is consistent with (grade I) impaired relaxation.     I reviewed the patient's new clinical results.    Telemetry: Normal sinus rhythm 80s    Medication Review:   amLODIPine, 5 mg, Oral, Daily  anastrozole, 1 mg, Oral, Daily  apixaban, 2.5 mg, Oral, Q12H  atenolol, 50 mg, Oral, Q24H  furosemide, 40 mg, Intravenous, Q12H  magnesium sulfate in D5W 1g/100mL (PREMIX), 1 g, Intravenous, Q1H  nystatin, 5 mL, Swish & Spit, 4x Daily  pantoprazole, 40 mg, Oral, Q AM  piperacillin-tazobactam, 3.375 g, Intravenous, Q8H  potassium chloride, 40 mEq, Oral, Q4H  potassium phosphate, 9 mmol, Intravenous, Once  sodium chloride,  10 mL, Intravenous, Q12H  sodium chloride, 10 mL, Intravenous, Q12H  Vancomycin Pharmacy Intermittent Dosing, , Does not apply, Daily             Assessment      Assessment:  1. Acute HFpEF, improving  2. New onset atrial fibrillation, patient is back in sinus rhythm.  3. Right lower lobe pneumonia, being managed by the hospital service  4. Acute kidney injury, creatinine is slightly worse today.    Plan     Recommendations:  1. Change to po diuretics.   2. Adjust her antihypertensive medications since you seem to be running low blood pressure.       I discussed the patients findings and my recommendations with patient.    Electronically signed by PRISCILLA Mckeon, 12/16/21, 8:59 AM EST.    Electronically signed by Kenton Coombs MD, 12/16/21, 6:43 PM EST.    Please note that portions of this note were completed with a voice recognition program.

## 2021-12-17 NOTE — CASE MANAGEMENT/SOCIAL WORK
Discharge Planning Assessment   [facility]     Patient Name: Geeta Renee  MRN: [id number]  Today's Date: 12/17/2021    Admit Date: 12/8/2021       Discharge Plan     Row Name 12/17/21 0902       Plan    Plan SS spoke with pt and pt's son both who are agreeable to short term nursing home placement for rehab with [facility] as preference.  SS faxed pt information via Epic and notified Erlinda.  SS will follow.              Continued Care and Services - Admitted Since 12/8/2021     Destination     Service Provider Request Status Selected Services Address Phone Fax Patient Preferred    [facility]  Pending - Request Sent N/A [address] [facility] [place] [address] [phone] --                 BRIDGER Cohn

## 2021-12-17 NOTE — PROGRESS NOTES
LOS: 9 days     Name: Geeta Renee  Age/Sex: 86 y.o. female  :  1935        PCP: Alejandra Dozier APRN  REF: No ref. provider found    Principal Problem:    Acute pancreatitis      Reason for follow-up: New onset atrial fibrillation and congestive heart failure    Subjective       Subjective     Geeta Renee is a 86-year-old female with a past medical history significant for esophageal cancer breast cancer, Addison esophagus, GERD, arthritis, hypertension and hiatal hernia.  Patient presented to the ER with complaints of abdominal pain.     Interval History: Patient reports she is breathing better today. Creatinine improved to 1.49. Currently back in atrial fibrillation but with a controlled rate.    Vital Signs  Temp:  [97.6 °F (36.4 °C)-98.4 °F (36.9 °C)] 98.1 °F (36.7 °C)  Heart Rate:  [71-85] 85  Resp:  [18-20] 20  BP: ()/(57-70) 129/70     Vital Signs (last 72 hrs)        0700  12/15 0659 12/15 0700   0659  0700   0659  0700   0904   Most Recent      Temp (°F) 97.3 -  98.2    97.7 -  98.7    97.6 -  98.4       98.1 (36.7)  0654    Heart Rate 73 -  80    68 -  83    71 -  85       85  0654    Resp   22    18 -  20    18 -  20       20  0654    BP 98/67 -  125/68    102/68 -  117/67    94/58 -  142/70       129/70  0654    SpO2 (%) 91 -  95    91 -  96    94 -  97       97  0654        Documented weights    21 1158 21 0500 21 1345 12/10/21 0517   Weight: 85.3 kg (188 lb) 79.7 kg (175 lb 9.6 oz) 79.7 kg (175 lb 9.6 oz) 79.9 kg (176 lb 3.2 oz)    21 0500 21 0121 21 0449 21 0500   Weight: 79.8 kg (175 lb 14.4 oz) 84 kg (185 lb 3.2 oz) 84 kg (185 lb 3.2 oz) 84.1 kg (185 lb 6.4 oz)    21 0500 12/15/21 0500 21 0500 21 0500   Weight: 84.9 kg (187 lb 1.6 oz) 83.9 kg (184 lb 14.4 oz) 84.2 kg (185 lb 11.2 oz) 83.5 kg (184 lb)      Body mass index is 31.58 kg/m².    Intake/Output  Summary (Last 24 hours) at 12/17/2021 0904  Last data filed at 12/17/2021 0300  Gross per 24 hour   Intake 920 ml   Output 2000 ml   Net -1080 ml     Objective    Objective       Physical Exam:     General Appearance:    Alert, cooperative, in no acute distress   Head:    Normocephalic, without obvious abnormality, atraumatic   Eyes:            Conjunctivae and sclerae normal, no   icterus, no pallor, corneas clear.   Neck:   No adenopathy, supple, trachea midline, no thyromegaly, no   carotid bruit, no JVD   Lungs:     Clear to auscultation,respirations regular, even and                  unlabored    Heart:    irregular rhythm and normal rate, normal S1 and S2, no            murmur, no gallop, no rub, no click   Chest Wall:    No abnormalities observed   Abdomen:     Normal bowel sounds, no masses, no organomegaly, soft        non-tender, non-distended, no guarding, no rebound                tenderness   Extremities:   Moves all extremities well, no edema, no cyanosis, no             redness   Pulses:   Pulses palpable and equal bilaterally   Skin:   No bleeding, bruising or rash       Neurologic:  Alert and oriented     Results review       Results Review:   Results from last 7 days   Lab Units 12/17/21  0451 12/16/21  0401 12/15/21  0045 12/14/21  0138 12/13/21  0100 12/12/21  0108 12/10/21  2319   WBC 10*3/mm3 12.92* 13.08* 11.92* 12.23* 9.53 12.47* 13.93*   HEMOGLOBIN g/dL 8.9* 9.3* 8.9* 9.2* 9.0* 10.7* 10.0*   PLATELETS 10*3/mm3 236 250 247 251 242 241 212     Results from last 7 days   Lab Units 12/17/21  0451 12/16/21  1636 12/16/21  0401 12/15/21  0045 12/14/21  0138 12/13/21  0100 12/12/21  1022 12/12/21  0108 12/12/21  0108 12/10/21  2319 12/10/21  2319   SODIUM mmol/L 138  --  140 145 142 145  --   --  143  --  141   POTASSIUM mmol/L 3.5 3.6 3.3* 4.0 3.6 3.6 4.0   < > 3.3*   < > 3.5  3.5   CHLORIDE mmol/L 106  --  105 112* 110* 110*  --   --  107  --  107   CO2 mmol/L 19.4*  --  20.6* 20.0* 18.6* 23.8   --   --  22.3  --  23.9   BUN mg/dL 17  --  18 15 20 25*  --   --  24*  --  29*   CREATININE mg/dL 1.49*  --  1.72* 1.68* 1.77* 2.31*  --   --  2.14*  --  2.40*   CALCIUM mg/dL 8.0*  --  6.1* 6.3* 6.8* 7.8*  --   --  8.5*  --  9.5   GLUCOSE mg/dL 110*  --  114* 92 95 104*  --   --  94  --  85   ALT (SGPT) U/L 11  --  10 9 12 9  --   --  11  --  12   AST (SGOT) U/L 18  --  13 12 17 13  --   --  13  --  12    < > = values in this interval not displayed.     Results from last 7 days   Lab Units 12/12/21  0744 12/12/21  0108   TROPONIN T ng/mL 0.020 0.023     No results found for: INR  Lab Results   Component Value Date    MG 1.6 12/17/2021    MG 1.3 (L) 12/16/2021    MG 1.7 12/15/2021     Lab Results   Component Value Date    TSH 3.070 12/08/2021    CHLPL 149 07/15/2015    TRIG 172 (H) 07/15/2015    HDL 39 (L) 07/15/2015    LDL 75 07/15/2015      Imaging Results (Last 48 Hours)     Procedure Component Value Units Date/Time    XR Chest AP [[id number]] Collected: 12/16/21 1050     Updated: 12/16/21 1052    Narrative:      EXAM:    XR Chest, 1 View     EXAM DATE:    12/16/2021 9:57 AM     CLINICAL HISTORY:    follow up pneumonia, respiratory failure; N17.9-Acute kidney failure,  unspecified; E87.6-Hypokalemia; E83.52-Hypercalcemia; K85.90-Acute  pancreatitis without necrosis or infection, unspecified     TECHNIQUE:    Frontal view of the chest.     COMPARISON:    12/12/2021     FINDINGS:    LUNGS:  Bibasilar atelectasis.    PLEURAL SPACE:  Small left pleural effusion and tiny right pleural  effusion.  No pneumothorax.    HEART:  Heart size is stable.    MEDIASTINUM:  Unremarkable.    BONES/JOINTS:  Unremarkable.       Impression:        Small left pleural effusion and tiny right pleural effusion.     This report was finalized on 12/16/2021 10:50 AM by Dr. Virgil Payton MD.           No results found for: BNP    Lab Results   Component Value Date    ABSOLUTELUNG 37 (A) 12/14/2021         Echo   Results for orders placed  during the hospital encounter of 12/08/21    Adult Transthoracic Echo Limited W/ Cont if Necessary Per Protocol    Interpretation Summary  · Limited study to evaluate LV function in a patient with ACS.  · Findings-  · Normal left ventricular cavity size and wall thickness noted.  · Left ventricular systolic function is normal. ''  · All left ventricular wall segments contract normally.  · Left ventricular ejection fraction appears to be 61 - 65%.  · Left ventricular diastolic function is consistent with (grade I) impaired relaxation.     I reviewed the patient's new clinical results.    Telemetry: Atrial fibrillation 80s    Medication Review:   [START ON 12/30/2021] amiodarone, 200 mg, Oral, Q24H  amiodarone, 400 mg, Oral, Q24H  anastrozole, 1 mg, Oral, Daily  apixaban, 2.5 mg, Oral, Q12H  atenolol, 50 mg, Oral, Q24H  furosemide, 20 mg, Oral, Daily  magnesium sulfate, 2 g, Intravenous, Once  nystatin, 5 mL, Swish & Spit, 4x Daily  pantoprazole, 40 mg, Oral, Q AM  piperacillin-tazobactam, 3.375 g, Intravenous, Q8H  potassium chloride, 40 mEq, Oral, Q4H  sodium chloride, 10 mL, Intravenous, Q12H  sodium chloride, 10 mL, Intravenous, Q12H             Assessment      Assessment:  1. Acute HFpEF, continues to improve  2. New onset atrial fibrillation patient back in atrial fibrillation was in sinus rhythm  3. Right lower lobe pneumonia  4. Acute kidney injury        Plan     Recommendations:  1. Patient is back in atrial fibrillation she is rate controlled currently we will continue with the amiodarone load  2. Continue anticoagulation  3. Continue with diuresing, creatinine is improving    I discussed the patients findings and my recommendations with patient and family      Electronically signed by PRISCILLA Mckeon, 12/17/21, 9:04 AM EST.  Electronically signed by Martita Gonsalez MD, 12/17/21, 12:11 PM EST.    Please note that portions of this note were completed with a voice recognition program.

## 2021-12-17 NOTE — CASE MANAGEMENT/SOCIAL WORK
Discharge Planning Assessment   [facility]     Patient Name: Geeta Renee  MRN: [id number]  Today's Date: 12/17/2021    Admit Date: 12/8/2021       Discharge Plan     Row Name 12/17/21 1544       Plan    Plan [facility] meli Coffey can accept pt early next week.  Pt aware and agreeable.  SS will follow up am Monday.    Row Name 12/17/21 9162       Plan    Plan Comments Titrate O2 and monitor. Ca+ 9.2, cont. Atenolol with parameters-mon. BP. Cards following, cont diuresing. VSS, Afeb., sats 98% on 4lnc, 2000ml uop, Amio po, liquis, lasix po, zosyn IV. Bun 17/cr. 1.49, wbc 12.9. PT/OT, inpt rehab cst (P)              Continued Care and Services - Admitted Since 12/8/2021     Destination     Service Provider Request Status Selected Services Address Phone Fax Patient Preferred    [facility]  Pending - Request Sent N/A [address] [place] [address] [phone] --              Luisa Currie, GENESISW

## 2021-12-17 NOTE — DISCHARGE PLACEMENT REQUEST
"Geeta Renee (86 y.o. Female)             Date of Birth Social Security Number Address Home Phone MRN    [id number]  [address] [facility] [address] [phone] [id number]    Episcopal Marital Status             Roman Catholic        Admission Date Admission Type Admitting Provider Attending Provider Department, Room/Bed    12/8/21 Emergency Shar Marr MD Grace, Aimee Russell, DO [address], 3305/2S    Discharge Date Discharge Disposition Discharge Destination                         Attending Provider: Rochelle Byrd DO    Allergies: Codeine, Sulfa Antibiotics    Isolation: None   Infection: None   Code Status: CPR   Advance Care Planning Activity    Ht: 162.6 cm (64\")   Wt: 83.5 kg (184 lb)    Admission Cmt: None   Principal Problem: Acute pancreatitis [K85.90]                 Active Insurance as of 12/8/2021     Primary Coverage     Payor Plan Insurance Group Employer/Plan Group    MEDICARE MEDICARE A & B      Payor Plan Address Payor Plan Phone Number Payor Plan Fax Number Effective Dates    [address] [phone]  11/1/2001 - None Entered    Formerly [facility] [id number]       Subscriber Name Subscriber Birth Date Member ID       GEETA RENEE 1935 [id number]           Secondary Coverage     Payor Plan Insurance Group Employer/Plan Group    [employer] PLAN G     Payor Plan Address Payor Plan Phone Number Payor Plan Fax Number Effective Dates    [address] [phone]  1/1/2016 - None Entered    [facility] [id number]       Subscriber Name Subscriber Birth Date Member ID       GEETA RENEE 1935 [id number]                 Emergency Contacts      (Rel.) Home Phone Work Phone Mobile Phone    Ash Renee (Son) [phone] -- [phone]            Emergency Contact Information     Name Relation Home Work Mobile    Ash Renee Son [phone]          Insurance " Information                MEDICARE/MEDICARE A & B Phone: [phone]    Subscriber: Geeta Renee Subscriber#: [id number]    Group#: -- Precert#: --        [employer] Phone: [phone]    Subscriber: Geeta Renee Subscriber#: [id number]    Group#: PLAN G Precert#: --          Treatment Team  Chat With All Active Members    Provider Relationship Specialty Contact    Rochelle Byrd DO  Attending --  [phone]    Danette Dozier, JANEY  Respiratory Therapist --  [phone]    Cirilo Cho  Patient Care Technician --     Natalia Fleming APRN  Nurse Practitioner Cardiology  [phone]    Eva Stubbs MD  Consulting Physician Cardiology  [phone]    Trent Salinas MD Registered Nurse Family Medicine  [phone]    Sherie Desir RN  Registered Nurse --     Evelyn Coe RN  Registered Nurse --  [phone]          Problem List           Codes Noted - Resolved       Hospital    * (Principal) Acute pancreatitis ICD-10-CM: K85.90  ICD-9-CM: 577.0 2021 - Present       Non-Hospital    AAA (abdominal aortic aneurysm) (HCC) ICD-10-CM: I71.4  ICD-9-CM: 441.4 2021 - Present    Aromatase inhibitor use ICD-10-CM: Z79.811  ICD-9-CM: V07.52 1/15/2020 - Present    Osteoporosis ICD-10-CM: M81.0  ICD-9-CM: 733.00 2020 - Present    Malignant neoplasm of upper-outer quadrant of left breast in female, estrogen receptor positive (HCC) ICD-10-CM: C50.412, Z17.0  ICD-9-CM: 174.4, V86.0 2019 - Present    Esophageal adenocarcinoma (HCC) ICD-10-CM: C15.9  ICD-9-CM: 150.9 2018 - Present             History & Physical      Shar Marr MD at 21 2110          Hospitalist History and Physical        Patient Identification  Name: Geeta Renee  Age/Sex: 86 y.o. female  :  1935        MRN: [id number]  Visit Number: [id number]  Admit Date: 2021   PCP: Alejandra Dozier APRN Chief  complaint abdominal pain    History of Present Illness:  Patient is a 86 y.o. female with history of esophageal cancer and breast cancer, both in remission, grider esophagus, GERD, arthritis, HTN, and hiatal hernia, who presents with complaints of worsening epigastric abdominal pain over the last 2-3 days. She describes the pain as sharp but also cramping, with radition into her back. Pain is exacerbated by eating anything solid, but she can tolerate liquids without increased pain. She reports accompanying nausea with several bouts of vomiting. She also reports diarrhea, but attributes this to prilosec which she recently quit taking as a result. She denies chest pain, dyspnea, fever or chills. She does report generalized weakness, fatigue and even some ataxia making it difficult to perform basic tasks or even walk.     Review of Systems  Review of Systems   Constitutional: Positive for activity change, appetite change and fatigue. Negative for chills, diaphoresis, fever and unexpected weight change.   HENT: Negative for postnasal drip, rhinorrhea, sinus pressure, sinus pain and sore throat.    Eyes: Negative for photophobia, pain, discharge, redness, itching and visual disturbance.   Respiratory: Negative for cough, shortness of breath and wheezing.    Cardiovascular: Negative for chest pain, palpitations and leg swelling.   Gastrointestinal: Positive for abdominal pain, diarrhea, nausea and vomiting. Negative for abdominal distention and constipation.   Endocrine: Negative for cold intolerance, heat intolerance, polydipsia, polyphagia and polyuria.   Genitourinary: Negative for difficulty urinating, dysuria, flank pain, frequency and hematuria.   Musculoskeletal: Positive for back pain (radiating from abdomen). Negative for arthralgias, joint swelling, myalgias, neck pain and neck stiffness.   Neurological: Positive for weakness (generalized). Negative for dizziness, tremors, seizures, syncope, light-headedness,  numbness and headaches.        Ataxia   Hematological: Negative for adenopathy. Does not bruise/bleed easily.   Psychiatric/Behavioral: Negative for agitation, behavioral problems and confusion.       History  Past Medical History:   Diagnosis Date   • Arthritis    • Breast cancer (HCC)    • Breast lump     left   • Esophageal cancer (HCC)    • GERD (gastroesophageal reflux disease)    • Hiatal hernia    • Hypertension    • Snoring      Past Surgical History:   Procedure Laterality Date   • BREAST LUMPECTOMY Left 2019    Procedure: BREAST LUMPECTOMY WITH ULTRASOUND;  Surgeon: Nurys Selby MD;  Location: [facility];  Service: General   • CATARACT EXTRACTION, BILATERAL  09/10/2019   • CHOLECYSTECTOMY     • COLONOSCOPY     • ELBOW PROCEDURE Right    • ENDOSCOPY     • FRACTURE SURGERY      right elbow     Family History   Problem Relation Age of Onset   • Hypertension Mother    • Alzheimer's disease Mother    • Heart disease Brother    • Tuberculosis Father    • Breast cancer Neg Hx      Social History     Tobacco Use   • Smoking status: Former Smoker     Packs/day: 0.50     Years: 30.00     Pack years: 15.00     Types: Cigarettes     Quit date:      Years since quittin.9   • Smokeless tobacco: Never Used   Vaping Use   • Vaping Use: Never used   Substance Use Topics   • Alcohol use: No   • Drug use: No     (Not in a hospital admission)    Allergies:  Codeine and Sulfa antibiotics    Objective     Vital Signs  Temp:  [98.5 °F (36.9 °C)] 98.5 °F (36.9 °C)  Heart Rate:  [50-87] 68  Resp:  [16] 16  BP: (111-164)/(63-99) 131/73  Body mass index is 32.27 kg/m².    Physical Exam:  Physical Exam  Constitutional:       Comments: Pleasant elderly female, no acute distress but appears fatigued   HENT:      Head: Normocephalic and atraumatic.      Right Ear: External ear normal.      Left Ear: External ear normal.      Nose: Nose normal.      Mouth/Throat:      Mouth: Mucous membranes are dry.      Pharynx:  Oropharynx is clear.   Eyes:      Extraocular Movements: Extraocular movements intact.      Conjunctiva/sclera: Conjunctivae normal.      Pupils: Pupils are equal, round, and reactive to light.   Cardiovascular:      Rate and Rhythm: Normal rate and regular rhythm.      Pulses: Normal pulses.      Heart sounds: Normal heart sounds. No murmur heard.      Pulmonary:      Effort: Pulmonary effort is normal. No respiratory distress.      Breath sounds: Normal breath sounds. No wheezing or rales.   Abdominal:      General: Abdomen is flat. Bowel sounds are normal. There is no distension.      Palpations: Abdomen is soft.      Tenderness: There is abdominal tenderness (epigstric region). There is guarding. There is no rebound.   Musculoskeletal:         General: Normal range of motion.      Cervical back: Normal range of motion and neck supple.      Right lower leg: Edema (trace around ankles) present.      Left lower leg: Edema (trace around ankles) present.   Lymphadenopathy:      Cervical: No cervical adenopathy.   Skin:     General: Skin is warm and dry.      Capillary Refill: Capillary refill takes less than 2 seconds.      Coloration: Skin is not jaundiced or pale.   Neurological:      General: No focal deficit present.      Comments: Finger-to-nose coordination intact. Upper extremity strength 5/5 and symmetric; lower extremity strength 4/5 and symmetric   Psychiatric:         Mood and Affect: Mood normal.         Behavior: Behavior normal.         Thought Content: Thought content normal.         Judgment: Judgment normal.           Results Review:       Lab Results:  Results from last 7 days   Lab Units 12/08/21  1158   WBC 10*3/mm3 15.95*   HEMOGLOBIN g/dL 12.5   PLATELETS 10*3/mm3 255     Results from last 7 days   Lab Units 12/08/21  1158   CRP mg/dL 4.13*     Results from last 7 days   Lab Units 12/08/21  2031 12/08/21  1622 12/08/21  1158 12/07/21  1118   SODIUM mmol/L 139 137 136 136   POTASSIUM mmol/L 2.3*  1.9* 2.0* 2.3*   CHLORIDE mmol/L 94* 93* 89* 86*   CO2 mmol/L 33.5* 30.3* 33.6* 37.9*   BUN mg/dL 28* 27* 29* 22   CREATININE mg/dL 3.35* 3.28* 3.51* 3.37*   CALCIUM mg/dL 13.8* 14.1* 15.5* 16.9*   GLUCOSE mg/dL 121* 110* 113* 121*     Results from last 7 days   Lab Units 12/08/21 2031 12/08/21  1310   MAGNESIUM mg/dL 1.4* 1.1*     No results found for: HGBA1C  Results from last 7 days   Lab Units 12/08/21  1622 12/08/21  1158 12/07/21  1118   BILIRUBIN mg/dL 0.5 0.5 0.5   ALK PHOS U/L 90 100 100   AST (SGOT) U/L 19 23 26   ALT (SGPT) U/L 17 19 23     Results from last 7 days   Lab Units 12/08/21 2031 12/08/21 1622 12/08/21  1158   TROPONIN T ng/mL 0.262* 0.255* 0.324*             Results from last 7 days   Lab Units 12/08/21 2037   PH, ARTERIAL pH units 7.531*   PO2 ART mm Hg 73.2*   PCO2, ARTERIAL mm Hg 49.8*   HCO3 ART mmol/L 41.7*       I have reviewed the patient's laboratory results.    Imaging:  Imaging Results (Last 72 Hours)     Procedure Component Value Units Date/Time    CT Abdomen Pelvis Without Contrast [[id number]] Collected: 12/08/21 1426     Updated: 12/08/21 1459    Narrative:      EXAM:    CT Abdomen and Pelvis Without Intravenous Contrast     EXAM DATE:    12/8/2021 1:39 PM     CLINICAL HISTORY:    Abdominal pain, acute, nonlocalized     TECHNIQUE:    Axial computed tomography images of the abdomen and pelvis without  intravenous contrast.  Sagittal and coronal reformatted images were  created and reviewed.  This CT exam was performed using one or more of  the following dose reduction techniques:  automated exposure control,  adjustment of the mA and/or kV according to patient size, and/or use of  iterative reconstruction technique.     COMPARISON:    09/20/2021     FINDINGS:    Lung bases:  Lung bases appear clear.    Heart:  Cardiomegaly is noted.    Mediastinum:  Moderate hiatal hernia.      ABDOMEN:    Liver:  Unremarkable.    Gallbladder and bile ducts:  Unremarkable.  No calcified stones.   No  ductal dilation.    Pancreas:  Inflammation surrounds the head of the pancreas and along  the second portion of the duodenum which is most consistent with acute  pancreatitis.  No ductal dilation.    Spleen:  Unremarkable.  No splenomegaly.    Adrenals:  Unremarkable.  No mass.    Kidneys and ureters:  Unremarkable.  No obstructing stones.  No  hydronephrosis.    Stomach and bowel:  Unremarkable.  No obstruction.  No mucosal  thickening.      PELVIS:    Appendix:  No findings to suggest acute appendicitis.    Bladder:  Unremarkable.  No stones.    Reproductive:  Unremarkable as visualized.      ABDOMEN and PELVIS:    Intraperitoneal space:  No pneumoperitoneum is identified.  No  loculated fluid collections.    Bones/joints:  No acute fracture.  No dislocation.    Soft tissues:  Unremarkable.    Vasculature:  Abdominal aortic aneurysm is 5.9 cm previously 5.7 cm  probably indicating no significant change given differences in  technique.    Lymph nodes:  Unremarkable.  No enlarged lymph nodes.    Other findings:  No pseudocyst identified.       Impression:      1.  CT findings which are most consistent with acute pancreatitis. No  evidence of hemorrhage or pseudocysts.  2.  Stable abdominal aortic aneurysm as detailed above. 5.9 cm.  3.  Other incidental and nonacute findings as above appear stable.     This report was finalized on 12/8/2021 2:57 PM by Dr. Hernando Banks MD.       XR Chest 1 View [[id number]] Collected: 12/08/21 1426     Updated: 12/08/21 1428    Narrative:      EXAM:    XR Chest, 1 View     EXAM DATE:    12/8/2021 1:34 PM     CLINICAL HISTORY:    pain     TECHNIQUE:    Frontal view of the chest.     COMPARISON:    No relevant prior studies available.     FINDINGS:    Lungs:  Unremarkable.  No consolidation.    Pleural space:  Unremarkable.  No pneumothorax.    Heart:  Cardiomegaly is noted.    Mediastinum:  Unremarkable.    Bones/joints:  Degenerative changes bilateral shoulders.        Impression:        Cardiomegaly. Otherwise unremarkable chest.     This report was finalized on 12/8/2021 2:26 PM by Dr. Hernando Banks MD.       CT Head Without Contrast [[id number]] Collected: 12/08/21 1424     Updated: 12/08/21 1428    Narrative:      EXAM:    CT Head Without Intravenous Contrast     EXAM DATE:    12/8/2021 1:39 PM     CLINICAL HISTORY:    Mental status change, unknown cause     TECHNIQUE:    Axial computed tomography images of the head/brain without intravenous  contrast.  Sagittal and coronal reformatted images were created and  reviewed.  This CT exam was performed using one or more of the following  dose reduction techniques:  automated exposure control, adjustment of  the mA and/or kV according to patient size, and/or use of iterative  reconstruction technique.     COMPARISON:    No relevant prior studies available.     FINDINGS:    Brain:  Moderate generalized cerebral atrophy which is in part  age-related.  Mild chronic small vessel ischemic disease noted.  No  hemorrhage.    Ventricles:  Unremarkable.  No ventriculomegaly.    Bones/joints:  Unremarkable.  No acute fracture.    Soft tissues:  Unremarkable.    Sinuses:  Unremarkable as visualized.  No acute sinusitis.    Mastoid air cells:  Unremarkable as visualized.  No mastoid effusion.       Impression:      1.  Generalized cerebral atrophy and mild chronic small vessel ischemic  disease.  2.  No CT evidence of acute intracranial abnormality.     This report was finalized on 12/8/2021 2:26 PM by Dr. Hernando Banks MD.             I have personally reviewed the patient's radiologic imaging.        EKG:   Normal sinus rhythm, HR 83, QTc 512 (prolonged)  Nonspecific ST and T wave abnormality  Abnormal ECG  When compared with ECG of 15-NOV-2019 10:08,  ST now depressed in Inferior leads  ST now depressed in Lateral leads  QT has lengthened  Confirmed by Kenton Coombs ([id number]) on 12/8/2021 6:53:01 PM    I have personally reviewed the patient's  EKG. Agree that ST and T wave changes are nonspecific; I do not appreciate any overt ST depression or elevation.         Assessment/Plan     - Acute pancreatitis: treat with aggressive IV fluid hydration, bowel rest with exception of sips with meds, and antiemetics and analgesics if needed. No LFT or bilirubin elevation to suggest obstructive etiology, and no gallstones or signs of biliary obstruction such as common bile duct dilation mentioned on CT imaging either. Patient denies alcohol use. Etiology possibly medication-induced as prelim home med list includes a couple PPI's and HCTZ. Hold off on further antibiotics at this time as no evidence of pancreatic abscess or necrosis noted on CT imaging. Continue to trend amylase and lipase along with inflammatory markers.  - Severe electrolyte derangements, including hypokalemia, hypomagnesemia, and hypercalcemia: likely secondary to above, in combination with GI fluid losses from vomiting/diarrhea and effects of home HCTZ (if confirmed by pharmacy as home med) which could cause both low K+/mag as well as high calcium. Calcitonin given in ED and additional dose ordered by nephrology, along with aggressive IV fluid hydration. Replace K+ through IV fluids as well as orally per nephrology recommendations. Magnesium replacement protocol ordered (renally dosed). Continue to monitor electrolytes closely.   - LILIA superimposed on stage III CKD: baseline creatinine and GFR around 1-1.3 and 40s, respectively. Cr currently 3.35. BUN 28, with BUN:cr ratio of 8.4. Nephrology consulted, aggressive IV fluid hydration ordered. Bentley placed per nephrology recommendation as well for more accurate I/O's. Continue to monito urine output and renal function closely.   - Hypertension: BP currently controlled at 131/73. Hold off nephrotoxic home agents. Will make IV hydralazine available PRN.   - Elevated troponin: suspect likely skewed from LILIA. Troponin trending downward overall since  initiation of aggressive IV fluids; continue to trend overnight. EKG showed only nonspecific ST and T wave changes. Patient denies any chest pain or dyspnea. Evaluate further with ECHO in the morning.   - QT prolongation: anticipate will shorten as K+ and mag improve. Repeat EKG in the morning. Avoid QT prolonging agents in the mean time, including most antiemetics (PO phenergan ordered PRN for now).    - Ataxia: CT head showed no acute abnormality. Likely secondary to electrolyte derangements above. Finger-to-nose coordination intact on my exam. No focal deficits appreciated. Neuro checks ordered q4h while addressing electrolytes.   - History of esophageal cancer and breast cancer, currently in remission: followed by [facility].     DVT Prophylaxis: SQ heparin    Estimated Length of Stay >2 midnights    I discussed the patient's findings, assessment and plan with the patient and nursing staff in the PCU.     * patient is high risk due to acute pancreatitis, severe hypokalemia, severe hypomagnesemia, severe hypercalcemia, acute on CKD stage III, troponin elevation, QT prolongation    Shar Marr MD  12/08/21  21:10 EST      Electronically signed by Shar Marr MD at 12/08/21 2231       Vital Signs (last day)     Date/Time Temp Temp src Pulse Resp BP Patient Position SpO2    12/17/21 0654 98.1 (36.7) Oral 85 20 129/70 Lying 97    12/17/21 0317 98 (36.7) Oral 75 20 142/70 Lying 96    12/16/21 1842 97.8 (36.6) Oral 79 18 101/57 Lying 95    12/16/21 1443 97.6 (36.4) Oral 72 18 94/58 Lying 94    12/16/21 1052 98.4 (36.9) Oral 71 18 105/68 Lying 97    12/16/21 0647 97.9 (36.6) Oral 83 18 117/67 Lying 93    12/16/21 0236 98.7 (37.1) Oral 77 18 110/59 Lying 96    12/16/21 0112 -- -- 78 -- 108/62 Lying --          Lines, Drains & Airways     Active LDAs     Name Placement date Placement time Site Days    Peripheral IV 12/14/21 1309 Anterior; Left; Upper Arm 12/14/21  1309  Arm  2     Peripheral IV 12/16/21 0837 Anterior; Right Forearm 12/16/21  0837  Forearm  1    Peripheral IV 12/16/21 0838 Anterior; Distal; Left Forearm 12/16/21  0838  Forearm  1    Urethral Catheter 16 Fr. 12/08/21  4064   8                  Current Facility-Administered Medications   Medication Dose Route Frequency Provider Last Rate Last Admin   • [START ON 12/30/2021] amiodarone (PACERONE) tablet 200 mg  200 mg Oral Q24H Natalia Fleming APRN       • amiodarone (PACERONE) tablet 400 mg  400 mg Oral Q24H Natalia Fleming APRN   400 mg at 12/17/21 0804   • anastrozole (ARIMIDEX) tablet 1 mg  1 mg Oral Daily Shar Marr MD   1 mg at 12/17/21 0804   • apixaban (ELIQUIS) tablet 2.5 mg  2.5 mg Oral Q12H Eva Stubbs MD   2.5 mg at 12/17/21 0804   • atenolol (TENORMIN) tablet 50 mg  50 mg Oral Q24H Shar Marr MD   50 mg at 12/17/21 0804   • calcium gluconate 1 g in sodium chloride 0.9 % 100 mL IVPB  1 g Intravenous PRN Rochelle Byrd DO        And   • calcium gluconate 6 g in sodium chloride 0.9 % 500 mL IVPB  6 g Intravenous PRN Rochelle Byrd DO       • furosemide (LASIX) tablet 20 mg  20 mg Oral Daily Natalia Fleming APRN   20 mg at 12/17/21 0804   • hydrALAZINE (APRESOLINE) injection 10 mg  10 mg Intravenous Q6H PRN Shar Marr MD       • loperamide (IMODIUM) capsule 2 mg  2 mg Oral 4x Daily PRN Rochelle Byrd DO   2 mg at 12/16/21 1633   • magnesium sulfate 4 gram infusion - Mg less than or equal to 1mg/dL  4 g Intravenous PRN Shar Marr MD        Or   • magnesium sulfate 3 gram infusion (1gm x 3) - Mg 1.1 - 1.5 mg/dL  1 g Intravenous PRN Shar Marr MD        Or   • Magnesium Sulfate 2 gram infusion- Mg 1.6 - 1.9 mg/dL  2 g Intravenous Shar Weldon MD       • Magnesium Sulfate 2 gram infusion- Mg 1.6 - 1.9 mg/dL  2 g Intravenous Once Rochelle Byrd DO 25 mL/hr at 12/17/21 0803 2 g at 12/17/21 0803    • nystatin (MYCOSTATIN) 100,000 unit/mL suspension 500,000 Units  5 mL Swish & Spit 4x Daily Rochelle Byrd, DO   500,000 Units at 12/17/21 0802   • pantoprazole (PROTONIX) EC tablet 40 mg  40 mg Oral Q AM Shar Marr MD   40 mg at 12/17/21 0523   • piperacillin-tazobactam (ZOSYN) IVPB 3.375 g in 100 mL NS VTB  3.375 g Intravenous Q8H Rochelle Byrd, DO   3.375 g at 12/17/21 0804   • potassium chloride (K-DUR,KLOR-CON) CR tablet 40 mEq  40 mEq Oral PRN Sandra Leon PA-C       • potassium chloride (K-DUR,KLOR-CON) CR tablet 40 mEq  40 mEq Oral Q4H Rochelle Byrd, DO   40 mEq at 12/17/21 0804   • potassium chloride (KLOR-CON) packet 40 mEq  40 mEq Oral PRN Sandra Leon PA-C       • potassium chloride 10 mEq in 100 mL IVPB  10 mEq Intravenous Q1H PRN Shar Marr MD       • potassium phosphate 21 mmol in sodium chloride 0.9 % 250 mL infusion  21 mmol Intravenous PRN Shar Marr MD        Or   • potassium phosphate 15 mmol in sodium chloride 0.9 % 100 mL infusion  15 mmol Intravenous PRN Shar Marr MD        Or   • potassium phosphate 9 mmol in sodium chloride 0.9 % 100 mL infusion  9 mmol Intravenous PRN Shar Marr MD       • promethazine (PHENERGAN) suppository 6.25 mg  6.25 mg Rectal Q6H PRN Shar Marr MD   6.25 mg at 12/09/21 2040   • promethazine (PHENERGAN) tablet 6.25 mg  6.25 mg Oral Q6H PRN Shar Marr MD   6.25 mg at 12/17/21 0039   • sodium chloride 0.9 % flush 10 mL  10 mL Intravenous PRN Shar Marr MD       • sodium chloride 0.9 % flush 10 mL  10 mL Intravenous Q12H Shar Marr MD   10 mL at 12/17/21 0805   • sodium chloride 0.9 % flush 10 mL  10 mL Intravenous PRN Shar Marr MD       • sodium chloride 0.9 % flush 10 mL  10 mL Intravenous Q12H Shar Marr MD   10 mL at 12/17/21 0805   • sodium chloride 0.9 % flush 10 mL  10 mL Intravenous PRN  Shar Marr MD           Lab Results (last 24 hours)     Procedure Component Value Units Date/Time    Comprehensive Metabolic Panel [[id number]]  (Abnormal) Collected: 12/17/21 0451    Specimen: Blood Updated: 12/17/21 0634     Glucose 110 mg/dL      BUN 17 mg/dL      Creatinine 1.49 mg/dL      Sodium 138 mmol/L      Potassium 3.5 mmol/L      Chloride 106 mmol/L      CO2 19.4 mmol/L      Calcium 8.0 mg/dL      Total Protein 5.9 g/dL      Albumin 2.42 g/dL      ALT (SGPT) 11 U/L      AST (SGOT) 18 U/L      Alkaline Phosphatase 117 U/L      Total Bilirubin 0.3 mg/dL      eGFR Non African Amer 33 mL/min/1.73      Globulin 3.5 gm/dL      A/G Ratio 0.7 g/dL      BUN/Creatinine Ratio 11.4     Anion Gap 12.6 mmol/L     Narrative:      GFR Normal >60  Chronic Kidney Disease <60  Kidney Failure <15      Magnesium [[id number]]  (Normal) Collected: 12/17/21 0451    Specimen: Blood Updated: 12/17/21 0633     Magnesium 1.6 mg/dL     CBC & Differential [[id number]]  (Abnormal) Collected: 12/17/21 0451    Specimen: Blood Updated: 12/17/21 0605    Narrative:      The following orders were created for panel order CBC & Differential.  Procedure                               Abnormality         Status                     ---------                               -----------         ------                     CBC Auto Differential[[id number]]        Abnormal            Final result                 Please view results for these tests on the individual orders.    CBC Auto Differential [[id number]]  (Abnormal) Collected: 12/17/21 0451    Specimen: Blood Updated: 12/17/21 0605     WBC 12.92 10*3/mm3      RBC 3.13 10*6/mm3      Hemoglobin 8.9 g/dL      Hematocrit 27.5 %      MCV 87.9 fL      MCH 28.4 pg      MCHC 32.4 g/dL      RDW 15.7 %      RDW-SD 50.0 fl      MPV 10.8 fL      Platelets 236 10*3/mm3      Neutrophil % 85.3 %      Lymphocyte % 6.8 %      Monocyte % 4.6 %      Eosinophil % 1.3 %      Basophil % 0.4 %      Immature Grans  % 1.6 %      Neutrophils, Absolute 11.01 10*3/mm3      Lymphocytes, Absolute 0.88 10*3/mm3      Monocytes, Absolute 0.60 10*3/mm3      Eosinophils, Absolute 0.17 10*3/mm3      Basophils, Absolute 0.05 10*3/mm3      Immature Grans, Absolute 0.21 10*3/mm3      nRBC 0.0 /100 WBC     Potassium [[id number]]  (Normal) Collected: 12/16/21 1636    Specimen: Blood Updated: 12/16/21 1818     Potassium 3.6 mmol/L     Phosphorus [[id number]]  (Abnormal) Collected: 12/16/21 1636    Specimen: Blood Updated: 12/16/21 1818     Phosphorus 2.1 mg/dL         Orders (last 24 hrs)      Start     Ordered    12/30/21 0900  amiodarone (PACERONE) tablet 200 mg  Every 24 Hours Scheduled         12/16/21 1128    12/17/21 0900  furosemide (LASIX) tablet 20 mg  Daily         12/16/21 1144    12/17/21 0800  potassium chloride (K-DUR,KLOR-CON) CR tablet 40 mEq  Every 4 Hours         12/17/21 0631    12/17/21 0800  Magnesium Sulfate 2 gram infusion- Mg 1.6 - 1.9 mg/dL  Once         12/17/21 0638    12/17/21 0720  Phosphorus  Timed         12/16/21 0137    12/17/21 0600  Magnesium  Morning Draw         12/16/21 1612    12/17/21 0600  Comprehensive Metabolic Panel  Morning Draw         12/16/21 1617    12/17/21 0600  CBC & Differential  Morning Draw         12/16/21 1617    12/17/21 0600  CBC Auto Differential  PROCEDURE ONCE         12/16/21 2210    12/16/21 1900  calcium gluconate 6 g in sodium chloride 0.9 % 500 mL IVPB  Once        Note to Pharmacy: Final concentration of bolus dose between 10 to 50 mg/mL.    12/16/21 1623    12/16/21 1800  Dietary Nutrition Supplements Boost Plus (Ensure Enlive, Ensure Plus)  Daily With Lunch & Dinner       12/16/21 1409    12/16/21 1800  calcium gluconate 1 g in sodium chloride 0.9 % 100 mL IVPB  Once,   Status:  Discontinued        Note to Pharmacy: Final concentration of bolus dose between 10 to 50 mg/mL.    12/16/21 1622    12/16/21 1800  calcium gluconate 1g/50ml 0.675% NaCl IV SOLN  Once         12/16/21  "1629    12/16/21 1614  calcium gluconate 1 g in sodium chloride 0.9 % 100 mL IVPB  As Needed        Note to Pharmacy: Final concentration of bolus dose between 10 to 50 mg/mL.   \"And\" Linked Group Details    12/16/21 1614    12/16/21 1614  calcium gluconate 6 g in sodium chloride 0.9 % 500 mL IVPB  As Needed        Note to Pharmacy: Final concentration of bolus dose between 10 to 50 mg/mL.   \"And\" Linked Group Details    12/16/21 1614    12/16/21 1613  Potassium  Timed         12/16/21 1612    12/16/21 1612  Phosphorus  Once         12/16/21 1612    12/16/21 1600  vancomycin 1250 mg/250 mL 0.9% NS IVPB ([facility])  Once         12/16/21 1425    12/16/21 1230  amiodarone (PACERONE) tablet 400 mg  Every 24 Hours Scheduled         12/16/21 1128    12/16/21 1215  Auto Discontinue in 48 Hours if not Collected  ONCE CDIFF         12/14/21 1215    12/16/21 0957  XR Chest AP  1 Time Imaging         12/15/21 1648    12/16/21 0800  potassium phosphate 9 mmol in sodium chloride 0.9 % 100 mL infusion  Once         12/16/21 0602    12/16/21 0700  magnesium sulfate 3 gram infusion (1gm x 3) - Mg 1.1 - 1.5 mg/dL  Every 1 Hour         12/16/21 0601    12/16/21 0700  potassium chloride (K-DUR,KLOR-CON) CR tablet 40 mEq  Every 4 Hours         12/16/21 0603    12/16/21 0000  piperacillin-tazobactam (ZOSYN) IVPB 3.375 g in 100 mL NS VTB  Every 8 Hours         12/15/21 1630    12/15/21 2100  nystatin (MYCOSTATIN) 100,000 unit/mL suspension 500,000 Units  4 Times Daily         12/15/21 1957    12/15/21 1400  furosemide (LASIX) injection 40 mg  Every 12 Hours,   Status:  Discontinued         12/15/21 1244    12/14/21 1350  loperamide (IMODIUM) capsule 2 mg  4 Times Daily PRN         12/14/21 1350    12/14/21 0313  potassium chloride (K-DUR,KLOR-CON) CR tablet 40 mEq  As Needed         12/14/21 0314    12/14/21 0313  potassium chloride (KLOR-CON) packet 40 mEq  As Needed         12/14/21 0314 12/13/21 1507  Up In Chair  Every Shift       " "12/13/21 1506    12/13/21 0900  amLODIPine (NORVASC) tablet 5 mg  Daily,   Status:  Discontinued         12/12/21 1539    12/12/21 1601  Ambulate Patient  Every Shift       12/12/21 1601    12/12/21 1430  apixaban (ELIQUIS) tablet 2.5 mg  Every 12 Hours Scheduled         12/12/21 1334    12/12/21 0900  Vancomycin Pharmacy Intermittent Dosing  Daily,   Status:  Discontinued         12/12/21 0356    12/12/21 0600  pantoprazole (PROTONIX) EC tablet 40 mg  Every Early Morning         12/11/21 1354    12/12/21 0600  Incentive Spirometry  Every 4 Hours While Awake       12/12/21 0337    12/09/21 1230  anastrozole (ARIMIDEX) tablet 1 mg  Daily         12/09/21 1126    12/09/21 1000  atenolol (TENORMIN) tablet 50 mg  Every 24 Hours Scheduled         12/09/21 0810    12/09/21 0804  Patient Currently On Electrolyte Replacement Protocol - Please Refer to MAR for Protocol Details  Misc Nursing Order (Specify)  Daily      Comments: Patient Currently On Electrolyte Replacement Protocol - Please Refer to MAR for Protocol Details    12/09/21 0803    12/09/21 0803  potassium chloride 10 mEq in 100 mL IVPB  Every 1 Hour PRN         12/09/21 0803    12/09/21 0506  Patient Currently On Electrolyte Replacement Protocol - Please Refer to MAR for Protocol Details  Misc Nursing Order (Specify)  Daily      Comments: Patient Currently On Electrolyte Replacement Protocol - Please Refer to MAR for Protocol Details    12/09/21 0505    12/09/21 0505  potassium phosphate 21 mmol in sodium chloride 0.9 % 250 mL infusion  As Needed        \"Or\" Linked Group Details    12/09/21 0505    12/09/21 0505  potassium phosphate 15 mmol in sodium chloride 0.9 % 100 mL infusion  As Needed        \"Or\" Linked Group Details    12/09/21 0505    12/09/21 0505  potassium phosphate 9 mmol in sodium chloride 0.9 % 100 mL infusion  As Needed        \"Or\" Linked Group Details    12/09/21 0505    12/09/21 0444  promethazine (PHENERGAN) suppository 6.25 mg  Every 6 Hours " "PRN         12/09/21 0444    12/09/21 0030  sodium chloride 0.9 % flush 10 mL  Every 12 Hours Scheduled         12/08/21 2336    12/09/21 0000  Neuro Checks  Every 4 Hours       12/08/21 2153    12/08/21 2336  sodium chloride 0.9 % flush 10 mL  As Needed         12/08/21 2336    12/08/21 2300  Vital Signs Every Hour and Per Hospital Policy Based on Patient Condition  Every Hour       12/08/21 2213    12/08/21 2300  Intake and Output  Every Hour       12/08/21 2213    12/08/21 2300  sodium chloride 0.9 % flush 10 mL  Every 12 Hours Scheduled         12/08/21 2213    12/08/21 2226  promethazine (PHENERGAN) tablet 6.25 mg  Every 6 Hours PRN         12/08/21 2226 12/08/21 2214  Daily Weights  Daily       12/08/21 2213    12/08/21 2214  Oral care for patients not on NPPV or intubated  Every Shift      Comments: Oral care for patients not on NPPV or intubated    12/08/21 2213    12/08/21 2213  sodium chloride 0.9 % flush 10 mL  As Needed         12/08/21 2213 12/08/21 2125  hydrALAZINE (APRESOLINE) injection 10 mg  Every 6 Hours PRN         12/08/21 2125 12/08/21 2105  Patient Currently On Electrolyte Replacement Protocol - Please Refer to MAR for Protocol Details  Misc Nursing Order (Specify)  Daily      Comments: Patient Currently On Electrolyte Replacement Protocol - Please Refer to MAR for Protocol Details    12/08/21 2104 12/08/21 2104  magnesium sulfate 4 gram infusion - Mg less than or equal to 1mg/dL  As Needed        \"Or\" Linked Group Details    12/08/21 2104 12/08/21 2104  magnesium sulfate 3 gram infusion (1gm x 3) - Mg 1.1 - 1.5 mg/dL  As Needed        \"Or\" Linked Group Details    12/08/21 2104 12/08/21 2104  Magnesium Sulfate 2 gram infusion- Mg 1.6 - 1.9 mg/dL  As Needed        \"Or\" Linked Group Details    12/08/21 2104 12/08/21 2029  Urinary Catheter Care  Every Shift      \"And\" Linked Group Details    12/08/21 2029 12/08/21 1300  sodium chloride 0.9 % flush 10 mL  As Needed      " "  \"And\" Linked Group Details    12/08/21 1300    Unscheduled  Magnesium  As Needed       12/08/21 2104    Unscheduled  Potassium  As Needed       12/08/21 2104    Unscheduled  Change Dressing to IV Site As Needed When Damp, Loose or Soiled  As Needed       12/08/21 2336    Unscheduled  Insert New Peripheral IV  As Needed      Comments: Frequency Per Facility Policy    12/08/21 2336    Unscheduled  Magnesium  As Needed       12/09/21 0505    Unscheduled  Potassium  As Needed       12/09/21 0505    Unscheduled  Magnesium  As Needed       12/09/21 0803    Unscheduled  Potassium  As Needed       12/09/21 0803    Unscheduled  Up In Chair  As Needed       12/10/21 1233    Unscheduled  Calcium  As Needed      Comments: 6 hours after infusion complete     \"And\" Linked Group Details    12/16/21 1614    --  SCANNED - TELEMETRY           12/08/21 0000    --  SCANNED - TELEMETRY           12/08/21 0000    --  SCANNED - TELEMETRY           12/08/21 0000    --  amLODIPine (NORVASC) 10 MG tablet  Daily         12/09/21 1016    --  promethazine (PHENERGAN) 12.5 MG tablet  Every 4 Hours PRN         12/09/21 1016    --  calcium carbonate (TUMS) 500 MG chewable tablet  Daily         12/09/21 1022    --  SCANNED - TELEMETRY           12/08/21 0000    --  SCANNED - TELEMETRY           12/08/21 0000    --  SCANNED - TELEMETRY           12/08/21 0000    --  SCANNED - TELEMETRY           12/08/21 0000    --  SCANNED - TELEMETRY           12/08/21 0000    --  SCANNED - TELEMETRY           12/08/21 0000    --  SCANNED - TELEMETRY           12/08/21 0000    --  SCANNED - TELEMETRY           12/08/21 0000    --  SCANNED - TELEMETRY           12/08/21 0000    --  SCANNED - TELEMETRY           12/08/21 0000    --  SCANNED - TELEMETRY           12/08/21 0000    --  SCANNED - TELEMETRY           12/08/21 0000    --  SCANNED - TELEMETRY           12/08/21 0000    --  SCANNED - TELEMETRY           12/08/21 0000    --  SCANNED - TELEMETRY           " 21 0000    --  SCANNED - TELEMETRY           21 0000    --  SCANNED - TELEMETRY           21 0000    --  SCANNED - TELEMETRY           21 0000    --  SCANNED - TELEMETRY           21 0000    --  SCANNED - TELEMETRY           21 0000    --  SCANNED - TELEMETRY           21 0000    --  SCANNED - TELEMETRY           21 0000    --  SCANNED - TELEMETRY           21 0000    --  SCANNED - TELEMETRY           21 0000    --  SCANNED - TELEMETRY           21 0000    --  SCANNED - TELEMETRY           21 0000    --  SCANNED - TELEMETRY           21 0000    --  SCANNED - TELEMETRY           21 0000    --  SCANNED - TELEMETRY           21 0000    --  SCANNED - TELEMETRY           21 0000    --  SCANNED - TELEMETRY           21 0000                Operative/Procedure Notes (last 24 hours)  Notes from 21 0859 through 21 0859   No notes of this type exist for this encounter.            Physician Progress Notes (last 24 hours)      Rochelle Byrd DO at 21 1603           [facility]     Progress Note    Patient Name: Geeta Renee  : 1935  MRN: [id number]  Primary Care Physician:  Alejandra Dozier APRN  Date of admission: 2021    Subjective   Subjective     Chief Complaint: 87 yo female being seen in follow up for abdominal pain, SOA    Vomiting     Fatigue  Associated symptoms include fatigue and vomiting.     Patient Reports overall feeling some better compared to admission. She was sitting up in bed; remains on 4 lpm nasal cannula.     Present during visit: KELSI Yi    Review of Systems   Constitutional: Positive for fatigue.   Gastrointestinal: Positive for vomiting.     Reports nausea earlier this morning prior to eating breakfast; has since resolved. Reports ongoing shortness of air with occasional cough; SOA slightly improved. No abdominal pain    Objective   Objective     Vitals:    Temp:  [97.6 °F (36.4 °C)-98.7 °F (37.1 °C)] 97.6 °F (36.4 °C)  Heart Rate:  [71-83] 72  Resp:  [18-20] 18  BP: ()/(58-68) 94/58  Flow (L/min):  [4] 4    Physical Exam  Constitutional:       General: She is not in acute distress.     Appearance: She is well-developed.      Interventions: Nasal cannula in place.   HENT:      Head: Normocephalic and atraumatic.      Mouth/Throat:      Mouth: Oral lesions (exudate/plaque noted on tongue) present.   Eyes:      Conjunctiva/sclera: Conjunctivae normal.   Neck:      Trachea: No tracheal deviation.   Cardiovascular:      Rate and Rhythm: Normal rate. Rhythm regularly irregular.      Pulses:           Dorsalis pedis pulses are 2+ on the right side and 2+ on the left side.      Heart sounds: No murmur heard.  No friction rub. No gallop.       Comments: No significant lower extremity edema  Pulmonary:      Effort: No respiratory distress.      Breath sounds: Examination of the right-lower field reveals decreased breath sounds. Examination of the left-lower field reveals decreased breath sounds. Decreased breath sounds present. No wheezing or rales.   Abdominal:      General: Bowel sounds are normal. There is no distension.      Palpations: Abdomen is soft.      Tenderness: There is no abdominal tenderness. There is no guarding.   Skin:     General: Skin is warm and dry.      Findings: No erythema or rash.   Neurological:      Mental Status: She is alert and oriented to person, place, and time.      Cranial Nerves: No cranial nerve deficit.          Result Review    Result Review:  I have personally reviewed the results from the time of this admission to 12/16/2021 16:03 EST and agree with these findings:  [x]  Laboratory  []  Microbiology  []  Radiology  []  EKG/Telemetry   []  Cardiology/Vascular   []  Pathology  []  Old records  []  Other:  Most notable findings include: WBC 11.92, H/H lower today at 8.9 and 28.1 respectively.  Creatinine stable at 1.72.  Phosphorus  2.1 magnesium 1.3; WBC 13.08    Assessment/Plan   Assessment / Plan     Brief Patient Summary:  Geeta Renee is a 86 y.o. female who presents with abdominal pain.  Patient found to have acute pancreatitis, pneumonia and new onset atrial fibrillation    Assessment/Plan:  -Severe sepsis, present on admission with acute pancreatitis: Clinically and symptomatically continues to improve. Continue her current diet. Reported one episode of nausea earlier today prior to breakfast which has since resolved. Continue to monitor.    -Bilateral pneumonia: Unclear if possibly present on admission and not noted in the setting of dehydration and LILIA.  Continue to wean the patient's supplemental oxygen as tolerated.  For now, continue with pharmacy to dose vancomycin and Zosyn.     I have updated the patient's chest x-ray today and per my view it overall appears improved with a small left-sided pleural effusion and probable bibasilar atelectasis.    -Acute kidney injury, nonoliguric and likely multifactorial related to ATN from sepsis, poor oral intake in the setting of pancreatitis and hypercalcemia: Creatinine 1.72 today but is overall improved from admission, however, the patient's baseline creatinine is approximately 1.     Attempt to avoid hypotension, dehydration, NSAIDs, etc.  Continue to monitor her intake and output. Patient's erratic PTH level - unclear what to make of this at this point; discussed with Dr Gastelum on 12/15/21; ?delayed response to calcitonin. PTH-rp was within normal.      Patient's calcium is still low today (~7.196 mg/dl) given degree of hypoalbuminemia. Will begin calcium supplementation per protocol.    -Severe electrolyte abnormalities to include hypercalcemia in the setting of known esophageal cancer and breast cancer, dehydration with hypophosphatemia, hypokalemia and hypomagnesemia: Continue to supplement her Mg/Phos/K as tolerated and repeat her chemistry panel in a.m.     Continue to monitor on  telemetry. Patient remains on phos/mag supplementation per protocol as needed.     -New onset atrial fibrillation, currently rate controlled: Continue with Eliquis.  Need to monitor her H/H and transfuse as needed.  No evidence of active bleeding at this time. H/H stable at 9.3/28.4.    -Essential hypertension, currently slightly low; asymptomatic: Continue with amlodipine and atenolol for now with holding parameters.    -Generalized weakness/debility: PT and OT have been consulted.  I have requested that nursing staff assist patient to chair if tolerated. Inpatient physical rehabilitation consult placed as well.     -History of breast cancer: Continue with Arimidex.    -History of esophageal cancer.    DVT prophylaxis:  Eliquis    CODE STATUS:    Level Of Support Discussed With: Patient  Code Status (Patient has no pulse and is not breathing): CPR (Attempt to Resuscitate)  Medical Interventions (Patient has pulse or is breathing): Full Support    Disposition:  I expect patient to be discharged home with home health versus SNF/rehab     Rochelle Byrd DO             Electronically signed by Rochelle Byrd DO at 12/16/21 1617       Consult Notes (last 24 hours)  Notes from 12/16/21 0859 through 12/17/21 0859   No notes of this type exist for this encounter.         Physical Therapy Notes (last 24 hours)  Notes from 12/16/21 0859 through 12/17/21 0859   No notes exist for this encounter.         Occupational Therapy Notes (last 24 hours)  Notes from 12/16/21 0859 through 12/17/21 0859   No notes exist for this encounter.         Speech Language Pathology Notes (last 24 hours)  Notes from 12/16/21 0859 through 12/17/21 0859   No notes exist for this encounter.         ADL Documentation (last day)     Date/Time Transferring Toileting Bathing Dressing Eating Communication Swallowing    12/16/21 2020 2 - assistive person 2 - assistive person 2 - assistive person 2 - assistive person 0 - independent 0 -  understands/communicates without difficulty 0 - swallows foods/liquids without difficulty    12/16/21 0719 2 - assistive person 2 - assistive person 2 - assistive person 2 - assistive person 0 - independent 0 - understands/communicates without difficulty 0 - swallows foods/liquids without difficulty

## 2021-12-17 NOTE — PLAN OF CARE
Goal Outcome Evaluation:              Outcome Summary: Patient is resting in bed. Nauseated earlier in shift. PRN meds given. Seems to be feeling better now. No acute distress noted. Will continue to monitor and follow plan of care.

## 2021-12-17 NOTE — PLAN OF CARE
Goal Outcome Evaluation:      Patient has no complaints. Vital signs stable. Will continue to monitor.

## 2021-12-17 NOTE — PROGRESS NOTES
[facility]     Progress Note    Patient Name: Geeta Renee  : 1935  MRN: [id number]  Primary Care Physician:  Alejandra Dozier APRN  Date of admission: 2021    Subjective   Subjective     Chief Complaint: 87 yo female being seen in follow up for abdominal pain, SOA    Vomiting     Fatigue  Associated symptoms include fatigue and vomiting.     Patient Reports that she continues to feel better. Reports that her shortness of air is improving. No significant cough. She ate some breakfast this morning. No nausea.     Review of Systems   Constitutional: Positive for fatigue.   Gastrointestinal: Positive for vomiting.   No chest pains. No abdominal pain    Objective   Objective     Vitals:   Temp:  [97.6 °F (36.4 °C)-98.3 °F (36.8 °C)] 98.3 °F (36.8 °C)  Heart Rate:  [70-85] 70  Resp:  [18-20] 20  BP: ()/(57-70) 101/60  Flow (L/min):  [4] 4    Physical Exam  Constitutional:       General: She is not in acute distress.     Appearance: She is well-developed.      Interventions: Nasal cannula in place.   HENT:      Head: Normocephalic and atraumatic.      Mouth/Throat:      Mouth: Oral lesions (exudate/plaque noted on tongue) present.   Eyes:      Conjunctiva/sclera: Conjunctivae normal.   Neck:      Trachea: No tracheal deviation.   Cardiovascular:      Rate and Rhythm: Normal rate. Rhythm regularly irregular.      Pulses:           Dorsalis pedis pulses are 2+ on the right side and 2+ on the left side.      Heart sounds: No murmur heard.  No friction rub. No gallop.       Comments: No significant lower extremity edema  Pulmonary:      Effort: No respiratory distress.      Breath sounds: Examination of the right-lower field reveals decreased breath sounds. Examination of the left-lower field reveals decreased breath sounds. Decreased breath sounds present. No wheezing or rales.   Abdominal:      General: Bowel sounds are normal. There is no distension.      Palpations: Abdomen is soft.       Tenderness: There is no abdominal tenderness. There is no guarding.   Genitourinary:     Comments: Bentley catheter in place and draining a pale/yellow colored urine  Skin:     General: Skin is warm and dry.      Findings: No erythema or rash.   Neurological:      Mental Status: She is alert and oriented to person, place, and time.      Cranial Nerves: No cranial nerve deficit.          Result Review    Result Review:  I have personally reviewed the results from the time of this admission to 12/17/2021 12:30 EST and agree with these findings:  [x]  Laboratory  []  Microbiology  []  Radiology  []  EKG/Telemetry   []  Cardiology/Vascular   []  Pathology  []  Old records  []  Other:  Most notable findings include: WBC 12.92, H/H lower today at 8.9 and 27.5 respectively.  Creatinine improved to 1.49, CO2 19.4.  Phosphorus 2.3 magnesium 1.6    Assessment/Plan   Assessment / Plan     Brief Patient Summary:  Geeta Renee is a 86 y.o. female who presents with abdominal pain.  Patient found to have acute pancreatitis, pneumonia and new onset atrial fibrillation    Assessment/Plan:  -Severe sepsis, present on admission with acute pancreatitis: Clinically and symptomatically continues to improve. Continue her current diet. Nausea resolved today. Continue to monitor.    -Bilateral pneumonia: Unclear if possibly present on admission and not noted in the setting of dehydration and LILIA.  Continue to wean the patient's supplemental oxygen as tolerated.  For now, continue with pharmacy to dose vancomycin and Zosyn. I decreased her supplemental oxygen to approximately 3-3.5LPM today; continue to monitor closely.    -Acute kidney injury, nonoliguric and likely multifactorial related to ATN from sepsis, poor oral intake in the setting of pancreatitis and hypercalcemia: Creatinine improved to 1.49 today but is overall improved from admission, however, the patient's baseline creatinine is approximately 1.     Attempt to avoid  hypotension, dehydration, NSAIDs, etc.  Continue to monitor her intake and output. Patient's erratic PTH level - unclear what to make of this at this point; discussed with Dr Gastelum on 12/15/21; ?delayed response to calcitonin. PTH-rp was within normal.      Patient's calcium is improved today to 9.26 mg/dL (when corrected for hypoalbuminemia).    -Severe electrolyte abnormalities to include hypercalcemia in the setting of known esophageal cancer and breast cancer, dehydration with hypophosphatemia, hypokalemia and hypomagnesemia: Continue to supplement her Mg/Phos/K as tolerated and repeat her chemistry panel in a.m.     Continue to monitor on telemetry. Patient remains on phos/mag supplementation per protocol as needed.  Mg remains slightly low but improved to 1.6 today; phosphorus is improved to 2.3.    -New onset atrial fibrillation, currently rate controlled: Continue with Eliquis.  Need to monitor her H/H and transfuse as needed.  No evidence of active bleeding at this time. H/H stable at 8.9/27.5.    -Essential hypertension, currently slightly low; asymptomatic: Continue atenolol for now with holding parameters.    -Generalized weakness/debility: PT and OT have been consulted.  I have requested that nursing staff assist patient to chair if tolerated. Inpatient physical rehabilitation consult placed as well.     -History of breast cancer: Continue with Arimidex.    -History of esophageal cancer.    DVT prophylaxis:  Eliquis    CODE STATUS:    Level Of Support Discussed With: Patient  Code Status (Patient has no pulse and is not breathing): CPR (Attempt to Resuscitate)  Medical Interventions (Patient has pulse or is breathing): Full Support    Disposition:  I expect patient to be discharged home with home health versus SNF/rehab     Rochellelucia Byrd, DO

## 2021-12-18 NOTE — PROGRESS NOTES
LOS: 10 days     Name: Geeta Renee  Age/Sex: 86 y.o. female  :  1935        PCP: Alejandra Dozier APRN  REF: No ref. provider found    Principal Problem:    Acute pancreatitis      Reason for follow-up: Atrial fibrillation and congestive heart failure    Subjective       Subjective     Geeta Renee is a 86-year-old female with a past medical history significant for esophageal cancer breast cancer, Addison esophagus, GERD, arthritis, hypertension and hiatal hernia.  Patient presented to the ER with complaints of abdominal pain.    Interval History: Patient reports she is feeling well today.  Denies any shortness of breath.  Has been ambulating with no issues.  Remains in normal sinus rhythm.  Creatinine improved at 1.32.  She had good urine output with p.o. diuretics.    Vital Signs  Temp:  [98.1 °F (36.7 °C)-98.8 °F (37.1 °C)] 98.7 °F (37.1 °C)  Heart Rate:  [70-77] 77  Resp:  [18-20] 20  BP: (101-129)/(55-71) 129/71     Vital Signs (last 72 hrs)       12/15 0700   0659  0700   0659  0700   0659  0700   0911   Most Recent      Temp (°F) 97.7 -  98.7    97.6 -  98.4    98.1 -  98.8       98.7 (37.1)  0657    Heart Rate 68 -  83    71 -  85    70 -  77       77  0657    Resp 18 -  20    18 -  20    18 -  20       20  0657    /68 -  117/67    94/58 -  142/70    101/59 -  129/71       129/71  0657    SpO2 (%) 91 -  96    94 -  97    97 -  98       97  0657        Documented weights    21 1158 21 0500 21 1345 12/10/21 0517   Weight: 85.3 kg (188 lb) 79.7 kg (175 lb 9.6 oz) 79.7 kg (175 lb 9.6 oz) 79.9 kg (176 lb 3.2 oz)    21 0500 21 0121 21 0449 21 0500   Weight: 79.8 kg (175 lb 14.4 oz) 84 kg (185 lb 3.2 oz) 84 kg (185 lb 3.2 oz) 84.1 kg (185 lb 6.4 oz)    21 0500 12/15/21 0500 21 0500 21 0500   Weight: 84.9 kg (187 lb 1.6 oz) 83.9 kg (184 lb 14.4 oz) 84.2 kg (185 lb 11.2  oz) 83.5 kg (184 lb)    12/18/21 0500   Weight: 84.2 kg (185 lb 11.2 oz)      Body mass index is 31.88 kg/m².    Intake/Output Summary (Last 24 hours) at 12/18/2021 0911  Last data filed at 12/18/2021 0139  Gross per 24 hour   Intake --   Output 1750 ml   Net -1750 ml     Objective    Objective       Physical Exam:     General Appearance:    Alert, cooperative, in no acute distress   Head:    Normocephalic, without obvious abnormality, atraumatic   Eyes:            Conjunctivae and sclerae normal, no   icterus, no pallor, corneas clear.   Neck:   No adenopathy, supple, trachea midline, no thyromegaly, no   carotid bruit, no JVD   Lungs:     Clear to auscultation,respirations regular, even and                  unlabored    Heart:    Regular rhythm and normal rate, normal S1 and S2, no            murmur, no gallop, no rub, no click   Chest Wall:    No abnormalities observed   Abdomen:     Normal bowel sounds, no masses, no organomegaly, soft        non-tender, non-distended, no guarding, no rebound                tenderness   Extremities:   Moves all extremities well, no edema, no cyanosis, no             redness   Pulses:   Pulses palpable and equal bilaterally   Skin:   No bleeding, bruising or rash       Neurologic:  Alert and oriented     Results review       Results Review:   Results from last 7 days   Lab Units 12/18/21  0131 12/17/21  0451 12/16/21  0401 12/15/21  0045 12/14/21  0138 12/13/21  0100 12/12/21  0108   WBC 10*3/mm3 12.77* 12.92* 13.08* 11.92* 12.23* 9.53 12.47*   HEMOGLOBIN g/dL 8.1* 8.9* 9.3* 8.9* 9.2* 9.0* 10.7*   PLATELETS 10*3/mm3 207 236 250 247 251 242 241     Results from last 7 days   Lab Units 12/18/21  0131 12/17/21  1905 12/17/21  0451 12/16/21  1636 12/16/21  0401 12/15/21  0045 12/14/21  0138 12/13/21  0100 12/13/21  0100 12/12/21  1022 12/12/21  0108   SODIUM mmol/L 141  --  138  --  140 145 142  --  145  --  143   POTASSIUM mmol/L 4.2 4.5 3.5 3.6 3.3* 4.0 3.6   < > 3.6   < > 3.3*    CHLORIDE mmol/L 110*  --  106  --  105 112* 110*  --  110*  --  107   CO2 mmol/L 18.3*  --  19.4*  --  20.6* 20.0* 18.6*  --  23.8  --  22.3   BUN mg/dL 17  --  17  --  18 15 20  --  25*  --  24*   CREATININE mg/dL 1.32*  --  1.49*  --  1.72* 1.68* 1.77*  --  2.31*  --  2.14*   CALCIUM mg/dL 7.0*  --  8.0*  --  6.1* 6.3* 6.8*  --  7.8*  --  8.5*   GLUCOSE mg/dL 118*  --  110*  --  114* 92 95  --  104*  --  94   ALT (SGPT) U/L 11  --  11  --  10 9 12  --  9  --  11   AST (SGOT) U/L 12  --  18  --  13 12 17  --  13  --  13    < > = values in this interval not displayed.     Results from last 7 days   Lab Units 12/12/21  0744 12/12/21  0108   TROPONIN T ng/mL 0.020 0.023     No results found for: INR  Lab Results   Component Value Date    MG 1.6 12/18/2021    MG 1.6 12/17/2021    MG 1.3 (L) 12/16/2021     Lab Results   Component Value Date    TSH 3.070 12/08/2021    CHLPL 149 07/15/2015    TRIG 172 (H) 07/15/2015    HDL 39 (L) 07/15/2015    LDL 75 07/15/2015      Imaging Results (Last 48 Hours)     Procedure Component Value Units Date/Time    XR Chest AP [[id number]] Collected: 12/16/21 1050     Updated: 12/16/21 1052    Narrative:      EXAM:    XR Chest, 1 View     EXAM DATE:    12/16/2021 9:57 AM     CLINICAL HISTORY:    follow up pneumonia, respiratory failure; N17.9-Acute kidney failure,  unspecified; E87.6-Hypokalemia; E83.52-Hypercalcemia; K85.90-Acute  pancreatitis without necrosis or infection, unspecified     TECHNIQUE:    Frontal view of the chest.     COMPARISON:    12/12/2021     FINDINGS:    LUNGS:  Bibasilar atelectasis.    PLEURAL SPACE:  Small left pleural effusion and tiny right pleural  effusion.  No pneumothorax.    HEART:  Heart size is stable.    MEDIASTINUM:  Unremarkable.    BONES/JOINTS:  Unremarkable.       Impression:        Small left pleural effusion and tiny right pleural effusion.     This report was finalized on 12/16/2021 10:50 AM by Dr. Virgil Payton MD.             Lab Results    Component Value Date    ABSOLUTELUNG 37 (A) 12/14/2021       Echo   Results for orders placed during the hospital encounter of 12/08/21    Adult Transthoracic Echo Limited W/ Cont if Necessary Per Protocol    Interpretation Summary  · Limited study to evaluate LV function in a patient with ACS.  · Findings-  · Normal left ventricular cavity size and wall thickness noted.  · Left ventricular systolic function is normal. ''  · All left ventricular wall segments contract normally.  · Left ventricular ejection fraction appears to be 61 - 65%.  · Left ventricular diastolic function is consistent with (grade I) impaired relaxation.     I reviewed the patient's new clinical results.    Telemetry: NSR 70-80 bpm     Medication Review:   [START ON 12/30/2021] amiodarone, 200 mg, Oral, Q24H  amiodarone, 400 mg, Oral, Q24H  anastrozole, 1 mg, Oral, Daily  apixaban, 2.5 mg, Oral, Q12H  atenolol, 50 mg, Oral, Q24H  furosemide, 20 mg, Oral, Daily  pantoprazole, 40 mg, Oral, Q AM  sodium chloride, 10 mL, Intravenous, Q12H  sodium chloride, 10 mL, Intravenous, Q12H             Assessment      Assessment:  1. Acute HFpEF, compensated  2. New onset atrial fibrillation patient is in atrial fibrillation was in sinus rhythm  3. Right lower lobe pneumonia  4. Acute kidney injury  1.         Plan     Recommendations:  1. Patient is maintaining sinus rhythm continue current medications  2. Congestive heart failure resolved she is tolerating p.o. diuretics  3. We will follow at a distance please call if assistance is required    I discussed the patients findings and my recommendations with patient and family      Electronically signed by PRISCILLA Mckeon, 12/18/21, 9:11 AM EST.  Electronically signed by Martita Gonsalez MD, 12/18/21, 1:21 PM EST.    Please note that portions of this note were completed with a voice recognition program.

## 2021-12-18 NOTE — THERAPY TREATMENT NOTE
Acute Care - Physical Therapy Treatment Note  [facility]     Patient Name: Geeta Renee  : 1935  MRN: [id number]  Today's Date: 2021      Visit Dx:     ICD-10-CM ICD-9-CM   1. Acute renal failure, unspecified acute renal failure type (HCC)  N17.9 584.9   2. Hypokalemia  E87.6 276.8   3. Hypercalcemia  E83.52 275.42   4. Acute pancreatitis without infection or necrosis, unspecified pancreatitis type  K85.90 577.0     Patient Active Problem List   Diagnosis   • Esophageal adenocarcinoma (HCC)   • Malignant neoplasm of upper-outer quadrant of left breast in female, estrogen receptor positive (HCC)   • Osteoporosis   • Aromatase inhibitor use   • AAA (abdominal aortic aneurysm) (HCC)   • Acute pancreatitis     Past Medical History:   Diagnosis Date   • Arthritis    • Breast cancer (HCC)    • Breast lump     left   • Esophageal cancer (HCC)    • GERD (gastroesophageal reflux disease)    • Hiatal hernia    • Hypertension    • Snoring      Past Surgical History:   Procedure Laterality Date   • BREAST LUMPECTOMY Left 2019    Procedure: BREAST LUMPECTOMY WITH ULTRASOUND;  Surgeon: Nurys Selby MD;  Location: [facility];  Service: General   • CATARACT EXTRACTION, BILATERAL  09/10/2019   • CHOLECYSTECTOMY     • COLONOSCOPY     • ELBOW PROCEDURE Right    • ENDOSCOPY     • FRACTURE SURGERY      right elbow     PT Assessment (last 12 hours)     PT Evaluation and Treatment     Row Name 21          Physical Therapy Time and Intention    Subjective Information complains of; weakness  -LL     Document Type therapy note (daily note)  -LL     Mode of Treatment individual therapy; physical therapy  -LL     Patient Effort good  -LL     Comment Patient agreeable to PT session this date.  -LL     Row Name 21          General Information    Patient Profile Reviewed yes  -LL     Equipment Currently Used at Home walker, rolling; wheelchair; other (see comments); ramp  Has w/c but she never  used it.  -LL     Existing Precautions/Restrictions fall; oxygen therapy device and L/min  -LL     Limitations/Impairments safety/cognitive  -     Row Name 12/17/21 1900          Cognition    Affect/Mental Status (Cognitive) WFL  -     Orientation Status (Cognition) oriented x 3  -LL     Follows Commands (Cognition) WFL  -     Row Name 12/17/21 1900          Bed Mobility    Bed Mobility rolling left; supine-sit  -LL     Rolling Left [place] (Bed Mobility) standby assist; 1 person assist  -LL     Supine-Sit [place] (Bed Mobility) standby assist; 1 person assist  -LL     Assistive Device (Bed Mobility) head of bed elevated; bed rails  -     Row Name 12/17/21 1900          Transfers    Transfers sit-stand transfer; stand-sit transfer; bed-chair transfer  -LL     Bed-Chair [place] (Transfers) contact guard; 1 person assist  -LL     Sit-Stand [place] (Transfers) contact guard; 1 person assist  -LL     Stand-Sit [place] (Transfers) contact guard; 1 person assist  -LL     Row Name 12/17/21 1900          Gait/Stairs (Locomotion)    [place] Level (Gait) contact guard; verbal cues; nonverbal cues (demo/gesture)  -     Assistive Device (Gait) walker, front-wheeled  -     Distance in Feet (Gait) 85'  -LL     Pattern (Gait) step-to  -LL     Row Name 12/17/21 1900          Safety Issues, Functional Mobility    Impairments Affecting Function (Mobility) balance; endurance/activity tolerance; strength; shortness of breath  -     Row Name 12/17/21 1900          Motor Skills    Therapeutic Exercise --  LAQ, pillow squeeze, AP, seated marching  -     Row Name 12/17/21 1900          Plan of Care Review    Outcome Summary Patient seen by PT this date and did well with all activities.  She requires SBA for bed mobility and CGA for transfers and to ambulated ~85' with RW.  -     Row Name 12/17/21 1900          Physical Therapy Goals    Bed Mobility Goal Selection (PT) bed mobility, PT goal 1   -LL     Transfer Goal Selection (PT) transfer, PT goal 1  -LL     Gait Training Goal Selection (PT) gait training, PT goal 1; gait training, PT goal 2  -LL     Row Name 12/17/21 1900          Bed Mobility Goal 1 (PT)    Activity/Assistive Device (Bed Mobility Goal 1, PT) bed mobility activities, all  -LL     [place] Level/Cues Needed (Bed Mobility Goal 1, PT) modified independence  -LL     Time Frame (Bed Mobility Goal 1, PT) long term goal (LTG); 2 weeks  -LL     Row Name 12/17/21 1900          Transfer Goal 1 (PT)    Activity/Assistive Device (Transfer Goal 1, PT) transfers, all  -LL     [place] Level/Cues Needed (Transfer Goal 1, PT) modified independence  -LL     Time Frame (Transfer Goal 1, PT) long term goal (LTG); 2 weeks  -LL     Row Name 12/17/21 1900          Gait Training Goal 1 (PT)    Activity/Assistive Device (Gait Training Goal 1, PT) assistive device use; walker, rolling  -LL     [place] Level (Gait Training Goal 1, PT) contact guard assist; 1 person assist  -LL     Distance (Gait Training Goal 1, PT) 50ft  -LL     Time Frame (Gait Training Goal 1, PT) short term goal (STG); 1 week  -LL     Row Name 12/17/21 1900          Gait Training Goal 2 (PT)    Activity/Assistive Device (Gait Training Goal 2, PT) gait (walking locomotion); walker, rolling  -LL     [place] Level (Gait Training Goal 2, PT) modified independence  -LL     Distance (Gait Training Goal 2, PT) 75ft  -LL     Time Frame (Gait Training Goal 2, PT) long term goal (LTG); 2 weeks  -LL     Row Name 12/17/21 1900          Positioning and Restraints    Pre-Treatment Position in bed  -LL     Post Treatment Position bed  -LL     In Bed supine; call light within reach; encouraged to call for assist; exit alarm on; side rails up x2; heels elevated  -LL     Row Name 12/17/21 1900          Therapy Assessment/Plan (PT)    Rehab Potential (PT) good, to achieve stated therapy goals  -LL     Criteria for Skilled Interventions Met  (PT) yes; meets criteria; skilled treatment is necessary  -     Problem List (PT) problems related to; balance; mobility; strength  -LL     Activity Limitations Related to Problem List (PT) unable to ambulate safely; unable to transfer safely  -           User Key  (r) = Recorded By, (t) = Taken By, (c) = Cosigned By    Initials Name Provider Type     Cassy Carty PTA Physical Therapy Assistant                Physical Therapy Education                 Title: PT OT SLP Therapies (Done)     Topic: Physical Therapy (Done)     Point: Mobility training (Done)     Learning Progress Summary           Patient Acceptance, E,D, VU,NR by LL at 12/17/2021 1915   Family Acceptance, E,D, VU,NR by LL at 12/17/2021 1915      Show all documentation for this point (6)                 Point: Home exercise program (Done)     Learning Progress Summary           Patient Acceptance, E,D, VU,NR by LL at 12/17/2021 1915   Family Acceptance, E,D, VU,NR by LL at 12/17/2021 1915      Show all documentation for this point (6)                 Point: Body mechanics (Done)     Learning Progress Summary           Patient Acceptance, E,D, VU,NR by LL at 12/17/2021 1915   Family Acceptance, E,D, VU,NR by LL at 12/17/2021 1915      Show all documentation for this point (6)                 Point: Precautions (Done)     Learning Progress Summary           Patient Acceptance, E,D, VU,NR by LL at 12/17/2021 1915   Family Acceptance, E,D, VU,NR by LL at 12/17/2021 1915      Show all documentation for this point (6)                             User Key     Initials Effective Dates Name Provider Type [facility] 05/02/16 -  Cassy Carty PTA Physical Therapy Assistant PT              PT Recommendation and Plan  Anticipated Discharge Disposition (PT): inpatient rehabilitation facility  Therapy Frequency (PT): other (see comments) (2-3 x/wk)  Outcome Summary: Patient seen by PT this date and did well with all activities.  She requires SBA for  bed mobility and CGA for transfers and to ambulated ~85' with RW.       Time Calculation:    PT Charges     Row Name 12/17/21 1916             Time Calculation    PT Received On 12/17/21  -LL              Time Calculation- PT    Total Timed Code Minutes- PT 45 minute(s)  -LL            User Key  (r) = Recorded By, (t) = Taken By, (c) = Cosigned By    Initials Name Provider Type    LL Cassy Carty PTA Physical Therapy Assistant              Therapy Charges for Today     Code Description Service Date Service Provider Modifiers Qty    64244151332 HC GAIT TRAINING EA 15 MIN 12/17/2021 Cassy Carty PTA GP 1    11193303876 HC PT THERAPEUTIC ACT EA 15 MIN 12/17/2021 Cassy Carty PTA GP 1    24864469825 HC PT THER PROC EA 15 MIN 12/17/2021 Cassy Carty PTA GP 1               Cassy. LILY Carty  12/17/2021

## 2021-12-18 NOTE — PLAN OF CARE
Goal Outcome Evaluation:              Outcome Summary: Patient participated with PT this date requiring SBA for bed mobility and CGA for transfers and ambulation.  Patient ambulated ~80' x 2 with RW and CGA.  Patient performed LE strengthening ex's sitting EOB.  Patient would be a good candidate for IRF when medically stable.

## 2021-12-18 NOTE — PLAN OF CARE
Goal Outcome Evaluation:  Plan of Care Reviewed With: patient        Progress: improving  Outcome Summary: pt voived no complaints

## 2021-12-18 NOTE — THERAPY TREATMENT NOTE
Acute Care - Physical Therapy Treatment Note  [facility]     Patient Name: Geeta Renee  : 1935  MRN: [id number]  Today's Date: 2021      Visit Dx:     ICD-10-CM ICD-9-CM   1. Acute renal failure, unspecified acute renal failure type (HCC)  N17.9 584.9   2. Hypokalemia  E87.6 276.8   3. Hypercalcemia  E83.52 275.42   4. Acute pancreatitis without infection or necrosis, unspecified pancreatitis type  K85.90 577.0     Patient Active Problem List   Diagnosis   • Esophageal adenocarcinoma (HCC)   • Malignant neoplasm of upper-outer quadrant of left breast in female, estrogen receptor positive (HCC)   • Osteoporosis   • Aromatase inhibitor use   • AAA (abdominal aortic aneurysm) (HCC)   • Acute pancreatitis     Past Medical History:   Diagnosis Date   • Arthritis    • Breast cancer (HCC)    • Breast lump     left   • Esophageal cancer (HCC)    • GERD (gastroesophageal reflux disease)    • Hiatal hernia    • Hypertension    • Snoring      Past Surgical History:   Procedure Laterality Date   • BREAST LUMPECTOMY Left 2019    Procedure: BREAST LUMPECTOMY WITH ULTRASOUND;  Surgeon: Nurys Selby MD;  Location: [facility];  Service: General   • CATARACT EXTRACTION, BILATERAL  09/10/2019   • CHOLECYSTECTOMY     • COLONOSCOPY     • ELBOW PROCEDURE Right    • ENDOSCOPY     • FRACTURE SURGERY      right elbow     PT Assessment (last 12 hours)     PT Evaluation and Treatment     Row Name 21 1800          Physical Therapy Time and Intention    Subjective Information complains of; weakness  -LL     Document Type therapy note (daily note)  -LL     Mode of Treatment individual therapy; physical therapy  -LL     Patient Effort good  -LL     Comment Patient agreeable to PT session  -LL     Row Name 21 1800          General Information    Patient Profile Reviewed yes  -LL     Equipment Currently Used at Home walker, rolling; wheelchair; other (see comments); ramp  Has w/c but she never used it.  -LL      Existing Precautions/Restrictions fall; oxygen therapy device and L/min  -LL     Limitations/Impairments safety/cognitive  -LL     Row Name 12/18/21 1800          Cognition    Affect/Mental Status (Cognitive) WFL  -LL     Orientation Status (Cognition) oriented x 3  -LL     Follows Commands (Cognition) WFL  -LL     Personal Safety Interventions fall prevention program maintained; gait belt; nonskid shoes/slippers when out of bed; supervised activity  -LL     Row Name 12/18/21 1800          Bed Mobility    Bed Mobility rolling left; supine-sit  -LL     Rolling Left Barnes (Bed Mobility) standby assist; 1 person assist  -LL     Supine-Sit Barnes (Bed Mobility) standby assist; 1 person assist  -LL     Assistive Device (Bed Mobility) head of bed elevated; bed rails  -LL     Row Name 12/18/21 1800          Transfers    Transfers sit-stand transfer; stand-sit transfer; bed-chair transfer  -LL     Bed-Chair Barnes (Transfers) contact guard; 1 person assist  -LL     Sit-Stand Barnes (Transfers) contact guard; 1 person assist  -LL     Stand-Sit Barnes (Transfers) contact guard; 1 person assist  -LL     Row Name 12/18/21 1800          Gait/Stairs (Locomotion)    Barnes Level (Gait) contact guard; verbal cues; nonverbal cues (demo/gesture)  -LL     Assistive Device (Gait) walker, front-wheeled  -LL     Distance in Feet (Gait) 80' x 2  -LL     Pattern (Gait) step-to  -LL     Row Name 12/18/21 1800          Safety Issues, Functional Mobility    Impairments Affecting Function (Mobility) balance; endurance/activity tolerance; strength; shortness of breath  -LL     Row Name 12/18/21 1800          Motor Skills    Therapeutic Exercise --  LAQ, seated marching, pillow squeeze, AP, GS  -LL     Row Name 12/18/21 1800          Plan of Care Review    Outcome Summary Patient participated with PT this date requiring SBA for bed mobility and CGA for transfers and ambulation.  Patient ambulated ~80' x 2  with RW and CGA.  Patient performed LE strengthening ex's sitting EOB.  Patient would be a good candidate for IRF when medically stable.  -LL     Row Name 12/18/21 1800          Physical Therapy Goals    Bed Mobility Goal Selection (PT) bed mobility, PT goal 1  -LL     Transfer Goal Selection (PT) transfer, PT goal 1  -LL     Gait Training Goal Selection (PT) gait training, PT goal 1; gait training, PT goal 2  -LL     Row Name 12/18/21 1800          Bed Mobility Goal 1 (PT)    Activity/Assistive Device (Bed Mobility Goal 1, PT) bed mobility activities, all  -LL     [place] Level/Cues Needed (Bed Mobility Goal 1, PT) modified independence  -LL     Time Frame (Bed Mobility Goal 1, PT) long term goal (LTG); 2 weeks  -LL     Row Name 12/18/21 1800          Transfer Goal 1 (PT)    Activity/Assistive Device (Transfer Goal 1, PT) transfers, all  -LL     [place] Level/Cues Needed (Transfer Goal 1, PT) modified independence  -LL     Time Frame (Transfer Goal 1, PT) long term goal (LTG); 2 weeks  -LL     Row Name 12/18/21 1800          Gait Training Goal 1 (PT)    Activity/Assistive Device (Gait Training Goal 1, PT) assistive device use; walker, rolling  -LL     [place] Level (Gait Training Goal 1, PT) contact guard assist; 1 person assist  -LL     Distance (Gait Training Goal 1, PT) 50ft  -LL     Time Frame (Gait Training Goal 1, PT) short term goal (STG); 1 week  -LL     Row Name 12/18/21 1800          Gait Training Goal 2 (PT)    Activity/Assistive Device (Gait Training Goal 2, PT) gait (walking locomotion); walker, rolling  -LL     [place] Level (Gait Training Goal 2, PT) modified independence  -LL     Distance (Gait Training Goal 2, PT) 75ft  -LL     Time Frame (Gait Training Goal 2, PT) long term goal (LTG); 2 weeks  -LL     Row Name 12/18/21 1800          Positioning and Restraints    Pre-Treatment Position in bed  -LL     Post Treatment Position bed  -LL     In Bed supine; call light within reach;  encouraged to call for assist; exit alarm on; side rails up x2; heels elevated  -     Row Name 12/18/21 1800          Therapy Assessment/Plan (PT)    Rehab Potential (PT) good, to achieve stated therapy goals  -LL     Criteria for Skilled Interventions Met (PT) yes; meets criteria; skilled treatment is necessary  -LL     Problem List (PT) problems related to; balance; mobility; strength  -LL     Activity Limitations Related to Problem List (PT) unable to ambulate safely; unable to transfer safely  -LL           User Key  (r) = Recorded By, (t) = Taken By, (c) = Cosigned By    Initials Name Provider Type     Cassy Carty PTA Physical Therapy Assistant                Physical Therapy Education                 Title: PT OT SLP Therapies (Done)     Topic: Physical Therapy (Done)     Point: Mobility training (Done)     Learning Progress Summary           Patient Acceptance, E,D, VU,NR by LL at 12/18/2021 1844   Family Acceptance, E,D, VU,NR by LL at 12/17/2021 1915      Show all documentation for this point (7)                 Point: Home exercise program (Done)     Learning Progress Summary           Patient Acceptance, E,D, VU,NR by LL at 12/18/2021 1844   Family Acceptance, E,D, VU,NR by LL at 12/17/2021 1915      Show all documentation for this point (7)                 Point: Body mechanics (Done)     Learning Progress Summary           Patient Acceptance, E,D, VU,NR by LL at 12/18/2021 1844   Family Acceptance, E,D, VU,NR by LL at 12/17/2021 1915      Show all documentation for this point (7)                 Point: Precautions (Done)     Learning Progress Summary           Patient Acceptance, E,D, VU,NR by LL at 12/18/2021 1844   Family Acceptance, E,D, VU,NR by LL at 12/17/2021 1915      Show all documentation for this point (7)                             User Key     Initials Effective Dates Name Provider Type [facility] 05/02/16 -  Cassy Catry PTA Physical Therapy Assistant PT              PT  Recommendation and Plan  Anticipated Discharge Disposition (PT): inpatient rehabilitation facility  Therapy Frequency (PT): other (see comments) (2-3 x/wk)  Outcome Summary: Patient participated with PT this date requiring SBA for bed mobility and CGA for transfers and ambulation.  Patient ambulated ~80' x 2 with RW and CGA.  Patient performed LE strengthening ex's sitting EOB.  Patient would be a good candidate for IRF when medically stable.       Time Calculation:    PT Charges     Row Name 12/18/21 1844             Time Calculation    PT Received On 12/18/21  -LL              Time Calculation- PT    Total Timed Code Minutes- PT 45 minute(s)  -LL            User Key  (r) = Recorded By, (t) = Taken By, (c) = Cosigned By    Initials Name Provider Type    LL Cassy Carty PTA Physical Therapy Assistant              Therapy Charges for Today     Code Description Service Date Service Provider Modifiers Qty    13829910819 HC GAIT TRAINING EA 15 MIN 12/17/2021 Cassy Carty, PTA GP 1    84675615609 HC PT THERAPEUTIC ACT EA 15 MIN 12/17/2021 Cassy Carty, PTA GP 1    58384266160 HC PT THER PROC EA 15 MIN 12/17/2021 Cassy Carty, PTA GP 1    98190264399 HC GAIT TRAINING EA 15 MIN 12/18/2021 Cassy Carty, LILY GP 1    94662382861 HC PT THERAPEUTIC ACT EA 15 MIN 12/18/2021 Cassy Carty, PTA GP 1    06034951182 HC PT THER PROC EA 15 MIN 12/18/2021 Cassy Carty, PTA GP 1               Cassy. LILY Carty  12/18/2021

## 2021-12-18 NOTE — PROGRESS NOTES
[facility]     Progress Note    Patient Name: Geeta Renee  : 1935  MRN: [id number]  Primary Care Physician:  Alejandra Dozier APRN  Date of admission: 2021    Subjective   Subjective     Chief Complaint: 87 yo female being seen in follow up for abdominal pain, SOA    Vomiting     Fatigue  Associated symptoms include fatigue and vomiting.     Patient Reports that she continues to feel better. Reports that her shortness of air is improving. No significant cough. No nausea.     Patient's supplemental oxygen further decreased to 2.5 lpm.    Review of Systems   Constitutional: Positive for fatigue.   Gastrointestinal: Positive for vomiting.   No chest pains. No abdominal pain.     Objective   Objective     Vitals:   Temp:  [98.4 °F (36.9 °C)-98.8 °F (37.1 °C)] 98.4 °F (36.9 °C)  Heart Rate:  [71-78] 78  Resp:  [18-20] 20  BP: (101-129)/(51-71) 102/51  Flow (L/min):  [4] 4    Physical Exam  Constitutional:       General: She is not in acute distress.     Appearance: She is well-developed.      Interventions: Nasal cannula in place.   HENT:      Head: Normocephalic and atraumatic.      Mouth/Throat:      Mouth: No oral lesions.   Eyes:      Conjunctiva/sclera: Conjunctivae normal.   Neck:      Trachea: No tracheal deviation.   Cardiovascular:      Rate and Rhythm: Normal rate and regular rhythm.      Pulses:           Dorsalis pedis pulses are 2+ on the right side and 2+ on the left side.      Heart sounds: No murmur heard.  No friction rub. No gallop.       Comments: No significant lower extremity edema  Pulmonary:      Effort: No respiratory distress.      Breath sounds: Examination of the right-lower field reveals decreased breath sounds. Examination of the left-lower field reveals decreased breath sounds. Decreased breath sounds present. No wheezing or rales.   Abdominal:      General: Bowel sounds are normal. There is no distension.      Palpations: Abdomen is soft.      Tenderness: There is no  abdominal tenderness. There is no guarding.   Genitourinary:     Comments: Bentley catheter in place and draining a pale/yellow colored urine  Skin:     General: Skin is warm and dry.      Findings: No erythema or rash.   Neurological:      Mental Status: She is alert and oriented to person, place, and time.      Cranial Nerves: No cranial nerve deficit.          Result Review    Result Review:  I have personally reviewed the results from the time of this admission to 12/18/2021 17:11 EST and agree with these findings:  [x]  Laboratory  []  Microbiology  []  Radiology  []  EKG/Telemetry   []  Cardiology/Vascular   []  Pathology  []  Old records  []  Other:  Most notable findings include: WBC 12.77, H/H lower today at 8.1 and 25.4 respectively.  Creatinine improved to 1.32.  Phosphorus 1.4, magnesium 1.6    Assessment/Plan   Assessment / Plan     Brief Patient Summary:  eGeta Renee is a 86 y.o. female who presents with abdominal pain.  Patient found to have acute pancreatitis, pneumonia and new onset atrial fibrillation    Assessment/Plan:  -Severe sepsis, present on admission with acute pancreatitis: Clinically and symptomatically continues to improve. Continue her current diet. Nausea resolved. Continue to monitor.    -Bilateral pneumonia: Unclear if possibly present on admission and not noted in the setting of dehydration and LILIA.  Continue to wean the patient's supplemental oxygen as tolerated.  For now, continue with pharmacy to dose vancomycin and Zosyn. I decreased her supplemental oxygen to approximately 2.5LPM today; continue to monitor closely.    -Acute kidney injury, nonoliguric and likely multifactorial related to ATN from sepsis, poor oral intake in the setting of pancreatitis and hypercalcemia: Creatinine improved to 1.32 today but is overall improved from admission, however, the patient's baseline creatinine is approximately 1.     Attempt to avoid hypotension, dehydration, NSAIDs, etc.  Continue  to monitor her intake and output. Patient's erratic PTH level - unclear what to make of this at this point; discussed with Dr Gastelum on 12/15/21; ?delayed response to calcitonin. PTH-rp was within normal.      Patient's calcium is slightly low today at 8.3 mg/dl (when corrected for hypoalbuminemia) but no need to supplement today.    -Severe electrolyte abnormalities to include hypercalcemia in the setting of known esophageal cancer and breast cancer, dehydration with hypophosphatemia, hypokalemia and hypomagnesemia: Continue to supplement her Mg/Phos/K as tolerated and repeat her chemistry panel in a.m.     Continue to monitor on telemetry. Patient remains on phos/mag supplementation per protocol as needed.  Mg remains stable at 1.6 today; phosphorus is worse at 1.4.    -(?)Acute anemia, normocytic versus hemo-dilutional from intravenous fluids: No signs of active bleeding. Obtain iron studies in am and repeat her CBC.    -New onset atrial fibrillation, currently rate controlled: Continue with Eliquis. Patient started on amiodarone and tolerating. Need to monitor her H/H and transfuse as needed.  No evidence of active bleeding at this time.     -Acute CHF with preserved EF: Started on p.o. diuretics per Cardiology. Continue to monitor I/O, renal function and BP.    -Essential hypertension, currently controlled. Continue to monitor.     -Generalized weakness/debility: PT and OT have been consulted.  I have requested that nursing staff assist patient to chair if tolerated. Inpatient physical rehabilitation consult placed as well.     -History of breast cancer: Continue with Arimidex.    -History of esophageal cancer.    DVT prophylaxis:  Eliquis    CODE STATUS:    Level Of Support Discussed With: Patient  Code Status (Patient has no pulse and is not breathing): CPR (Attempt to Resuscitate)  Medical Interventions (Patient has pulse or is breathing): Full Support    Disposition:  I expect patient to be discharged home  with home health versus SNF/rehab     Rochelle Byrd, DO

## 2021-12-18 NOTE — PLAN OF CARE
Goal Outcome Evaluation:              Outcome Summary: Patient is resting quietly in bed with no complaints. VS are stable. Denies pain. Will continue to monitor an follow plan of care.

## 2021-12-19 NOTE — PLAN OF CARE
Goal Outcome Evaluation:  Plan of Care Reviewed With: patient        Progress: improving  Outcome Summary: pt sat up in chair for 30 min  magdalena well

## 2021-12-19 NOTE — PLAN OF CARE
Problem: Adult Inpatient Plan of Care  Goal: Plan of Care Review  Outcome: Ongoing, Progressing  Goal: Patient-Specific Goal (Individualized)  Outcome: Ongoing, Progressing  Goal: Absence of Hospital-Acquired Illness or Injury  Outcome: Ongoing, Progressing  Intervention: Identify and Manage Fall Risk  Recent Flowsheet Documentation  Taken 12/18/2021 1926 by Reta Vaughn, RN  Safety Promotion/Fall Prevention:   room organization consistent   safety round/check completed   fall prevention program maintained  Intervention: Prevent Skin Injury  Recent Flowsheet Documentation  Taken 12/18/2021 1926 by Reta Vaughn RN  Skin Protection:   adhesive use limited   incontinence pads utilized  Intervention: Prevent and Manage VTE (venous thromboembolism) Risk  Recent Flowsheet Documentation  Taken 12/18/2021 1926 by Reta Vaughn RN  VTE Prevention/Management: (see MAR) other (see comments)  Intervention: Prevent Infection  Recent Flowsheet Documentation  Taken 12/18/2021 1926 by Reta Vaughn, RN  Infection Prevention: rest/sleep promoted  Goal: Optimal Comfort and Wellbeing  Outcome: Ongoing, Progressing  Intervention: Provide Person-Centered Care  Recent Flowsheet Documentation  Taken 12/18/2021 1926 by Reta Vaughn RN  Trust Relationship/Rapport:   care explained   choices provided   emotional support provided   empathic listening provided   questions answered   questions encouraged   reassurance provided   thoughts/feelings acknowledged  Goal: Readiness for Transition of Care  Outcome: Ongoing, Progressing

## 2021-12-20 NOTE — CASE MANAGEMENT/SOCIAL WORK
Discharge Planning Assessment  [facility]     Patient Name: Geeta Renee  MRN: [id number]  Today's Date: 12/20/2021    Admit Date: 12/8/2021       Discharge Plan     Row Name 12/20/21 1526       Plan    Plan SS spoke to pt on this date and she voices agreement to be discharged to [facility].  notified Dr. Galvan. Pt is not medically stable for discharge on this date. SS notified [facility] per Erlinda Reis and bed will be available on 12/21/21. [facility] to complete admission papers with pt in the am.  to follow.              Continued Care and Services - Admitted Since 12/8/2021     Destination Coordination complete.    Service Provider Request Status Selected Services Address Phone Fax Patient Preferred    [facility]   Selected Skilled Nursing [address] [place] [address] [phone] --              ANTONIA Manuel

## 2021-12-20 NOTE — TELEPHONE ENCOUNTER
----- Message from Quincy Arredondo MD sent at 12/20/2021  6:45 AM EST -----  Ok to book with Vandervoort/Reji as long as she is not having abdominal pain from her pancreatitis.   Thanks  [facility]  ----- Message -----  From: Cho, Logan HAYDEN  Sent: 12/9/2021   1:09 PM EST  To: MD Dr. Maria Elena Arreguin, This pt was seen on 10/13/21 for a AAA-pt had a CT ABD/Pelvis on 12/08-results in Epic for review. Is it ok to schedule EVAR? Thanks.

## 2021-12-20 NOTE — PLAN OF CARE
Problem: Adult Inpatient Plan of Care  Goal: Plan of Care Review  Outcome: Ongoing, Progressing  Goal: Patient-Specific Goal (Individualized)  Outcome: Ongoing, Progressing  Goal: Absence of Hospital-Acquired Illness or Injury  Outcome: Ongoing, Progressing  Intervention: Identify and Manage Fall Risk  Recent Flowsheet Documentation  Taken 12/19/2021 1951 by Reta Vaughn, RN  Safety Promotion/Fall Prevention:   room organization consistent   safety round/check completed   fall prevention program maintained  Intervention: Prevent Skin Injury  Recent Flowsheet Documentation  Taken 12/19/2021 1951 by Reta Vaughn RN  Skin Protection: adhesive use limited  Intervention: Prevent and Manage VTE (venous thromboembolism) Risk  Recent Flowsheet Documentation  Taken 12/19/2021 1951 by Reta Vaughn, RN  VTE Prevention/Management: (see MAR) other (see comments)  Intervention: Prevent Infection  Recent Flowsheet Documentation  Taken 12/19/2021 1951 by Reta Vaughn, RN  Infection Prevention: rest/sleep promoted  Goal: Optimal Comfort and Wellbeing  Outcome: Ongoing, Progressing  Intervention: Provide Person-Centered Care  Recent Flowsheet Documentation  Taken 12/19/2021 1951 by Reta Vaughn, RN  Trust Relationship/Rapport:   care explained   choices provided   emotional support provided   empathic listening provided   questions answered   questions encouraged   reassurance provided   thoughts/feelings acknowledged  Goal: Readiness for Transition of Care  Outcome: Ongoing, Progressing

## 2021-12-20 NOTE — PROGRESS NOTES
[facility]     Patient Identification:  Name:  Geeta Renee  Age:  86 y.o.  Sex:  female  :  1935  MRN:  [id number]  Visit Number:  [id number]  Primary Care Provider:  Alejandra Dozier APRN    Length of stay:  12    Subjective: Patient seen and examined assisted by Gena Cohen RN.  Patient just got changed, she is sitting upright in her bed no complaints, no nausea or vomiting, she is back to sinus rhythm.  Magnesium and phosphorus level still low.  Currently being corrected.    Chief Complaint: Follow-up for abdominal pain  ----------------------------------------------------------------------------------------------------------------------  [facility] Meds:  [START ON 2021] amiodarone, 200 mg, Oral, Q24H  amiodarone, 400 mg, Oral, Q24H  anastrozole, 1 mg, Oral, Daily  apixaban, 2.5 mg, Oral, Q12H  atenolol, 50 mg, Oral, Q24H  furosemide, 20 mg, Oral, Daily  pantoprazole, 40 mg, Oral, Q AM  sodium chloride, 10 mL, Intravenous, Q12H  sodium chloride, 10 mL, Intravenous, Q12H         ----------------------------------------------------------------------------------------------------------------------  Vital Signs:  Temp:  [98 °F (36.7 °C)-98.2 °F (36.8 °C)] 98.2 °F (36.8 °C)  Heart Rate:  [73-87] 73  Resp:  [18-20] 20  BP: (100-141)/(54-72) 115/54       Tele: Sinus rhythm 73 bpm      21  0500 21  0500 21  0536   Weight: 84.2 kg (185 lb 11.2 oz) 85.7 kg (188 lb 14.4 oz) 85 kg (187 lb 8 oz)     Body mass index is 32.18 kg/m².    Intake/Output Summary (Last 24 hours) at 2021 1054  Last data filed at 2021 0300  Gross per 24 hour   Intake 950 ml   Output 1250 ml   Net -300 ml     Diet Regular; Low Fat  ----------------------------------------------------------------------------------------------------------------------  Physical exam:  General: Comfortable,awake, alert, oriented to self, place, and time,  No respiratory  distress.    Skin:  Skin is warm and dry. No rash noted. No pallor.    HENT:  Head:  Normocephalic and atraumatic.  Mouth:  Moist mucous membranes.    Eyes:  Conjunctivae and EOM are normal.  Pupils are equal, round, and reactive to light.  No scleral icterus.    Neck:  Neck supple.  No JVD present.    Pulmonary/Chest:  No respiratory distress, no wheezes, no crackles, with normal breath sounds and good air movement.  Cardiovascular:  Normal rate, regular rhythm and normal heart sounds with no murmur.  Abdominal:  Soft.  Bowel sounds are normal.  No distension and no tenderness.   Extremities:  No edema, no tenderness, and no deformity.  No red or swollen joints anywhere.  Strong pulses in all 4 extremities with no clubbing, no cyanosis, no edema.  Neurological:  Motor strength equal no obvious deficit, sensory grossly intact.   No cranial nerve deficit.  No tongue deviation.  No facial droop.  No slurred speech.    Genitourinary: No Bentley catheter  Back:  ----------------------------------------------------------------------------------------------------------------------  ----------------------------------------------------------------------------------------------------------------------      Results from last 7 days   Lab Units 12/19/21  0018 12/18/21  0131 12/17/21  0451   WBC 10*3/mm3 12.19* 12.77* 12.92*   HEMOGLOBIN g/dL 8.5* 8.1* 8.9*   HEMATOCRIT % 27.0* 25.4* 27.5*   MCV fL 89.4 88.8 87.9   MCHC g/dL 31.5 31.9 32.4   PLATELETS 10*3/mm3 261 207 236         Results from last 7 days   Lab Units 12/19/21  1932 12/19/21  0718 12/19/21  0018 12/18/21  0131 12/17/21  1905 12/17/21  0451 12/16/21  1636 12/16/21  0401   SODIUM mmol/L  --   --  141 141  --  138  --  140   POTASSIUM mmol/L 4.2  --  4.3 4.2   < > 3.5   < > 3.3*   MAGNESIUM mg/dL 1.2* 1.4*  --  1.6  --  1.6  --  1.3*   CHLORIDE mmol/L  --   --  109* 110*  --  106  --  105   CO2 mmol/L  --   --  19.5* 18.3*  --  19.4*  --  20.6*   BUN mg/dL  --   --   15 17  --  17  --  18   CREATININE mg/dL  --   --  1.38* 1.32*  --  1.49*  --  1.72*   EGFR IF NONAFRICN AM mL/min/1.73  --   --  36* 38*  --  33*  --  28*   CALCIUM mg/dL 6.8*  --  6.9* 7.0*  --  8.0*  --  6.1*   GLUCOSE mg/dL  --   --  104* 118*  --  110*  --  114*   ALBUMIN g/dL  --   --   --  2.33*  --  2.42*  --  2.63*   BILIRUBIN mg/dL  --   --   --  0.3  --  0.3  --  0.4   ALK PHOS U/L  --   --   --  88  --  117  --  114   AST (SGOT) U/L  --   --   --  12  --  18  --  13   ALT (SGPT) U/L  --   --   --  11  --  11  --  10    < > = values in this interval not displayed.   Estimated Creatinine Clearance: 31.4 mL/min (A) (by C-G formula based on SCr of 1.38 mg/dL (H)).    No results found for: AMMONIA      No results found for: BLOODCX  No results found for: URINECX  No results found for: WOUNDCX  No results found for: STOOLCX    I have personally looked at the labs and they are summarized above.  ----------------------------------------------------------------------------------------------------------------------  Imaging Results (Last 24 Hours)     ** No results found for the last 24 hours. **        ----------------------------------------------------------------------------------------------------------------------  Assessment and Plan:  -Acute pancreatitis  -Severe hypercalcemia   -Acute hypophosphatemia and hypomagnesemia  -Paroxysmal atrial fibrillation  -Acute heart failure with preserved ejection fraction, currently compensated  -Contraction alkalosis now resolved  -History of esophageal cancer and breast cancer  -Acute kidney injury resolving  -Essential hypertension    Patient is no longer symptomatic pancreatitis while she is eating well, will aggressively correct her electrolytes today hopefully she can be discharged soon, in the meantime continue PT OT.      Disposition discharged when stable      Jennie Galvan MD  12/20/21  10:54 EST

## 2021-12-20 NOTE — PLAN OF CARE
Goal Outcome Evaluation:         Patient resting comfortably in bed; VSS stable. No complaints at this time. Will continue to monitor.

## 2021-12-20 NOTE — CASE MANAGEMENT/SOCIAL WORK
Discharge Planning Assessment   [place]     Patient Name: Geeta Renee  MRN: [id number]  Today's Date: 12/20/2021    Admit Date: 12/8/2021       Discharge Plan     Row Name 12/20/21 0855       Plan    Plan Per [facility] via Erlinda pt can be accepted with a new negative covid test when pt is medically stable.  Pt aware and agreeable.  SS will follow.               Continued Care and Services - Admitted Since 12/8/2021     Destination     Service Provider Request Status Selected Services Address Phone Fax Patient Preferred    [facility]  Pending - Request Sent N/A [address] [place] [address] [phone] --              Luisa Currie, GENESISW

## 2021-12-20 NOTE — PLAN OF CARE
Goal Outcome Evaluation:           Progress: no change  Outcome Summary: Patient resting in bed.  Patient sat up in chair.  D/c padilla catheter, patient voiding independently.  Magnesium replaced per protocol.

## 2021-12-20 NOTE — PROGRESS NOTES
[facility]     Progress Note    Patient Name: Geeta Renee  : 1935  MRN: [id number]  Primary Care Physician:  Alejandra Dozier APRN  Date of admission: 2021    Subjective   Subjective     Chief Complaint: 85 yo female being seen in follow up for abdominal pain, SOA    Vomiting     Fatigue  Associated symptoms include fatigue and vomiting.     Patient Reports that she continues to feel better. Reports that her shortness of air is improving. No significant cough. No nausea.     Patient's supplemental oxygen further decreased to 2.0 lpm.    Review of Systems   Constitutional: Positive for fatigue.   Gastrointestinal: Positive for vomiting.   No chest pains. No abdominal pain.     Objective   Objective     Vitals:   Temp:  [97.5 °F (36.4 °C)-98.4 °F (36.9 °C)] 98 °F (36.7 °C)  Heart Rate:  [74-87] 87  Resp:  [18-20] 18  BP: (103-141)/(50-66) 103/58  Flow (L/min):  [2] 2    Physical Exam  Constitutional:       General: She is not in acute distress.     Appearance: She is well-developed.      Interventions: Nasal cannula in place.   HENT:      Head: Normocephalic and atraumatic.      Mouth/Throat:      Mouth: No oral lesions.   Eyes:      Conjunctiva/sclera: Conjunctivae normal.   Neck:      Trachea: No tracheal deviation.   Cardiovascular:      Rate and Rhythm: Normal rate and regular rhythm.      Pulses:           Dorsalis pedis pulses are 2+ on the right side and 2+ on the left side.      Heart sounds: No murmur heard.  No friction rub. No gallop.       Comments: No significant lower extremity edema  Pulmonary:      Effort: No respiratory distress.      Breath sounds: Examination of the right-lower field reveals decreased breath sounds. Examination of the left-lower field reveals decreased breath sounds. Decreased breath sounds present. No wheezing or rales.   Abdominal:      General: Bowel sounds are normal. There is no distension.      Palpations: Abdomen is soft.      Tenderness: There is no  abdominal tenderness. There is no guarding.   Genitourinary:     Comments: Bentley catheter in place and draining a pale/yellow colored urine  Skin:     General: Skin is warm and dry.      Findings: No erythema or rash.   Neurological:      Mental Status: She is alert and oriented to person, place, and time.      Cranial Nerves: No cranial nerve deficit.          Result Review    Result Review:  I have personally reviewed the results from the time of this admission to 12/19/2021 21:57 EST and agree with these findings:  [x]  Laboratory  []  Microbiology  []  Radiology  []  EKG/Telemetry   []  Cardiology/Vascular   []  Pathology  []  Old records  []  Other:  Most notable findings include: WBC 12.19, H/H lower today at 8.5 and 27 respectively. Ca2+ 6.9, Creatinine improved to 1.32.  Phosphorus 2.0, magnesium 1.2; Cr 1.38    Assessment/Plan   Assessment / Plan     Brief Patient Summary:  Geeta Renee is a 86 y.o. female who presents with abdominal pain.  Patient found to have acute pancreatitis, pneumonia and new onset atrial fibrillation    Assessment/Plan:  -Severe sepsis, present on admission with acute pancreatitis: Clinically and symptomatically continues to improve. Continue her current diet. Nausea resolved. Continue to monitor.    -Bilateral pneumonia: Unclear if possibly present on admission and not noted in the setting of dehydration and LILIA.  Continue to wean the patient's supplemental oxygen as tolerated.  Patient completed course of Vancomycin and Zosyn. I decreased her supplemental oxygen to approximately 2.0LPM today; continue to monitor closely.    -Acute kidney injury, nonoliguric and likely multifactorial related to ATN from sepsis, poor oral intake in the setting of pancreatitis and hypercalcemia: Creatinine improved to 1.32 today but is overall improved from admission, however, the patient's baseline creatinine is approximately 1.     Attempt to avoid hypotension, dehydration, NSAIDs, etc.   Continue to monitor her intake and output. Patient's erratic PTH level - unclear what to make of this at this point; discussed with Dr Gastelum on 12/15/21; ?delayed response to calcitonin. PTH-rp was within normal.      Patient's calcium is low today at 6.9 mg/dl; has PRN supplementation.     -Severe electrolyte abnormalities to include hypercalcemia in the setting of known esophageal cancer and breast cancer, dehydration with hypophosphatemia, hypokalemia and hypomagnesemia: Continue to supplement her Mg/Phos/K as tolerated and repeat her chemistry panel in a.m.     Continue to monitor on telemetry. Patient remains on phos/mag supplementation per protocol as needed.  Mg lower today at 1.2; continue on supplementation in addition to phosphorus supplementation; 2.0.     -(?)Acute anemia, normocytic versus hemo-dilutional from intravenous fluids: No signs of active bleeding. Iron studies low but TIBC also low; likely combination JUSTEN and ACD; will place on oral iron supplementation.    -New onset atrial fibrillation, currently rate controlled: Continue with Eliquis. Patient started on amiodarone and tolerating. Need to monitor her H/H and transfuse as needed.  No evidence of active bleeding at this time.     -Acute CHF with preserved EF: Started on p.o. diuretics per Cardiology. Continue to monitor I/O, renal function and BP.    -Essential hypertension, currently controlled. Continue to monitor.     -Generalized weakness/debility: PT and OT have been consulted.  I have requested that nursing staff assist patient to chair if tolerated.     -History of breast cancer: Continue with Arimidex.    -History of esophageal cancer.    DVT prophylaxis:  Eliquis    CODE STATUS:    Level Of Support Discussed With: Patient  Code Status (Patient has no pulse and is not breathing): CPR (Attempt to Resuscitate)  Medical Interventions (Patient has pulse or is breathing): Full Support    Disposition:  I expect patient to be discharged to  SNF/rehab     Rochelle Fernie Byrd, DO

## 2021-12-20 NOTE — NURSING NOTE
Pt transferred to room 349A from 305B via hospital bed at this time. 2L NC remaining on, pt. A&O, VSS, no s/s distress noted. Pt states she would prefer her son be notified of room change tomorrow in the AM.

## 2021-12-21 NOTE — CASE MANAGEMENT/SOCIAL WORK
Discharge Planning Assessment  [facility]     Patient Name: Geeta Renee  MRN: [id number]  Today's Date: 12/21/2021    Admit Date: 12/8/2021       Discharge Plan     Row Name 12/21/21 1606       Plan    Plan EMS canceled as pt's son stated he felt comfortable transporting pt.    Row Name 12/21/21 1526       Plan    Final Discharge Disposition Code 03 - skilled nursing facility (SNF)    Final Note Pt to be discharged to [facility] on this date.  Facility aware and agreeable per Erlinda.  Pt aware and agreeable and has notified her son.  Pt to be transported via [facility].  [facility] scheduled.              Continued Care and Services - Admitted Since 12/8/2021     Destination Coordination complete.    Service Provider Request Status Selected Services Address Phone Fax Patient Preferred    [facility]   Selected Skilled Nursing [address] [facility] [place] [address] [phone] --                      BRIDGER Cohn

## 2021-12-21 NOTE — CASE MANAGEMENT/SOCIAL WORK
Discharge Planning Assessment  [facility]     Patient Name: Geeta Renee  MRN: [id number]  Today's Date: 12/21/2021    Admit Date: 12/8/2021       Discharge Plan     Row Name 12/21/21 1526       Plan    Final Discharge Disposition Code 03 - skilled nursing facility (SNF)    Final Note Pt to be discharged to [facility] on this date.  Facility aware and agreeable per Erlinda.  Pt aware and agreeable and has notified her son.  Pt to be transported via [facility].  [facility] scheduled.              Continued Care and Services - Admitted Since 12/8/2021     Destination Coordination complete.    Service Provider Request Status Selected Services Address Phone Fax Patient Preferred    [facility]   Selected Skilled Nursing [address] [place] [address] [phone] --                    GENESIS CohnW

## 2021-12-21 NOTE — PLAN OF CARE
Goal Outcome Evaluation:      Patient is to be discharged to [facility].

## 2021-12-21 NOTE — DISCHARGE SUMMARY
[facility] HOSPITALIST MEDICINE DISCHARGE SUMMARY    Patient Identification:  Name:  Geeta Renee  Age:  86 y.o.  Sex:  female  :  1935  MRN:  [id number]  Visit Number:  [id number]    Date of Admission: 2021  Date of Discharge: 2021  DISCHARGE DISPOSITION   Stable  PCP: Alejandra Dozier APRN    DISCHARGE DIAGNOSIS : Acute pancreatitis idiopathic, severe hypercalcemia now resolved etiology unclear to follow-up with endocrine as recommended by nephrology, contraction alkalosis resolved acute kidney injury on CKD stage III resolved, essential hypertension, acute preserved ejection fraction heart failure now compensated, QTC prolongation, severe electrolyte derangement including hypercalcemia hypokalemia hypophosphatemia and hypomagnesemia now corrected, history of left breast cancer status post lumpectomy, history of esophageal adenocarcinoma, new onset atrial fibrillation rate controlled spontaneously converted back to sinus rhythm, large infrarenal abdominal aneurysm 5.9 cm in size followed by vascular, troponinemia remained flat likely type II, bilateral basal pneumonia, hypocalcemia medication induced    HOSPITAL COURSE  Patient is a 86 y.o. female presented to [facility] complaining abdominal pain sharp in character is radiating to the back, work-up noted patient has acute pancreatitis, she has also significant hypercalcemia level of 16.9  Acute kidney injury on CKD was also noted.  On admission she was placed on IV fluids, nephrology was consulted, recommended to continue hydration and to give a dose of calcitonin.  Nephrotoxic medications including diuretics was held, during her stay she developed bilateral pneumonia, due to absence of pneumonia admission she was treated as HCAP.  Work-up for hypercalcemia was performed also cause of pancreatitis was unclear, biliary is nondilated, no stones, nephrology suspect hypercalcemia can cause.  After above regimen  for hypercalcemia calcium improved, she did develop hypocalcemia.  The work-up for hypoxemia was confusing, PTH RP was normal, initial PTH level was normal the repeat was 555.  Nephrology suspect this is delayed response to calcitonin, they did recommend endocrine ointment when discharged.  Cultures of her pneumonia was nonyielding, cardiology was on board for new onset A. fib and recommended anticoagulation, also had CHF with preserved ejection fraction addressed with Lasix.  Rest of her stay was uneventful, renal function is now back to baseline.  Plan is to discharge her back to nursing home, her EKG today prior to discharge noted sinus rhythm, QTC is 470 this was relayed to cardiology and the patient to continue with amiodarone as prescribed.    VITAL SIGNS:      12/20/21  0536 12/20/21  2227 12/21/21  0500   Weight: 85 kg (187 lb 8 oz) 80.6 kg (177 lb 9.6 oz) 80.6 kg (177 lb 9.6 oz) (admit weight, patient not on floor 24 hrs)     Body mass index is 30.49 kg/m².  Vitals:    12/21/21 0900   BP: 111/52   Pulse: 83   Resp: 18   Temp: 97.8 °F (36.6 °C)   SpO2: 95%     PHYSICAL EXAM:  General: Comfortable,awake, alert, oriented to self, place, and time, well-developed and well-nourished.  No respiratory distress.    Skin:  Skin is warm and dry. No rash noted. No pallor.    Hepatojugular reflux  HENT:  Head:  Normocephalic and atraumatic.  Mouth:  Moist mucous membranes.    Eyes:  Conjunctivae and EOM are normal.  Pupils are equal, round, and reactive to light.  No scleral icterus.    Neck:  Neck supple.  No JVD present.    Pulmonary/Chest:  No respiratory distress, no wheezes, no crackles, with normal breath sounds and good air movement.  Cardiovascular:  Normal rate, regular rhythm and normal heart sounds with no murmur.  Abdominal:  Soft.  Bowel sounds are normal.  No distension and no tenderness.   Extremities:  No edema, no tenderness, and no deformity.  No red or swollen joints anywhere.  Strong pulses in all 4  extremities with no clubbing, no cyanosis, no edema.  Neurological:  Motor strength equal no obvious deficit, sensory grossly intact.   No cranial nerve deficit.  No tongue deviation.  No facial droop.  No slurred speech.    Genitourinary: No Bentley catheter  Back:  ----------    DISCHARGE MEDICATIONS:     Discharge Medications      New Medications      Instructions Start Date   amiodarone 400 MG tablet  Commonly known as: PACERONE   400 mg, Oral, Every 24 Hours Scheduled   Start Date: December 22, 2021     amiodarone 200 MG tablet  Commonly known as: PACERONE   200 mg, Oral, Every 24 Hours Scheduled   Start Date: December 30, 2021     apixaban 5 MG tablet tablet  Commonly known as: ELIQUIS   5 mg, Oral, Every 12 Hours Scheduled      furosemide 20 MG tablet  Commonly known as: LASIX   20 mg, Oral, Daily   Start Date: December 22, 2021     magnesium oxide 400 MG tablet  Commonly known as: MAG-OX   400 mg, Oral, Daily      pantoprazole 40 MG EC tablet  Commonly known as: PROTONIX   40 mg, Oral, Every Early Morning   Start Date: December 22, 2021     potassium chloride 10 MEQ CR tablet   10 mEq, Oral, Daily         Changes to Medications      Instructions Start Date   atenolol 50 MG tablet  Commonly known as: TENORMIN  What changed:   · medication strength  · how much to take  · when to take this   50 mg, Oral, Every 24 Hours Scheduled   Start Date: December 22, 2021        Continue These Medications      Instructions Start Date   anastrozole 1 MG tablet  Commonly known as: ARIMIDEX   Oral, Daily         Stop These Medications    amLODIPine 10 MG tablet  Commonly known as: NORVASC     calcium carbonate 500 MG chewable tablet  Commonly known as: TUMS     promethazine 12.5 MG tablet  Commonly known as: PHENERGAN            Diet Instructions    Regular low fat.         Your Scheduled Appointments    Jan 04, 2022  1:00 PM  Mammo cor ciprianog dig mina lt with [facility] CARE MAMM 2  [facility] Breast [facility] [address]  [place] [address]  [phone]   Bring any films/reports from outside facilities.  Do not apply any powders, perfumes, deodorants, or lotions on the day of the exam.  Arrive 15 minutes prior to exam time.     Jan 11, 2022  1:45 PM  Pre-Admission Testing with PAT 1 GENEVA  [facility] PREADMISSION T ([place]) [address]  [facility] [address]  [phone]      Mar 03, 2022 12:00 PM  LAB with [place]RISTOPHER NURSE LAB  [facility] HEMATOLOGY & ONCOLOGY ([place]) [address] [facility] [address]  [place] [address]  [phone]      Mar 03, 2022 12:30 PM  FOLLOW UP with BERTHA Berrios MD  [facility] HEMATOLOGY & ONCOLOGY ([place]) [address] [facility] [address]  [place] [address]  [phone]      Mar 03, 2022  1:00 PM  INJECTION with  COR OP INFUS CHAIR 19  [facility] ([place]) [address] [facility]  [place] [address]  [phone]      Additional instructions:    Apt    dr sheridan  for  dec  30  at  11:45   in  [place]  office         Activity Instructions    As tolerated.          Additional Instructions for the Follow-ups that You Need to Schedule     Discharge Follow-up with PCP   As directed       Currently Documented PCP:    Alejandra Dozier APRN    PCP Phone Number:    [phone]     Follow Up Details: PRISCILLA Booker (posthospitalization follow-up) 1 week with BMP check including phosphorus, magnesium and possible level check         Discharge Follow-up with Specified Provider: Endocrine; 1 Month   As directed      To: Endocrine    Follow Up: 1 Month    Follow Up Details: Severe hypercalcemia            Contact information for follow-up providers     Alejandra Dozier APRN .    Specialty: Family Medicine  Why: PRISCILLA Booker (posthospitalization follow-up) 1 week with BMP check including phosphorus, magnesium and possible level check  Contact information:  [address]  [address]  [place] [address]  [phone]                    Contact information for after-discharge care     Destination     [facility] .    Service: Skilled Nursing  Contact information:  [address] [place] [address]  [phone]                             Jennie Galvan MD  12/21/21  14:08 EST    Please note that this discharge summary required more than 30 minutes to complete.

## 2021-12-21 NOTE — DISCHARGE PLACEMENT REQUEST
"Geeta Renee (86 y.o. Female)             Date of Birth Social Security Number Address Home Phone MRN    1935  [address] [place] [id number] [phone] [id number]    Islam Marital Status             [facility]        Admission Date Admission Type Admitting Provider Attending Provider Department, Room/Bed    12/8/21 Emergency Shar Marr MD Oculam, Claire Chin, MD [address], 3307/1S    Discharge Date Discharge Disposition Discharge Destination           Skilled Nursing Facility (DC - External)              Attending Provider: Jennie Galvan MD    Allergies: Codeine, Sulfa Antibiotics    Isolation: None   Infection: None   Code Status: CPR   Advance Care Planning Activity    Ht: 162.6 cm (64\")   Wt: 80.6 kg (177 lb 9.6 oz)    Admission Cmt: None   Principal Problem: Acute pancreatitis [K85.90]                 Active Insurance as of 12/8/2021     Primary Coverage     Payor Plan Insurance Group Employer/Plan Group    MEDICARE MEDICARE A & B      Payor Plan Address Payor Plan Phone Number Payor Plan Fax Number Effective Dates    [address] [phone]  11/1/2001 - None Entered    [facility] [id number]       Subscriber Name Subscriber Birth Date Member ID       GEETA RENEE 1935 [id number]           Secondary Coverage     Payor Plan Insurance Group Employer/Plan Group    BANKERS LIFE AND CASUALTY [employer] BANKERS LIFE AND CASUALTY [employer] PLAN G     Payor Plan Address Payor Plan Phone Number Payor Plan Fax Number Effective Dates    [address] [phone]  1/1/2016 - None Entered    [facility] [id number]       Subscriber Name Subscriber Birth Date Member ID       GEETA RENEE 1935 [id number]                 Emergency Contacts      (Rel.) Home Phone Work Phone Mobile Phone    Ash Renee (Son) [phone] -- [phone]              After Visit Summary    Geeta Renee  MRN: [id number]    Icon primary diagnosis          " "Acute inflammation of the pancreas   Icon Date   12/8/2021 - 12/21/2021   Icon Location   [facility]     Icon Checklist header      Your Next Steps      Icon destination   Destination    [facility]   Skilled Nursing   [address] [place] [address]   [phone]      Icon do   Do     Icon Checkbox     8 medications from [employer] [place], [place] - [facility]. - [phone] [facility] [phone] FX      Icon read   Read     Icon Checkbox    Read 5 attachments      Icon Location   Go    Jan 4 2022 Mammo cor diag dig mina lt 1:00 PM  [facility]   [address] [facility] [address] [phone]  Bring any films/reports from outside facilities. Do not apply any powders, perfumes, deodorants, or lotions on the day of the exam. Arrive 15 minutes prior to exam time.   You have more future appointments. Please review your full appointment list.    IM5 Sign-Up    Send messages to your doctor, view your test results, renew your prescriptions, schedule appointments, and more.     Go to [email or web address], click \"Sign Up Now\", and enter your personal activation code: [email or web address]. Activation code expires 1/20/2022.  After Visit Summary    Instructions     Icon medication changes this visit             Your medications have changed    Icon medications to start taking   START taking:   amiodarone (PACERONE)   This medication has multiple start dates; refer to the medication list below for more details.       apixaban (ELIQUIS)        furosemide (LASIX)   Start taking on: December 22, 2021       magnesium oxide (MAG-OX)        pantoprazole (PROTONIX)   Start taking on: December 22, 2021       potassium chloride         Icon medications to change how you take   CHANGE how you take:   atenolol (TENORMIN) -- medication strength, how much to take, when to take this        Icon medications to " stop taking   STOP taking:   amLODIPine 10 MG tablet (NORVASC)        calcium carbonate 500 MG chewable tablet (TUMS)        promethazine 12.5 MG tablet (PHENERGAN)         Review your updated medication list below.          Your Next Steps  Icon destination   Destination  Icon do   Do  Icon read   Read  Icon Location   Go      COVID-19 Vaccination Information  Why Get Vaccinated?  Building defenses against COVID-19 is a team effort, and you are a gordon part of that team. Getting the COVID-19 vaccine adds one more layer of protection for you, your coworkers, and family. Here are ways you can build people’s confidence in the COVID-19 vaccines in your community and at home.     · Get vaccinated and enroll in the v-safe text messaging program to help [facility] monitor vaccine safety.   · Tell others why you are getting vaccinated and encourage them to get vaccinated. Share your success story.    · Learn how to have conversations about COVID-19 vaccine with coworkers, family, and friends.  · https://www.cdc.gov/coronavirus/2019-ncov/vaccines/index.html     How do I schedule an appointment for a vaccine?  https://www.vaccines.gov/ helps you find locations that carry COVID-19 vaccines and their contact information. Because every location handles appointments differently, you will need to schedule your appointment directly with the location you choose.            If you have any questions about your recovery, please call the [facility] Nurse Call Center at [phone]. A registered nurse is available 24 hours a day 7 days a week to assist you.   If you have any COVID-19 related questions, please call [phone].       Icon activity      Activity instructions        As tolerated.               Icon diet      Diet instructions        Regular low fat.              Icon Orders placed today                    Other instructions    Discharge Follow-up with PCP   Currently Documented PCP:   Alejandra Dozier APRN   PCP Phone  Number:   [phone]       Discharge Follow-up with Specified Provider: Endocrine; 1 Month                 What's Next    What's Next          Follow up with PRISCILLA Booker APRN (posthospitalization follow-up) 1 week with BMP check including phosphorus, magnesium and possible level check [address]   [address]   [facility] [address]  [phone]   Jan 4 2022 Mammo cor diag dig mina lt  Tuesday Jan 4, 2022 1:00 PM  Bring any films/reports from outside facilities.   Do not apply any powders, perfumes, deodorants, or lotions on the day of the exam.   Arrive 15 minutes prior to exam time. [facility]  [address] [facility] [address]  [phone]   Jan 11 2022 Pre-Admission Testing   Tuesday Jan 11, 2022 1:45 PM   [facility] T  [address] [facility] [facility] [address]  [phone]   Mar 3 2022 LAB  Thursday Mar 3, 2022 12:00 PM   [facility] HEMATOLOGY & ONCOLOGY  [address] [facility] [address]   [facility] [address]  [phone]          FOLLOW UP with BERTHA Berrios MD  Thursday Mar 3, 2022 12:30 PM   [facility] HEMATOLOGY & ONCOLOGY  [address] [facility] [address]   [facility] [address]  [phone]          INJECTION  Thursday Mar 3, 2022 1:00 PM   [facility] OUTPATIENT INFUSION  [address] [facility] [address]  [phone]    Icon Orders placed today                    Additional Information        Apt    dr sheridan  for  dec  30  at  11:45   in  [place]  office               Continuing Care     Icon destination      Destination    [facility]  Services: Skilled Nursing   Address: [address] [place], [place] [address] [address]   Phone: [phone]                         Your Allergies  Date Reviewed: 12/9/2021  Your Allergies   Allergen Reactions   Codeine Nausea Only       Sulfa Antibiotics Hives     Patient Belongings Returned      Document Return of Belongings Flowsheet    Were  the patient bedside belongings sent home? --   Medications Retrieved from Pharmacy & Sent Home --   Belongings Sent to Safe --   Belongings sent with: --   Belongings Retrieved from Security & Sent Home --           Finisarhart Signup    [facility] [employer] allows you to send messages to your doctor, view your test results, renew your prescriptions, schedule appointments, and more. To sign up, go to Rippld and click on the Sign Up Now link in the New User? box. Enter your [employer] Activation Code exactly as it appears below along with the last four digits of your Social Security Number and your Date of Birth () to complete the sign-up process. If you do not sign up before the expiration date, you must request a new code.     [employer] Activation Code: [email or web address]  Expires: 2022 12:33 PM     If you have questions, you can email [email or web address] or call [phone] to talk to our [employer] staff. Remember, [employer] is NOT to be used for urgent needs. For medical emergencies, dial 911.         Medication List    Medication List     Morning Afternoon Evening Bedtime As Needed   Icon medications to start taking   * amiodarone 400 MG tablet  Commonly known as: PACERONE  Start taking on: 2021  Take 1 tablet by mouth Daily for 8 doses.  Last time this was given: Ask your nurse or doctor        Icon medications to start taking   * amiodarone 200 MG tablet  Commonly known as: PACERONE  Start taking on: 2021  Take 1 tablet by mouth Daily.  Last time this was given: Ask your nurse or doctor         anastrozole 1 MG tablet  Commonly known as: ARIMIDEX  Take by mouth Daily.  Last time this was given: 1 mg on 2021  8:35 AM        Icon medications to start taking   apixaban 5 MG tablet tablet  Commonly known as: ELIQUIS  Take 1 tablet by mouth Every 12 (Twelve) Hours. Indications: Atrial Fibrillation  For: Atrial Fibrillation  Last time this was given: 5  mg on December 21, 2021  8:48 AM        Icon medications to change how you take   atenolol 50 MG tablet  Commonly known as: TENORMIN  Start taking on: December 22, 2021  Take 1 tablet by mouth Daily.  What changed:   0 medication strength  0 how much to take  0 when to take this  Last time this was given: 50 mg on December 21, 2021  8:48 AM        Icon medications to start taking   furosemide 20 MG tablet  Commonly known as: LASIX  Start taking on: December 22, 2021  Take 1 tablet by mouth Daily.  Last time this was given: 20 mg on December 21, 2021  8:49 AM        Icon medications to start taking   magnesium oxide 400 MG tablet  Commonly known as: MAG-OX  Take 1 tablet by mouth Daily.        Icon medications to start taking   pantoprazole 40 MG EC tablet  Commonly known as: PROTONIX  Start taking on: December 22, 2021  Take 1 tablet by mouth Every Morning.  Last time this was given: 40 mg on December 20, 2021  5:38 AM        Icon medications to start taking   potassium chloride 10 MEQ CR tablet  Take 1 tablet by mouth Daily.  Last time this was given: Ask your nurse or doctor         (very important)  * This list has 2 medication(s) that are the same as other medications prescribed for you. Read the directions carefully, and ask your doctor or other care provider to review them with you.         Where to  your medications     [employer] medication  information                     these medications at [facility] [place], [place] - [address] - [phone] [facility] [phone] FX     amiodarone (2 prescriptions) • apixaban • atenolol • furosemide • magnesium oxide • pantoprazole • potassium chloride      Address[facility] [address]   Phone: [phone]           Always carry an updated list of your medications with you. If there is an emergency, a responder can quickly see what medications you are taking. Take this paperwork with you the next time you see  your health care provider.            Ariana Sign-Up      Instructions    You have been enrolled into the transition from hospital to home program.  A nurse (Mariaelena) will be contacting you after you discharge to home to set up a time to come out to your home for a follow-up visit.  If you have any questions or concerns you can call this number anytime and a nurse will contact you:  [facility]  [phone].       Additional Instructions    Click to add instructions      Icon List of Attachments     Attached Information  Acute Pancreatitis  Easy-to-Read (English)  Acute Pancreatitis  ?     Acute pancreatitis happens when the pancreas gets swollen. The pancreas is a large gland in the body that helps to control blood sugar. It also makes enzymes that help to digest food.  This condition can last a few days and cause serious problems. The lungs, heart, and kidneys may stop working.  What are the causes?  Causes include:  · Alcohol abuse.  · Drug abuse.  · Gallstones.  · A tumor in the pancreas.  Other causes include:  · Some medicines.  · Some chemicals.  · Diabetes.  · An infection.  · Damage caused by an accident.  · The poison (venom) from a scorpion bite.  · Belly (abdominal) surgery.  · The body's defense system (immune system) attacking the pancreas (autoimmune pancreatitis).  · Genes that are passed from parent to child (inherited).  In some cases, the cause is not known.  What are the signs or symptoms?  · Pain in the upper belly that may be felt in the back. The pain may be very bad.  · Swelling of the belly.  · Feeling sick to your stomach (nauseous) and throwing up (vomiting).  · Fever.  How is this treated?  You will likely have to stay in the hospital. Treatment may include:  · Pain medicine.  · Fluid through an IV tube.  · Placing a tube in the stomach to take out the stomach contents. This may help you stop throwing up.  · Not eating for 3-4 days.  · Antibiotic medicines, if you have an  infection.  · Treating any other problems that may be the cause.  · Steroid medicines, if your problem is caused by your defense system attacking your body's own tissues.  · Surgery.  Follow these instructions at home:  Eating and drinking  ?     · Follow instructions from your doctor about what to eat and drink.  · Eat foods that do not have a lot of fat in them.  · Eat small meals often. Do not eat big meals.  · Drink enough fluid to keep your pee (urine) pale yellow.  · Do not drink alcohol if it caused your condition.     Medicines  · Take over-the-counter and prescription medicines only as told by your doctor.  · Ask your doctor if the medicine prescribed to you:  ? Requires you to avoid driving or using heavy machinery.  ? Can cause trouble pooping (constipation). You may need to take steps to prevent or treat trouble pooping:  § Take over-the-counter or prescription medicines.  § Eat foods that are high in fiber. These include beans, whole grains, and fresh fruits and vegetables.  § Limit foods that are high in fat and sugar. These include fried or sweet foods.  General instructions  · Do not use any products that contain nicotine or tobacco, such as cigarettes, e-cigarettes, and chewing tobacco. If you need help quitting, ask your doctor.  · Get plenty of rest.  · Check your blood sugar at home as told by your doctor.  · Keep all follow-up visits as told by your doctor. This is important.  Contact a doctor if:  · You do not get better as quickly as expected.  · You have new symptoms.  · Your symptoms get worse.  · You have pain or weakness that lasts a long time.  · You keep feeling sick to your stomach.  · You get better and then you have pain again.  · You have a fever.  Get help right away if:  · You cannot eat or keep fluids down.  · Your pain gets very bad.  · Your skin or the white part of your eyes turns yellow.  · You have sudden swelling in your belly.  · You throw up.  · You feel dizzy or you pass  out (faint).  · Your blood sugar is high (over 300 mg/dL).  Summary  · Acute pancreatitis happens when the pancreas gets swollen.  · This condition is often caused by alcohol abuse, drug abuse, or gallstones.  · You will likely have to stay in the hospital for treatment.  This information is not intended to replace advice given to you by your health care provider. Make sure you discuss any questions you have with your health care provider.  Document Revised: 10/07/2019 Document Reviewed: 10/07/2019  [employer] Patient Education © 2021 Elsevier Inc.         Icon List of Attachments     Attached Information  Hypercalcemia (English)  Hypercalcemia  Hypercalcemia is when the level of calcium in a person's blood is above normal. The body needs calcium to make bones and keep them strong. Calcium also helps the muscles, nerves, brain, and heart work the way they should.  Most of the calcium in the body is in the bones. There is also some calcium in the blood. Hypercalcemia can happen when calcium comes out of the bones, or when the kidneys are not able to remove calcium from the blood. Hypercalcemia can be mild or severe.  What are the causes?  There are many possible causes of hypercalcemia. Common causes of this condition include:  · Hyperparathyroidism. This is a condition in which the body produces too much parathyroid hormone. There are four parathyroid glands in your neck. These glands produce a chemical messenger (hormone) that helps the body absorb calcium from foods and helps your bones release calcium.  · Certain kinds of cancer.  Less common causes of hypercalcemia include:  · Getting too much calcium or vitamin D from your diet.  · Kidney failure.  · Hyperthyroidism.  · Severe dehydration.  · Being on bed rest or being inactive for a long time.  · Certain medicines.  · Infections.  What increases the risk?  You are more likely to develop this condition if you:  · Are female.  · Are 60 years of age or  older.  · Have a family history of hypercalcemia.  What are the signs or symptoms?  Mild hypercalcemia that starts slowly may not cause symptoms. Severe, sudden hypercalcemia is more likely to cause symptoms, such as:  · Being more thirsty than usual.  · Needing to urinate more often than usual.  · Abdominal pain.  · Nausea and vomiting.  · Constipation.  · Muscle pain, twitching, or weakness.  · Feeling very tired.  How is this diagnosed?  ?     Hypercalcemia is usually diagnosed with a blood test. You may also have tests to help determine what is causing this condition, such as imaging tests and more blood tests.  How is this treated?  Treatment for hypercalcemia depends on the cause. Treatment may include:  · Receiving fluids through an IV.  · Medicines that:  ? Keep calcium levels steady after receiving fluids (loop diuretics).  ? Keep calcium in your bones (bisphosphonates).  ? Lower the calcium level in your blood.  · Surgery to remove overactive parathyroid glands.  · A procedure that filters your blood to correct calcium levels (hemodialysis).  Follow these instructions at home:  ?     · Take over-the-counter and prescription medicines only as told by your health care provider.  · Follow instructions from your health care provider about eating or drinking restrictions.  · Drink enough fluid to keep your urine pale yellow.  · Stay active. Weight-bearing exercise helps to keep calcium in your bones. Follow instructions from your health care provider about what type and level of exercise is safe for you.  · Keep all follow-up visits as told by your health care provider. This is important.  Contact a health care provider if you have:  · A fever.  · A heartbeat that is irregular or very fast.  · Changes in mood, memory, or personality.  Get help right away if you:  · Have severe abdominal pain.  · Have chest pain.  · Have trouble breathing.  · Become very confused and sleepy.  · Lose  consciousness.  Summary  · Hypercalcemia is when the level of calcium in a person's blood is above normal. The body needs calcium to make bones and keep them strong. Calcium also helps the muscles, nerves, brain, and heart work the way they should.  · There are many possible causes of hypercalcemia, and treatment depends on the cause.  · Take over-the-counter and prescription medicines only as told by your health care provider.  · Follow instructions from your health care provider about eating or drinking restrictions.  This information is not intended to replace advice given to you by your health care provider. Make sure you discuss any questions you have with your health care provider.  Document Revised: 01/14/2020 Document Reviewed: 09/23/2019  [employer] Patient Education © 2021 Elsevier Inc.         Icon List of Attachments     Attached Information  Heart Failure  Diagnosis  Easy-to-Read (English)  Heart Failure, Diagnosis  ?     Heart failure means that your heart is not able to pump blood in the right way. This makes it hard for your body to work well. Heart failure is usually a long-term (chronic) condition. You must take good care of yourself and follow your treatment plan from your doctor.  What are the causes?  This condition may be caused by:  · High blood pressure.  · Build up of cholesterol and fat in the arteries.  · Heart attack. This injures the heart muscle.  · Heart valves that do not open and close properly.  · Damage of the heart muscle. This is also called cardiomyopathy.  · Lung disease.  · Abnormal heart rhythms.  What increases the risk?  The risk of heart failure goes up as a person ages. This condition is also more likely to develop in people who:  · Are overweight.  · Are male.  · Smoke or chew tobacco.  · Abuse alcohol or illegal drugs.  · Have taken medicines that can damage the heart.  · Have diabetes.  · Have abnormal heart rhythms.  · Have thyroid problems.  · Have low blood counts  (anemia).  What are the signs or symptoms?  Symptoms of this condition include:  · Shortness of breath.  · Coughing.  · Swelling of the feet, ankles, legs, or belly.  · Losing weight for no reason.  · Trouble breathing.  · Waking from sleep because of the need to sit up and get more air.  · Rapid heartbeat.  · Being very tired.  · Feeling dizzy, or feeling like you may pass out (faint).  · Having no desire to eat.  · Feeling like you may vomit (nauseous).  · Peeing (urinating) more at night.  · Feeling confused.  How is this treated?  ?        ?     This condition may be treated with:  · Medicines. These can be given to treat blood pressure and to make the heart muscles stronger.  · Changes in your daily life. These may include eating a healthy diet, staying at a healthy body weight, quitting tobacco and illegal drug use, or doing exercises.  · Surgery. Surgery can be done to open blocked valves, or to put devices in the heart, such as pacemakers.  · A donor heart (heart transplant). You will receive a healthy heart from a donor.  Follow these instructions at home:  · Treat other conditions as told by your doctor. These may include high blood pressure, diabetes, thyroid disease, or abnormal heart rhythms.  · Learn as much as you can about heart failure.  · Get support as you need it.  · Keep all follow-up visits as told by your doctor. This is important.  Summary  · Heart failure means that your heart is not able to pump blood in the right way.  · This condition is caused by high blood pressure, heart attack, or damage of the heart muscle.  · Symptoms of this condition include shortness of breath and swelling of the feet, ankles, legs, or belly. You may also feel very tired or feel like you may vomit.  · You may be treated with medicines, surgery, or changes in your daily life.  · Treat other health conditions as told by your doctor.  This information is not intended to replace advice given to you by your health  care provider. Make sure you discuss any questions you have with your health care provider.  Document Revised: 03/06/2020 Document Reviewed: 03/06/2020  Elsevier Patient Education © 2021 [employer]         Icon List of Attachments     Attached Information  Atrial Fibrillation  Easy-to-Read (English)  Atrial Fibrillation  ?     Atrial fibrillation is a type of heartbeat that is irregular or fast. If you have this condition, your heart beats without any order. This makes it hard for your heart to pump blood in a normal way.  Atrial fibrillation may come and go, or it may become a long-lasting problem. If this condition is not treated, it can put you at higher risk for stroke, heart failure, and other heart problems.  What are the causes?  This condition may be caused by diseases that damage the heart. They include:  · High blood pressure.  · Heart failure.  · Heart valve disease.  · Heart surgery.  Other causes include:  · Diabetes.  · Thyroid disease.  · Being overweight.  · Kidney disease.  Sometimes the cause is not known.  What increases the risk?  You are more likely to develop this condition if:  · You are older.  · You smoke.  · You exercise often and very hard.  · You have a family history of this condition.  · You are a man.  · You use drugs.  · You drink a lot of alcohol.  · You have lung conditions, such as emphysema, pneumonia, or COPD.  · You have sleep apnea.  What are the signs or symptoms?  Common symptoms of this condition include:  · A feeling that your heart is beating very fast.  · Chest pain or discomfort.  · Feeling short of breath.  · Suddenly feeling light-headed or weak.  · Getting tired easily during activity.  · Fainting.  · Sweating.  In some cases, there are no symptoms.  How is this treated?  Treatment for this condition depends on underlying conditions and how you feel when you have atrial fibrillation. They include:  · Medicines to:  ? Prevent blood clots.  ? Treat heart rate or  heart rhythm problems.  · Using devices, such as a pacemaker, to correct heart rhythm problems.  · Doing surgery to remove the part of the heart that sends bad signals.  · Closing an area where clots can form in the heart (left atrial appendage).  In some cases, your doctor will treat other underlying conditions.  Follow these instructions at home:  Medicines  · Take over-the-counter and prescription medicines only as told by your doctor.  · Do not take any new medicines without first talking to your doctor.  · If you are taking blood thinners:  ? Talk with your doctor before you take any medicines that have aspirin or NSAIDs, such as ibuprofen, in them.  ? Take your medicine exactly as told by your doctor. Take it at the same time each day.  ? Avoid activities that could hurt or bruise you. Follow instructions about how to prevent falls.  ? Wear a bracelet that says you are taking blood thinners. Or, carry a card that lists what medicines you take.  Lifestyle  ?        ?     · Do not use any products that have nicotine or tobacco in them. These include cigarettes, e-cigarettes, and chewing tobacco. If you need help quitting, ask your doctor.  · Eat heart-healthy foods. Talk with your doctor about the right eating plan for you.  · Exercise regularly as told by your doctor.  · Do not drink alcohol.  · Lose weight if you are overweight.  · Do not use drugs, including cannabis.  General instructions  · If you have a condition that causes breathing to stop for a short period of time (apnea), treat it as told by your doctor.  · Keep a healthy weight. Do not use diet pills unless your doctor says they are safe for you. Diet pills may make heart problems worse.  · Keep all follow-up visits as told by your doctor. This is important.  Contact a doctor if:  · You notice a change in the speed, rhythm, or strength of your heartbeat.  · You are taking a blood-thinning medicine and you get more bruising.  · You get tired more  "easily when you move or exercise.  · You have a sudden change in weight.  Get help right away if:  ?     · You have pain in your chest or your belly (abdomen).  · You have trouble breathing.  · You have side effects of blood thinners, such as blood in your vomit, poop (stool), or pee (urine), or bleeding that cannot stop.  · You have any signs of a stroke. \"BE FAST\" is an easy way to remember the main warning signs:  ? B - Balance. Signs are dizziness, sudden trouble walking, or loss of balance.  ? E - Eyes. Signs are trouble seeing or a change in how you see.  ? F - Face. Signs are sudden weakness or loss of feeling in the face, or the face or eyelid drooping on one side.  ? A - Arms. Signs are weakness or loss of feeling in an arm. This happens suddenly and usually on one side of the body.  ? S - Speech. Signs are sudden trouble speaking, slurred speech, or trouble understanding what people say.  ? T - Time. Time to call emergency services. Write down what time symptoms started.  · You have other signs of a stroke, such as:  ? A sudden, very bad headache with no known cause.  ? Feeling like you may vomit (nausea).  ? Vomiting.  ? A seizure.  These symptoms may be an emergency. Do not wait to see if the symptoms will go away. Get medical help right away. Call your local emergency services (911 in the [place] Do not drive yourself to the hospital.  Summary  · Atrial fibrillation is a type of heartbeat that is irregular or fast.  · You are at higher risk of this condition if you smoke, are older, have diabetes, or are overweight.  · Follow your doctor's instructions about medicines, diet, exercise, and follow-up visits.  · Get help right away if you have signs or symptoms of a stroke.  · Get help right away if you cannot catch your breath, or you have chest pain or discomfort.  This information is not intended to replace advice given to you by your health care provider. Make sure you discuss any questions you have " with your health care provider.  Document Revised: 06/10/2020 Document Reviewed: 06/10/2020  [employer] Patient Education © 2021 [employer]         Icon List of Attachments     Attached Information  Heart-Healthy Eating Plan  Easy-to-Read (English)  Heart-Healthy Eating Plan  Heart-healthy meal planning includes:  · Eating less unhealthy fats.  · Eating more healthy fats.  · Making other changes in your diet.  Talk with your doctor or a diet specialist (dietitian) to create an eating plan that is right for you.  What is my plan?  Your doctor may recommend an eating plan that includes:  · Total fat: ______% or less of total calories a day.  · Saturated fat: ______% or less of total calories a day.  · Cholesterol: less than _________mg a day.  What are tips for following this plan?  Cooking  Avoid frying your food. Try to bake, boil, grill, or broil it instead. You can also reduce fat by:  · Removing the skin from poultry.  · Removing all visible fats from meats.  · Steaming vegetables in water or broth.  Meal planning  ?     · At meals, divide your plate into four equal parts:  ? Fill one-half of your plate with vegetables and green salads.  ? Fill one-fourth of your plate with whole grains.  ? Fill one-fourth of your plate with lean protein foods.  · Eat 4-5 servings of vegetables per day. A serving of vegetables is:  ? 1 cup of raw or cooked vegetables.  ? 2 cups of raw leafy greens.  · Eat 4-5 servings of fruit per day. A serving of fruit is:  ? 1 medium whole fruit.  ? ¼ cup of dried fruit.  ? ½ cup of fresh, frozen, or canned fruit.  ? ½ cup of 100% fruit juice.  · Eat more foods that have soluble fiber. These are apples, broccoli, carrots, beans, peas, and barley. Try to get 20-30 g of fiber per day.  · Eat 4-5 servings of nuts, legumes, and seeds per week:  ? 1 serving of dried beans or legumes equals ½ cup after being cooked.  ? 1 serving of nuts is ¼ cup.  ? 1 serving of seeds equals 1 tablespoon.      General information  · Eat more home-cooked food. Eat less restaurant, buffet, and fast food.  · Limit or avoid alcohol.  · Limit foods that are high in starch and sugar.  · Avoid fried foods.  · Lose weight if you are overweight.  · Keep track of how much salt (sodium) you eat. This is important if you have high blood pressure. Ask your doctor to tell you more about this.  · Try to add vegetarian meals each week.  Fats  · Choose healthy fats. These include olive oil and canola oil, flaxseeds, walnuts, almonds, and seeds.  · Eat more omega-3 fats. These include salmon, mackerel, sardines, tuna, flaxseed oil, and ground flaxseeds. Try to eat fish at least 2 times each week.  · Check food labels. Avoid foods with trans fats or high amounts of saturated fat.  · Limit saturated fats.  ? These are often found in animal products, such as meats, butter, and cream.  ? These are also found in plant foods, such as palm oil, palm kernel oil, and coconut oil.  · Avoid foods with partially hydrogenated oils in them. These have trans fats. Examples are stick margarine, some tub margarines, cookies, crackers, and other baked goods.  What foods can I eat?  Fruits  All fresh, canned (in natural juice), or frozen fruits.  Vegetables  Fresh or frozen vegetables (raw, steamed, roasted, or grilled). Green salads.  Grains  Most grains. Choose whole wheat and whole grains most of the time. Rice and pasta, including brown rice and pastas made with whole wheat.  Meats and other proteins  Lean, well-trimmed beef, veal, pork, and lamb. Chicken and turkey without skin. All fish and shellfish. Wild duck, rabbit, pheasant, and venison. Egg whites or low-cholesterol egg substitutes. Dried beans, peas, lentils, and tofu. Seeds and most nuts.  Dairy  Low-fat or nonfat cheeses, including ricotta and mozzarella. Skim or 1% milk that is liquid, powdered, or evaporated. Buttermilk that is made with low-fat milk. Nonfat or low-fat yogurt.  Fats and  oils  Non-hydrogenated (trans-free) margarines. Vegetable oils, including soybean, sesame, sunflower, olive, peanut, safflower, corn, canola, and cottonseed. Salad dressings or mayonnaise made with a vegetable oil.  Beverages  Mineral water. Coffee and tea. Diet carbonated beverages.  Sweets and desserts  Sherbet, gelatin, and fruit ice. Small amounts of dark chocolate.  Limit all sweets and desserts.  Seasonings and condiments  All seasonings and condiments.  The items listed above may not be a complete list of foods and drinks you can eat. Contact a dietitian for more options.  What foods should I avoid?  Fruits  Canned fruit in heavy syrup. Fruit in cream or butter sauce. Fried fruit. Limit coconut.  Vegetables  Vegetables cooked in cheese, cream, or butter sauce. Fried vegetables.  Grains  Breads that are made with saturated or trans fats, oils, or whole milk. Croissants. Sweet rolls. Donuts. High-fat crackers, such as cheese crackers.  Meats and other proteins  Fatty meats, such as hot dogs, ribs, sausage, miller, rib-eye roast or steak. High-fat deli meats, such as salami and bologna. Caviar. Domestic duck and goose. Organ meats, such as liver.  Dairy  Cream, sour cream, cream cheese, and creamed cottage cheese. Whole-milk cheeses. Whole or 2% milk that is liquid, evaporated, or condensed. Whole buttermilk. Cream sauce or high-fat cheese sauce. Yogurt that is made from whole milk.  Fats and oils  Meat fat, or shortening. Cocoa butter, hydrogenated oils, palm oil, coconut oil, palm kernel oil. Solid fats and shortenings, including miller fat, salt pork, lard, and butter. Nondairy cream substitutes. Salad dressings with cheese or sour cream.  Beverages  Regular sodas and juice drinks with added sugar.  Sweets and desserts  Frosting. Pudding. Cookies. Cakes. Pies. Milk chocolate or white chocolate. Buttered syrups. Full-fat ice cream or ice cream drinks.  The items listed above may not be a complete list of foods  and drinks to avoid. Contact a dietitian for more information.  Summary  · Heart-healthy meal planning includes eating less unhealthy fats, eating more healthy fats, and making other changes in your diet.  · Eat a balanced diet. This includes fruits and vegetables, low-fat or nonfat dairy, lean protein, nuts and legumes, whole grains, and heart-healthy oils and fats.  This information is not intended to replace advice given to you by your health care provider. Make sure you discuss any questions you have with your health care provider.  Document Revised: 02/21/2019 Document Reviewed: 01/25/2019  [employer] Patient Education © 2021 [employer]               Opioid Resource    If you or someone you know needs information on substance abuse, please visit   [email or web address] for listings of facilities and resources across [place].      Stroke Symptoms    · Call 911 or have someone take you to the Emergency Department if you have any of the following:  · Sudden numbness or weakness of your face, arm or leg especially on one side of the body  · Sudden confusion, difficulty speaking or trouble understanding   · Changes in your vision or loss of sight in one eye  · Sudden severe headache with no known cause  · Sudden dizziness, trouble walking, loss of balance or coordination     It is important to seek emergency care right away if you have further stroke symptoms. If you get emergency help quickly, the powerful clot-dissolving medicines can reduce the disabilities caused by a stroke.      For more information:  American Stroke Association  5-251-8-STROKE  www.strokeassociation.org      Smoking Cessation    IF YOU SMOKE OR USE TOBACCO PLEASE READ THE FOLLOWING:  Why is smoking bad for me?  Smoking increases the risk of heart disease, lung disease, vascular disease, stroke, and cancer. If you smoke, STOP!     For more information:  Quit Now  [place]  1-800-QUIT-NOW  https://[place].quitlogix.org/en[place]/      Suicidal Feelings    If you feel like life is too tough and are thinking of suicide or injuring yourself, get help right away!  · Call 911  · Call a suicide hotline to speak to a counselor. 9-900-662-TALK or 3-707-NTMFQFF       Patient Experience    Thank you for choosing [facility]. You may receive a survey following your visit. Please take a moment to share what went well, where we need improvement, and which staff members deserve recognition. We value your input.             ? ?                YOU ARE THE MOST IMPORTANT FACTOR IN YOUR RECOVERY.      Follow all instructions carefully.      I have reviewed my discharge instructions with my nurse, including the following information, if applicable:                 Information about my illness and diagnosis              Follow up appointments (including lab draws)              Wound Care              Equipment Needs              Medications (new and continuing) along with side effects              Preventative information such as vaccines and smoking cessations              Diet              Pain              I know when to contact my Doctor's office or seek emergency care        I want my nurse to describe the side effects of my medications: YES NO   If the answer is no, I understand the side effects of my medications: YES NO   My nurse described the side effects of my medications in a way that I could understand: YES NO   I have taken my personal belongings and my own medications with me at discharge: YES NO          I have received this information and my questions have been answered. I have discussed any concerns I see with this plan with the nurse or physician. I understand these instructions.     Signature of Patient or Responsible Person: _____________________________________     Date: _________________  Time: __________________     Signature of Healthcare Provider:  _______________________________________  Date: _________________  Time: __________________

## 2021-12-21 NOTE — DISCHARGE PLACEMENT REQUEST
"Geeta Renee (86 y.o. Female)             Date of Birth Social Security Number Address Home Phone MRN    1935  [id number] [facility] [address] [phone] [id number]    Faith Marital Status             Rastafarian        Admission Date Admission Type Admitting Provider Attending Provider Department, Room/Bed    12/8/21 Emergency Sahr Marr MD Oculam, Claire Chin, MD [address], 3307/1S    Discharge Date Discharge Disposition Discharge Destination           Skilled Nursing Facility (DC - External)              Attending Provider: Jennie Galvan MD    Allergies: Codeine, Sulfa Antibiotics    Isolation: None   Infection: None   Code Status: CPR   Advance Care Planning Activity    Ht: 162.6 cm (64\")   Wt: 80.6 kg (177 lb 9.6 oz)    Admission Cmt: None   Principal Problem: Acute pancreatitis [K85.90]                 Active Insurance as of 12/8/2021     Primary Coverage     Payor Plan Insurance Group Employer/Plan Group    MEDICARE MEDICARE A & B      Payor Plan Address Payor Plan Phone Number Payor Plan Fax Number Effective Dates    [address] [phone]  11/1/2001 - None Entered    [facility] [id number]       Subscriber Name Subscriber Birth Date Member ID       GEETA RENEE 1935 [id number]           Secondary Coverage     Payor Plan Insurance Group Employer/Plan Group    [employer] PLAN G     Payor Plan Address Payor Plan Phone Number Payor Plan Fax Number Effective Dates    [address] [phone]  1/1/2016 - None Entered    [facility] [id number]       Subscriber Name Subscriber Birth Date Member ID       GEETA RENEE 1935 [id number]                 Emergency Contacts      (Rel.) Home Phone Work Phone Mobile Phone    Ash Renee (Son) [phone] -- [phone]            Emergency Contact Information     Name Relation Home Work Mobile    Ash Renee Son " [phone]          Insurance Information                MEDICARE/MEDICARE A & B Phone: [phone]    Subscriber: Geeta Renee Subscriber#: [id number]    Group#: -- Precert#: --        [employer] Phone: [phone]    Subscriber: Geeta Renee Subscriber#: [id number]    Group#: PLAN [id number] Precert#: --          Treatment Team  Chat With All Active Members    Provider Relationship Specialty Contact    Jennie Galvan MD  Attending --  [phone]    Sara Caballero, JANEY  Respiratory Therapist --  [phone]    Marifer Conrad, PCT  Patient Care Technician --     Rochelle Byrd DO  Physician of Record Hospitalist  [phone]    Erlinda Barros, PT  Physical Therapist Physical Therapy     Trent Salinas MD Registered Nurse Family Medicine  [phone]    Rosalie Kurtz, RN  Registered Nurse --           Problem List           Codes Noted - Resolved       Hospital    * (Principal) Acute pancreatitis ICD-10-CM: K85.90  ICD-9-CM: 577.0 2021 - Present       Non-Hospital    AAA (abdominal aortic aneurysm) (HCC) ICD-10-CM: I71.4  ICD-9-CM: 441.4 2021 - Present    Aromatase inhibitor use ICD-10-CM: Z79.811  ICD-9-CM: V07.52 1/15/2020 - Present    Osteoporosis ICD-10-CM: M81.0  ICD-9-CM: 733.00 2020 - Present    Malignant neoplasm of upper-outer quadrant of left breast in female, estrogen receptor positive (HCC) ICD-10-CM: C50.412, Z17.0  ICD-9-CM: 174.4, V86.0 2019 - Present    Esophageal adenocarcinoma (HCC) ICD-10-CM: C15.9  ICD-9-CM: 150.9 2018 - Present             History & Physical      Shar Marr MD at 21 2110          Hospitalist History and Physical        Patient Identification  Name: Geeta Henderlight  Age/Sex: 86 y.o. female  :  1935        MRN: [id number]  Visit Number: [id number]  Admit Date: 2021   PCP: Alejandra Dozier APRN          Chief complaint abdominal  pain    History of Present Illness:  Patient is a 86 y.o. female with history of esophageal cancer and breast cancer, both in remission, grider esophagus, GERD, arthritis, HTN, and hiatal hernia, who presents with complaints of worsening epigastric abdominal pain over the last 2-3 days. She describes the pain as sharp but also cramping, with radition into her back. Pain is exacerbated by eating anything solid, but she can tolerate liquids without increased pain. She reports accompanying nausea with several bouts of vomiting. She also reports diarrhea, but attributes this to prilosec which she recently quit taking as a result. She denies chest pain, dyspnea, fever or chills. She does report generalized weakness, fatigue and even some ataxia making it difficult to perform basic tasks or even walk.     Review of Systems  Review of Systems   Constitutional: Positive for activity change, appetite change and fatigue. Negative for chills, diaphoresis, fever and unexpected weight change.   HENT: Negative for postnasal drip, rhinorrhea, sinus pressure, sinus pain and sore throat.    Eyes: Negative for photophobia, pain, discharge, redness, itching and visual disturbance.   Respiratory: Negative for cough, shortness of breath and wheezing.    Cardiovascular: Negative for chest pain, palpitations and leg swelling.   Gastrointestinal: Positive for abdominal pain, diarrhea, nausea and vomiting. Negative for abdominal distention and constipation.   Endocrine: Negative for cold intolerance, heat intolerance, polydipsia, polyphagia and polyuria.   Genitourinary: Negative for difficulty urinating, dysuria, flank pain, frequency and hematuria.   Musculoskeletal: Positive for back pain (radiating from abdomen). Negative for arthralgias, joint swelling, myalgias, neck pain and neck stiffness.   Neurological: Positive for weakness (generalized). Negative for dizziness, tremors, seizures, syncope, light-headedness, numbness and  headaches.        Ataxia   Hematological: Negative for adenopathy. Does not bruise/bleed easily.   Psychiatric/Behavioral: Negative for agitation, behavioral problems and confusion.       History  Past Medical History:   Diagnosis Date   • Arthritis    • Breast cancer (HCC)    • Breast lump     left   • Esophageal cancer (HCC)    • GERD (gastroesophageal reflux disease)    • Hiatal hernia    • Hypertension    • Snoring      Past Surgical History:   Procedure Laterality Date   • BREAST LUMPECTOMY Left 2019    Procedure: BREAST LUMPECTOMY WITH ULTRASOUND;  Surgeon: Nurys Selby MD;  Location: [facility];  Service: General   • CATARACT EXTRACTION, BILATERAL  09/10/2019   • CHOLECYSTECTOMY     • COLONOSCOPY     • ELBOW PROCEDURE Right    • ENDOSCOPY     • FRACTURE SURGERY      right elbow     Family History   Problem Relation Age of Onset   • Hypertension Mother    • Alzheimer's disease Mother    • Heart disease Brother    • Tuberculosis Father    • Breast cancer Neg Hx      Social History     Tobacco Use   • Smoking status: Former Smoker     Packs/day: 0.50     Years: 30.00     Pack years: 15.00     Types: Cigarettes     Quit date:      Years since quittin.9   • Smokeless tobacco: Never Used   Vaping Use   • Vaping Use: Never used   Substance Use Topics   • Alcohol use: No   • Drug use: No     (Not in a hospital admission)    Allergies:  Codeine and Sulfa antibiotics    Objective     Vital Signs  Temp:  [98.5 °F (36.9 °C)] 98.5 °F (36.9 °C)  Heart Rate:  [50-87] 68  Resp:  [16] 16  BP: (111-164)/(63-99) 131/73  Body mass index is 32.27 kg/m².    Physical Exam:  Physical Exam  Constitutional:       Comments: Pleasant elderly female, no acute distress but appears fatigued   HENT:      Head: Normocephalic and atraumatic.      Right Ear: External ear normal.      Left Ear: External ear normal.      Nose: Nose normal.      Mouth/Throat:      Mouth: Mucous membranes are dry.      Pharynx: Oropharynx is  clear.   Eyes:      Extraocular Movements: Extraocular movements intact.      Conjunctiva/sclera: Conjunctivae normal.      Pupils: Pupils are equal, round, and reactive to light.   Cardiovascular:      Rate and Rhythm: Normal rate and regular rhythm.      Pulses: Normal pulses.      Heart sounds: Normal heart sounds. No murmur heard.      Pulmonary:      Effort: Pulmonary effort is normal. No respiratory distress.      Breath sounds: Normal breath sounds. No wheezing or rales.   Abdominal:      General: Abdomen is flat. Bowel sounds are normal. There is no distension.      Palpations: Abdomen is soft.      Tenderness: There is abdominal tenderness (epigstric region). There is guarding. There is no rebound.   Musculoskeletal:         General: Normal range of motion.      Cervical back: Normal range of motion and neck supple.      Right lower leg: Edema (trace around ankles) present.      Left lower leg: Edema (trace around ankles) present.   Lymphadenopathy:      Cervical: No cervical adenopathy.   Skin:     General: Skin is warm and dry.      Capillary Refill: Capillary refill takes less than 2 seconds.      Coloration: Skin is not jaundiced or pale.   Neurological:      General: No focal deficit present.      Comments: Finger-to-nose coordination intact. Upper extremity strength 5/5 and symmetric; lower extremity strength 4/5 and symmetric   Psychiatric:         Mood and Affect: Mood normal.         Behavior: Behavior normal.         Thought Content: Thought content normal.         Judgment: Judgment normal.           Results Review:       Lab Results:  Results from last 7 days   Lab Units 12/08/21  1158   WBC 10*3/mm3 15.95*   HEMOGLOBIN g/dL 12.5   PLATELETS 10*3/mm3 255     Results from last 7 days   Lab Units 12/08/21  1158   CRP mg/dL 4.13*     Results from last 7 days   Lab Units 12/08/21  2031 12/08/21  1622 12/08/21  1158 12/07/21  1118   SODIUM mmol/L 139 137 136 136   POTASSIUM mmol/L 2.3* 1.9* 2.0* 2.3*    CHLORIDE mmol/L 94* 93* 89* 86*   CO2 mmol/L 33.5* 30.3* 33.6* 37.9*   BUN mg/dL 28* 27* 29* 22   CREATININE mg/dL 3.35* 3.28* 3.51* 3.37*   CALCIUM mg/dL 13.8* 14.1* 15.5* 16.9*   GLUCOSE mg/dL 121* 110* 113* 121*     Results from last 7 days   Lab Units 12/08/21 2031 12/08/21  1310   MAGNESIUM mg/dL 1.4* 1.1*     No results found for: HGBA1C  Results from last 7 days   Lab Units 12/08/21  1622 12/08/21  1158 12/07/21  1118   BILIRUBIN mg/dL 0.5 0.5 0.5   ALK PHOS U/L 90 100 100   AST (SGOT) U/L 19 23 26   ALT (SGPT) U/L 17 19 23     Results from last 7 days   Lab Units 12/08/21 2031 12/08/21 1622 12/08/21  1158   TROPONIN T ng/mL 0.262* 0.255* 0.324*             Results from last 7 days   Lab Units 12/08/21 2037   PH, ARTERIAL pH units 7.531*   PO2 ART mm Hg 73.2*   PCO2, ARTERIAL mm Hg 49.8*   HCO3 ART mmol/L 41.7*       I have reviewed the patient's laboratory results.    Imaging:  Imaging Results (Last 72 Hours)     Procedure Component Value Units Date/Time    CT Abdomen Pelvis Without Contrast [[id number]] Collected: 12/08/21 1426     Updated: 12/08/21 1459    Narrative:      EXAM:    CT Abdomen and Pelvis Without Intravenous Contrast     EXAM DATE:    12/8/2021 1:39 PM     CLINICAL HISTORY:    Abdominal pain, acute, nonlocalized     TECHNIQUE:    Axial computed tomography images of the abdomen and pelvis without  intravenous contrast.  Sagittal and coronal reformatted images were  created and reviewed.  This CT exam was performed using one or more of  the following dose reduction techniques:  automated exposure control,  adjustment of the mA and/or kV according to patient size, and/or use of  iterative reconstruction technique.     COMPARISON:    09/20/2021     FINDINGS:    Lung bases:  Lung bases appear clear.    Heart:  Cardiomegaly is noted.    Mediastinum:  Moderate hiatal hernia.      ABDOMEN:    Liver:  Unremarkable.    Gallbladder and bile ducts:  Unremarkable.  No calcified stones.  No  ductal  dilation.    Pancreas:  Inflammation surrounds the head of the pancreas and along  the second portion of the duodenum which is most consistent with acute  pancreatitis.  No ductal dilation.    Spleen:  Unremarkable.  No splenomegaly.    Adrenals:  Unremarkable.  No mass.    Kidneys and ureters:  Unremarkable.  No obstructing stones.  No  hydronephrosis.    Stomach and bowel:  Unremarkable.  No obstruction.  No mucosal  thickening.      PELVIS:    Appendix:  No findings to suggest acute appendicitis.    Bladder:  Unremarkable.  No stones.    Reproductive:  Unremarkable as visualized.      ABDOMEN and PELVIS:    Intraperitoneal space:  No pneumoperitoneum is identified.  No  loculated fluid collections.    Bones/joints:  No acute fracture.  No dislocation.    Soft tissues:  Unremarkable.    Vasculature:  Abdominal aortic aneurysm is 5.9 cm previously 5.7 cm  probably indicating no significant change given differences in  technique.    Lymph nodes:  Unremarkable.  No enlarged lymph nodes.    Other findings:  No pseudocyst identified.       Impression:      1.  CT findings which are most consistent with acute pancreatitis. No  evidence of hemorrhage or pseudocysts.  2.  Stable abdominal aortic aneurysm as detailed above. 5.9 cm.  3.  Other incidental and nonacute findings as above appear stable.     This report was finalized on 12/8/2021 2:57 PM by Dr. Hernando Banks MD.       XR Chest 1 View [[id number]] Collected: 12/08/21 1426     Updated: 12/08/21 1428    Narrative:      EXAM:    XR Chest, 1 View     EXAM DATE:    12/8/2021 1:34 PM     CLINICAL HISTORY:    pain     TECHNIQUE:    Frontal view of the chest.     COMPARISON:    No relevant prior studies available.     FINDINGS:    Lungs:  Unremarkable.  No consolidation.    Pleural space:  Unremarkable.  No pneumothorax.    Heart:  Cardiomegaly is noted.    Mediastinum:  Unremarkable.    Bones/joints:  Degenerative changes bilateral shoulders.       Impression:         Cardiomegaly. Otherwise unremarkable chest.     This report was finalized on 12/8/2021 2:26 PM by Dr. Hernando Banks MD.       CT Head Without Contrast [[id number]] Collected: 12/08/21 1424     Updated: 12/08/21 1428    Narrative:      EXAM:    CT Head Without Intravenous Contrast     EXAM DATE:    12/8/2021 1:39 PM     CLINICAL HISTORY:    Mental status change, unknown cause     TECHNIQUE:    Axial computed tomography images of the head/brain without intravenous  contrast.  Sagittal and coronal reformatted images were created and  reviewed.  This CT exam was performed using one or more of the following  dose reduction techniques:  automated exposure control, adjustment of  the mA and/or kV according to patient size, and/or use of iterative  reconstruction technique.     COMPARISON:    No relevant prior studies available.     FINDINGS:    Brain:  Moderate generalized cerebral atrophy which is in part  age-related.  Mild chronic small vessel ischemic disease noted.  No  hemorrhage.    Ventricles:  Unremarkable.  No ventriculomegaly.    Bones/joints:  Unremarkable.  No acute fracture.    Soft tissues:  Unremarkable.    Sinuses:  Unremarkable as visualized.  No acute sinusitis.    Mastoid air cells:  Unremarkable as visualized.  No mastoid effusion.       Impression:      1.  Generalized cerebral atrophy and mild chronic small vessel ischemic  disease.  2.  No CT evidence of acute intracranial abnormality.     This report was finalized on 12/8/2021 2:26 PM by Dr. Hernando Banks MD.             I have personally reviewed the patient's radiologic imaging.        EKG:   Normal sinus rhythm, HR 83, QTc 512 (prolonged)  Nonspecific ST and T wave abnormality  Abnormal ECG  When compared with ECG of 15-NOV-2019 10:08,  ST now depressed in Inferior leads  ST now depressed in Lateral leads  QT has lengthened  Confirmed by Kenton Coombs ([id number]) on 12/8/2021 6:53:01 PM    I have personally reviewed the patient's EKG. Agree that ST  and T wave changes are nonspecific; I do not appreciate any overt ST depression or elevation.         Assessment/Plan     - Acute pancreatitis: treat with aggressive IV fluid hydration, bowel rest with exception of sips with meds, and antiemetics and analgesics if needed. No LFT or bilirubin elevation to suggest obstructive etiology, and no gallstones or signs of biliary obstruction such as common bile duct dilation mentioned on CT imaging either. Patient denies alcohol use. Etiology possibly medication-induced as prelim home med list includes a couple PPI's and HCTZ. Hold off on further antibiotics at this time as no evidence of pancreatic abscess or necrosis noted on CT imaging. Continue to trend amylase and lipase along with inflammatory markers.  - Severe electrolyte derangements, including hypokalemia, hypomagnesemia, and hypercalcemia: likely secondary to above, in combination with GI fluid losses from vomiting/diarrhea and effects of home HCTZ (if confirmed by pharmacy as home med) which could cause both low K+/mag as well as high calcium. Calcitonin given in ED and additional dose ordered by nephrology, along with aggressive IV fluid hydration. Replace K+ through IV fluids as well as orally per nephrology recommendations. Magnesium replacement protocol ordered (renally dosed). Continue to monitor electrolytes closely.   - LILIA superimposed on stage III CKD: baseline creatinine and GFR around 1-1.3 and 40s, respectively. Cr currently 3.35. BUN 28, with BUN:cr ratio of 8.4. Nephrology consulted, aggressive IV fluid hydration ordered. Bentley placed per nephrology recommendation as well for more accurate I/O's. Continue to monito urine output and renal function closely.   - Hypertension: BP currently controlled at 131/73. Hold off nephrotoxic home agents. Will make IV hydralazine available PRN.   - Elevated troponin: suspect likely skewed from LILIA. Troponin trending downward overall since initiation of aggressive  IV fluids; continue to trend overnight. EKG showed only nonspecific ST and T wave changes. Patient denies any chest pain or dyspnea. Evaluate further with ECHO in the morning.   - QT prolongation: anticipate will shorten as K+ and mag improve. Repeat EKG in the morning. Avoid QT prolonging agents in the mean time, including most antiemetics (PO phenergan ordered PRN for now).    - Ataxia: CT head showed no acute abnormality. Likely secondary to electrolyte derangements above. Finger-to-nose coordination intact on my exam. No focal deficits appreciated. Neuro checks ordered q4h while addressing electrolytes.   - History of esophageal cancer and breast cancer, currently in remission: followed by [facility].     DVT Prophylaxis: SQ heparin    Estimated Length of Stay >2 midnights    I discussed the patient's findings, assessment and plan with the patient and nursing staff in the PCU.     * patient is high risk due to acute pancreatitis, severe hypokalemia, severe hypomagnesemia, severe hypercalcemia, acute on CKD stage III, troponin elevation, QT prolongation    Shar Marr MD  12/08/21  21:10 EST      Electronically signed by Shar Marr MD at 12/08/21 2231       Vital Signs (last day)     Date/Time Temp Temp src Pulse Resp BP Patient Position SpO2    12/21/21 0900 97.8 (36.6) Oral 83 18 111/52 Lying 95    12/21/21 0630 98 (36.7) Oral 66 18 120/60 Lying 97    12/21/21 0259 97.3 (36.3) Oral 71 18 114/60 Lying 95    12/20/21 2227 98 (36.7) Oral -- 18 155/81 Lying --    12/20/21 1900 97.5 (36.4) Oral 68 18 113/49 Lying 98    12/20/21 1436 98 (36.7) Oral 72 18 117/62 Lying --    12/20/21 1050 -- -- 73 -- 115/54 -- --    12/20/21 1041 98.2 (36.8) Oral 74 20 119/72 Lying 97    12/20/21 0700 98.1 (36.7) Oral 73 18 121/66 Lying 97    12/20/21 0300 98 (36.7) Oral 76 18 100/62 Lying 96          Lines, Drains & Airways     Active LDAs     Name Placement date Placement time Site Days    Peripheral  IV 12/16/21 0837 Anterior; Right Forearm 12/16/21  0837  Forearm  5    Peripheral IV 12/16/21 0838 Anterior; Distal; Left Forearm 12/16/21  0838  Forearm  5                  Current Facility-Administered Medications   Medication Dose Route Frequency Provider Last Rate Last Admin   • [START ON 12/30/2021] amiodarone (PACERONE) tablet 200 mg  200 mg Oral Q24H Jsavir Agudelo MD       • amiodarone (PACERONE) tablet 400 mg  400 mg Oral Q24H Jasvir Agudelo MD   400 mg at 12/21/21 0848   • anastrozole (ARIMIDEX) tablet 1 mg  1 mg Oral Daily Jasvir Agudelo MD   1 mg at 12/21/21 0835   • apixaban (ELIQUIS) tablet 5 mg  5 mg Oral Q12H Jennie Galvan MD   5 mg at 12/21/21 0848   • atenolol (TENORMIN) tablet 50 mg  50 mg Oral Q24H Jasvir Agudelo MD   50 mg at 12/21/21 0848   • calcium gluconate 1 g in sodium chloride 0.9 % 100 mL IVPB  1 g Intravenous PRN Jasvir Agudelo MD        And   • calcium gluconate 6 g in sodium chloride 0.9 % 500 mL IVPB  6 g Intravenous PRN Jasvir Agudelo MD       • furosemide (LASIX) tablet 20 mg  20 mg Oral Daily Jasvir Agudelo MD   20 mg at 12/21/21 0849   • hydrALAZINE (APRESOLINE) injection 10 mg  10 mg Intravenous Q6H PRN Jasvir Agudelo MD       • loperamide (IMODIUM) capsule 2 mg  2 mg Oral 4x Daily PRN Jasvir Agudelo MD   2 mg at 12/19/21 1314   • magnesium sulfate 4 gram infusion - Mg less than or equal to 1mg/dL  4 g Intravenous PRN Jasvir Agudelo MD        Or   • magnesium sulfate 3 gram infusion (1gm x 3) - Mg 1.1 - 1.5 mg/dL  1 g Intravenous PRN Jasvir Agudelo MD        Or   • Magnesium Sulfate 2 gram infusion- Mg 1.6 - 1.9 mg/dL  2 g Intravenous PRN Jasvir Agudelo MD       • pantoprazole (PROTONIX) EC tablet 40 mg  40 mg Oral Q AM Jasvir Agudelo MD   40 mg at 12/20/21 0538   • potassium chloride (K-DUR,KLOR-CON) CR tablet 40 mEq  40 mEq Oral PRN Jasvir Agudelo MD   40 mEq at 12/20/21 0848   • potassium chloride (KLOR-CON)  packet 40 mEq  40 mEq Oral PRN Jasvir Agudelo MD       • potassium chloride 10 mEq in 100 mL IVPB  10 mEq Intravenous Q1H PRN Jasvir Agudelo MD       • potassium phosphate 21 mmol in sodium chloride 0.9 % 250 mL infusion  21 mmol Intravenous PRN Jasvir Agudelo MD        Or   • potassium phosphate 15 mmol in sodium chloride 0.9 % 100 mL infusion  15 mmol Intravenous PRN Jasvir Agudelo MD        Or   • potassium phosphate 9 mmol in sodium chloride 0.9 % 100 mL infusion  9 mmol Intravenous PRN Jasvir Agudelo MD       • promethazine (PHENERGAN) suppository 6.25 mg  6.25 mg Rectal Q6H PRN Jasvir Agudelo MD   6.25 mg at 12/09/21 2040   • promethazine (PHENERGAN) tablet 6.25 mg  6.25 mg Oral Q6H PRN Jasvir Agudelo MD   6.25 mg at 12/20/21 1945   • sodium chloride 0.9 % flush 10 mL  10 mL Intravenous PRN Jasvir Agudelo MD       • sodium chloride 0.9 % flush 10 mL  10 mL Intravenous Q12H Jasvir Agudelo MD   10 mL at 12/20/21 0849   • sodium chloride 0.9 % flush 10 mL  10 mL Intravenous PRN Jasvir Agudelo MD       • sodium chloride 0.9 % flush 10 mL  10 mL Intravenous Q12H Jasvir Agudelo MD   10 mL at 12/21/21 0849   • sodium chloride 0.9 % flush 10 mL  10 mL Intravenous PRN Jasvir Agudelo MD           Lab Results (last 24 hours)     Procedure Component Value Units Date/Time    COVID PRE-OP / PRE-PROCEDURE SCREENING ORDER (NO ISOLATION) - Swab, Nasopharynx [[id number]]  (Normal) Collected: 12/21/21 1049    Specimen: Swab from Nasopharynx Updated: 12/21/21 1152    Narrative:      The following orders were created for panel order COVID PRE-OP / PRE-PROCEDURE SCREENING ORDER (NO ISOLATION) - Swab, Nasopharynx.  Procedure                               Abnormality         Status                     ---------                               -----------         ------                     COVID-19,CEPHEID/AMI,CO...[[id number]]  Normal              Final result                 Please view  results for these tests on the individual orders.    COVID-19,CEPHEID/AIM,COR/LORNA/PAD/SUJIT IN-HOUSE(OR EMERGENT/ADD-ON),NP SWAB IN TRANSPORT MEDIA 3-4 HR TAT, RT-PCR - Swab, Nasopharynx [[id number]]  (Normal) Collected: 12/21/21 1049    Specimen: Swab from Nasopharynx Updated: 12/21/21 1152     COVID19 Not Detected    Narrative:      Fact sheet for providers: https://www.fda.gov/media/297492/download     Fact sheet for patients: https://www.fda.gov/media/541823/download  Fact sheet for providers: https://www.fda.gov/media/808766/download    Fact sheet for patients: https://www.fda.gov/media/078993/download    Test performed by PCR.    Magnesium [[id number]]  (Normal) Collected: 12/21/21 0230    Specimen: Blood Updated: 12/21/21 0306     Magnesium 2.2 mg/dL         Orders (last 24 hrs)      Start     Ordered    12/30/21 0900  amiodarone (PACERONE) tablet 200 mg  Every 24 Hours Scheduled         12/16/21 1128    12/30/21 0000  amiodarone (PACERONE) 200 MG tablet  Every 24 Hours Scheduled         12/21/21 1208    12/22/21 0000  atenolol (TENORMIN) 50 MG tablet  Every 24 Hours Scheduled         12/21/21 1208    12/22/21 0000  amiodarone (PACERONE) 400 MG tablet  Every 24 Hours Scheduled         12/21/21 1208    12/22/21 0000  furosemide (LASIX) 20 MG tablet  Daily         12/21/21 1208    12/22/21 0000  pantoprazole (PROTONIX) 40 MG EC tablet  Every Early Morning         12/21/21 1208    12/21/21 1207  Discontinue IV  Once         12/21/21 1208    12/21/21 1203  Discharge patient  Once         12/21/21 1208    12/21/21 1003  COVID PRE-OP / PRE-PROCEDURE SCREENING ORDER (NO ISOLATION) - Swab, Nasopharynx  Once         12/21/21 1002    12/21/21 1003  COVID-19,CEPHEID/AMI,COR/LORNA/PAD/SUJIT IN-HOUSE(OR EMERGENT/ADD-ON),NP SWAB IN TRANSPORT MEDIA 3-4 HR TAT, RT-PCR - Swab, Nasopharynx  PROCEDURE ONCE         12/21/21 1002    12/21/21 0900  apixaban (ELIQUIS) tablet 5 mg  Every 12 Hours Scheduled         12/21/21 0730     "12/21/21 0600  Magnesium  Morning Draw         12/20/21 1403    12/21/21 0000  apixaban (ELIQUIS) 5 MG tablet tablet  Every 12 Hours Scheduled         12/21/21 1208    12/21/21 0000  magnesium oxide (MAG-OX) 400 MG tablet  Daily         12/21/21 1208    12/21/21 0000  potassium chloride 10 MEQ CR tablet  Daily         12/21/21 1208    12/21/21 0000  Discharge Follow-up with PCP         12/21/21 1208    12/21/21 0000  Discharge Follow-up with Specified Provider: Endocrine; 1 Month         12/21/21 1208    12/20/21 1400  Magnesium Sulfate 4 gram infusion- Mg 1.6-1.9 mg/dL  Once         12/20/21 1207    12/20/21 1341  Discontinue Indwelling Urinary Catheter  Once         12/20/21 1340    12/20/21 1341  Notify Provider if Bladder Distention Continues  Until Discontinued         12/20/21 1340    12/20/21 1341  Consult Pharmacist For Review of Medications That May Cause Urinary Retention - RN To Place Order for Consult it Needed  Continuous         12/20/21 1340    12/17/21 0900  furosemide (LASIX) tablet 20 mg  Daily         12/16/21 1144    12/16/21 1800  Dietary Nutrition Supplements Boost Plus (Ensure Enlive, Ensure Plus)  Daily With Lunch & Dinner       12/16/21 1409    12/16/21 1614  calcium gluconate 1 g in sodium chloride 0.9 % 100 mL IVPB  As Needed        Note to Pharmacy: Final concentration of bolus dose between 10 to 50 mg/mL.   \"And\" Linked Group Details    12/16/21 1614    12/16/21 1614  calcium gluconate 6 g in sodium chloride 0.9 % 500 mL IVPB  As Needed        Note to Pharmacy: Final concentration of bolus dose between 10 to 50 mg/mL.   \"And\" Linked Group Details    12/16/21 1614    12/16/21 1230  amiodarone (PACERONE) tablet 400 mg  Every 24 Hours Scheduled         12/16/21 1128    12/14/21 1350  loperamide (IMODIUM) capsule 2 mg  4 Times Daily PRN         12/14/21 1350    12/14/21 0313  potassium chloride (K-DUR,KLOR-CON) CR tablet 40 mEq  As Needed         12/14/21 0314    12/14/21 0313  potassium " "chloride (KLOR-CON) packet 40 mEq  As Needed         12/14/21 0314    12/13/21 1507  Up In Chair  Every Shift       12/13/21 1506    12/12/21 1601  Ambulate Patient  Every Shift       12/12/21 1601    12/12/21 1430  apixaban (ELIQUIS) tablet 2.5 mg  Every 12 Hours Scheduled,   Status:  Discontinued         12/12/21 1334    12/12/21 0600  pantoprazole (PROTONIX) EC tablet 40 mg  Every Early Morning         12/11/21 1354    12/12/21 0600  Incentive Spirometry  Every 4 Hours While Awake       12/12/21 0337    12/09/21 1230  anastrozole (ARIMIDEX) tablet 1 mg  Daily         12/09/21 1126    12/09/21 1000  atenolol (TENORMIN) tablet 50 mg  Every 24 Hours Scheduled         12/09/21 0810    12/09/21 0804  Patient Currently On Electrolyte Replacement Protocol - Please Refer to MAR for Protocol Details  Misc Nursing Order (Specify)  Daily      Comments: Patient Currently On Electrolyte Replacement Protocol - Please Refer to MAR for Protocol Details    12/09/21 0803    12/09/21 0803  potassium chloride 10 mEq in 100 mL IVPB  Every 1 Hour PRN         12/09/21 0803    12/09/21 0506  Patient Currently On Electrolyte Replacement Protocol - Please Refer to MAR for Protocol Details  Misc Nursing Order (Specify)  Daily      Comments: Patient Currently On Electrolyte Replacement Protocol - Please Refer to MAR for Protocol Details    12/09/21 0505    12/09/21 0505  potassium phosphate 21 mmol in sodium chloride 0.9 % 250 mL infusion  As Needed        \"Or\" Linked Group Details    12/09/21 0505    12/09/21 0505  potassium phosphate 15 mmol in sodium chloride 0.9 % 100 mL infusion  As Needed        \"Or\" Linked Group Details    12/09/21 0505    12/09/21 0505  potassium phosphate 9 mmol in sodium chloride 0.9 % 100 mL infusion  As Needed        \"Or\" Linked Group Details    12/09/21 0505    12/09/21 0444  promethazine (PHENERGAN) suppository 6.25 mg  Every 6 Hours PRN         12/09/21 0444    12/09/21 0030  sodium chloride 0.9 % flush 10 mL " " Every 12 Hours Scheduled         12/08/21 2336    12/09/21 0000  Neuro Checks  Every 4 Hours       12/08/21 2153    12/08/21 2336  sodium chloride 0.9 % flush 10 mL  As Needed         12/08/21 2336    12/08/21 2300  Vital Signs Every Hour and Per Hospital Policy Based on Patient Condition  Every Hour       12/08/21 2213    12/08/21 2300  Intake and Output  Every Hour       12/08/21 2213    12/08/21 2300  sodium chloride 0.9 % flush 10 mL  Every 12 Hours Scheduled         12/08/21 2213    12/08/21 2226  promethazine (PHENERGAN) tablet 6.25 mg  Every 6 Hours PRN         12/08/21 2226 12/08/21 2214  Daily Weights  Daily       12/08/21 2213 12/08/21 2214  Oral care for patients not on NPPV or intubated  Every Shift      Comments: Oral care for patients not on NPPV or intubated    12/08/21 2213 12/08/21 2213  sodium chloride 0.9 % flush 10 mL  As Needed         12/08/21 2213 12/08/21 2125  hydrALAZINE (APRESOLINE) injection 10 mg  Every 6 Hours PRN         12/08/21 2125 12/08/21 2105  Patient Currently On Electrolyte Replacement Protocol - Please Refer to MAR for Protocol Details  Misc Nursing Order (Specify)  Daily      Comments: Patient Currently On Electrolyte Replacement Protocol - Please Refer to MAR for Protocol Details    12/08/21 2104 12/08/21 2104  magnesium sulfate 4 gram infusion - Mg less than or equal to 1mg/dL  As Needed        \"Or\" Linked Group Details    12/08/21 2104 12/08/21 2104  magnesium sulfate 3 gram infusion (1gm x 3) - Mg 1.1 - 1.5 mg/dL  As Needed        \"Or\" Linked Group Details    12/08/21 2104 12/08/21 2104  Magnesium Sulfate 2 gram infusion- Mg 1.6 - 1.9 mg/dL  As Needed        \"Or\" Linked Group Details    12/08/21 2104 12/08/21 2029  Urinary Catheter Care  Every Shift,   Status:  Canceled      \"And\" Linked Group Details    12/08/21 2029 12/08/21 1300  sodium chloride 0.9 % flush 10 mL  As Needed        \"And\" Linked Group Details    12/08/21 1300    Unscheduled " " Magnesium  As Needed       12/08/21 2104    Unscheduled  Potassium  As Needed       12/08/21 2104    Unscheduled  Change Dressing to IV Site As Needed When Damp, Loose or Soiled  As Needed       12/08/21 2336    Unscheduled  Insert New Peripheral IV  As Needed      Comments: Frequency Per Facility Policy    12/08/21 2336    Unscheduled  Magnesium  As Needed       12/09/21 0505    Unscheduled  Potassium  As Needed       12/09/21 0505    Unscheduled  Magnesium  As Needed       12/09/21 0803    Unscheduled  Potassium  As Needed       12/09/21 0803    Unscheduled  Calcium  As Needed      Comments: 6 hours after infusion complete     \"And\" Linked Group Details    12/16/21 1614    Unscheduled  Up In Chair  As Needed       12/18/21 1733    Unscheduled  Bladder Scan if Patient Unable to Void 4-6 Hours After Catheter Removal  As Needed         12/20/21 1340    Unscheduled  If Bladder Scan Volume is Less Than 500mL & Patient is Without Symptoms of Bladder Discomfort / Distention Monitor Every 1-2 Hours for Spontaneous Void  As Needed       12/20/21 1340    Unscheduled  Straight Cath Every 4-6 Hours As Needed If Patient is Unable to Void After 4-6 Hours, Bladder Scan Volume is Greater Than 500mL & Patient Has Symptoms of Bladder Discomfort / Distention  As Needed       12/20/21 1340    Unscheduled  Schedule / Prompt Voiding For Patients With Urinary Incontinence  As Needed       12/20/21 1340    --  SCANNED - TELEMETRY           12/08/21 0000    --  SCANNED - TELEMETRY           12/08/21 0000    --  SCANNED - TELEMETRY           12/08/21 0000    --  amLODIPine (NORVASC) 10 MG tablet  Daily         12/09/21 1016    --  promethazine (PHENERGAN) 12.5 MG tablet  Every 4 Hours PRN         12/09/21 1016    --  calcium carbonate (TUMS) 500 MG chewable tablet  Daily         12/09/21 1022    --  SCANNED - TELEMETRY           12/08/21 0000    --  SCANNED - TELEMETRY           12/08/21 0000    --  SCANNED - TELEMETRY           12/08/21 " 0000    --  SCANNED - TELEMETRY           12/08/21 0000    --  SCANNED - TELEMETRY           12/08/21 0000    --  SCANNED - TELEMETRY           12/08/21 0000    --  SCANNED - TELEMETRY           12/08/21 0000    --  SCANNED - TELEMETRY           12/08/21 0000    --  SCANNED - TELEMETRY           12/08/21 0000    --  SCANNED - TELEMETRY           12/08/21 0000    --  SCANNED - TELEMETRY           12/08/21 0000    --  SCANNED - TELEMETRY           12/08/21 0000    --  SCANNED - TELEMETRY           12/08/21 0000    --  SCANNED - TELEMETRY           12/08/21 0000    --  SCANNED - TELEMETRY           12/08/21 0000    --  SCANNED - TELEMETRY           12/08/21 0000    --  SCANNED - TELEMETRY           12/08/21 0000    --  SCANNED - TELEMETRY           12/08/21 0000    --  SCANNED - TELEMETRY           12/08/21 0000    --  SCANNED - TELEMETRY           12/08/21 0000    --  SCANNED - TELEMETRY           12/08/21 0000    --  SCANNED - TELEMETRY           12/08/21 0000    --  SCANNED - TELEMETRY           12/08/21 0000    --  SCANNED - TELEMETRY           12/08/21 0000    --  SCANNED - TELEMETRY           12/08/21 0000    --  SCANNED - TELEMETRY           12/08/21 0000    --  SCANNED - TELEMETRY           12/08/21 0000    --  SCANNED - TELEMETRY           12/08/21 0000    --  SCANNED - TELEMETRY           12/08/21 0000    --  SCANNED - TELEMETRY           12/08/21 0000    --  SCANNED - TELEMETRY           12/08/21 0000    --  SCANNED - TELEMETRY           12/08/21 0000    --  SCANNED - TELEMETRY           12/08/21 0000    --  SCANNED - TELEMETRY           12/08/21 0000    --  SCANNED - TELEMETRY           12/08/21 0000    --  SCANNED - TELEMETRY           12/08/21 0000    --  SCANNED - TELEMETRY           12/08/21 0000    --  SCANNED - TELEMETRY           12/08/21 0000    --  SCANNED - TELEMETRY           12/08/21 0000    --  SCANNED - TELEMETRY           12/08/21 0000    --  SCANNED - TELEMETRY           12/08/21 0000                 Physician Progress Notes (last 24 hours)  Notes from 12/20/21 1241 through 12/21/21 1241   No notes of this type exist for this encounter.         Consult Notes (last 24 hours)  Notes from 12/20/21 1241 through 12/21/21 1241   No notes of this type exist for this encounter.         Physical Therapy Notes (last 24 hours)  Notes from 12/20/21 1241 through 12/21/21 1241   No notes exist for this encounter.         Occupational Therapy Notes (last 24 hours)  Notes from 12/20/21 1241 through 12/21/21 1241   No notes exist for this encounter.         ADL Documentation (last day)     Date/Time Transferring Toileting Bathing Dressing Eating Communication Swallowing    12/21/21 1000 2 - assistive person 2 - assistive person 2 - assistive person 2 - assistive person 0 - independent 0 - understands/communicates without difficulty 0 - swallows foods/liquids without difficulty    12/20/21 2045 2 - assistive person 2 - assistive person 2 - assistive person 2 - assistive person 0 - independent 0 - understands/communicates without difficulty 0 - swallows foods/liquids without difficulty    12/20/21 0850 2 - assistive person 2 - assistive person 2 - assistive person 2 - assistive person 0 - independent 0 - understands/communicates without difficulty 0 - swallows foods/liquids without difficulty            Discharge Summary    No notes of this type exist for this encounter.         Discharge Order (From admission, onward)     Start     Ordered    12/21/21 1203  Discharge patient  Once        Expected Discharge Date: 12/21/21    Discharge Disposition: Skilled Nursing Facility (DC - External)    Physician of Record for Attribution - Please select from Treatment Team: FELICITAS PLASCENCIA [[id number]]    Review needed by CMO to determine Physician of Record: No       Question Answer Comment   Physician of Record for Attribution - Please select from Treatment Team FELICITAS PLASCENCIA    Review needed by CMO to determine Physician of  Record No        12/21/21 1208                After Visit Summary    Geeta Renee  MRN: [id number]    Icon primary diagnosis          Acute inflammation of the pancreas   Icon Date   12/8/2021 - 12/21/2021   Icon Location   [address]     Icon Checklist header      Your Next Steps      Icon destination   Destination    [facility]   Skilled Nursing   [address] [facility] [address]   [phone]      Icon do   Do     Icon Checkbox     8 medications from [facility] [place] [address] [facility]. - [phone] [facility] [phone] FX      Icon read   Read     Icon Checkbox    Read these attachments  1 Acute Pancreatitis  Easy-to-Read (English)  2 Hypercalcemia (English)  3 Heart Failure  Diagnosis  Easy-to-Read (English)  4 Atrial Fibrillation  Easy-to-Read (English)      Icon Location   Go    Jan 4 2022 Mammo cor diag dig mina lt 1:00 PM  [facility]   [address] [facility] [address] [phone]  Bring any films/reports from outside facilities. Do not apply any powders, perfumes, deodorants, or lotions on the day of the exam. Arrive 15 minutes prior to exam time.   You have more future appointments. Please review your full appointment list.    After Visit Summary    Instructions     Icon medication changes this visit             Your medications have changed    Icon medications to start taking   START taking:   amiodarone (PACERONE)   This medication has multiple start dates; refer to the medication list below for more details.       apixaban (ELIQUIS)        furosemide (LASIX)   Start taking on: December 22, 2021       magnesium oxide (MAG-OX)        pantoprazole (PROTONIX)   Start taking on: December 22, 2021       potassium chloride         Icon medications to change how you take   CHANGE how you take:   atenolol (TENORMIN) -- medication strength, how much to take, when to take this        Icon  medications to stop taking   STOP taking:   calcium carbonate 500 MG chewable tablet (TUMS)        promethazine 12.5 MG tablet (PHENERGAN)         Icon medications to ask your healthcare provider about   ASK how to take:   amLODIPine 10 MG tablet (NORVASC)         Review your updated medication list below.          Your Next Steps  Icon destination   Destination  Icon do   Do  Icon read   Read  Icon Location   Go      COVID-19 Vaccination Information  Why Get Vaccinated?  Building defenses against COVID-19 is a team effort, and you are a gordon part of that team. Getting the COVID-19 vaccine adds one more layer of protection for you, your coworkers, and family. Here are ways you can build people’s confidence in the COVID-19 vaccines in your community and at home.     · Get vaccinated and enroll in the v-safe text messaging program to help CDC monitor vaccine safety.   · Tell others why you are getting vaccinated and encourage them to get vaccinated. Share your success story.    · Learn how to have conversations about COVID-19 vaccine with coworkers, family, and friends.  · https://www.cdc.gov/coronavirus/2019-ncov/vaccines/index.html     How do I schedule an appointment for a vaccine?  https://www.vaccines.gov/ helps you find locations that carry COVID-19 vaccines and their contact information. Because every location handles appointments differently, you will need to schedule your appointment directly with the location you choose.            If you have any questions about your recovery, please call the [facility] Nurse Call Center at [phone]. A registered nurse is available 24 hours a day 7 days a week to assist you.   If you have any COVID-19 related questions, please call [phone].       Icon Orders placed today                    Other instructions    Discharge Follow-up with PCP   Currently Documented PCP:   Alejandra Dozier APRN   PCP Phone Number:   [phone]       Discharge Follow-up with  Specified Provider: Endocrine; 1 Month                 What's Next    What's Next          Follow up with PRISCILLA Booker APRN (posthospitalization follow-up) 1 week with BMP check including phosphorus, magnesium and possible level check [address]   [address]   [facility] [address]  [phone]   Jan 4 2022 Mammo cor diag dig mina lt  Tuesday Jan 4, 2022 1:00 PM  Bring any films/reports from outside facilities.   Do not apply any powders, perfumes, deodorants, or lotions on the day of the exam.   Arrive 15 minutes prior to exam time. [facility]  [address] [facility] [address]  [phone]   Jan 11 2022 Pre-Admission Testing   Tuesday Jan 11, 2022 1:45 PM   [facility]  [address] [facility] [address]  [phone]   Mar 3 2022 LAB  Thursday Mar 3, 2022 12:00 PM   [facility] HEMATOLOGY & ONCOLOGY  [address] [facility] [address]   [facility] [address]  [phone]          FOLLOW UP with BERTHA Berrios MD  Thursday Mar 3, 2022 12:30 PM   [facility] HEMATOLOGY & ONCOLOGY  [address] [facility] [address]   [facility] [address]  [phone]          INJECTION  Thursday Mar 3, 2022 1:00 PM   [facility] OUTPATIENT INFUSION  [address] [facility] [address]  [phone]    Icon Orders placed today                    Additional Information        Apt    dr sheridan  for  dec  30  at  11:45   in  [place]  office               Continuing Care     Icon destination      Destination    [facility]  Services: Skilled Nursing   Address: [address] [place] [address]   Phone: [phone]                         Your Allergies  Date Reviewed: 12/9/2021  Your Allergies   Allergen Reactions   Codeine Nausea Only       Sulfa Antibiotics Hives     Patient Belongings Returned      Document Return of Belongings Flowsheet    Were the patient bedside belongings sent home? --    Medications Retrieved from Pharmacy & Sent Home --   Belongings Sent to Safe --   Belongings sent with: --   Belongings Retrieved from Security & Sent Home --           [employer] Signup    [facility] [employer] allows you to send messages to your doctor, view your test results, renew your prescriptions, schedule appointments, and more. To sign up, go to [employer] and click on the Sign Up Now link in the New User? box. Enter your Ichiba Activation Code exactly as it appears below along with the last four digits of your Social Security Number and your Date of Birth () to complete the sign-up process. If you do not sign up before the expiration date, you must request a new code.     Ichiba Activation Code: [email or web address]  Expires: 2022 12:33 PM     If you have questions, you can email [email or web address] or call [phone] to talk to our Ichiba staff. Remember, Ichiba is NOT to be used for urgent needs. For medical emergencies, dial 911.       Medication List      TAKE these medications    TAKE these medications     Morning Afternoon Evening Bedtime As Needed   Icon medications to start taking   * amiodarone 400 MG tablet  Commonly known as: PACERONE  Start taking on: 2021  Take 1 tablet by mouth Daily for 8 doses.  Last time this was given: Ask your nurse or doctor        Icon medications to start taking   * amiodarone 200 MG tablet  Commonly known as: PACERONE  Start taking on: 2021  Take 1 tablet by mouth Daily.  Last time this was given: Ask your nurse or doctor         anastrozole 1 MG tablet  Commonly known as: ARIMIDEX  Take by mouth Daily.  Last time this was given: 1 mg on 2021  8:35 AM        Icon medications to start taking   apixaban 5 MG tablet tablet  Commonly known as: ELIQUIS  Take 1 tablet by mouth Every 12 (Twelve) Hours. Indications: Atrial Fibrillation  For: Atrial Fibrillation  Last time this was given: 5 mg on December  21, 2021  8:48 AM        Icon medications to change how you take   atenolol 50 MG tablet  Commonly known as: TENORMIN  Start taking on: December 22, 2021  Take 1 tablet by mouth Daily.  What changed:   0 medication strength  0 how much to take  0 when to take this  Last time this was given: 50 mg on December 21, 2021  8:48 AM        Icon medications to start taking   furosemide 20 MG tablet  Commonly known as: LASIX  Start taking on: December 22, 2021  Take 1 tablet by mouth Daily.  Last time this was given: 20 mg on December 21, 2021  8:49 AM        Icon medications to start taking   magnesium oxide 400 MG tablet  Commonly known as: MAG-OX  Take 1 tablet by mouth Daily.        Icon medications to start taking   pantoprazole 40 MG EC tablet  Commonly known as: PROTONIX  Start taking on: December 22, 2021  Take 1 tablet by mouth Every Morning.  Last time this was given: 40 mg on December 20, 2021  5:38 AM        Icon medications to start taking   potassium chloride 10 MEQ CR tablet  Take 1 tablet by mouth Daily.  Last time this was given: Ask your nurse or doctor         (very important)  * This list has 2 medication(s) that are the same as other medications prescribed for you. Read the directions carefully, and ask your doctor or other care provider to review them with you.         ASK your doctor about these medications    ASK your doctor about these medications     Morning Afternoon Evening Bedtime As Needed   Icon medications to ask your healthcare provider about   amLODIPine 10 MG tablet  Commonly known as: NORVASC  Take 10 mg by mouth Daily.  Last time this was given: 5 mg on December 16, 2021  8:13 AM  Ask about: Which instructions should I use?                   Where to  your medications     Icon medication  information                     these medications at [facility] [place] [address] [address] - [phone]  - [phone] FX     amiodarone (2  "prescriptions) • apixaban • atenolol • furosemide • magnesium oxide • pantoprazole • potassium chloride      Address[facility] [address] [place], [place] [address] [address]   Phone: [phone]           Always carry an updated list of your medications with you. If there is an emergency, a responder can quickly see what medications you are taking. Take this paperwork with you the next time you see your health care provider.              [employer] Sign-Up    Send messages to your doctor, view your test results, renew your prescriptions, schedule appointments, and more.     Go to [email or web address], click \"Sign Up Now\", and enter your personal activation code: [email or web address]. Activation code expires 1/20/2022.        Instructions    You have been enrolled into the transition from hospital to home program.  A nurse (Mariaelena) will be contacting you after you discharge to home to set up a time to come out to your home for a follow-up visit.  If you have any questions or concerns you can call this number anytime and a nurse will contact you:  [facility]  [phone].       Additional Instructions    Click to add instructions      Icon List of Attachments     Attached Information  Acute Pancreatitis  Easy-to-Read (English)  Acute Pancreatitis  ?     Acute pancreatitis happens when the pancreas gets swollen. The pancreas is a large gland in the body that helps to control blood sugar. It also makes enzymes that help to digest food.  This condition can last a few days and cause serious problems. The lungs, heart, and kidneys may stop working.  What are the causes?  Causes include:  · Alcohol abuse.  · Drug abuse.  · Gallstones.  · A tumor in the pancreas.  Other causes include:  · Some medicines.  · Some chemicals.  · Diabetes.  · An infection.  · Damage caused by an accident.  · The poison (venom) from a scorpion bite.  · Belly (abdominal) surgery.  · The body's defense system (immune system) " attacking the pancreas (autoimmune pancreatitis).  · Genes that are passed from parent to child (inherited).  In some cases, the cause is not known.  What are the signs or symptoms?  · Pain in the upper belly that may be felt in the back. The pain may be very bad.  · Swelling of the belly.  · Feeling sick to your stomach (nauseous) and throwing up (vomiting).  · Fever.  How is this treated?  You will likely have to stay in the hospital. Treatment may include:  · Pain medicine.  · Fluid through an IV tube.  · Placing a tube in the stomach to take out the stomach contents. This may help you stop throwing up.  · Not eating for 3-4 days.  · Antibiotic medicines, if you have an infection.  · Treating any other problems that may be the cause.  · Steroid medicines, if your problem is caused by your defense system attacking your body's own tissues.  · Surgery.  Follow these instructions at home:  Eating and drinking  ?     · Follow instructions from your doctor about what to eat and drink.  · Eat foods that do not have a lot of fat in them.  · Eat small meals often. Do not eat big meals.  · Drink enough fluid to keep your pee (urine) pale yellow.  · Do not drink alcohol if it caused your condition.     Medicines  · Take over-the-counter and prescription medicines only as told by your doctor.  · Ask your doctor if the medicine prescribed to you:  ? Requires you to avoid driving or using heavy machinery.  ? Can cause trouble pooping (constipation). You may need to take steps to prevent or treat trouble pooping:  § Take over-the-counter or prescription medicines.  § Eat foods that are high in fiber. These include beans, whole grains, and fresh fruits and vegetables.  § Limit foods that are high in fat and sugar. These include fried or sweet foods.  General instructions  · Do not use any products that contain nicotine or tobacco, such as cigarettes, e-cigarettes, and chewing tobacco. If you need help quitting, ask your  doctor.  · Get plenty of rest.  · Check your blood sugar at home as told by your doctor.  · Keep all follow-up visits as told by your doctor. This is important.  Contact a doctor if:  · You do not get better as quickly as expected.  · You have new symptoms.  · Your symptoms get worse.  · You have pain or weakness that lasts a long time.  · You keep feeling sick to your stomach.  · You get better and then you have pain again.  · You have a fever.  Get help right away if:  · You cannot eat or keep fluids down.  · Your pain gets very bad.  · Your skin or the white part of your eyes turns yellow.  · You have sudden swelling in your belly.  · You throw up.  · You feel dizzy or you pass out (faint).  · Your blood sugar is high (over 300 mg/dL).  Summary  · Acute pancreatitis happens when the pancreas gets swollen.  · This condition is often caused by alcohol abuse, drug abuse, or gallstones.  · You will likely have to stay in the hospital for treatment.  This information is not intended to replace advice given to you by your health care provider. Make sure you discuss any questions you have with your health care provider.  Document Revised: 10/07/2019 Document Reviewed: 10/07/2019  [employer] Patient Education © 2021 [employer]         Icon List of Attachments     Attached Information  Hypercalcemia (English)  Hypercalcemia  Hypercalcemia is when the level of calcium in a person's blood is above normal. The body needs calcium to make bones and keep them strong. Calcium also helps the muscles, nerves, brain, and heart work the way they should.  Most of the calcium in the body is in the bones. There is also some calcium in the blood. Hypercalcemia can happen when calcium comes out of the bones, or when the kidneys are not able to remove calcium from the blood. Hypercalcemia can be mild or severe.  What are the causes?  There are many possible causes of hypercalcemia. Common causes of this condition  include:  · Hyperparathyroidism. This is a condition in which the body produces too much parathyroid hormone. There are four parathyroid glands in your neck. These glands produce a chemical messenger (hormone) that helps the body absorb calcium from foods and helps your bones release calcium.  · Certain kinds of cancer.  Less common causes of hypercalcemia include:  · Getting too much calcium or vitamin D from your diet.  · Kidney failure.  · Hyperthyroidism.  · Severe dehydration.  · Being on bed rest or being inactive for a long time.  · Certain medicines.  · Infections.  What increases the risk?  You are more likely to develop this condition if you:  · Are female.  · Are 60 years of age or older.  · Have a family history of hypercalcemia.  What are the signs or symptoms?  Mild hypercalcemia that starts slowly may not cause symptoms. Severe, sudden hypercalcemia is more likely to cause symptoms, such as:  · Being more thirsty than usual.  · Needing to urinate more often than usual.  · Abdominal pain.  · Nausea and vomiting.  · Constipation.  · Muscle pain, twitching, or weakness.  · Feeling very tired.  How is this diagnosed?  ?     Hypercalcemia is usually diagnosed with a blood test. You may also have tests to help determine what is causing this condition, such as imaging tests and more blood tests.  How is this treated?  Treatment for hypercalcemia depends on the cause. Treatment may include:  · Receiving fluids through an IV.  · Medicines that:  ? Keep calcium levels steady after receiving fluids (loop diuretics).  ? Keep calcium in your bones (bisphosphonates).  ? Lower the calcium level in your blood.  · Surgery to remove overactive parathyroid glands.  · A procedure that filters your blood to correct calcium levels (hemodialysis).  Follow these instructions at home:  ?     · Take over-the-counter and prescription medicines only as told by your health care provider.  · Follow instructions from your health  care provider about eating or drinking restrictions.  · Drink enough fluid to keep your urine pale yellow.  · Stay active. Weight-bearing exercise helps to keep calcium in your bones. Follow instructions from your health care provider about what type and level of exercise is safe for you.  · Keep all follow-up visits as told by your health care provider. This is important.  Contact a health care provider if you have:  · A fever.  · A heartbeat that is irregular or very fast.  · Changes in mood, memory, or personality.  Get help right away if you:  · Have severe abdominal pain.  · Have chest pain.  · Have trouble breathing.  · Become very confused and sleepy.  · Lose consciousness.  Summary  · Hypercalcemia is when the level of calcium in a person's blood is above normal. The body needs calcium to make bones and keep them strong. Calcium also helps the muscles, nerves, brain, and heart work the way they should.  · There are many possible causes of hypercalcemia, and treatment depends on the cause.  · Take over-the-counter and prescription medicines only as told by your health care provider.  · Follow instructions from your health care provider about eating or drinking restrictions.  This information is not intended to replace advice given to you by your health care provider. Make sure you discuss any questions you have with your health care provider.  Document Revised: 01/14/2020 Document Reviewed: 09/23/2019  [employer] Patient Education © 2021 [employer]         Icon List of Attachments     Attached Information  Heart Failure  Diagnosis  Easy-to-Read (English)  Heart Failure, Diagnosis  ?     Heart failure means that your heart is not able to pump blood in the right way. This makes it hard for your body to work well. Heart failure is usually a long-term (chronic) condition. You must take good care of yourself and follow your treatment plan from your doctor.  What are the causes?  This condition may be caused  by:  · High blood pressure.  · Build up of cholesterol and fat in the arteries.  · Heart attack. This injures the heart muscle.  · Heart valves that do not open and close properly.  · Damage of the heart muscle. This is also called cardiomyopathy.  · Lung disease.  · Abnormal heart rhythms.  What increases the risk?  The risk of heart failure goes up as a person ages. This condition is also more likely to develop in people who:  · Are overweight.  · Are male.  · Smoke or chew tobacco.  · Abuse alcohol or illegal drugs.  · Have taken medicines that can damage the heart.  · Have diabetes.  · Have abnormal heart rhythms.  · Have thyroid problems.  · Have low blood counts (anemia).  What are the signs or symptoms?  Symptoms of this condition include:  · Shortness of breath.  · Coughing.  · Swelling of the feet, ankles, legs, or belly.  · Losing weight for no reason.  · Trouble breathing.  · Waking from sleep because of the need to sit up and get more air.  · Rapid heartbeat.  · Being very tired.  · Feeling dizzy, or feeling like you may pass out (faint).  · Having no desire to eat.  · Feeling like you may vomit (nauseous).  · Peeing (urinating) more at night.  · Feeling confused.  How is this treated?  ?        ?     This condition may be treated with:  · Medicines. These can be given to treat blood pressure and to make the heart muscles stronger.  · Changes in your daily life. These may include eating a healthy diet, staying at a healthy body weight, quitting tobacco and illegal drug use, or doing exercises.  · Surgery. Surgery can be done to open blocked valves, or to put devices in the heart, such as pacemakers.  · A donor heart (heart transplant). You will receive a healthy heart from a donor.  Follow these instructions at home:  · Treat other conditions as told by your doctor. These may include high blood pressure, diabetes, thyroid disease, or abnormal heart rhythms.  · Learn as much as you can about heart  failure.  · Get support as you need it.  · Keep all follow-up visits as told by your doctor. This is important.  Summary  · Heart failure means that your heart is not able to pump blood in the right way.  · This condition is caused by high blood pressure, heart attack, or damage of the heart muscle.  · Symptoms of this condition include shortness of breath and swelling of the feet, ankles, legs, or belly. You may also feel very tired or feel like you may vomit.  · You may be treated with medicines, surgery, or changes in your daily life.  · Treat other health conditions as told by your doctor.  This information is not intended to replace advice given to you by your health care provider. Make sure you discuss any questions you have with your health care provider.  Document Revised: 03/06/2020 Document Reviewed: 03/06/2020  [employer] Patient Education © 2021 [employer]         Icon List of Attachments     Attached Information  Atrial Fibrillation  Easy-to-Read (English)  Atrial Fibrillation  ?     Atrial fibrillation is a type of heartbeat that is irregular or fast. If you have this condition, your heart beats without any order. This makes it hard for your heart to pump blood in a normal way.  Atrial fibrillation may come and go, or it may become a long-lasting problem. If this condition is not treated, it can put you at higher risk for stroke, heart failure, and other heart problems.  What are the causes?  This condition may be caused by diseases that damage the heart. They include:  · High blood pressure.  · Heart failure.  · Heart valve disease.  · Heart surgery.  Other causes include:  · Diabetes.  · Thyroid disease.  · Being overweight.  · Kidney disease.  Sometimes the cause is not known.  What increases the risk?  You are more likely to develop this condition if:  · You are older.  · You smoke.  · You exercise often and very hard.  · You have a family history of this condition.  · You are a man.  · You use  drugs.  · You drink a lot of alcohol.  · You have lung conditions, such as emphysema, pneumonia, or COPD.  · You have sleep apnea.  What are the signs or symptoms?  Common symptoms of this condition include:  · A feeling that your heart is beating very fast.  · Chest pain or discomfort.  · Feeling short of breath.  · Suddenly feeling light-headed or weak.  · Getting tired easily during activity.  · Fainting.  · Sweating.  In some cases, there are no symptoms.  How is this treated?  Treatment for this condition depends on underlying conditions and how you feel when you have atrial fibrillation. They include:  · Medicines to:  ? Prevent blood clots.  ? Treat heart rate or heart rhythm problems.  · Using devices, such as a pacemaker, to correct heart rhythm problems.  · Doing surgery to remove the part of the heart that sends bad signals.  · Closing an area where clots can form in the heart (left atrial appendage).  In some cases, your doctor will treat other underlying conditions.  Follow these instructions at home:  Medicines  · Take over-the-counter and prescription medicines only as told by your doctor.  · Do not take any new medicines without first talking to your doctor.  · If you are taking blood thinners:  ? Talk with your doctor before you take any medicines that have aspirin or NSAIDs, such as ibuprofen, in them.  ? Take your medicine exactly as told by your doctor. Take it at the same time each day.  ? Avoid activities that could hurt or bruise you. Follow instructions about how to prevent falls.  ? Wear a bracelet that says you are taking blood thinners. Or, carry a card that lists what medicines you take.  Lifestyle  ?        ?     · Do not use any products that have nicotine or tobacco in them. These include cigarettes, e-cigarettes, and chewing tobacco. If you need help quitting, ask your doctor.  · Eat heart-healthy foods. Talk with your doctor about the right eating plan for you.  · Exercise regularly  "as told by your doctor.  · Do not drink alcohol.  · Lose weight if you are overweight.  · Do not use drugs, including cannabis.  General instructions  · If you have a condition that causes breathing to stop for a short period of time (apnea), treat it as told by your doctor.  · Keep a healthy weight. Do not use diet pills unless your doctor says they are safe for you. Diet pills may make heart problems worse.  · Keep all follow-up visits as told by your doctor. This is important.  Contact a doctor if:  · You notice a change in the speed, rhythm, or strength of your heartbeat.  · You are taking a blood-thinning medicine and you get more bruising.  · You get tired more easily when you move or exercise.  · You have a sudden change in weight.  Get help right away if:  ?     · You have pain in your chest or your belly (abdomen).  · You have trouble breathing.  · You have side effects of blood thinners, such as blood in your vomit, poop (stool), or pee (urine), or bleeding that cannot stop.  · You have any signs of a stroke. \"BE FAST\" is an easy way to remember the main warning signs:  ? B - Balance. Signs are dizziness, sudden trouble walking, or loss of balance.  ? E - Eyes. Signs are trouble seeing or a change in how you see.  ? F - Face. Signs are sudden weakness or loss of feeling in the face, or the face or eyelid drooping on one side.  ? A - Arms. Signs are weakness or loss of feeling in an arm. This happens suddenly and usually on one side of the body.  ? S - Speech. Signs are sudden trouble speaking, slurred speech, or trouble understanding what people say.  ? T - Time. Time to call emergency services. Write down what time symptoms started.  · You have other signs of a stroke, such as:  ? A sudden, very bad headache with no known cause.  ? Feeling like you may vomit (nausea).  ? Vomiting.  ? A seizure.  These symptoms may be an emergency. Do not wait to see if the symptoms will go away. Get medical help right " away. Call your local emergency services (911 in the [place] Do not drive yourself to the hospital.  Summary  · Atrial fibrillation is a type of heartbeat that is irregular or fast.  · You are at higher risk of this condition if you smoke, are older, have diabetes, or are overweight.  · Follow your doctor's instructions about medicines, diet, exercise, and follow-up visits.  · Get help right away if you have signs or symptoms of a stroke.  · Get help right away if you cannot catch your breath, or you have chest pain or discomfort.  This information is not intended to replace advice given to you by your health care provider. Make sure you discuss any questions you have with your health care provider.  Document Revised: 06/10/2020 Document Reviewed: 06/10/2020  [employer] Patient Education © 2021 [employer]               Opioid Resource    If you or someone you know needs information on substance abuse, please visit   [email or web address].[email or web address] for listings of facilities and resources across [place].      Stroke Symptoms    · Call 911 or have someone take you to the Emergency Department if you have any of the following:  · Sudden numbness or weakness of your face, arm or leg especially on one side of the body  · Sudden confusion, difficulty speaking or trouble understanding   · Changes in your vision or loss of sight in one eye  · Sudden severe headache with no known cause  · Sudden dizziness, trouble walking, loss of balance or coordination     It is important to seek emergency care right away if you have further stroke symptoms. If you get emergency help quickly, the powerful clot-dissolving medicines can reduce the disabilities caused by a stroke.      For more information:  American Stroke Association  1-191-9-STROKE  www.strokeassociation.org      Smoking Cessation    IF YOU SMOKE OR USE TOBACCO PLEASE READ THE FOLLOWING:  Why is smoking bad for me?  Smoking increases the risk of heart disease, lung disease,  vascular disease, stroke, and cancer. If you smoke, STOP!     For more information:  Quit Now [place]  1-800-QUIT-NOW  https://[email or web address].quitlogix.org/en-[place]/      Suicidal Feelings    If you feel like life is too tough and are thinking of suicide or injuring yourself, get help right away!  · Call 911  · Call a suicide hotline to speak to a counselor. 8-705-108-TALK or 2-010-IDWFOSX       Patient Experience    Thank you for choosing [facility]. You may receive a survey following your visit. Please take a moment to share what went well, where we need improvement, and which staff members deserve recognition. We value your input.             ? ?                YOU ARE THE MOST IMPORTANT FACTOR IN YOUR RECOVERY.      Follow all instructions carefully.      I have reviewed my discharge instructions with my nurse, including the following information, if applicable:                 Information about my illness and diagnosis              Follow up appointments (including lab draws)              Wound Care              Equipment Needs              Medications (new and continuing) along with side effects              Preventative information such as vaccines and smoking cessations              Diet              Pain              I know when to contact my Doctor's office or seek emergency care        I want my nurse to describe the side effects of my medications: YES NO   If the answer is no, I understand the side effects of my medications: YES NO   My nurse described the side effects of my medications in a way that I could understand: YES NO   I have taken my personal belongings and my own medications with me at discharge: YES NO          I have received this information and my questions have been answered. I have discussed any concerns I see with this plan with the nurse or physician. I understand these instructions.     Signature of Patient or Responsible Person: _____________________________________     Date:  _________________  Time: __________________     Signature of Healthcare Provider: _______________________________________  Date: _________________  Time: __________________

## 2021-12-23 NOTE — ED NOTES
[facility], notified that transport is needed to take pt back to [facility] [place] [facility].      Kelly Hernandez RN  12/23/21 [id number]

## 2021-12-23 NOTE — ED PROVIDER NOTES
Subjective   87 yo female patient presents to the ED from local NH for elevated potassium levels. NH reports potassium level was 6.1. Pt was recently started on Amiodarone. Patient is asymptomatic.       History provided by:  Patient   used: No        Review of Systems   Constitutional: Negative.    HENT: Negative.    Eyes: Negative.    Respiratory: Negative.    Cardiovascular: Negative.    Gastrointestinal: Negative.    Endocrine: Negative.    Genitourinary: Negative.    Musculoskeletal: Negative.    Skin: Negative.    Allergic/Immunologic: Negative.    Neurological: Negative.    Hematological: Negative.    Psychiatric/Behavioral: Negative.    All other systems reviewed and are negative.      Past Medical History:   Diagnosis Date   • Arthritis    • Breast cancer (HCC)    • Breast lump     left   • Esophageal cancer (HCC)    • GERD (gastroesophageal reflux disease)    • Hiatal hernia    • Hypertension    • Snoring        Allergies   Allergen Reactions   • Codeine Nausea Only   • Sulfa Antibiotics Hives       Past Surgical History:   Procedure Laterality Date   • BREAST LUMPECTOMY Left 2019    Procedure: BREAST LUMPECTOMY WITH ULTRASOUND;  Surgeon: Nurys Selby MD;  Location: [facility];  Service: General   • CATARACT EXTRACTION, BILATERAL  09/10/2019   • CHOLECYSTECTOMY     • COLONOSCOPY     • ELBOW PROCEDURE Right    • ENDOSCOPY     • FRACTURE SURGERY      right elbow       Family History   Problem Relation Age of Onset   • Hypertension Mother    • Alzheimer's disease Mother    • Heart disease Brother    • Tuberculosis Father    • Breast cancer Neg Hx        Social History     Socioeconomic History   • Marital status:    • Number of children: 2   Tobacco Use   • Smoking status: Former Smoker     Packs/day: 0.50     Years: 30.00     Pack years: 15.00     Types: Cigarettes     Quit date:      Years since quittin.9   • Smokeless tobacco: Never Used   Vaping Use   •  Vaping Use: Never used   Substance and Sexual Activity   • Alcohol use: No   • Drug use: No   • Sexual activity: Defer           Objective   Physical Exam  Vitals and nursing note reviewed.   Constitutional:       Appearance: Normal appearance. She is normal weight.   HENT:      Head: Normocephalic and atraumatic.      Right Ear: External ear normal.      Left Ear: External ear normal.      Nose: Nose normal.      Mouth/Throat:      Mouth: Mucous membranes are moist.      Pharynx: Oropharynx is clear.   Eyes:      Extraocular Movements: Extraocular movements intact.      Conjunctiva/sclera: Conjunctivae normal.      Pupils: Pupils are equal, round, and reactive to light.   Cardiovascular:      Rate and Rhythm: Normal rate and regular rhythm.      Pulses: Normal pulses.      Heart sounds: Normal heart sounds.   Pulmonary:      Effort: Pulmonary effort is normal.      Breath sounds: Normal breath sounds.   Abdominal:      General: Abdomen is flat. Bowel sounds are normal.      Palpations: Abdomen is soft.   Musculoskeletal:         General: Normal range of motion.      Cervical back: Normal range of motion and neck supple.   Skin:     General: Skin is warm.      Capillary Refill: Capillary refill takes less than 2 seconds.   Neurological:      General: No focal deficit present.      Mental Status: She is alert and oriented to person, place, and time. Mental status is at baseline.   Psychiatric:         Mood and Affect: Mood normal.         Behavior: Behavior normal.         Thought Content: Thought content normal.         Judgment: Judgment normal.         Procedures           ED Course  ED Course as of 12/23/21 1813   Thu Dec 23, 2021   1627 ECG 16:19 NSR, rate 67. Normal ECG. QT/QTc 444/469 [TONIA]   1733 XR Chest 1 View  IMPRESSION:  No evidence of active or acute cardiopulmonary disease on today's chest  radiograph.     This report was finalized on 12/23/2021 5:01 PM by Dr. Eloy Schmidt MD.             Specimen  Collected: 12/23/21 17:01 Last Resulted: 12/23/21 17:01         [ML]   1813 Potassium: 4.9 [ML]   1813 PT DC back to the NH.  [ML]      ED Course User Index  [TONIA] Glenn Fraser MD  [ML] Liz Levy PA                                                 MDM  Number of Diagnoses or Management Options     Amount and/or Complexity of Data Reviewed  Clinical lab tests: ordered and reviewed  Tests in the radiology section of CPT®: reviewed and ordered    Risk of Complications, Morbidity, and/or Mortality  Presenting problems: low  Diagnostic procedures: low  Management options: low    Patient Progress  Patient progress: improved      Final diagnoses:   Abnormal laboratory test       ED Disposition  ED Disposition     ED Disposition Condition Comment    Discharge Stable           Alejandra Dozier, APRN  [address] [facility] [address]  [facility] [address]  [facility] [id number] [phone]    Schedule an appointment as soon as possible for a visit in 1 day           Medication List      No changes were made to your prescriptions during this visit.          Liz Levy PA  12/23/21 1813

## 2022-01-01 ENCOUNTER — TELEPHONE (OUTPATIENT)
Dept: CARDIAC SURGERY | Facility: CLINIC | Age: 87
End: 2022-01-01

## 2022-01-01 ENCOUNTER — APPOINTMENT (OUTPATIENT)
Dept: PREADMISSION TESTING | Facility: HOSPITAL | Age: 87
End: 2022-01-01

## 2022-01-01 ENCOUNTER — APPOINTMENT (OUTPATIENT)
Dept: MAMMOGRAPHY | Facility: HOSPITAL | Age: 87
End: 2022-01-01

## 2022-01-01 ENCOUNTER — TELEPHONE (OUTPATIENT)
Dept: ONCOLOGY | Facility: CLINIC | Age: 87
End: 2022-01-01

## 2022-01-10 NOTE — TELEPHONE ENCOUNTER
Geeta returned my phone call regarding her surgery with Dr. Arredondo. Geeta stated that she called on 01/07 and CX her PAT due to her being in the hospital due to some health issues. She wants to R/S her surgery in a couple of months, when she feels she will have her strength back. I let her know to contact our office and ask for me when she is ready to get her surgery R/S. She understood.

## 2022-01-10 NOTE — TELEPHONE ENCOUNTER
Pt called someone on 01/07 to CX her PAT apt on 01/11-I saw this information this afternoon and tried to contact the pt to inform her that since she CX her PAT apt we would have to CX her surgery for 01/13-we would not have enough time to get her PAT r/s before her surgery. I was unable to reach the pt and left her a VM. I have placed her on my pending list for Dr. Arredondo and will keep trying to reach the pt.

## 2022-02-02 NOTE — TELEPHONE ENCOUNTER
Caller: NILESH    Relationship: [facility]    Best call back number: [phone]    What was the call regarding: NILESH CALLED REGARDING [facility] PROLIA INJECTIONS. SHE SAYS THE PATIENT IS WANTING TO HAVE THEM DONE WITH THEIR OFFICE, HOWEVER THE COPAY WITH THE PHARMACY IS OVER $700. SHE IS WONDERING HOW WE GET THEM PAID.    Do you require a callback: YES

## 2022-02-08 NOTE — TELEPHONE ENCOUNTER
Caller: [facility]    Relationship to patient: SELF    Best call back number: [phone]    Patient is needing: TO SCHEDULE PROLIA INJECTION.

## 2022-03-02 ENCOUNTER — APPOINTMENT (OUTPATIENT)
Dept: MAMMOGRAPHY | Facility: HOSPITAL | Age: 87
End: 2022-03-02

## 2022-03-02 ENCOUNTER — APPOINTMENT (OUTPATIENT)
Dept: BONE DENSITY | Facility: HOSPITAL | Age: 87
End: 2022-03-02

## 2022-03-03 ENCOUNTER — APPOINTMENT (OUTPATIENT)
Dept: ONCOLOGY | Facility: HOSPITAL | Age: 87
End: 2022-03-03

## 2022-03-04 ENCOUNTER — APPOINTMENT (OUTPATIENT)
Dept: PREADMISSION TESTING | Facility: HOSPITAL | Age: 87
End: 2022-03-04

## (undated) DEVICE — VIOLET BRAIDED (POLYGLACTIN 910), SYNTHETIC ABSORBABLE SUTURE: Brand: COATED VICRYL

## (undated) DEVICE — KT INK TISS MARGINMARKER STD 6COLOR

## (undated) DEVICE — KT CVR ULTRASND PROB PULL UP LXF 5X8

## (undated) DEVICE — AMD ANTIMICROBIAL NON-ADHERENT ISLAND DRESSING,0.2% POLYHEXAMETHYLENE BIGUANIDE HCI (PHMB): Brand: TELFA

## (undated) DEVICE — HOLDER: Brand: DEROYAL

## (undated) DEVICE — SUT VIC 3/0 SH 27IN J416H

## (undated) DEVICE — BRA COMPR CURAD P/OP LG

## (undated) DEVICE — DRAPE,UTILTY,TAPE,15X26, 4EA/PK: Brand: MEDLINE

## (undated) DEVICE — SPNG LAP PREWSH SFTPK 18X18IN STRL PK/5

## (undated) DEVICE — GLV SURG PREMIERPRO MIC LTX PF SZ7 BRN

## (undated) DEVICE — DRAPE,T,LAPARO,TRANS,STERILE: Brand: MEDLINE

## (undated) DEVICE — PAD GRND REM POLYHESIVE A/ DISP

## (undated) DEVICE — SUT SILK 2/0 SH 30IN K833H

## (undated) DEVICE — PK BASIC 70

## (undated) DEVICE — APPL CHLORAPREP W/TINT 26ML ORNG